# Patient Record
Sex: FEMALE | Race: WHITE | Employment: OTHER | URBAN - METROPOLITAN AREA
[De-identification: names, ages, dates, MRNs, and addresses within clinical notes are randomized per-mention and may not be internally consistent; named-entity substitution may affect disease eponyms.]

---

## 2017-07-11 PROBLEM — Z71.2 ENCOUNTER TO DISCUSS TEST RESULTS: Status: ACTIVE | Noted: 2017-07-11

## 2017-07-11 PROBLEM — N28.89 LEFT KIDNEY MASS: Status: ACTIVE | Noted: 2017-07-11

## 2018-05-01 PROBLEM — N17.9 AKI (ACUTE KIDNEY INJURY) (HCC): Status: ACTIVE | Noted: 2018-05-01

## 2018-05-01 PROBLEM — E86.0 DEHYDRATION: Status: ACTIVE | Noted: 2018-05-01

## 2018-05-31 PROBLEM — E86.0 DEHYDRATION: Status: RESOLVED | Noted: 2018-05-01 | Resolved: 2018-05-31

## 2018-06-05 ENCOUNTER — HOSPITAL ENCOUNTER (OUTPATIENT)
Dept: MRI IMAGING | Age: 78
Discharge: HOME | End: 2018-06-05
Attending: INTERNAL MEDICINE | Admitting: INTERNAL MEDICINE

## 2018-06-05 DIAGNOSIS — Z85.118 PERSONAL HISTORY OF OTHER MALIGNANT NEOPLASM OF BRONCHUS AND LUNG: ICD-10-CM

## 2018-06-05 DIAGNOSIS — Z85.118 PERSONAL HISTORY OF MALIGNANT NEOPLASM OF BRONCHUS AND LUNG: ICD-10-CM

## 2018-09-05 ENCOUNTER — HOSPITAL ENCOUNTER (OUTPATIENT)
Dept: CT IMAGING | Age: 78
Discharge: HOME | End: 2018-09-05
Payer: MEDICARE

## 2018-09-05 DIAGNOSIS — N28.89 RENAL MASS: ICD-10-CM

## 2018-09-05 DIAGNOSIS — C79.52 SECONDARY MALIGNANT NEOPLASM OF BONE AND BONE MARROW (HCC): ICD-10-CM

## 2018-09-05 DIAGNOSIS — Z85.118 PERSONAL HISTORY OF MALIGNANT NEOPLASM OF BRONCHUS AND LUNG: ICD-10-CM

## 2018-09-05 DIAGNOSIS — C79.51 SECONDARY MALIGNANT NEOPLASM OF BONE AND BONE MARROW (HCC): ICD-10-CM

## 2018-09-05 PROCEDURE — 71260 CT THORAX DX C+: CPT

## 2018-09-05 PROCEDURE — 70491 CT SOFT TISSUE NECK W/DYE: CPT

## 2018-09-05 PROCEDURE — 74177 CT ABD & PELVIS W/CONTRAST: CPT

## 2018-09-05 PROCEDURE — 6360000004 HC RX CONTRAST MEDICATION: Performed by: NURSE PRACTITIONER

## 2018-09-05 RX ADMIN — IOPAMIDOL 100 ML: 755 INJECTION, SOLUTION INTRAVENOUS at 10:16

## 2018-09-05 RX ADMIN — IOHEXOL 50 ML: 240 INJECTION, SOLUTION INTRATHECAL; INTRAVASCULAR; INTRAVENOUS; ORAL at 10:16

## 2018-09-26 PROBLEM — Z71.2 ENCOUNTER TO DISCUSS TEST RESULTS: Status: RESOLVED | Noted: 2017-07-11 | Resolved: 2018-09-26

## 2018-12-10 ENCOUNTER — HOSPITAL ENCOUNTER (OUTPATIENT)
Dept: CT IMAGING | Age: 78
Discharge: HOME | End: 2018-12-10
Payer: MEDICARE

## 2018-12-10 DIAGNOSIS — Z85.118 PERSONAL HISTORY OF MALIGNANT NEOPLASM OF BRONCHUS AND LUNG: ICD-10-CM

## 2018-12-10 DIAGNOSIS — N28.9 URETERAL SLUDGE: ICD-10-CM

## 2018-12-10 LAB
GFR AFRICAN AMERICAN: 31
GFR NON-AFRICAN AMERICAN: 26
PERFORMED ON: ABNORMAL
POC CREATININE: 1.9 MG/DL (ref 0.6–1.2)
POC SAMPLE TYPE: ABNORMAL

## 2018-12-10 PROCEDURE — 82565 ASSAY OF CREATININE: CPT

## 2018-12-10 PROCEDURE — 74176 CT ABD & PELVIS W/O CONTRAST: CPT

## 2018-12-10 PROCEDURE — 6360000004 HC RX CONTRAST MEDICATION: Performed by: INTERNAL MEDICINE

## 2018-12-10 PROCEDURE — 70490 CT SOFT TISSUE NECK W/O DYE: CPT

## 2018-12-10 PROCEDURE — 71250 CT THORAX DX C-: CPT

## 2018-12-10 RX ADMIN — IOHEXOL 50 ML: 240 INJECTION, SOLUTION INTRATHECAL; INTRAVASCULAR; INTRAVENOUS; ORAL at 11:27

## 2019-03-13 ENCOUNTER — HOSPITAL ENCOUNTER (OUTPATIENT)
Dept: CT IMAGING | Age: 79
Discharge: HOME | End: 2019-03-13
Payer: MEDICARE

## 2019-03-13 DIAGNOSIS — C34.90 MALIGNANT NEOPLASM OF BRONCHUS AND LUNG (HCC): ICD-10-CM

## 2019-03-13 PROCEDURE — 74176 CT ABD & PELVIS W/O CONTRAST: CPT

## 2019-03-13 PROCEDURE — 6360000004 HC RX CONTRAST MEDICATION: Performed by: INTERNAL MEDICINE

## 2019-03-13 RX ADMIN — IOHEXOL 50 ML: 240 INJECTION, SOLUTION INTRATHECAL; INTRAVASCULAR; INTRAVENOUS; ORAL at 10:44

## 2019-03-28 ENCOUNTER — HOSPITAL ENCOUNTER (OUTPATIENT)
Dept: NUCLEAR MEDICINE | Age: 79
Discharge: HOME | End: 2019-03-28
Payer: MEDICARE

## 2019-03-28 ENCOUNTER — HOSPITAL ENCOUNTER (OUTPATIENT)
Dept: MRI IMAGING | Age: 79
Discharge: HOME | End: 2019-03-28
Payer: MEDICARE

## 2019-03-28 DIAGNOSIS — C34.90 NONSQUAMOUS NONSMALL CELL NEOPLASM OF LUNG, UNSPECIFIED LATERALITY (HCC): ICD-10-CM

## 2019-03-28 DIAGNOSIS — C34.90 MALIGNANT NEOPLASM OF BRONCHUS AND LUNG (HCC): ICD-10-CM

## 2019-03-28 LAB
GFR AFRICAN AMERICAN: 44
GFR NON-AFRICAN AMERICAN: 37
PERFORMED ON: ABNORMAL
POC CREATININE: 1.4 MG/DL (ref 0.6–1.2)
POC SAMPLE TYPE: ABNORMAL

## 2019-03-28 PROCEDURE — A9579 GAD-BASE MR CONTRAST NOS,1ML: HCPCS | Performed by: INTERNAL MEDICINE

## 2019-03-28 PROCEDURE — 6360000004 HC RX CONTRAST MEDICATION: Performed by: INTERNAL MEDICINE

## 2019-03-28 PROCEDURE — 72157 MRI CHEST SPINE W/O & W/DYE: CPT

## 2019-03-28 PROCEDURE — A9503 TC99M MEDRONATE: HCPCS | Performed by: INTERNAL MEDICINE

## 2019-03-28 PROCEDURE — 2580000003 HC RX 258: Performed by: INTERNAL MEDICINE

## 2019-03-28 PROCEDURE — 82565 ASSAY OF CREATININE: CPT

## 2019-03-28 PROCEDURE — 78306 BONE IMAGING WHOLE BODY: CPT

## 2019-03-28 PROCEDURE — 3430000000 HC RX DIAGNOSTIC RADIOPHARMACEUTICAL: Performed by: INTERNAL MEDICINE

## 2019-03-28 RX ORDER — SODIUM CHLORIDE 0.9 % (FLUSH) 0.9 %
10 SYRINGE (ML) INJECTION 2 TIMES DAILY
Status: DISCONTINUED | OUTPATIENT
Start: 2019-03-28 | End: 2019-03-29 | Stop reason: HOSPADM

## 2019-03-28 RX ORDER — TC 99M MEDRONATE 20 MG/10ML
25 INJECTION, POWDER, LYOPHILIZED, FOR SOLUTION INTRAVENOUS
Status: COMPLETED | OUTPATIENT
Start: 2019-03-28 | End: 2019-03-28

## 2019-03-28 RX ADMIN — TC 99M MEDRONATE 25 MILLICURIE: 20 INJECTION, POWDER, LYOPHILIZED, FOR SOLUTION INTRAVENOUS at 10:50

## 2019-03-28 RX ADMIN — Medication 10 ML: at 10:50

## 2019-03-28 RX ADMIN — GADOTERIDOL 6 ML: 279.3 INJECTION, SOLUTION INTRAVENOUS at 12:08

## 2019-08-05 ENCOUNTER — HOSPITAL ENCOUNTER (OUTPATIENT)
Dept: CT IMAGING | Age: 79
Discharge: HOME | End: 2019-08-05
Payer: MEDICARE

## 2019-08-05 DIAGNOSIS — C34.90 MALIGNANT NEOPLASM OF BRONCHUS AND LUNG (HCC): ICD-10-CM

## 2019-08-05 DIAGNOSIS — C64.1 MALIGNANT NEOPLASM OF RIGHT KIDNEY, EXCEPT RENAL PELVIS (HCC): ICD-10-CM

## 2019-08-05 PROCEDURE — 74176 CT ABD & PELVIS W/O CONTRAST: CPT

## 2019-08-05 PROCEDURE — 6360000004 HC RX CONTRAST MEDICATION: Performed by: NURSE PRACTITIONER

## 2019-08-05 RX ADMIN — IOHEXOL 50 ML: 240 INJECTION, SOLUTION INTRATHECAL; INTRAVASCULAR; INTRAVENOUS; ORAL at 11:10

## 2019-10-18 ENCOUNTER — APPOINTMENT (OUTPATIENT)
Dept: CT IMAGING | Age: 79
DRG: 065 | End: 2019-10-18
Payer: MEDICARE

## 2019-10-18 ENCOUNTER — APPOINTMENT (OUTPATIENT)
Dept: GENERAL RADIOLOGY | Age: 79
DRG: 065 | End: 2019-10-18
Payer: MEDICARE

## 2019-10-18 ENCOUNTER — HOSPITAL ENCOUNTER (INPATIENT)
Age: 79
LOS: 3 days | Discharge: HOME | DRG: 065 | End: 2019-10-21
Attending: EMERGENCY MEDICINE | Admitting: INTERNAL MEDICINE
Payer: MEDICARE

## 2019-10-18 DIAGNOSIS — R29.898 LEFT ARM WEAKNESS: ICD-10-CM

## 2019-10-18 DIAGNOSIS — I63.9 CEREBROVASCULAR ACCIDENT (CVA), UNSPECIFIED MECHANISM (HCC): Primary | ICD-10-CM

## 2019-10-18 LAB
A/G RATIO: 1.2 (ref 1.1–2.2)
ALBUMIN SERPL-MCNC: 3.3 G/DL (ref 3.4–5)
ALP BLD-CCNC: 97 U/L (ref 40–129)
ALT SERPL-CCNC: 15 U/L (ref 10–40)
ANION GAP SERPL CALCULATED.3IONS-SCNC: 10 MMOL/L (ref 3–16)
ANION GAP SERPL CALCULATED.3IONS-SCNC: 15 MMOL/L (ref 3–16)
AST SERPL-CCNC: 17 U/L (ref 15–37)
BASOPHILS ABSOLUTE: 0.1 K/UL (ref 0–0.2)
BASOPHILS RELATIVE PERCENT: 0.7 %
BILIRUB SERPL-MCNC: <0.2 MG/DL (ref 0–1)
BILIRUBIN URINE: NEGATIVE
BLOOD, URINE: NEGATIVE
BUN BLDV-MCNC: 36 MG/DL (ref 7–20)
BUN BLDV-MCNC: 38 MG/DL (ref 7–20)
CALCIUM IONIZED: 1.22 MMOL/L (ref 1.12–1.32)
CALCIUM SERPL-MCNC: 8 MG/DL (ref 8.3–10.6)
CALCIUM SERPL-MCNC: 8.9 MG/DL (ref 8.3–10.6)
CHLORIDE BLD-SCNC: 102 MMOL/L (ref 99–110)
CHLORIDE BLD-SCNC: 106 MMOL/L (ref 99–110)
CLARITY: CLEAR
CO2: 17 MMOL/L (ref 21–32)
CO2: 20 MMOL/L (ref 21–32)
CO2: 20 MMOL/L (ref 21–32)
COLOR: YELLOW
CREAT SERPL-MCNC: 1.3 MG/DL (ref 0.6–1.2)
CREAT SERPL-MCNC: 1.4 MG/DL (ref 0.6–1.2)
EKG ATRIAL RATE: 79 BPM
EKG DIAGNOSIS: NORMAL
EKG P AXIS: 64 DEGREES
EKG P-R INTERVAL: 144 MS
EKG Q-T INTERVAL: 404 MS
EKG QRS DURATION: 72 MS
EKG QTC CALCULATION (BAZETT): 463 MS
EKG R AXIS: 22 DEGREES
EKG T AXIS: 54 DEGREES
EKG VENTRICULAR RATE: 79 BPM
EOSINOPHILS ABSOLUTE: 1 K/UL (ref 0–0.6)
EOSINOPHILS RELATIVE PERCENT: 14.3 %
GFR AFRICAN AMERICAN: 41
GFR AFRICAN AMERICAN: 44
GFR AFRICAN AMERICAN: 48
GFR NON-AFRICAN AMERICAN: 34
GFR NON-AFRICAN AMERICAN: 36
GFR NON-AFRICAN AMERICAN: 40
GLOBULIN: 2.8 G/DL
GLUCOSE BLD-MCNC: 133 MG/DL (ref 70–99)
GLUCOSE BLD-MCNC: 147 MG/DL (ref 70–99)
GLUCOSE BLD-MCNC: 81 MG/DL (ref 70–99)
GLUCOSE URINE: NEGATIVE MG/DL
HCT VFR BLD CALC: 30.5 % (ref 36–48)
HEMOGLOBIN: 10.2 G/DL (ref 12–16)
INR BLD: 1.03 (ref 0.86–1.14)
KETONES, URINE: NEGATIVE MG/DL
LEUKOCYTE ESTERASE, URINE: NEGATIVE
LYMPHOCYTES ABSOLUTE: 0.7 K/UL (ref 1–5.1)
LYMPHOCYTES RELATIVE PERCENT: 10.1 %
MCH RBC QN AUTO: 29.3 PG (ref 26–34)
MCHC RBC AUTO-ENTMCNC: 33.4 G/DL (ref 31–36)
MCV RBC AUTO: 87.8 FL (ref 80–100)
MICROSCOPIC EXAMINATION: NORMAL
MONOCYTES ABSOLUTE: 0.5 K/UL (ref 0–1.3)
MONOCYTES RELATIVE PERCENT: 6.8 %
NEUTROPHILS ABSOLUTE: 4.9 K/UL (ref 1.7–7.7)
NEUTROPHILS RELATIVE PERCENT: 68.1 %
NITRITE, URINE: NEGATIVE
PDW BLD-RTO: 14 % (ref 12.4–15.4)
PERFORMED ON: ABNORMAL
PH UA: 6 (ref 5–8)
PLATELET # BLD: 239 K/UL (ref 135–450)
PMV BLD AUTO: 7.3 FL (ref 5–10.5)
POC ANION GAP: 7 (ref 10–20)
POC BUN: 37 MG/DL (ref 7–18)
POC CHLORIDE: 112 MMOL/L (ref 99–110)
POC CREATININE: 1.5 MG/DL (ref 0.6–1.2)
POC POTASSIUM: 5 MMOL/L (ref 3.5–5.1)
POC SAMPLE TYPE: ABNORMAL
POC SODIUM: 139 MMOL/L (ref 136–145)
POTASSIUM REFLEX MAGNESIUM: 4.6 MMOL/L (ref 3.5–5.1)
POTASSIUM REFLEX MAGNESIUM: 5 MMOL/L (ref 3.5–5.1)
PROTEIN UA: NEGATIVE MG/DL
PROTHROMBIN TIME: 11.7 SEC (ref 9.8–13)
RBC # BLD: 3.47 M/UL (ref 4–5.2)
SODIUM BLD-SCNC: 134 MMOL/L (ref 136–145)
SODIUM BLD-SCNC: 136 MMOL/L (ref 136–145)
SPECIFIC GRAVITY UA: 1.02 (ref 1–1.03)
TOTAL PROTEIN: 6.1 G/DL (ref 6.4–8.2)
TROPONIN: <0.01 NG/ML
URINE REFLEX TO CULTURE: NORMAL
URINE TYPE: NORMAL
UROBILINOGEN, URINE: 0.2 E.U./DL
WBC # BLD: 7.1 K/UL (ref 4–11)

## 2019-10-18 PROCEDURE — 80047 BASIC METABLC PNL IONIZED CA: CPT

## 2019-10-18 PROCEDURE — 99291 CRITICAL CARE FIRST HOUR: CPT

## 2019-10-18 PROCEDURE — 70496 CT ANGIOGRAPHY HEAD: CPT

## 2019-10-18 PROCEDURE — 93005 ELECTROCARDIOGRAM TRACING: CPT | Performed by: EMERGENCY MEDICINE

## 2019-10-18 PROCEDURE — 70450 CT HEAD/BRAIN W/O DYE: CPT

## 2019-10-18 PROCEDURE — 80061 LIPID PANEL: CPT

## 2019-10-18 PROCEDURE — 6370000000 HC RX 637 (ALT 250 FOR IP): Performed by: STUDENT IN AN ORGANIZED HEALTH CARE EDUCATION/TRAINING PROGRAM

## 2019-10-18 PROCEDURE — 80053 COMPREHEN METABOLIC PANEL: CPT

## 2019-10-18 PROCEDURE — 83036 HEMOGLOBIN GLYCOSYLATED A1C: CPT

## 2019-10-18 PROCEDURE — 6360000002 HC RX W HCPCS: Performed by: STUDENT IN AN ORGANIZED HEALTH CARE EDUCATION/TRAINING PROGRAM

## 2019-10-18 PROCEDURE — 85610 PROTHROMBIN TIME: CPT

## 2019-10-18 PROCEDURE — 1200000000 HC SEMI PRIVATE

## 2019-10-18 PROCEDURE — 6360000004 HC RX CONTRAST MEDICATION: Performed by: EMERGENCY MEDICINE

## 2019-10-18 PROCEDURE — 36415 COLL VENOUS BLD VENIPUNCTURE: CPT

## 2019-10-18 PROCEDURE — 81003 URINALYSIS AUTO W/O SCOPE: CPT

## 2019-10-18 PROCEDURE — 51798 US URINE CAPACITY MEASURE: CPT

## 2019-10-18 PROCEDURE — 85025 COMPLETE CBC W/AUTO DIFF WBC: CPT

## 2019-10-18 PROCEDURE — 2580000003 HC RX 258: Performed by: STUDENT IN AN ORGANIZED HEALTH CARE EDUCATION/TRAINING PROGRAM

## 2019-10-18 PROCEDURE — 84484 ASSAY OF TROPONIN QUANT: CPT

## 2019-10-18 PROCEDURE — 71045 X-RAY EXAM CHEST 1 VIEW: CPT

## 2019-10-18 PROCEDURE — 70498 CT ANGIOGRAPHY NECK: CPT

## 2019-10-18 RX ORDER — LABETALOL 20 MG/4 ML (5 MG/ML) INTRAVENOUS SYRINGE
10 EVERY 6 HOURS PRN
Status: DISCONTINUED | OUTPATIENT
Start: 2019-10-18 | End: 2019-10-21 | Stop reason: HOSPADM

## 2019-10-18 RX ORDER — PROCHLORPERAZINE MALEATE 10 MG
10 TABLET ORAL EVERY 8 HOURS PRN
Status: ON HOLD | COMMUNITY
End: 2020-10-01 | Stop reason: HOSPADM

## 2019-10-18 RX ORDER — SODIUM CHLORIDE 0.9 % (FLUSH) 0.9 %
10 SYRINGE (ML) INJECTION PRN
Status: DISCONTINUED | OUTPATIENT
Start: 2019-10-18 | End: 2019-10-21 | Stop reason: HOSPADM

## 2019-10-18 RX ORDER — ACETAMINOPHEN 325 MG/1
650 TABLET ORAL EVERY 4 HOURS PRN
Status: DISCONTINUED | OUTPATIENT
Start: 2019-10-18 | End: 2019-10-21 | Stop reason: HOSPADM

## 2019-10-18 RX ORDER — SODIUM CHLORIDE 0.9 % (FLUSH) 0.9 %
10 SYRINGE (ML) INJECTION EVERY 12 HOURS SCHEDULED
Status: DISCONTINUED | OUTPATIENT
Start: 2019-10-18 | End: 2019-10-21 | Stop reason: HOSPADM

## 2019-10-18 RX ORDER — SODIUM CHLORIDE 9 MG/ML
INJECTION, SOLUTION INTRAVENOUS CONTINUOUS
Status: DISCONTINUED | OUTPATIENT
Start: 2019-10-18 | End: 2019-10-20

## 2019-10-18 RX ORDER — LORAZEPAM 0.5 MG/1
0.5 TABLET ORAL EVERY 8 HOURS PRN
Status: ON HOLD | COMMUNITY
End: 2020-10-16 | Stop reason: HOSPADM

## 2019-10-18 RX ORDER — OXYCODONE HYDROCHLORIDE AND ACETAMINOPHEN 5; 325 MG/1; MG/1
1 TABLET ORAL EVERY 8 HOURS PRN
Status: ON HOLD | COMMUNITY
End: 2020-10-16 | Stop reason: HOSPADM

## 2019-10-18 RX ORDER — HEPARIN SODIUM 5000 [USP'U]/ML
5000 INJECTION, SOLUTION INTRAVENOUS; SUBCUTANEOUS EVERY 8 HOURS SCHEDULED
Status: DISCONTINUED | OUTPATIENT
Start: 2019-10-18 | End: 2019-10-21 | Stop reason: HOSPADM

## 2019-10-18 RX ORDER — SIMVASTATIN 10 MG
10 TABLET ORAL NIGHTLY
Status: DISCONTINUED | OUTPATIENT
Start: 2019-10-18 | End: 2019-10-21 | Stop reason: HOSPADM

## 2019-10-18 RX ORDER — ONDANSETRON 2 MG/ML
4 INJECTION INTRAMUSCULAR; INTRAVENOUS EVERY 6 HOURS PRN
Status: DISCONTINUED | OUTPATIENT
Start: 2019-10-18 | End: 2019-10-21 | Stop reason: HOSPADM

## 2019-10-18 RX ORDER — ASPIRIN 325 MG
650 TABLET ORAL EVERY 12 HOURS PRN
Status: ON HOLD | COMMUNITY
End: 2019-10-21 | Stop reason: HOSPADM

## 2019-10-18 RX ORDER — CITALOPRAM 20 MG/1
40 TABLET ORAL NIGHTLY
Status: DISCONTINUED | OUTPATIENT
Start: 2019-10-18 | End: 2019-10-21 | Stop reason: HOSPADM

## 2019-10-18 RX ADMIN — IOPAMIDOL 80 ML: 755 INJECTION, SOLUTION INTRAVENOUS at 16:14

## 2019-10-18 RX ADMIN — ASPIRIN 325 MG: 325 TABLET, DELAYED RELEASE ORAL at 20:35

## 2019-10-18 RX ADMIN — CITALOPRAM HYDROBROMIDE 40 MG: 20 TABLET ORAL at 20:08

## 2019-10-18 RX ADMIN — SIMVASTATIN 10 MG: 10 TABLET, FILM COATED ORAL at 20:35

## 2019-10-18 RX ADMIN — HEPARIN SODIUM 5000 UNITS: 5000 INJECTION INTRAVENOUS; SUBCUTANEOUS at 20:08

## 2019-10-18 RX ADMIN — SODIUM CHLORIDE: 0.9 INJECTION, SOLUTION INTRAVENOUS at 20:34

## 2019-10-19 ENCOUNTER — APPOINTMENT (OUTPATIENT)
Dept: MRI IMAGING | Age: 79
DRG: 065 | End: 2019-10-19
Payer: MEDICARE

## 2019-10-19 LAB
ANION GAP SERPL CALCULATED.3IONS-SCNC: 9 MMOL/L (ref 3–16)
BASOPHILS ABSOLUTE: 0.1 K/UL (ref 0–0.2)
BASOPHILS RELATIVE PERCENT: 0.9 %
BUN BLDV-MCNC: 33 MG/DL (ref 7–20)
CALCIUM SERPL-MCNC: 8.9 MG/DL (ref 8.3–10.6)
CHLORIDE BLD-SCNC: 110 MMOL/L (ref 99–110)
CO2: 21 MMOL/L (ref 21–32)
CREAT SERPL-MCNC: 1.3 MG/DL (ref 0.6–1.2)
EOSINOPHILS ABSOLUTE: 1.2 K/UL (ref 0–0.6)
EOSINOPHILS RELATIVE PERCENT: 18.2 %
GFR AFRICAN AMERICAN: 48
GFR NON-AFRICAN AMERICAN: 40
GLUCOSE BLD-MCNC: 99 MG/DL (ref 70–99)
HCT VFR BLD CALC: 32.8 % (ref 36–48)
HEMOGLOBIN: 10.6 G/DL (ref 12–16)
LYMPHOCYTES ABSOLUTE: 0.8 K/UL (ref 1–5.1)
LYMPHOCYTES RELATIVE PERCENT: 12.9 %
MCH RBC QN AUTO: 29.2 PG (ref 26–34)
MCHC RBC AUTO-ENTMCNC: 32.2 G/DL (ref 31–36)
MCV RBC AUTO: 90.7 FL (ref 80–100)
MONOCYTES ABSOLUTE: 0.5 K/UL (ref 0–1.3)
MONOCYTES RELATIVE PERCENT: 8.1 %
NEUTROPHILS ABSOLUTE: 3.9 K/UL (ref 1.7–7.7)
NEUTROPHILS RELATIVE PERCENT: 59.9 %
PDW BLD-RTO: 14.1 % (ref 12.4–15.4)
PLATELET # BLD: 221 K/UL (ref 135–450)
PMV BLD AUTO: 7.2 FL (ref 5–10.5)
POTASSIUM REFLEX MAGNESIUM: 4.7 MMOL/L (ref 3.5–5.1)
RBC # BLD: 3.62 M/UL (ref 4–5.2)
SODIUM BLD-SCNC: 140 MMOL/L (ref 136–145)
WBC # BLD: 6.5 K/UL (ref 4–11)

## 2019-10-19 PROCEDURE — 36415 COLL VENOUS BLD VENIPUNCTURE: CPT

## 2019-10-19 PROCEDURE — 6370000000 HC RX 637 (ALT 250 FOR IP)

## 2019-10-19 PROCEDURE — 70551 MRI BRAIN STEM W/O DYE: CPT

## 2019-10-19 PROCEDURE — 2580000003 HC RX 258: Performed by: STUDENT IN AN ORGANIZED HEALTH CARE EDUCATION/TRAINING PROGRAM

## 2019-10-19 PROCEDURE — 1200000000 HC SEMI PRIVATE

## 2019-10-19 PROCEDURE — 85025 COMPLETE CBC W/AUTO DIFF WBC: CPT

## 2019-10-19 PROCEDURE — C8923 2D TTE W OR W/O FOL W/CON,CO: HCPCS

## 2019-10-19 PROCEDURE — 6370000000 HC RX 637 (ALT 250 FOR IP): Performed by: STUDENT IN AN ORGANIZED HEALTH CARE EDUCATION/TRAINING PROGRAM

## 2019-10-19 PROCEDURE — 80048 BASIC METABOLIC PNL TOTAL CA: CPT

## 2019-10-19 PROCEDURE — 6360000002 HC RX W HCPCS: Performed by: STUDENT IN AN ORGANIZED HEALTH CARE EDUCATION/TRAINING PROGRAM

## 2019-10-19 PROCEDURE — 6370000000 HC RX 637 (ALT 250 FOR IP): Performed by: INTERNAL MEDICINE

## 2019-10-19 RX ORDER — ACETAMINOPHEN, ASPIRIN AND CAFFEINE 250; 250; 65 MG/1; MG/1; MG/1
1 TABLET, FILM COATED ORAL ONCE
Status: DISCONTINUED | OUTPATIENT
Start: 2019-10-19 | End: 2019-10-21 | Stop reason: HOSPADM

## 2019-10-19 RX ORDER — FLUCONAZOLE 100 MG/1
150 TABLET ORAL ONCE
Status: COMPLETED | OUTPATIENT
Start: 2019-10-19 | End: 2019-10-19

## 2019-10-19 RX ORDER — TRAMADOL HYDROCHLORIDE 50 MG/1
25 TABLET ORAL EVERY 6 HOURS PRN
Status: DISCONTINUED | OUTPATIENT
Start: 2019-10-19 | End: 2019-10-21 | Stop reason: HOSPADM

## 2019-10-19 RX ORDER — NIFEDIPINE 30 MG/1
30 TABLET, FILM COATED, EXTENDED RELEASE ORAL DAILY
Status: DISCONTINUED | OUTPATIENT
Start: 2019-10-19 | End: 2019-10-21 | Stop reason: HOSPADM

## 2019-10-19 RX ADMIN — HEPARIN SODIUM 5000 UNITS: 5000 INJECTION INTRAVENOUS; SUBCUTANEOUS at 21:16

## 2019-10-19 RX ADMIN — ACETAMINOPHEN 650 MG: 325 TABLET ORAL at 05:35

## 2019-10-19 RX ADMIN — ASPIRIN 325 MG: 325 TABLET, DELAYED RELEASE ORAL at 11:58

## 2019-10-19 RX ADMIN — Medication 10 ML: at 14:10

## 2019-10-19 RX ADMIN — HEPARIN SODIUM 5000 UNITS: 5000 INJECTION INTRAVENOUS; SUBCUTANEOUS at 13:54

## 2019-10-19 RX ADMIN — NIFEDIPINE 30 MG: 30 TABLET, EXTENDED RELEASE ORAL at 12:12

## 2019-10-19 RX ADMIN — CITALOPRAM HYDROBROMIDE 40 MG: 20 TABLET ORAL at 20:29

## 2019-10-19 RX ADMIN — TRAMADOL HYDROCHLORIDE 25 MG: 50 TABLET, FILM COATED ORAL at 12:17

## 2019-10-19 RX ADMIN — HEPARIN SODIUM 5000 UNITS: 5000 INJECTION INTRAVENOUS; SUBCUTANEOUS at 05:35

## 2019-10-19 RX ADMIN — FLUCONAZOLE 150 MG: 100 TABLET ORAL at 13:55

## 2019-10-19 RX ADMIN — SIMVASTATIN 10 MG: 10 TABLET, FILM COATED ORAL at 20:29

## 2019-10-19 ASSESSMENT — ENCOUNTER SYMPTOMS
DIARRHEA: 0
CONSTIPATION: 0
STRIDOR: 0
NAUSEA: 0
SHORTNESS OF BREATH: 0
APNEA: 0
VOMITING: 0
COLOR CHANGE: 0
EYE REDNESS: 0
ABDOMINAL PAIN: 0
COUGH: 0
EYE PAIN: 0
CHEST TIGHTNESS: 0
TROUBLE SWALLOWING: 0
ABDOMINAL DISTENTION: 0
WHEEZING: 0
CHOKING: 0
SORE THROAT: 0

## 2019-10-19 ASSESSMENT — PAIN DESCRIPTION - PROGRESSION
CLINICAL_PROGRESSION: GRADUALLY IMPROVING
CLINICAL_PROGRESSION: NOT CHANGED

## 2019-10-19 ASSESSMENT — PAIN SCALES - GENERAL
PAINLEVEL_OUTOF10: 3
PAINLEVEL_OUTOF10: 4
PAINLEVEL_OUTOF10: 1
PAINLEVEL_OUTOF10: 3

## 2019-10-19 ASSESSMENT — PAIN DESCRIPTION - LOCATION
LOCATION: HEAD
LOCATION: HEAD

## 2019-10-19 ASSESSMENT — PAIN DESCRIPTION - PAIN TYPE
TYPE: ACUTE PAIN
TYPE: ACUTE PAIN

## 2019-10-19 ASSESSMENT — PAIN DESCRIPTION - ORIENTATION: ORIENTATION: LEFT;RIGHT

## 2019-10-19 ASSESSMENT — PAIN - FUNCTIONAL ASSESSMENT
PAIN_FUNCTIONAL_ASSESSMENT: ACTIVITIES ARE NOT PREVENTED
PAIN_FUNCTIONAL_ASSESSMENT: ACTIVITIES ARE NOT PREVENTED

## 2019-10-19 ASSESSMENT — PAIN DESCRIPTION - FREQUENCY
FREQUENCY: INTERMITTENT
FREQUENCY: INTERMITTENT

## 2019-10-19 ASSESSMENT — PAIN DESCRIPTION - DESCRIPTORS
DESCRIPTORS: HEADACHE
DESCRIPTORS: ACHING

## 2019-10-19 ASSESSMENT — PAIN DESCRIPTION - ONSET
ONSET: ON-GOING
ONSET: ON-GOING

## 2019-10-20 ENCOUNTER — APPOINTMENT (OUTPATIENT)
Dept: CT IMAGING | Age: 79
DRG: 065 | End: 2019-10-20
Payer: MEDICARE

## 2019-10-20 LAB
ANION GAP SERPL CALCULATED.3IONS-SCNC: 15 MMOL/L (ref 3–16)
BASOPHILS ABSOLUTE: 0 K/UL (ref 0–0.2)
BASOPHILS RELATIVE PERCENT: 0.8 %
BUN BLDV-MCNC: 22 MG/DL (ref 7–20)
CALCIUM SERPL-MCNC: 9.1 MG/DL (ref 8.3–10.6)
CHLORIDE BLD-SCNC: 106 MMOL/L (ref 99–110)
CHOLESTEROL, TOTAL: 129 MG/DL (ref 0–199)
CO2: 21 MMOL/L (ref 21–32)
CREAT SERPL-MCNC: 1.2 MG/DL (ref 0.6–1.2)
EOSINOPHILS ABSOLUTE: 1 K/UL (ref 0–0.6)
EOSINOPHILS RELATIVE PERCENT: 17.1 %
ESTIMATED AVERAGE GLUCOSE: 119.8 MG/DL
GFR AFRICAN AMERICAN: 53
GFR NON-AFRICAN AMERICAN: 44
GLUCOSE BLD-MCNC: 117 MG/DL (ref 70–99)
HBA1C MFR BLD: 5.8 %
HCT VFR BLD CALC: 33.4 % (ref 36–48)
HDLC SERPL-MCNC: 41 MG/DL (ref 40–60)
HEMOGLOBIN: 10.9 G/DL (ref 12–16)
LDL CHOLESTEROL CALCULATED: 66 MG/DL
LYMPHOCYTES ABSOLUTE: 0.8 K/UL (ref 1–5.1)
LYMPHOCYTES RELATIVE PERCENT: 14.6 %
MCH RBC QN AUTO: 28.7 PG (ref 26–34)
MCHC RBC AUTO-ENTMCNC: 32.7 G/DL (ref 31–36)
MCV RBC AUTO: 87.8 FL (ref 80–100)
MONOCYTES ABSOLUTE: 0.4 K/UL (ref 0–1.3)
MONOCYTES RELATIVE PERCENT: 7.9 %
NEUTROPHILS ABSOLUTE: 3.4 K/UL (ref 1.7–7.7)
NEUTROPHILS RELATIVE PERCENT: 59.6 %
PDW BLD-RTO: 14.2 % (ref 12.4–15.4)
PLATELET # BLD: 234 K/UL (ref 135–450)
PMV BLD AUTO: 6.9 FL (ref 5–10.5)
POTASSIUM REFLEX MAGNESIUM: 4.7 MMOL/L (ref 3.5–5.1)
RBC # BLD: 3.8 M/UL (ref 4–5.2)
SODIUM BLD-SCNC: 142 MMOL/L (ref 136–145)
TRIGL SERPL-MCNC: 112 MG/DL (ref 0–150)
VLDLC SERPL CALC-MCNC: 22 MG/DL
WBC # BLD: 5.7 K/UL (ref 4–11)

## 2019-10-20 PROCEDURE — 2580000003 HC RX 258: Performed by: STUDENT IN AN ORGANIZED HEALTH CARE EDUCATION/TRAINING PROGRAM

## 2019-10-20 PROCEDURE — 97162 PT EVAL MOD COMPLEX 30 MIN: CPT

## 2019-10-20 PROCEDURE — 97116 GAIT TRAINING THERAPY: CPT

## 2019-10-20 PROCEDURE — 6360000002 HC RX W HCPCS: Performed by: STUDENT IN AN ORGANIZED HEALTH CARE EDUCATION/TRAINING PROGRAM

## 2019-10-20 PROCEDURE — 97165 OT EVAL LOW COMPLEX 30 MIN: CPT

## 2019-10-20 PROCEDURE — 97530 THERAPEUTIC ACTIVITIES: CPT

## 2019-10-20 PROCEDURE — 36415 COLL VENOUS BLD VENIPUNCTURE: CPT

## 2019-10-20 PROCEDURE — 80048 BASIC METABOLIC PNL TOTAL CA: CPT

## 2019-10-20 PROCEDURE — 6370000000 HC RX 637 (ALT 250 FOR IP)

## 2019-10-20 PROCEDURE — 97535 SELF CARE MNGMENT TRAINING: CPT

## 2019-10-20 PROCEDURE — 70450 CT HEAD/BRAIN W/O DYE: CPT

## 2019-10-20 PROCEDURE — 6370000000 HC RX 637 (ALT 250 FOR IP): Performed by: STUDENT IN AN ORGANIZED HEALTH CARE EDUCATION/TRAINING PROGRAM

## 2019-10-20 PROCEDURE — 85025 COMPLETE CBC W/AUTO DIFF WBC: CPT

## 2019-10-20 PROCEDURE — 1200000000 HC SEMI PRIVATE

## 2019-10-20 PROCEDURE — 6370000000 HC RX 637 (ALT 250 FOR IP): Performed by: INTERNAL MEDICINE

## 2019-10-20 RX ORDER — UREA 10 %
6 LOTION (ML) TOPICAL NIGHTLY PRN
Status: DISCONTINUED | OUTPATIENT
Start: 2019-10-20 | End: 2019-10-21 | Stop reason: HOSPADM

## 2019-10-20 RX ADMIN — Medication 6 MG: at 22:15

## 2019-10-20 RX ADMIN — NIFEDIPINE 30 MG: 30 TABLET, EXTENDED RELEASE ORAL at 08:18

## 2019-10-20 RX ADMIN — Medication 10 ML: at 10:00

## 2019-10-20 RX ADMIN — HEPARIN SODIUM 5000 UNITS: 5000 INJECTION INTRAVENOUS; SUBCUTANEOUS at 14:04

## 2019-10-20 RX ADMIN — HEPARIN SODIUM 5000 UNITS: 5000 INJECTION INTRAVENOUS; SUBCUTANEOUS at 06:10

## 2019-10-20 RX ADMIN — HEPARIN SODIUM 5000 UNITS: 5000 INJECTION INTRAVENOUS; SUBCUTANEOUS at 22:15

## 2019-10-20 RX ADMIN — Medication 10 ML: at 08:20

## 2019-10-20 RX ADMIN — ASPIRIN 325 MG: 325 TABLET, DELAYED RELEASE ORAL at 08:18

## 2019-10-20 RX ADMIN — SIMVASTATIN 10 MG: 10 TABLET, FILM COATED ORAL at 20:30

## 2019-10-20 RX ADMIN — Medication 10 ML: at 21:29

## 2019-10-20 RX ADMIN — CITALOPRAM HYDROBROMIDE 40 MG: 20 TABLET ORAL at 20:30

## 2019-10-20 RX ADMIN — TRAMADOL HYDROCHLORIDE 25 MG: 50 TABLET, FILM COATED ORAL at 08:26

## 2019-10-20 ASSESSMENT — ENCOUNTER SYMPTOMS
CHEST TIGHTNESS: 0
TROUBLE SWALLOWING: 0
CONSTIPATION: 0
ABDOMINAL PAIN: 0
COUGH: 0
EYE REDNESS: 0
VOMITING: 0
CHOKING: 0
ABDOMINAL DISTENTION: 0
COLOR CHANGE: 0
EYE PAIN: 0
SORE THROAT: 0
STRIDOR: 0
DIARRHEA: 0
APNEA: 0
SHORTNESS OF BREATH: 0
NAUSEA: 0
WHEEZING: 0

## 2019-10-20 ASSESSMENT — PAIN SCALES - GENERAL
PAINLEVEL_OUTOF10: 4
PAINLEVEL_OUTOF10: 0

## 2019-10-20 ASSESSMENT — PAIN DESCRIPTION - PROGRESSION: CLINICAL_PROGRESSION: GRADUALLY IMPROVING

## 2019-10-20 ASSESSMENT — PAIN DESCRIPTION - ONSET: ONSET: ON-GOING

## 2019-10-20 ASSESSMENT — PAIN - FUNCTIONAL ASSESSMENT: PAIN_FUNCTIONAL_ASSESSMENT: ACTIVITIES ARE NOT PREVENTED

## 2019-10-20 ASSESSMENT — PAIN DESCRIPTION - PAIN TYPE: TYPE: ACUTE PAIN

## 2019-10-20 ASSESSMENT — PAIN DESCRIPTION - DESCRIPTORS: DESCRIPTORS: HEADACHE

## 2019-10-20 ASSESSMENT — PAIN DESCRIPTION - LOCATION: LOCATION: HEAD

## 2019-10-20 ASSESSMENT — PAIN DESCRIPTION - FREQUENCY: FREQUENCY: INTERMITTENT

## 2019-10-20 ASSESSMENT — PAIN DESCRIPTION - ORIENTATION: ORIENTATION: UPPER

## 2019-10-21 VITALS
DIASTOLIC BLOOD PRESSURE: 76 MMHG | TEMPERATURE: 98.3 F | OXYGEN SATURATION: 93 % | HEART RATE: 81 BPM | BODY MASS INDEX: 27.95 KG/M2 | WEIGHT: 151.9 LBS | HEIGHT: 62 IN | SYSTOLIC BLOOD PRESSURE: 106 MMHG | RESPIRATION RATE: 18 BRPM

## 2019-10-21 LAB
ANION GAP SERPL CALCULATED.3IONS-SCNC: 7 MMOL/L (ref 3–16)
BASOPHILS ABSOLUTE: 0 K/UL (ref 0–0.2)
BASOPHILS RELATIVE PERCENT: 0.8 %
BUN BLDV-MCNC: 21 MG/DL (ref 7–20)
CALCIUM SERPL-MCNC: 9.4 MG/DL (ref 8.3–10.6)
CHLORIDE BLD-SCNC: 102 MMOL/L (ref 99–110)
CO2: 25 MMOL/L (ref 21–32)
CREAT SERPL-MCNC: 1 MG/DL (ref 0.6–1.2)
EOSINOPHILS ABSOLUTE: 1.1 K/UL (ref 0–0.6)
EOSINOPHILS RELATIVE PERCENT: 18.4 %
GFR AFRICAN AMERICAN: >60
GFR NON-AFRICAN AMERICAN: 54
GLUCOSE BLD-MCNC: 102 MG/DL (ref 70–99)
HCT VFR BLD CALC: 32.5 % (ref 36–48)
HEMOGLOBIN: 10.6 G/DL (ref 12–16)
LYMPHOCYTES ABSOLUTE: 0.9 K/UL (ref 1–5.1)
LYMPHOCYTES RELATIVE PERCENT: 15.2 %
MCH RBC QN AUTO: 28.8 PG (ref 26–34)
MCHC RBC AUTO-ENTMCNC: 32.6 G/DL (ref 31–36)
MCV RBC AUTO: 88.3 FL (ref 80–100)
MONOCYTES ABSOLUTE: 0.5 K/UL (ref 0–1.3)
MONOCYTES RELATIVE PERCENT: 8.6 %
NEUTROPHILS ABSOLUTE: 3.3 K/UL (ref 1.7–7.7)
NEUTROPHILS RELATIVE PERCENT: 57 %
PDW BLD-RTO: 14.2 % (ref 12.4–15.4)
PLATELET # BLD: 272 K/UL (ref 135–450)
PMV BLD AUTO: 7.5 FL (ref 5–10.5)
POTASSIUM REFLEX MAGNESIUM: 4.2 MMOL/L (ref 3.5–5.1)
RBC # BLD: 3.68 M/UL (ref 4–5.2)
SODIUM BLD-SCNC: 134 MMOL/L (ref 136–145)
WBC # BLD: 5.9 K/UL (ref 4–11)

## 2019-10-21 PROCEDURE — 6370000000 HC RX 637 (ALT 250 FOR IP)

## 2019-10-21 PROCEDURE — 85025 COMPLETE CBC W/AUTO DIFF WBC: CPT

## 2019-10-21 PROCEDURE — 97116 GAIT TRAINING THERAPY: CPT

## 2019-10-21 PROCEDURE — 6360000002 HC RX W HCPCS: Performed by: STUDENT IN AN ORGANIZED HEALTH CARE EDUCATION/TRAINING PROGRAM

## 2019-10-21 PROCEDURE — 97530 THERAPEUTIC ACTIVITIES: CPT

## 2019-10-21 PROCEDURE — 2580000003 HC RX 258: Performed by: STUDENT IN AN ORGANIZED HEALTH CARE EDUCATION/TRAINING PROGRAM

## 2019-10-21 PROCEDURE — 6360000002 HC RX W HCPCS: Performed by: INTERNAL MEDICINE

## 2019-10-21 PROCEDURE — 80048 BASIC METABOLIC PNL TOTAL CA: CPT

## 2019-10-21 PROCEDURE — 6370000000 HC RX 637 (ALT 250 FOR IP): Performed by: STUDENT IN AN ORGANIZED HEALTH CARE EDUCATION/TRAINING PROGRAM

## 2019-10-21 RX ORDER — ATORVASTATIN CALCIUM 40 MG/1
40 TABLET, FILM COATED ORAL DAILY
Qty: 30 TABLET | Refills: 3 | Status: SHIPPED | OUTPATIENT
Start: 2019-10-21 | End: 2021-08-05 | Stop reason: CLARIF

## 2019-10-21 RX ORDER — HEPARIN SODIUM (PORCINE) LOCK FLUSH IV SOLN 100 UNIT/ML 100 UNIT/ML
500 SOLUTION INTRAVENOUS PRN
Status: DISCONTINUED | OUTPATIENT
Start: 2019-10-21 | End: 2019-10-21 | Stop reason: HOSPADM

## 2019-10-21 RX ORDER — ASPIRIN 325 MG
325 TABLET, DELAYED RELEASE (ENTERIC COATED) ORAL DAILY
Qty: 30 TABLET | Refills: 0 | Status: ON HOLD | OUTPATIENT
Start: 2019-10-21 | End: 2020-10-01

## 2019-10-21 RX ORDER — CLOPIDOGREL BISULFATE 75 MG/1
75 TABLET ORAL DAILY
Qty: 30 TABLET | Refills: 0 | Status: SHIPPED | OUTPATIENT
Start: 2019-10-21 | End: 2021-08-05 | Stop reason: CLARIF

## 2019-10-21 RX ORDER — NIFEDIPINE 30 MG/1
30 TABLET, FILM COATED, EXTENDED RELEASE ORAL DAILY
Qty: 30 TABLET | Refills: 0 | Status: ON HOLD | OUTPATIENT
Start: 2019-10-21 | End: 2020-09-27 | Stop reason: CLARIF

## 2019-10-21 RX ORDER — ASPIRIN 81 MG/1
81 TABLET ORAL DAILY
Qty: 30 TABLET | Refills: 0 | Status: SHIPPED | OUTPATIENT
Start: 2019-10-21 | End: 2019-10-21 | Stop reason: HOSPADM

## 2019-10-21 RX ADMIN — HEPARIN SODIUM (PORCINE) LOCK FLUSH IV SOLN 100 UNIT/ML 500 UNITS: 100 SOLUTION at 16:24

## 2019-10-21 RX ADMIN — HEPARIN SODIUM 5000 UNITS: 5000 INJECTION INTRAVENOUS; SUBCUTANEOUS at 06:02

## 2019-10-21 RX ADMIN — NIFEDIPINE 30 MG: 30 TABLET, EXTENDED RELEASE ORAL at 07:48

## 2019-10-21 RX ADMIN — Medication 10 ML: at 07:49

## 2019-10-21 RX ADMIN — ASPIRIN 325 MG: 325 TABLET, DELAYED RELEASE ORAL at 07:49

## 2019-10-21 RX ADMIN — HEPARIN SODIUM 5000 UNITS: 5000 INJECTION INTRAVENOUS; SUBCUTANEOUS at 14:52

## 2019-10-21 ASSESSMENT — ENCOUNTER SYMPTOMS
APNEA: 0
DIARRHEA: 0
EYE REDNESS: 0
TROUBLE SWALLOWING: 0
WHEEZING: 0
NAUSEA: 0
ABDOMINAL DISTENTION: 0
COLOR CHANGE: 0
CHEST TIGHTNESS: 0
SORE THROAT: 0
STRIDOR: 0
COUGH: 0
ABDOMINAL PAIN: 0
SHORTNESS OF BREATH: 0
VOMITING: 0
CHOKING: 0
CONSTIPATION: 0
EYE PAIN: 0

## 2019-10-21 ASSESSMENT — PAIN SCALES - GENERAL
PAINLEVEL_OUTOF10: 0
PAINLEVEL_OUTOF10: 0

## 2019-10-22 ENCOUNTER — APPOINTMENT (OUTPATIENT)
Dept: CT IMAGING | Age: 79
DRG: 065 | End: 2019-10-22
Payer: MEDICARE

## 2019-10-22 ENCOUNTER — HOSPITAL ENCOUNTER (INPATIENT)
Age: 79
LOS: 1 days | Discharge: HOME HEALTH | DRG: 065 | End: 2019-10-25
Attending: EMERGENCY MEDICINE | Admitting: INTERNAL MEDICINE
Payer: MEDICARE

## 2019-10-22 DIAGNOSIS — R29.898 WEAKNESS OF BOTH LOWER EXTREMITIES: Primary | ICD-10-CM

## 2019-10-22 DIAGNOSIS — N17.9 AKI (ACUTE KIDNEY INJURY) (HCC): ICD-10-CM

## 2019-10-22 LAB
ANION GAP SERPL CALCULATED.3IONS-SCNC: 13 MMOL/L (ref 3–16)
BASOPHILS ABSOLUTE: 0.1 K/UL (ref 0–0.2)
BASOPHILS RELATIVE PERCENT: 1.1 %
BUN BLDV-MCNC: 33 MG/DL (ref 7–20)
CALCIUM SERPL-MCNC: 9.4 MG/DL (ref 8.3–10.6)
CHLORIDE BLD-SCNC: 104 MMOL/L (ref 99–110)
CO2: 20 MMOL/L (ref 21–32)
CREAT SERPL-MCNC: 1.6 MG/DL (ref 0.6–1.2)
EOSINOPHILS ABSOLUTE: 1 K/UL (ref 0–0.6)
EOSINOPHILS RELATIVE PERCENT: 14.1 %
GFR AFRICAN AMERICAN: 38
GFR NON-AFRICAN AMERICAN: 31
GLUCOSE BLD-MCNC: 133 MG/DL (ref 70–99)
HCT VFR BLD CALC: 33.8 % (ref 36–48)
HEMOGLOBIN: 11 G/DL (ref 12–16)
LYMPHOCYTES ABSOLUTE: 1 K/UL (ref 1–5.1)
LYMPHOCYTES RELATIVE PERCENT: 14.5 %
MAGNESIUM: 1.5 MG/DL (ref 1.8–2.4)
MCH RBC QN AUTO: 29 PG (ref 26–34)
MCHC RBC AUTO-ENTMCNC: 32.7 G/DL (ref 31–36)
MCV RBC AUTO: 88.9 FL (ref 80–100)
MONOCYTES ABSOLUTE: 0.7 K/UL (ref 0–1.3)
MONOCYTES RELATIVE PERCENT: 9.1 %
NEUTROPHILS ABSOLUTE: 4.4 K/UL (ref 1.7–7.7)
NEUTROPHILS RELATIVE PERCENT: 61.2 %
PDW BLD-RTO: 14.5 % (ref 12.4–15.4)
PHOSPHORUS: 4.8 MG/DL (ref 2.5–4.9)
PLATELET # BLD: 275 K/UL (ref 135–450)
PMV BLD AUTO: 7.3 FL (ref 5–10.5)
POTASSIUM SERPL-SCNC: 5.3 MMOL/L (ref 3.5–5.1)
RBC # BLD: 3.8 M/UL (ref 4–5.2)
SODIUM BLD-SCNC: 137 MMOL/L (ref 136–145)
TROPONIN: 0.07 NG/ML
WBC # BLD: 7.2 K/UL (ref 4–11)

## 2019-10-22 PROCEDURE — 83735 ASSAY OF MAGNESIUM: CPT

## 2019-10-22 PROCEDURE — 70450 CT HEAD/BRAIN W/O DYE: CPT

## 2019-10-22 PROCEDURE — 84484 ASSAY OF TROPONIN QUANT: CPT

## 2019-10-22 PROCEDURE — 85025 COMPLETE CBC W/AUTO DIFF WBC: CPT

## 2019-10-22 PROCEDURE — 80048 BASIC METABOLIC PNL TOTAL CA: CPT

## 2019-10-22 PROCEDURE — 36415 COLL VENOUS BLD VENIPUNCTURE: CPT

## 2019-10-22 PROCEDURE — 99285 EMERGENCY DEPT VISIT HI MDM: CPT

## 2019-10-22 PROCEDURE — 93005 ELECTROCARDIOGRAM TRACING: CPT | Performed by: EMERGENCY MEDICINE

## 2019-10-22 PROCEDURE — 84100 ASSAY OF PHOSPHORUS: CPT

## 2019-10-22 ASSESSMENT — ENCOUNTER SYMPTOMS
RESPIRATORY NEGATIVE: 1
EYES NEGATIVE: 1
GASTROINTESTINAL NEGATIVE: 1

## 2019-10-23 ENCOUNTER — APPOINTMENT (OUTPATIENT)
Dept: MRI IMAGING | Age: 79
DRG: 065 | End: 2019-10-23
Payer: MEDICARE

## 2019-10-23 PROBLEM — R20.2 TINGLING OF LEFT UPPER EXTREMITY: Status: ACTIVE | Noted: 2019-10-23

## 2019-10-23 PROBLEM — N17.9 ACUTE RENAL FAILURE (ARF) (HCC): Status: ACTIVE | Noted: 2019-10-23

## 2019-10-23 PROBLEM — R29.90 STROKE-LIKE SYMPTOM: Status: ACTIVE | Noted: 2019-10-23

## 2019-10-23 PROBLEM — R29.898 WEAKNESS OF BOTH LOWER EXTREMITIES: Status: ACTIVE | Noted: 2019-10-23

## 2019-10-23 LAB
ANION GAP SERPL CALCULATED.3IONS-SCNC: 12 MMOL/L (ref 3–16)
BASOPHILS ABSOLUTE: 0.1 K/UL (ref 0–0.2)
BASOPHILS RELATIVE PERCENT: 0.9 %
BILIRUBIN URINE: NEGATIVE
BLOOD, URINE: NEGATIVE
BUN BLDV-MCNC: 31 MG/DL (ref 7–20)
CALCIUM SERPL-MCNC: 8.8 MG/DL (ref 8.3–10.6)
CHLORIDE BLD-SCNC: 106 MMOL/L (ref 99–110)
CLARITY: CLEAR
CO2: 19 MMOL/L (ref 21–32)
COLOR: YELLOW
CREAT SERPL-MCNC: 1.3 MG/DL (ref 0.6–1.2)
EKG ATRIAL RATE: 83 BPM
EKG DIAGNOSIS: NORMAL
EKG P AXIS: 51 DEGREES
EKG P-R INTERVAL: 144 MS
EKG Q-T INTERVAL: 388 MS
EKG QRS DURATION: 74 MS
EKG QTC CALCULATION (BAZETT): 455 MS
EKG R AXIS: 15 DEGREES
EKG T AXIS: 43 DEGREES
EKG VENTRICULAR RATE: 83 BPM
EOSINOPHILS ABSOLUTE: 1 K/UL (ref 0–0.6)
EOSINOPHILS RELATIVE PERCENT: 15.4 %
GFR AFRICAN AMERICAN: 48
GFR NON-AFRICAN AMERICAN: 40
GLUCOSE BLD-MCNC: 105 MG/DL (ref 70–99)
GLUCOSE URINE: NEGATIVE MG/DL
HCT VFR BLD CALC: 32.8 % (ref 36–48)
HEMOGLOBIN: 10.4 G/DL (ref 12–16)
KETONES, URINE: NEGATIVE MG/DL
LEUKOCYTE ESTERASE, URINE: NEGATIVE
LYMPHOCYTES ABSOLUTE: 0.9 K/UL (ref 1–5.1)
LYMPHOCYTES RELATIVE PERCENT: 13.6 %
MCH RBC QN AUTO: 29.3 PG (ref 26–34)
MCHC RBC AUTO-ENTMCNC: 31.8 G/DL (ref 31–36)
MCV RBC AUTO: 92 FL (ref 80–100)
MICROSCOPIC EXAMINATION: NORMAL
MONOCYTES ABSOLUTE: 0.6 K/UL (ref 0–1.3)
MONOCYTES RELATIVE PERCENT: 8.7 %
NEUTROPHILS ABSOLUTE: 4.1 K/UL (ref 1.7–7.7)
NEUTROPHILS RELATIVE PERCENT: 61.4 %
NITRITE, URINE: NEGATIVE
PDW BLD-RTO: 14.7 % (ref 12.4–15.4)
PH UA: 6 (ref 5–8)
PLATELET # BLD: 261 K/UL (ref 135–450)
PMV BLD AUTO: 7.9 FL (ref 5–10.5)
POTASSIUM REFLEX MAGNESIUM: 5.1 MMOL/L (ref 3.5–5.1)
PROTEIN UA: NEGATIVE MG/DL
RBC # BLD: 3.56 M/UL (ref 4–5.2)
SODIUM BLD-SCNC: 137 MMOL/L (ref 136–145)
SPECIFIC GRAVITY UA: 1.02 (ref 1–1.03)
URINE TYPE: NORMAL
UROBILINOGEN, URINE: 0.2 E.U./DL
WBC # BLD: 6.6 K/UL (ref 4–11)

## 2019-10-23 PROCEDURE — 36415 COLL VENOUS BLD VENIPUNCTURE: CPT

## 2019-10-23 PROCEDURE — 2580000003 HC RX 258: Performed by: INTERNAL MEDICINE

## 2019-10-23 PROCEDURE — 70551 MRI BRAIN STEM W/O DYE: CPT

## 2019-10-23 PROCEDURE — 6360000002 HC RX W HCPCS: Performed by: INTERNAL MEDICINE

## 2019-10-23 PROCEDURE — G0378 HOSPITAL OBSERVATION PER HR: HCPCS

## 2019-10-23 PROCEDURE — 81003 URINALYSIS AUTO W/O SCOPE: CPT

## 2019-10-23 PROCEDURE — 6360000004 HC RX CONTRAST MEDICATION: Performed by: PSYCHIATRY & NEUROLOGY

## 2019-10-23 PROCEDURE — 97116 GAIT TRAINING THERAPY: CPT

## 2019-10-23 PROCEDURE — 97162 PT EVAL MOD COMPLEX 30 MIN: CPT

## 2019-10-23 PROCEDURE — 96372 THER/PROPH/DIAG INJ SC/IM: CPT

## 2019-10-23 PROCEDURE — 80048 BASIC METABOLIC PNL TOTAL CA: CPT

## 2019-10-23 PROCEDURE — 85025 COMPLETE CBC W/AUTO DIFF WBC: CPT

## 2019-10-23 PROCEDURE — 6370000000 HC RX 637 (ALT 250 FOR IP): Performed by: INTERNAL MEDICINE

## 2019-10-23 PROCEDURE — 97110 THERAPEUTIC EXERCISES: CPT

## 2019-10-23 PROCEDURE — A9579 GAD-BASE MR CONTRAST NOS,1ML: HCPCS | Performed by: PSYCHIATRY & NEUROLOGY

## 2019-10-23 PROCEDURE — 72141 MRI NECK SPINE W/O DYE: CPT

## 2019-10-23 RX ORDER — NIFEDIPINE 30 MG/1
30 TABLET, FILM COATED, EXTENDED RELEASE ORAL DAILY
Status: DISCONTINUED | OUTPATIENT
Start: 2019-10-23 | End: 2019-10-25 | Stop reason: HOSPADM

## 2019-10-23 RX ORDER — ACETAMINOPHEN 325 MG/1
650 TABLET ORAL EVERY 4 HOURS PRN
Status: DISCONTINUED | OUTPATIENT
Start: 2019-10-23 | End: 2019-10-25 | Stop reason: HOSPADM

## 2019-10-23 RX ORDER — UREA 10 %
10 LOTION (ML) TOPICAL NIGHTLY PRN
Status: DISCONTINUED | OUTPATIENT
Start: 2019-10-23 | End: 2019-10-25 | Stop reason: HOSPADM

## 2019-10-23 RX ORDER — SODIUM CHLORIDE 9 MG/ML
INJECTION, SOLUTION INTRAVENOUS CONTINUOUS
Status: DISCONTINUED | OUTPATIENT
Start: 2019-10-23 | End: 2019-10-24

## 2019-10-23 RX ORDER — CLOPIDOGREL BISULFATE 75 MG/1
75 TABLET ORAL DAILY
Status: DISCONTINUED | OUTPATIENT
Start: 2019-10-23 | End: 2019-10-25 | Stop reason: HOSPADM

## 2019-10-23 RX ORDER — SODIUM CHLORIDE 9 MG/ML
10 INJECTION INTRAVENOUS PRN
Status: DISCONTINUED | OUTPATIENT
Start: 2019-10-23 | End: 2019-10-25 | Stop reason: HOSPADM

## 2019-10-23 RX ORDER — SODIUM CHLORIDE 9 MG/ML
10 INJECTION INTRAVENOUS EVERY 12 HOURS SCHEDULED
Status: DISCONTINUED | OUTPATIENT
Start: 2019-10-23 | End: 2019-10-25 | Stop reason: HOSPADM

## 2019-10-23 RX ORDER — ATORVASTATIN CALCIUM 20 MG/1
40 TABLET, FILM COATED ORAL DAILY
Status: DISCONTINUED | OUTPATIENT
Start: 2019-10-23 | End: 2019-10-25 | Stop reason: HOSPADM

## 2019-10-23 RX ORDER — CITALOPRAM 20 MG/1
40 TABLET ORAL NIGHTLY
Status: DISCONTINUED | OUTPATIENT
Start: 2019-10-23 | End: 2019-10-25 | Stop reason: HOSPADM

## 2019-10-23 RX ORDER — ONDANSETRON 2 MG/ML
4 INJECTION INTRAMUSCULAR; INTRAVENOUS EVERY 4 HOURS PRN
Status: DISCONTINUED | OUTPATIENT
Start: 2019-10-23 | End: 2019-10-23 | Stop reason: SDUPTHER

## 2019-10-23 RX ORDER — LORAZEPAM 0.5 MG/1
0.5 TABLET ORAL EVERY 8 HOURS PRN
Status: DISCONTINUED | OUTPATIENT
Start: 2019-10-23 | End: 2019-10-25 | Stop reason: HOSPADM

## 2019-10-23 RX ORDER — PROCHLORPERAZINE MALEATE 10 MG
10 TABLET ORAL EVERY 8 HOURS PRN
Status: DISCONTINUED | OUTPATIENT
Start: 2019-10-23 | End: 2019-10-25 | Stop reason: HOSPADM

## 2019-10-23 RX ORDER — 0.9 % SODIUM CHLORIDE 0.9 %
1000 INTRAVENOUS SOLUTION INTRAVENOUS ONCE
Status: COMPLETED | OUTPATIENT
Start: 2019-10-23 | End: 2019-10-23

## 2019-10-23 RX ORDER — HEPARIN SODIUM 5000 [USP'U]/ML
5000 INJECTION, SOLUTION INTRAVENOUS; SUBCUTANEOUS EVERY 8 HOURS
Status: DISCONTINUED | OUTPATIENT
Start: 2019-10-23 | End: 2019-10-25 | Stop reason: HOSPADM

## 2019-10-23 RX ORDER — PANTOPRAZOLE SODIUM 40 MG/1
40 TABLET, DELAYED RELEASE ORAL NIGHTLY
Status: DISCONTINUED | OUTPATIENT
Start: 2019-10-23 | End: 2019-10-25 | Stop reason: HOSPADM

## 2019-10-23 RX ORDER — ONDANSETRON 2 MG/ML
4 INJECTION INTRAMUSCULAR; INTRAVENOUS EVERY 6 HOURS PRN
Status: DISCONTINUED | OUTPATIENT
Start: 2019-10-23 | End: 2019-10-25 | Stop reason: HOSPADM

## 2019-10-23 RX ORDER — OXYCODONE HYDROCHLORIDE AND ACETAMINOPHEN 5; 325 MG/1; MG/1
1 TABLET ORAL EVERY 8 HOURS PRN
Status: DISCONTINUED | OUTPATIENT
Start: 2019-10-23 | End: 2019-10-25 | Stop reason: HOSPADM

## 2019-10-23 RX ADMIN — LORAZEPAM 0.5 MG: 0.5 TABLET ORAL at 04:23

## 2019-10-23 RX ADMIN — HEPARIN SODIUM 5000 UNITS: 5000 INJECTION INTRAVENOUS; SUBCUTANEOUS at 20:04

## 2019-10-23 RX ADMIN — Medication 10 ML: at 20:42

## 2019-10-23 RX ADMIN — PANTOPRAZOLE SODIUM 40 MG: 40 TABLET, DELAYED RELEASE ORAL at 20:05

## 2019-10-23 RX ADMIN — Medication 10 ML: at 09:40

## 2019-10-23 RX ADMIN — HEPARIN SODIUM 5000 UNITS: 5000 INJECTION INTRAVENOUS; SUBCUTANEOUS at 04:25

## 2019-10-23 RX ADMIN — CITALOPRAM HYDROBROMIDE 40 MG: 20 TABLET ORAL at 20:04

## 2019-10-23 RX ADMIN — SODIUM CHLORIDE 1000 ML: 9 INJECTION, SOLUTION INTRAVENOUS at 04:22

## 2019-10-23 RX ADMIN — SODIUM CHLORIDE 75 ML/HR: 9 INJECTION, SOLUTION INTRAVENOUS at 04:23

## 2019-10-23 RX ADMIN — ATORVASTATIN CALCIUM 40 MG: 20 TABLET, FILM COATED ORAL at 09:40

## 2019-10-23 RX ADMIN — Medication 10 MG: at 20:46

## 2019-10-23 RX ADMIN — CLOPIDOGREL BISULFATE 75 MG: 75 TABLET ORAL at 09:40

## 2019-10-23 RX ADMIN — NIFEDIPINE 30 MG: 30 TABLET, EXTENDED RELEASE ORAL at 09:40

## 2019-10-23 RX ADMIN — HEPARIN SODIUM 5000 UNITS: 5000 INJECTION INTRAVENOUS; SUBCUTANEOUS at 12:33

## 2019-10-23 RX ADMIN — ASPIRIN 325 MG: 325 TABLET, DELAYED RELEASE ORAL at 09:40

## 2019-10-23 ASSESSMENT — PAIN SCALES - GENERAL
PAINLEVEL_OUTOF10: 0

## 2019-10-24 LAB
ANION GAP SERPL CALCULATED.3IONS-SCNC: 12 MMOL/L (ref 3–16)
BASOPHILS ABSOLUTE: 0 K/UL (ref 0–0.2)
BASOPHILS RELATIVE PERCENT: 0.7 %
BUN BLDV-MCNC: 24 MG/DL (ref 7–20)
CALCIUM SERPL-MCNC: 8.5 MG/DL (ref 8.3–10.6)
CHLORIDE BLD-SCNC: 106 MMOL/L (ref 99–110)
CO2: 19 MMOL/L (ref 21–32)
CREAT SERPL-MCNC: 1.1 MG/DL (ref 0.6–1.2)
EOSINOPHILS ABSOLUTE: 0.9 K/UL (ref 0–0.6)
EOSINOPHILS RELATIVE PERCENT: 14.4 %
GFR AFRICAN AMERICAN: 58
GFR NON-AFRICAN AMERICAN: 48
GLUCOSE BLD-MCNC: 97 MG/DL (ref 70–99)
HCT VFR BLD CALC: 33.1 % (ref 36–48)
HEMOGLOBIN: 10.5 G/DL (ref 12–16)
LYMPHOCYTES ABSOLUTE: 0.9 K/UL (ref 1–5.1)
LYMPHOCYTES RELATIVE PERCENT: 14 %
MCH RBC QN AUTO: 28.6 PG (ref 26–34)
MCHC RBC AUTO-ENTMCNC: 31.9 G/DL (ref 31–36)
MCV RBC AUTO: 89.8 FL (ref 80–100)
MONOCYTES ABSOLUTE: 0.5 K/UL (ref 0–1.3)
MONOCYTES RELATIVE PERCENT: 8.3 %
NEUTROPHILS ABSOLUTE: 3.9 K/UL (ref 1.7–7.7)
NEUTROPHILS RELATIVE PERCENT: 62.6 %
PDW BLD-RTO: 14.9 % (ref 12.4–15.4)
PLATELET # BLD: 252 K/UL (ref 135–450)
PMV BLD AUTO: 7.2 FL (ref 5–10.5)
POTASSIUM REFLEX MAGNESIUM: 4.6 MMOL/L (ref 3.5–5.1)
RBC # BLD: 3.68 M/UL (ref 4–5.2)
SODIUM BLD-SCNC: 137 MMOL/L (ref 136–145)
WBC # BLD: 6.3 K/UL (ref 4–11)

## 2019-10-24 PROCEDURE — 85025 COMPLETE CBC W/AUTO DIFF WBC: CPT

## 2019-10-24 PROCEDURE — 97110 THERAPEUTIC EXERCISES: CPT

## 2019-10-24 PROCEDURE — 80048 BASIC METABOLIC PNL TOTAL CA: CPT

## 2019-10-24 PROCEDURE — 96372 THER/PROPH/DIAG INJ SC/IM: CPT

## 2019-10-24 PROCEDURE — 97535 SELF CARE MNGMENT TRAINING: CPT

## 2019-10-24 PROCEDURE — 97166 OT EVAL MOD COMPLEX 45 MIN: CPT

## 2019-10-24 PROCEDURE — 6360000002 HC RX W HCPCS: Performed by: INTERNAL MEDICINE

## 2019-10-24 PROCEDURE — 2580000003 HC RX 258: Performed by: INTERNAL MEDICINE

## 2019-10-24 PROCEDURE — 36415 COLL VENOUS BLD VENIPUNCTURE: CPT

## 2019-10-24 PROCEDURE — 6370000000 HC RX 637 (ALT 250 FOR IP): Performed by: INTERNAL MEDICINE

## 2019-10-24 PROCEDURE — 97116 GAIT TRAINING THERAPY: CPT

## 2019-10-24 PROCEDURE — 1200000000 HC SEMI PRIVATE

## 2019-10-24 RX ADMIN — ASPIRIN 325 MG: 325 TABLET, DELAYED RELEASE ORAL at 08:07

## 2019-10-24 RX ADMIN — ACETAMINOPHEN 650 MG: 325 TABLET ORAL at 20:59

## 2019-10-24 RX ADMIN — Medication 10 ML: at 09:29

## 2019-10-24 RX ADMIN — PANTOPRAZOLE SODIUM 40 MG: 40 TABLET, DELAYED RELEASE ORAL at 20:47

## 2019-10-24 RX ADMIN — SODIUM CHLORIDE: 9 INJECTION, SOLUTION INTRAVENOUS at 00:00

## 2019-10-24 RX ADMIN — CLOPIDOGREL BISULFATE 75 MG: 75 TABLET ORAL at 08:07

## 2019-10-24 RX ADMIN — HEPARIN SODIUM 5000 UNITS: 5000 INJECTION INTRAVENOUS; SUBCUTANEOUS at 20:47

## 2019-10-24 RX ADMIN — HEPARIN SODIUM 5000 UNITS: 5000 INJECTION INTRAVENOUS; SUBCUTANEOUS at 04:17

## 2019-10-24 RX ADMIN — NIFEDIPINE 30 MG: 30 TABLET, EXTENDED RELEASE ORAL at 08:07

## 2019-10-24 RX ADMIN — CITALOPRAM HYDROBROMIDE 40 MG: 20 TABLET ORAL at 20:47

## 2019-10-24 RX ADMIN — HEPARIN SODIUM 5000 UNITS: 5000 INJECTION INTRAVENOUS; SUBCUTANEOUS at 11:23

## 2019-10-24 RX ADMIN — Medication 10 MG: at 21:00

## 2019-10-24 RX ADMIN — ATORVASTATIN CALCIUM 40 MG: 20 TABLET, FILM COATED ORAL at 08:07

## 2019-10-24 ASSESSMENT — PAIN DESCRIPTION - DESCRIPTORS: DESCRIPTORS: HEADACHE

## 2019-10-24 ASSESSMENT — PAIN DESCRIPTION - PAIN TYPE: TYPE: ACUTE PAIN

## 2019-10-24 ASSESSMENT — PAIN DESCRIPTION - LOCATION: LOCATION: HEAD

## 2019-10-24 ASSESSMENT — PAIN SCALES - GENERAL
PAINLEVEL_OUTOF10: 3
PAINLEVEL_OUTOF10: 0

## 2019-10-24 ASSESSMENT — PAIN DESCRIPTION - ONSET: ONSET: GRADUAL

## 2019-10-24 ASSESSMENT — PAIN DESCRIPTION - PROGRESSION: CLINICAL_PROGRESSION: GRADUALLY IMPROVING

## 2019-10-24 ASSESSMENT — PAIN - FUNCTIONAL ASSESSMENT: PAIN_FUNCTIONAL_ASSESSMENT: ACTIVITIES ARE NOT PREVENTED

## 2019-10-25 ENCOUNTER — APPOINTMENT (OUTPATIENT)
Dept: NUCLEAR MEDICINE | Age: 79
DRG: 065 | End: 2019-10-25
Payer: MEDICARE

## 2019-10-25 ENCOUNTER — APPOINTMENT (OUTPATIENT)
Dept: CT IMAGING | Age: 79
DRG: 065 | End: 2019-10-25
Payer: MEDICARE

## 2019-10-25 VITALS
DIASTOLIC BLOOD PRESSURE: 70 MMHG | HEIGHT: 62 IN | OXYGEN SATURATION: 93 % | RESPIRATION RATE: 16 BRPM | TEMPERATURE: 98.5 F | WEIGHT: 138.23 LBS | HEART RATE: 84 BPM | BODY MASS INDEX: 25.44 KG/M2 | SYSTOLIC BLOOD PRESSURE: 133 MMHG

## 2019-10-25 LAB
ANION GAP SERPL CALCULATED.3IONS-SCNC: 11 MMOL/L (ref 3–16)
BASOPHILS ABSOLUTE: 0 K/UL (ref 0–0.2)
BASOPHILS RELATIVE PERCENT: 0.8 %
BUN BLDV-MCNC: 27 MG/DL (ref 7–20)
CALCIUM SERPL-MCNC: 8.8 MG/DL (ref 8.3–10.6)
CHLORIDE BLD-SCNC: 105 MMOL/L (ref 99–110)
CO2: 22 MMOL/L (ref 21–32)
CREAT SERPL-MCNC: 1.2 MG/DL (ref 0.6–1.2)
EOSINOPHILS ABSOLUTE: 0.8 K/UL (ref 0–0.6)
EOSINOPHILS RELATIVE PERCENT: 14.4 %
GFR AFRICAN AMERICAN: 53
GFR NON-AFRICAN AMERICAN: 44
GLUCOSE BLD-MCNC: 107 MG/DL (ref 70–99)
HCT VFR BLD CALC: 33.5 % (ref 36–48)
HEMOGLOBIN: 11 G/DL (ref 12–16)
LYMPHOCYTES ABSOLUTE: 0.7 K/UL (ref 1–5.1)
LYMPHOCYTES RELATIVE PERCENT: 12 %
MAGNESIUM: 1.6 MG/DL (ref 1.8–2.4)
MCH RBC QN AUTO: 28.9 PG (ref 26–34)
MCHC RBC AUTO-ENTMCNC: 32.8 G/DL (ref 31–36)
MCV RBC AUTO: 88.1 FL (ref 80–100)
MONOCYTES ABSOLUTE: 0.5 K/UL (ref 0–1.3)
MONOCYTES RELATIVE PERCENT: 8.1 %
NEUTROPHILS ABSOLUTE: 3.8 K/UL (ref 1.7–7.7)
NEUTROPHILS RELATIVE PERCENT: 64.7 %
PDW BLD-RTO: 15.1 % (ref 12.4–15.4)
PLATELET # BLD: 236 K/UL (ref 135–450)
PMV BLD AUTO: 7.1 FL (ref 5–10.5)
POTASSIUM REFLEX MAGNESIUM: 4 MMOL/L (ref 3.5–5.1)
RBC # BLD: 3.8 M/UL (ref 4–5.2)
SODIUM BLD-SCNC: 138 MMOL/L (ref 136–145)
WBC # BLD: 5.8 K/UL (ref 4–11)

## 2019-10-25 PROCEDURE — 96372 THER/PROPH/DIAG INJ SC/IM: CPT

## 2019-10-25 PROCEDURE — 36415 COLL VENOUS BLD VENIPUNCTURE: CPT

## 2019-10-25 PROCEDURE — 3430000000 HC RX DIAGNOSTIC RADIOPHARMACEUTICAL: Performed by: INTERNAL MEDICINE

## 2019-10-25 PROCEDURE — 83735 ASSAY OF MAGNESIUM: CPT

## 2019-10-25 PROCEDURE — 6360000002 HC RX W HCPCS: Performed by: INTERNAL MEDICINE

## 2019-10-25 PROCEDURE — 78306 BONE IMAGING WHOLE BODY: CPT

## 2019-10-25 PROCEDURE — 85025 COMPLETE CBC W/AUTO DIFF WBC: CPT

## 2019-10-25 PROCEDURE — 80048 BASIC METABOLIC PNL TOTAL CA: CPT

## 2019-10-25 PROCEDURE — A9503 TC99M MEDRONATE: HCPCS | Performed by: INTERNAL MEDICINE

## 2019-10-25 PROCEDURE — 71250 CT THORAX DX C-: CPT

## 2019-10-25 PROCEDURE — 2580000003 HC RX 258: Performed by: INTERNAL MEDICINE

## 2019-10-25 PROCEDURE — 6370000000 HC RX 637 (ALT 250 FOR IP): Performed by: INTERNAL MEDICINE

## 2019-10-25 RX ORDER — IBUPROFEN 200 MG
400 TABLET ORAL EVERY 6 HOURS PRN
Status: DISCONTINUED | OUTPATIENT
Start: 2019-10-25 | End: 2019-10-25 | Stop reason: HOSPADM

## 2019-10-25 RX ORDER — TC 99M MEDRONATE 20 MG/10ML
25 INJECTION, POWDER, LYOPHILIZED, FOR SOLUTION INTRAVENOUS
Status: COMPLETED | OUTPATIENT
Start: 2019-10-25 | End: 2019-10-25

## 2019-10-25 RX ADMIN — CLOPIDOGREL BISULFATE 75 MG: 75 TABLET ORAL at 09:15

## 2019-10-25 RX ADMIN — ACETAMINOPHEN 650 MG: 325 TABLET ORAL at 09:15

## 2019-10-25 RX ADMIN — ASPIRIN 325 MG: 325 TABLET, DELAYED RELEASE ORAL at 09:15

## 2019-10-25 RX ADMIN — TC 99M MEDRONATE 26.1 MILLICURIE: 20 INJECTION, POWDER, LYOPHILIZED, FOR SOLUTION INTRAVENOUS at 09:41

## 2019-10-25 RX ADMIN — NIFEDIPINE 30 MG: 30 TABLET, EXTENDED RELEASE ORAL at 09:15

## 2019-10-25 RX ADMIN — SODIUM CHLORIDE 10 ML: 9 INJECTION, SOLUTION INTRAMUSCULAR; INTRAVENOUS; SUBCUTANEOUS at 09:41

## 2019-10-25 RX ADMIN — HEPARIN SODIUM 5000 UNITS: 5000 INJECTION INTRAVENOUS; SUBCUTANEOUS at 14:09

## 2019-10-25 RX ADMIN — ATORVASTATIN CALCIUM 40 MG: 20 TABLET, FILM COATED ORAL at 09:15

## 2019-10-25 RX ADMIN — HEPARIN SODIUM 5000 UNITS: 5000 INJECTION INTRAVENOUS; SUBCUTANEOUS at 04:58

## 2019-10-25 ASSESSMENT — PAIN DESCRIPTION - LOCATION: LOCATION: HEAD

## 2019-10-25 ASSESSMENT — PAIN SCALES - GENERAL: PAINLEVEL_OUTOF10: 3

## 2019-10-25 ASSESSMENT — PAIN DESCRIPTION - FREQUENCY: FREQUENCY: INTERMITTENT

## 2019-10-25 ASSESSMENT — PAIN DESCRIPTION - DESCRIPTORS: DESCRIPTORS: HEADACHE

## 2019-10-25 ASSESSMENT — PAIN DESCRIPTION - PAIN TYPE: TYPE: ACUTE PAIN

## 2019-10-25 ASSESSMENT — PAIN DESCRIPTION - ONSET: ONSET: ON-GOING

## 2019-10-25 ASSESSMENT — PAIN DESCRIPTION - PROGRESSION: CLINICAL_PROGRESSION: GRADUALLY WORSENING

## 2019-10-31 ENCOUNTER — OFFICE VISIT (OUTPATIENT)
Dept: CARDIOLOGY CLINIC | Age: 79
End: 2019-10-31
Payer: MEDICARE

## 2019-10-31 VITALS
HEIGHT: 62 IN | WEIGHT: 145.2 LBS | BODY MASS INDEX: 26.72 KG/M2 | DIASTOLIC BLOOD PRESSURE: 68 MMHG | RESPIRATION RATE: 16 BRPM | SYSTOLIC BLOOD PRESSURE: 104 MMHG

## 2019-10-31 DIAGNOSIS — I63.329 CEREBROVASCULAR ACCIDENT (CVA) DUE TO THROMBOSIS OF ANTERIOR CEREBRAL ARTERY, UNSPECIFIED BLOOD VESSEL LATERALITY (HCC): Primary | ICD-10-CM

## 2019-10-31 PROCEDURE — 99214 OFFICE O/P EST MOD 30 MIN: CPT | Performed by: NURSE PRACTITIONER

## 2019-10-31 PROCEDURE — 0296T PR EXT ECG > 48HR TO 21 DAY RCRD W/CONECT INTL RCRD: CPT | Performed by: INTERNAL MEDICINE

## 2019-10-31 RX ORDER — MAGNESIUM OXIDE 400 MG/1
400 TABLET ORAL 2 TIMES DAILY
Qty: 30 TABLET | Refills: 1 | Status: SHIPPED | OUTPATIENT
Start: 2019-10-31 | End: 2021-08-05 | Stop reason: CLARIF

## 2019-11-14 ENCOUNTER — OFFICE VISIT (OUTPATIENT)
Dept: CARDIOLOGY CLINIC | Age: 79
End: 2019-11-14
Payer: MEDICARE

## 2019-11-14 VITALS
WEIGHT: 148.6 LBS | HEIGHT: 62 IN | BODY MASS INDEX: 27.34 KG/M2 | OXYGEN SATURATION: 94 % | HEART RATE: 92 BPM | DIASTOLIC BLOOD PRESSURE: 74 MMHG | SYSTOLIC BLOOD PRESSURE: 110 MMHG

## 2019-11-14 DIAGNOSIS — I63.00 CEREBROVASCULAR ACCIDENT (CVA) DUE TO THROMBOSIS OF PRECEREBRAL ARTERY (HCC): Primary | ICD-10-CM

## 2019-11-14 PROCEDURE — 99214 OFFICE O/P EST MOD 30 MIN: CPT | Performed by: NURSE PRACTITIONER

## 2019-11-26 PROCEDURE — 0298T PR EXT ECG > 48HR TO 21 DAY REVIEW AND INTERPRETATN: CPT | Performed by: INTERNAL MEDICINE

## 2019-12-05 DIAGNOSIS — R00.2 PALPITATION: ICD-10-CM

## 2020-03-25 PROBLEM — N17.9 AKI (ACUTE KIDNEY INJURY) (HCC): Status: RESOLVED | Noted: 2018-05-01 | Resolved: 2020-03-24

## 2020-04-08 ENCOUNTER — HOSPITAL ENCOUNTER (OUTPATIENT)
Dept: CT IMAGING | Age: 80
Discharge: HOME | End: 2020-04-08
Payer: MEDICARE

## 2020-04-08 PROCEDURE — 6360000004 HC RX CONTRAST MEDICATION: Performed by: NURSE PRACTITIONER

## 2020-04-08 PROCEDURE — 74150 CT ABDOMEN W/O CONTRAST: CPT

## 2020-04-08 PROCEDURE — 71250 CT THORAX DX C-: CPT

## 2020-04-08 RX ADMIN — IOHEXOL 50 ML: 240 INJECTION, SOLUTION INTRATHECAL; INTRAVASCULAR; INTRAVENOUS; ORAL at 10:42

## 2020-06-24 ENCOUNTER — HOSPITAL ENCOUNTER (OUTPATIENT)
Age: 80
Setting detail: OBSERVATION
Discharge: NURSING, REHAB OR LONG-TERM CARE | DRG: 948 | End: 2020-06-26
Attending: EMERGENCY MEDICINE | Admitting: INTERNAL MEDICINE
Payer: MEDICARE

## 2020-06-24 ENCOUNTER — APPOINTMENT (OUTPATIENT)
Dept: GENERAL RADIOLOGY | Age: 80
DRG: 948 | End: 2020-06-24
Payer: MEDICARE

## 2020-06-24 ENCOUNTER — APPOINTMENT (OUTPATIENT)
Dept: CT IMAGING | Age: 80
DRG: 948 | End: 2020-06-24
Payer: MEDICARE

## 2020-06-24 PROBLEM — R53.1 GENERALIZED WEAKNESS: Status: ACTIVE | Noted: 2020-06-24

## 2020-06-24 LAB
A/G RATIO: 1 (ref 1.1–2.2)
ALBUMIN SERPL-MCNC: 3.6 G/DL (ref 3.4–5)
ALP BLD-CCNC: 108 U/L (ref 40–129)
ALT SERPL-CCNC: 22 U/L (ref 10–40)
ANION GAP SERPL CALCULATED.3IONS-SCNC: 12 MMOL/L (ref 3–16)
AST SERPL-CCNC: 23 U/L (ref 15–37)
BASOPHILS ABSOLUTE: 0.1 K/UL (ref 0–0.2)
BASOPHILS RELATIVE PERCENT: 0.8 %
BILIRUB SERPL-MCNC: <0.2 MG/DL (ref 0–1)
BUN BLDV-MCNC: 29 MG/DL (ref 7–20)
CALCIUM SERPL-MCNC: 9.2 MG/DL (ref 8.3–10.6)
CHLORIDE BLD-SCNC: 107 MMOL/L (ref 99–110)
CO2: 19 MMOL/L (ref 21–32)
CREAT SERPL-MCNC: 1.2 MG/DL (ref 0.6–1.2)
EOSINOPHILS ABSOLUTE: 0.5 K/UL (ref 0–0.6)
EOSINOPHILS RELATIVE PERCENT: 8.1 %
GFR AFRICAN AMERICAN: 53
GFR NON-AFRICAN AMERICAN: 43
GLOBULIN: 3.5 G/DL
GLUCOSE BLD-MCNC: 116 MG/DL (ref 70–99)
HCT VFR BLD CALC: 35.2 % (ref 36–48)
HEMOGLOBIN: 10.9 G/DL (ref 12–16)
LIPASE: 41 U/L (ref 13–60)
LYMPHOCYTES ABSOLUTE: 0.7 K/UL (ref 1–5.1)
LYMPHOCYTES RELATIVE PERCENT: 10.9 %
MCH RBC QN AUTO: 26.5 PG (ref 26–34)
MCHC RBC AUTO-ENTMCNC: 31.1 G/DL (ref 31–36)
MCV RBC AUTO: 85 FL (ref 80–100)
MONOCYTES ABSOLUTE: 0.4 K/UL (ref 0–1.3)
MONOCYTES RELATIVE PERCENT: 5.7 %
NEUTROPHILS ABSOLUTE: 4.8 K/UL (ref 1.7–7.7)
NEUTROPHILS RELATIVE PERCENT: 74.5 %
PDW BLD-RTO: 16.5 % (ref 12.4–15.4)
PLATELET # BLD: 286 K/UL (ref 135–450)
PMV BLD AUTO: 7.6 FL (ref 5–10.5)
POTASSIUM REFLEX MAGNESIUM: 4.4 MMOL/L (ref 3.5–5.1)
PRO-BNP: 262 PG/ML (ref 0–449)
RBC # BLD: 4.14 M/UL (ref 4–5.2)
SODIUM BLD-SCNC: 138 MMOL/L (ref 136–145)
TOTAL PROTEIN: 7.1 G/DL (ref 6.4–8.2)
TROPONIN: <0.01 NG/ML
WBC # BLD: 6.4 K/UL (ref 4–11)

## 2020-06-24 PROCEDURE — 83880 ASSAY OF NATRIURETIC PEPTIDE: CPT

## 2020-06-24 PROCEDURE — 2580000003 HC RX 258: Performed by: INTERNAL MEDICINE

## 2020-06-24 PROCEDURE — 82607 VITAMIN B-12: CPT

## 2020-06-24 PROCEDURE — 83690 ASSAY OF LIPASE: CPT

## 2020-06-24 PROCEDURE — 72125 CT NECK SPINE W/O DYE: CPT

## 2020-06-24 PROCEDURE — 73600 X-RAY EXAM OF ANKLE: CPT

## 2020-06-24 PROCEDURE — 36415 COLL VENOUS BLD VENIPUNCTURE: CPT

## 2020-06-24 PROCEDURE — 6360000002 HC RX W HCPCS: Performed by: INTERNAL MEDICINE

## 2020-06-24 PROCEDURE — 73110 X-RAY EXAM OF WRIST: CPT

## 2020-06-24 PROCEDURE — 82306 VITAMIN D 25 HYDROXY: CPT

## 2020-06-24 PROCEDURE — 85025 COMPLETE CBC W/AUTO DIFF WBC: CPT

## 2020-06-24 PROCEDURE — 70450 CT HEAD/BRAIN W/O DYE: CPT

## 2020-06-24 PROCEDURE — G0378 HOSPITAL OBSERVATION PER HR: HCPCS

## 2020-06-24 PROCEDURE — 2580000003 HC RX 258: Performed by: PHYSICIAN ASSISTANT

## 2020-06-24 PROCEDURE — 71045 X-RAY EXAM CHEST 1 VIEW: CPT

## 2020-06-24 PROCEDURE — 93005 ELECTROCARDIOGRAM TRACING: CPT | Performed by: PHYSICIAN ASSISTANT

## 2020-06-24 PROCEDURE — 6370000000 HC RX 637 (ALT 250 FOR IP): Performed by: INTERNAL MEDICINE

## 2020-06-24 PROCEDURE — 99285 EMERGENCY DEPT VISIT HI MDM: CPT

## 2020-06-24 PROCEDURE — 84443 ASSAY THYROID STIM HORMONE: CPT

## 2020-06-24 PROCEDURE — 96372 THER/PROPH/DIAG INJ SC/IM: CPT

## 2020-06-24 PROCEDURE — 80053 COMPREHEN METABOLIC PANEL: CPT

## 2020-06-24 PROCEDURE — 84484 ASSAY OF TROPONIN QUANT: CPT

## 2020-06-24 PROCEDURE — 6370000000 HC RX 637 (ALT 250 FOR IP): Performed by: EMERGENCY MEDICINE

## 2020-06-24 RX ORDER — OXYCODONE HYDROCHLORIDE AND ACETAMINOPHEN 5; 325 MG/1; MG/1
1 TABLET ORAL EVERY 4 HOURS PRN
Status: DISCONTINUED | OUTPATIENT
Start: 2020-06-24 | End: 2020-06-26 | Stop reason: HOSPADM

## 2020-06-24 RX ORDER — NIFEDIPINE 30 MG/1
30 TABLET, FILM COATED, EXTENDED RELEASE ORAL DAILY
Status: DISCONTINUED | OUTPATIENT
Start: 2020-06-24 | End: 2020-06-26 | Stop reason: HOSPADM

## 2020-06-24 RX ORDER — SODIUM CHLORIDE 0.9 % (FLUSH) 0.9 %
10 SYRINGE (ML) INJECTION PRN
Status: DISCONTINUED | OUTPATIENT
Start: 2020-06-24 | End: 2020-06-26 | Stop reason: HOSPADM

## 2020-06-24 RX ORDER — HEPARIN SODIUM 5000 [USP'U]/ML
5000 INJECTION, SOLUTION INTRAVENOUS; SUBCUTANEOUS EVERY 8 HOURS SCHEDULED
Status: DISCONTINUED | OUTPATIENT
Start: 2020-06-24 | End: 2020-06-26 | Stop reason: HOSPADM

## 2020-06-24 RX ORDER — SODIUM CHLORIDE 0.9 % (FLUSH) 0.9 %
10 SYRINGE (ML) INJECTION EVERY 12 HOURS SCHEDULED
Status: DISCONTINUED | OUTPATIENT
Start: 2020-06-24 | End: 2020-06-26 | Stop reason: HOSPADM

## 2020-06-24 RX ORDER — CITALOPRAM 10 MG/1
40 TABLET ORAL NIGHTLY
Status: DISCONTINUED | OUTPATIENT
Start: 2020-06-24 | End: 2020-06-26 | Stop reason: HOSPADM

## 2020-06-24 RX ORDER — ACETAMINOPHEN 325 MG/1
650 TABLET ORAL EVERY 6 HOURS PRN
Status: DISCONTINUED | OUTPATIENT
Start: 2020-06-24 | End: 2020-06-26 | Stop reason: HOSPADM

## 2020-06-24 RX ORDER — ATORVASTATIN CALCIUM 40 MG/1
40 TABLET, FILM COATED ORAL DAILY
Status: DISCONTINUED | OUTPATIENT
Start: 2020-06-24 | End: 2020-06-26 | Stop reason: HOSPADM

## 2020-06-24 RX ORDER — ACETAMINOPHEN 325 MG/1
650 TABLET ORAL ONCE
Status: COMPLETED | OUTPATIENT
Start: 2020-06-24 | End: 2020-06-24

## 2020-06-24 RX ORDER — ACETAMINOPHEN 650 MG/1
650 SUPPOSITORY RECTAL EVERY 6 HOURS PRN
Status: DISCONTINUED | OUTPATIENT
Start: 2020-06-24 | End: 2020-06-26 | Stop reason: HOSPADM

## 2020-06-24 RX ORDER — SENNA AND DOCUSATE SODIUM 50; 8.6 MG/1; MG/1
1 TABLET, FILM COATED ORAL 2 TIMES DAILY
Status: DISCONTINUED | OUTPATIENT
Start: 2020-06-24 | End: 2020-06-26 | Stop reason: HOSPADM

## 2020-06-24 RX ORDER — ONDANSETRON 2 MG/ML
4 INJECTION INTRAMUSCULAR; INTRAVENOUS EVERY 6 HOURS PRN
Status: DISCONTINUED | OUTPATIENT
Start: 2020-06-24 | End: 2020-06-26 | Stop reason: HOSPADM

## 2020-06-24 RX ORDER — 0.9 % SODIUM CHLORIDE 0.9 %
500 INTRAVENOUS SOLUTION INTRAVENOUS ONCE
Status: COMPLETED | OUTPATIENT
Start: 2020-06-24 | End: 2020-06-24

## 2020-06-24 RX ORDER — PANTOPRAZOLE SODIUM 40 MG/1
40 TABLET, DELAYED RELEASE ORAL NIGHTLY
Status: DISCONTINUED | OUTPATIENT
Start: 2020-06-24 | End: 2020-06-26 | Stop reason: HOSPADM

## 2020-06-24 RX ORDER — PROMETHAZINE HYDROCHLORIDE 25 MG/1
12.5 TABLET ORAL EVERY 6 HOURS PRN
Status: DISCONTINUED | OUTPATIENT
Start: 2020-06-24 | End: 2020-06-26 | Stop reason: HOSPADM

## 2020-06-24 RX ORDER — ASPIRIN 81 MG/1
81 TABLET ORAL DAILY
Status: DISCONTINUED | OUTPATIENT
Start: 2020-06-24 | End: 2020-06-26 | Stop reason: HOSPADM

## 2020-06-24 RX ADMIN — DOCUSATE SODIUM 50 MG AND SENNOSIDES 8.6 MG 1 TABLET: 8.6; 5 TABLET, FILM COATED ORAL at 20:25

## 2020-06-24 RX ADMIN — CITALOPRAM HYDROBROMIDE 40 MG: 10 TABLET ORAL at 20:25

## 2020-06-24 RX ADMIN — Medication 10 ML: at 20:26

## 2020-06-24 RX ADMIN — PANTOPRAZOLE SODIUM 40 MG: 40 TABLET, DELAYED RELEASE ORAL at 20:25

## 2020-06-24 RX ADMIN — HEPARIN SODIUM 5000 UNITS: 5000 INJECTION INTRAVENOUS; SUBCUTANEOUS at 21:22

## 2020-06-24 RX ADMIN — ACETAMINOPHEN 650 MG: 325 TABLET ORAL at 17:30

## 2020-06-24 RX ADMIN — SODIUM CHLORIDE 500 ML: 9 INJECTION, SOLUTION INTRAVENOUS at 15:45

## 2020-06-24 RX ADMIN — NIFEDIPINE 30 MG: 30 TABLET, EXTENDED RELEASE ORAL at 20:25

## 2020-06-24 RX ADMIN — OXYCODONE HYDROCHLORIDE AND ACETAMINOPHEN 1 TABLET: 5; 325 TABLET ORAL at 20:25

## 2020-06-24 RX ADMIN — ATORVASTATIN CALCIUM 40 MG: 40 TABLET, FILM COATED ORAL at 20:25

## 2020-06-24 ASSESSMENT — ENCOUNTER SYMPTOMS
ABDOMINAL PAIN: 0
DIARRHEA: 0
WHEEZING: 0
NAUSEA: 0
COUGH: 0
SHORTNESS OF BREATH: 0
CHEST TIGHTNESS: 0
VOMITING: 0

## 2020-06-24 ASSESSMENT — PAIN SCALES - GENERAL
PAINLEVEL_OUTOF10: 5
PAINLEVEL_OUTOF10: 0
PAINLEVEL_OUTOF10: 0
PAINLEVEL_OUTOF10: 1
PAINLEVEL_OUTOF10: 5

## 2020-06-24 ASSESSMENT — PAIN DESCRIPTION - PAIN TYPE
TYPE: ACUTE PAIN
TYPE: ACUTE PAIN

## 2020-06-24 ASSESSMENT — PAIN DESCRIPTION - LOCATION
LOCATION: ANKLE;WRIST
LOCATION: ANKLE;WRIST

## 2020-06-24 ASSESSMENT — PAIN DESCRIPTION - ORIENTATION: ORIENTATION: RIGHT

## 2020-06-24 NOTE — ED NOTES
.Rounded on patient for pain, repositioning, and toileting needs. Call light and personal items within reach. Side rails up times 2.      Jose Chiang RN  06/24/20 1184

## 2020-06-24 NOTE — ED NOTES
PIV was setting off pump alarm. Port accessed and is flowing freely.      Sukumar Dukes RN  06/24/20 7738

## 2020-06-24 NOTE — ED PROVIDER NOTES
[address] ENCOUNTER          PHYSICIAN ASSISTANT NOTE       Date of evaluation: 6/24/2020    Chief Complaint     Fall; Ankle Pain; and Wrist Pain    History of Present Illness     Prince Mercer is a 70 y.o. female who presents after a fall. Patient reports that she was in her shower when she accidentally slipped and fell to the ground. Had immediate onset of right-sided ankle pain and right-sided wrist pain. Was unable to get up off the ground with assistance from her , prompting EMS to be called. Unsure if she struck her head on the way down, but does not believe she did. Did not lose consciousness, is on aspirin and Plavix. Denies any prodrome prior to the mechanical fall in the shower. Denies dizziness, vision changes, headache, chest pain, shortness of breath, or any other symptoms. Did have a recent stroke in October 2019 for which she has some residual right-sided weakness, no increase in that today    Review of Systems     Review of Systems   Constitutional: Negative for chills, diaphoresis, fatigue and fever. Respiratory: Negative for cough, chest tightness, shortness of breath and wheezing. Cardiovascular: Negative for chest pain and palpitations. Gastrointestinal: Negative for abdominal pain, diarrhea, nausea and vomiting. Genitourinary: Negative. Musculoskeletal:        Right Ankle Pain, Right Wrist Pain   Skin: Negative for rash. Neurological: Negative for dizziness, weakness, light-headedness and headaches. All other systems reviewed and are negative. Past Medical, Surgical, Family, and Social History     She has a past medical history of CHUYITA (acute kidney injury) ([address] Allergic rhinitis, Aneurysm ([address] Aortic aneurysm ([address] Cancer ([address] Cholelithiasis, Depression, Hyperlipidemia, Hypertension, Incontinence, MRSA (methicillin resistant staph aureus) culture positive, Ulcer, and Ulcerative colitis ([address]   She has a past surgical history that includes Abdomen surgery; Cholecystectomy, open (5/7/2012); Pressure ulcer debridement (2010); Colonoscopy; Upper gastrointestinal endoscopy; Stomach surgery; ERCP (3/13/12); bronchoscopy (12/11/2012); and ERCP (12-). Her family history includes Arthritis in her mother. She reports that she quit smoking about 11 years ago. She has a 75.00 pack-year smoking history. She has never used smokeless tobacco. She reports that she does not drink alcohol or use drugs. Medications     Previous Medications    ASPIRIN 325 MG EC TABLET    Take 1 tablet by mouth daily    ATORVASTATIN (LIPITOR) 40 MG TABLET    Take 1 tablet by mouth daily    CITALOPRAM (CELEXA) 40 MG TABLET    Take 40 mg by mouth nightly     CLOPIDOGREL (PLAVIX) 75 MG TABLET    Take 1 tablet by mouth daily    LORAZEPAM (ATIVAN) 0.5 MG TABLET    Take 0.5 mg by mouth every 8 hours as needed for Anxiety. MAGNESIUM OXIDE (MAG-OX) 400 MG TABLET    Take 1 tablet by mouth 2 times daily    NIFEDIPINE (ADALAT CC) 30 MG EXTENDED RELEASE TABLET    Take 1 tablet by mouth daily    OXYCODONE-ACETAMINOPHEN (PERCOCET) 5-325 MG PER TABLET    Take 1 tablet by mouth every 8 hours as needed for Pain. PANTOPRAZOLE (PROTONIX) 40 MG TABLET    Take 40 mg by mouth nightly     PROCHLORPERAZINE (COMPAZINE) 10 MG TABLET    Take 10 mg by mouth every 8 hours as needed (nausea)       Allergies     She has No Known Allergies. Physical Exam     INITIAL VITALS: BP: (!) 142/82, Temp: 98.1 °F (36.7 °C), Pulse: 78, Resp: 18, SpO2: 95 %  Physical Exam  Vitals signs and nursing note reviewed. Constitutional:       General: She is not in acute distress. Appearance: She is well-developed. She is not diaphoretic. HENT:      Head: Normocephalic and atraumatic. Eyes:      Conjunctiva/sclera: Conjunctivae normal.      Pupils: Pupils are equal, round, and reactive to light. Neck:      Musculoskeletal: Normal range of motion.    Cardiovascular:      Rate and Rhythm: Normal rate and regular rhythm. Heart sounds: Normal heart sounds. No murmur. No friction rub. Pulmonary:      Effort: Pulmonary effort is normal. No respiratory distress. Breath sounds: Normal breath sounds. No wheezing or rales. Abdominal:      General: There is no distension. Palpations: Abdomen is soft. Tenderness: There is no abdominal tenderness. There is no guarding. Musculoskeletal:      Comments: Mild swelling to the ulnar aspect of the right wrist, no significant tenderness to palpation. No snuffbox tenderness. Adequate range of motion to the wrist without difficulty. Radial pulse easily palpable. Median, radian, ulnar innervation intact. Mild tenderness to palpation throughout the medial malleoli of the right ankle, no swelling appreciated. Good range of motion to the foot. No cervical, thoracic, or lumbar spinous process tenderness. No step-offs noted. Negative logroll bilaterally. No obvious long bone deformities. Skin:     General: Skin is warm and dry. Neurological:      Mental Status: She is alert and oriented to person, place, and time. Psychiatric:         Behavior: Behavior normal.         Thought Content: Thought content normal.         Judgment: Judgment normal.       Diagnostic Results     EKG   Interpreted in conjunction with emergency department physician Pushpa Calderon MD  Rhythm: normal sinus   Rate: 78  Axis: normal  Ectopy: none  Conduction: normal  ST Segments: no acute change  T Waves: flattening in  III and aVl  Q Waves:nonspecific  Clinical Impression: no acute changes  Comparison:  10/22/19    RADIOLOGY:  XR CHEST PORTABLE   Final Result      No acute consolidation                  XR WRIST RIGHT (MIN 3 VIEWS)   Final Result      Marked deformity about the distal radius and ulna likely relates to old injury. Marked degenerative change radiocarpal joint and first carpal metacarpal joint.       Widening of the plans were discussed and agreed upon. Clinical Impression     1. Fall, initial encounter    2.  Right wrist pain        Disposition     PATIENT REFERRED TO:  Simona Myers MD  [facility] [address] [place] [address]  [phone]    Schedule an appointment as soon as possible for a visit         DISCHARGE MEDICATIONS:  New Prescriptions    No medications on file       DISPOSITION  06/24/2020 03:23:25 PM        Verito Nicholson [place]  06/24/20 1729

## 2020-06-24 NOTE — ED PROVIDER NOTES
450 K/uL    MPV 7.6 5.0 - 10.5 fL    Neutrophils % 74.5 %    Lymphocytes % 10.9 %    Monocytes % 5.7 %    Eosinophils % 8.1 %    Basophils % 0.8 %    Neutrophils Absolute 4.8 1.7 - 7.7 K/uL    Lymphocytes Absolute 0.7 (L) 1.0 - 5.1 K/uL    Monocytes Absolute 0.4 0.0 - 1.3 K/uL    Eosinophils Absolute 0.5 0.0 - 0.6 K/uL    Basophils Absolute 0.1 0.0 - 0.2 K/uL   Comprehensive Metabolic Panel w/ Reflex to MG   Result Value Ref Range    Sodium 138 136 - 145 mmol/L    Potassium reflex Magnesium 4.4 3.5 - 5.1 mmol/L    Chloride 107 99 - 110 mmol/L    CO2 19 (L) 21 - 32 mmol/L    Anion Gap 12 3 - 16    Glucose 116 (H) 70 - 99 mg/dL    BUN 29 (H) 7 - 20 mg/dL    CREATININE 1.2 0.6 - 1.2 mg/dL    GFR Non- 43 (A) >60    GFR  53 (A) >60    Calcium 9.2 8.3 - 10.6 mg/dL    Total Protein 7.1 6.4 - 8.2 g/dL    Alb 3.6 3.4 - 5.0 g/dL    Albumin/Globulin Ratio 1.0 (L) 1.1 - 2.2    Total Bilirubin <0.2 0.0 - 1.0 mg/dL    Alkaline Phosphatase 108 40 - 129 U/L    ALT 22 10 - 40 U/L    AST 23 15 - 37 U/L    Globulin 3.5 g/dL   Lipase   Result Value Ref Range    Lipase 41.0 13.0 - 60.0 U/L   Troponin   Result Value Ref Range    Troponin <0.01 <0.01 ng/mL   Brain Natriuretic Peptide   Result Value Ref Range    Pro- 0 - 449 pg/mL       RECENT VITALS:  BP: (!) 142/82, Temp: 98.1 °F (36.7 °C), Pulse: 78, Resp: 18, SpO2: 95 %     Procedures         ED Course     The patient was given the following medications:  Orders Placed This Encounter   Medications    0.9 % sodium chloride bolus    acetaminophen (TYLENOL) tablet 650 mg       CONSULTS:  None    MEDICAL DECISION MAKING / ASSESSMENT / Barbara Irvin is a 70 y.o. female who presents to the emergency department after a mechanical fall. The patient's CT of her head and imaging for injuries were negative. When attempting to get up for discharge patient felt that she was too weak to stand up and walk.   Labs were ordered subsequently and were pending at the time I took over care of this patient. At the time I took over care the following labs and diagnostics were pending: CBC, CMP, lipase, troponin, BNP, CXR      CBC, CMP, lipase, CXR, BNP are without significant abnormality. Troponin is negative. On reassessment the patient was still unable to stand and walk due to weakness and pain. She was admitted to medicine for further care. This patient was also evaluated by the attending physician. All care plans were discussed and agreed upon. Clinical Impression     1. Fall, initial encounter    2.  Right wrist pain        Disposition     PATIENT REFERRED TO:  Jenn Carver MD  [address] [place] [address]  [address] [place] [address]  [phone]    Schedule an appointment as soon as possible for a visit         DISCHARGE MEDICATIONS:  New Prescriptions    No medications on file       DISPOSITION  06/24/2020 03:23:25 PM          [address], [place]  06/24/20 1689

## 2020-06-24 NOTE — ED NOTES
Bed: A08-08  Expected date: 6/24/20  Expected time:   Means of arrival:   Comments:  [facility] [address]  06/24/20 1316

## 2020-06-24 NOTE — ED NOTES
Pt was unable to get up for an ambulation attempt. Pt denies pain, just overall weakness.      Thor Cristina RN  06/24/20 7836

## 2020-06-24 NOTE — PROGRESS NOTES
Patient arrived to room 6309 via cart from ER. nasireis any c/o pain or SOB. VSS. Patient oriented to room and plan of care. Call light placed in reach and bed alarm activated.

## 2020-06-24 NOTE — H&P
Hospital Medicine History & Physical      PCP: Carolyne Quezada DO    Date of Admission: 6/24/2020    Date of Service: 6/24/2020    Pt seen/examined on 6/24/2020  Placed in Observation. Chief Complaint:  fall    History Of Present Illness:      70 y.o. female who presented to [facility]. with mechanical fall getting into the shower this morning. She reports some neck pain as well as right ankle and right wrist pain. She denies LOC, there were no preceding sx, and there was no post ictal confusion. In the ED, she was unable to get up due to generalized weakness. Symptom pain   Location R wrist, R ankle, neck   Severity    Modifying factors    Associated si/sx    Duration Since this AM   Quality    Timing persistent   Context A/w mechanical fall       Past Medical History:          Diagnosis Date    CHUYITA (acute kidney injury) ([address] 5/1/2018    Allergic rhinitis     Aneurysm [facility]     aorta  watching now    Aortic aneurysm (HCC)     Currently has     Cancer ([address]     Lung    Cholelithiasis     Depression     Hyperlipidemia     Hypertension     Incontinence     bladder    MRSA (methicillin resistant staph aureus) culture positive 12/28/11    Leg    Ulcer     Ulcerative colitis ([address]        Past Surgical History:          Procedure Laterality Date    ABDOMEN SURGERY      gastric resection for ulcers    BRONCHOSCOPY  12/11/2012    CHOLECYSTECTOMY, OPEN  5/7/2012    COLONOSCOPY      ERCP  3/13/12    sphincterotomy; 5 [place] of [place] cm stent placed    ERCP  12-    pancreatic stent removal, pancreotogram    PRESSURE ULCER DEBRIDEMENT  2010    gastric    STOMACH SURGERY      for ulcer    UPPER GASTROINTESTINAL ENDOSCOPY         Medications Prior to Admission:      Prior to Admission medications    Medication Sig Start Date End Date Taking?  Authorizing Provider   magnesium oxide (MAG-OX) 400 MG tablet Take 1 tablet by mouth 2 times daily 10/31/19   CHULA Bess - CNP   NIFEdipine (ADALAT CC) 30 MG extended release tablet Take 1 tablet by mouth daily 10/21/19   Mary Curran MD   clopidogrel (PLAVIX) 75 MG tablet Take 1 tablet by mouth daily 10/21/19 11/20/19  Mary Curran MD   atorvastatin (LIPITOR) 40 MG tablet Take 1 tablet by mouth daily 10/21/19   Anabella Almonte MD   aspirin 325 MG EC tablet Take 1 tablet by mouth daily 10/21/19 11/20/19  Mary Curran MD   LORazepam (ATIVAN) 0.5 MG tablet Take 0.5 mg by mouth every 8 hours as needed for Anxiety. Historical Provider, MD   oxyCODONE-acetaminophen (PERCOCET) 5-325 MG per tablet Take 1 tablet by mouth every 8 hours as needed for Pain. Historical Provider, MD   prochlorperazine (COMPAZINE) 10 MG tablet Take 10 mg by mouth every 8 hours as needed (nausea)    Historical Provider, MD   pantoprazole (PROTONIX) 40 MG tablet Take 40 mg by mouth nightly     Historical Provider, MD   citalopram (CELEXA) 40 MG tablet Take 40 mg by mouth nightly     Historical Provider, MD       Allergies:  Patient has no known allergies. Social History:      TOBACCO:   reports that she quit smoking about 11 years ago. She has a 75.00 pack-year smoking history. She has never used smokeless tobacco.  ETOH:   reports no history of alcohol use. Family History:      Reviewed in detail and negative except as follows:        Problem Relation Age of Onset    Arthritis Mother        REVIEW OF SYSTEMS:   Pertinent positives and negatives as noted in the HPI. All other systems reviewed and negative.     PHYSICAL EXAM PERFORMED:    /76   Pulse 79   Temp 98.1 °F (36.7 °C) (Oral)   Resp 18   Ht 5' 7\" (1.702 m)   Wt 155 lb (70.3 kg)   SpO2 95%   BMI 24.28 kg/m²     General appearance:  No apparent distress, appears stated age  Eyes: Pupils equal, round, reactive to light, conjunctiva/corneas clear  Ears/Nose/Mouth/Throat: No external lesions or scars, hearing intact to voice  Neck: Trachea midline, no masses noted, no thyromegaly  Respiratory:  Normal respiratory effort. Clear to auscultation bilaterally  Cardiovascular: Regular rate and rhythm, nl S1/S2, w/o murmurs, rubs or gallops, no lower extremity edema  Abdomen: Soft, non-tender, non-distended, no hepatosplenomegaly  Musculoskeletal: No cyanosis, clubbing or petechiae, no lower extremity misalignment, asymmetry, or crepitation,  R wrist in splint, R upper and lower extremity both neurovascularly intact  Neuro: CN grossly intact, strength/sensation intact in all 3 extremities, no focal deficits noted  Skin: Normal skin color, texture, turgor. No rashes or lesions noted. Psychiatric: Alert and oriented x4, good insight and judgment    Labs:     Recent Labs     06/24/20  1608   WBC 6.4   HGB 10.9*   HCT 35.2*        Recent Labs     06/24/20  1608      K 4.4      CO2 19*   BUN 29*   CREATININE 1.2   CALCIUM 9.2     Recent Labs     06/24/20  1608   AST 23   ALT 22   BILITOT <0.2   ALKPHOS 108     No results for input(s): INR in the last 72 hours. Recent Labs     06/24/20  1608   TROPONINI <0.01       Urinalysis:      Lab Results   Component Value Date    NITRU Negative 10/23/2019    WBCUA 6-10 04/30/2018    BACTERIA 4+ 04/30/2018    RBCUA None seen 04/30/2018    BLOODU Negative 10/23/2019    SPECGRAV 1.025 10/23/2019    GLUCOSEU Negative 10/23/2019    GLUCOSEU Negative 05/08/2012       Radiology:     CXR: I have reviewed the CXR with the following interpretation:  -no acute process    Pertinent Labs Reviewed  -BMP, CBC, troponin, BNP, all unremarkable    XR CHEST PORTABLE   Final Result      No acute consolidation                  XR WRIST RIGHT (MIN 3 VIEWS)   Final Result      Marked deformity about the distal radius and ulna likely relates to old injury. Marked degenerative change radiocarpal joint and first carpal metacarpal joint. Widening of the scapholunate interval may relate to acute or chronic injury. If concern for acute injury splinting with follow-up recommended. XR ANKLE RIGHT (2 VIEWS)   Final Result      No acute bony abnormality right ankle. If concern for acute injury splinting with follow-up recommended. CT Cervical Spine WO Contrast   Final Result      No acute bony pathology. Stable sclerotic lesion at C2      CT Head WO Contrast   Final Result      No acute intracranial pathology. 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 lacunar infarcts in the basal ganglia        Acute sinusitis             ASSESSMENT:    Active Hospital Problems    Diagnosis Date Noted    Generalized weakness [R53.1] 06/24/2020       PLAN:    # Generalized weakness  -Unclear etiology, no focal neuro deficits  -Metabolic workup negative  -Chest XR negative  -UA pending  -Check b12, vitD, TSH  -PT/OT, SW for home needs vs placement    # Mechanical fall  -Negative imaging: CT head, CT C-spine, XR wrist, XR ankle  -Splint for wrist  -pain control as needed    # HTN  -Continue home aspirin, statin, nifedipine    # Depression  -Continue citalopram    DVT Prophylaxis: lovenox  Diet: No diet orders on file  Code Status: Prior    PT/OT Eval Status: ordered    Niki Au MD    Thank you Evy Castro DO for the opportunity to be involved in this patient's care. If you have any questions or concerns please feel free to contact me at [phone].

## 2020-06-25 LAB
ANION GAP SERPL CALCULATED.3IONS-SCNC: 9 MMOL/L (ref 3–16)
BUN BLDV-MCNC: 25 MG/DL (ref 7–20)
CALCIUM SERPL-MCNC: 8.6 MG/DL (ref 8.3–10.6)
CHLORIDE BLD-SCNC: 106 MMOL/L (ref 99–110)
CO2: 23 MMOL/L (ref 21–32)
CREAT SERPL-MCNC: 1 MG/DL (ref 0.6–1.2)
EKG ATRIAL RATE: 78 BPM
EKG DIAGNOSIS: NORMAL
EKG P AXIS: 52 DEGREES
EKG P-R INTERVAL: 150 MS
EKG Q-T INTERVAL: 404 MS
EKG QRS DURATION: 78 MS
EKG QTC CALCULATION (BAZETT): 460 MS
EKG R AXIS: 16 DEGREES
EKG T AXIS: 51 DEGREES
EKG VENTRICULAR RATE: 78 BPM
GFR AFRICAN AMERICAN: >60
GFR NON-AFRICAN AMERICAN: 54
GLUCOSE BLD-MCNC: 122 MG/DL (ref 70–99)
GLUCOSE BLD-MCNC: 166 MG/DL (ref 70–99)
HCT VFR BLD CALC: 32.7 % (ref 36–48)
HEMOGLOBIN: 10.5 G/DL (ref 12–16)
MCH RBC QN AUTO: 26.6 PG (ref 26–34)
MCHC RBC AUTO-ENTMCNC: 32 G/DL (ref 31–36)
MCV RBC AUTO: 83.1 FL (ref 80–100)
PDW BLD-RTO: 16.3 % (ref 12.4–15.4)
PERFORMED ON: ABNORMAL
PLATELET # BLD: 255 K/UL (ref 135–450)
PMV BLD AUTO: 7.7 FL (ref 5–10.5)
POTASSIUM REFLEX MAGNESIUM: 3.9 MMOL/L (ref 3.5–5.1)
RBC # BLD: 3.94 M/UL (ref 4–5.2)
SARS-COV-2, PCR: NOT DETECTED
SODIUM BLD-SCNC: 138 MMOL/L (ref 136–145)
TSH REFLEX: 1.67 UIU/ML (ref 0.27–4.2)
TSH REFLEX: 2.14 UIU/ML (ref 0.27–4.2)
TSH REFLEX: 2.34 UIU/ML (ref 0.27–4.2)
VITAMIN B-12: 330 PG/ML (ref 211–911)
VITAMIN B-12: 366 PG/ML (ref 211–911)
VITAMIN D 25-HYDROXY: 38.3 NG/ML
VITAMIN D 25-HYDROXY: 44.7 NG/ML
WBC # BLD: 8.4 K/UL (ref 4–11)

## 2020-06-25 PROCEDURE — 85027 COMPLETE CBC AUTOMATED: CPT

## 2020-06-25 PROCEDURE — U0003 INFECTIOUS AGENT DETECTION BY NUCLEIC ACID (DNA OR RNA); SEVERE ACUTE RESPIRATORY SYNDROME CORONAVIRUS 2 (SARS-COV-2) (CORONAVIRUS DISEASE [COVID-19]), AMPLIFIED PROBE TECHNIQUE, MAKING USE OF HIGH THROUGHPUT TECHNOLOGIES AS DESCRIBED BY CMS-2020-01-R: HCPCS

## 2020-06-25 PROCEDURE — 82607 VITAMIN B-12: CPT

## 2020-06-25 PROCEDURE — G0378 HOSPITAL OBSERVATION PER HR: HCPCS

## 2020-06-25 PROCEDURE — 82306 VITAMIN D 25 HYDROXY: CPT

## 2020-06-25 PROCEDURE — 1200000000 HC SEMI PRIVATE

## 2020-06-25 PROCEDURE — 97530 THERAPEUTIC ACTIVITIES: CPT

## 2020-06-25 PROCEDURE — 6360000002 HC RX W HCPCS: Performed by: INTERNAL MEDICINE

## 2020-06-25 PROCEDURE — 6370000000 HC RX 637 (ALT 250 FOR IP): Performed by: INTERNAL MEDICINE

## 2020-06-25 PROCEDURE — 96372 THER/PROPH/DIAG INJ SC/IM: CPT

## 2020-06-25 PROCEDURE — 80048 BASIC METABOLIC PNL TOTAL CA: CPT

## 2020-06-25 PROCEDURE — 2580000003 HC RX 258: Performed by: INTERNAL MEDICINE

## 2020-06-25 PROCEDURE — 84443 ASSAY THYROID STIM HORMONE: CPT

## 2020-06-25 PROCEDURE — 36415 COLL VENOUS BLD VENIPUNCTURE: CPT

## 2020-06-25 PROCEDURE — 97162 PT EVAL MOD COMPLEX 30 MIN: CPT

## 2020-06-25 PROCEDURE — 97167 OT EVAL HIGH COMPLEX 60 MIN: CPT

## 2020-06-25 PROCEDURE — 97535 SELF CARE MNGMENT TRAINING: CPT

## 2020-06-25 PROCEDURE — 97116 GAIT TRAINING THERAPY: CPT

## 2020-06-25 RX ADMIN — DOCUSATE SODIUM 50 MG AND SENNOSIDES 8.6 MG 1 TABLET: 8.6; 5 TABLET, FILM COATED ORAL at 09:25

## 2020-06-25 RX ADMIN — HEPARIN SODIUM 5000 UNITS: 5000 INJECTION INTRAVENOUS; SUBCUTANEOUS at 14:55

## 2020-06-25 RX ADMIN — NIFEDIPINE 30 MG: 30 TABLET, EXTENDED RELEASE ORAL at 09:25

## 2020-06-25 RX ADMIN — ASPIRIN 81 MG: 81 TABLET, COATED ORAL at 09:25

## 2020-06-25 RX ADMIN — PANTOPRAZOLE SODIUM 40 MG: 40 TABLET, DELAYED RELEASE ORAL at 20:58

## 2020-06-25 RX ADMIN — CITALOPRAM HYDROBROMIDE 40 MG: 10 TABLET ORAL at 20:58

## 2020-06-25 RX ADMIN — HEPARIN SODIUM 5000 UNITS: 5000 INJECTION INTRAVENOUS; SUBCUTANEOUS at 22:25

## 2020-06-25 RX ADMIN — Medication 10 ML: at 20:59

## 2020-06-25 RX ADMIN — Medication 10 ML: at 09:26

## 2020-06-25 RX ADMIN — HEPARIN SODIUM 5000 UNITS: 5000 INJECTION INTRAVENOUS; SUBCUTANEOUS at 06:00

## 2020-06-25 RX ADMIN — ATORVASTATIN CALCIUM 40 MG: 40 TABLET, FILM COATED ORAL at 09:25

## 2020-06-25 RX ADMIN — DOCUSATE SODIUM 50 MG AND SENNOSIDES 8.6 MG 1 TABLET: 8.6; 5 TABLET, FILM COATED ORAL at 20:59

## 2020-06-25 ASSESSMENT — PAIN SCALES - GENERAL
PAINLEVEL_OUTOF10: 0

## 2020-06-25 ASSESSMENT — PAIN - FUNCTIONAL ASSESSMENT: PAIN_FUNCTIONAL_ASSESSMENT: 0-10

## 2020-06-25 NOTE — DISCHARGE INSTR - COC
Continuity of Care Form    Patient Name: Mello Menjivar   :  1940  MRN:  [id number]    Admit date:  2020  Discharge date:  2020    Code Status Order: Full Code   Advance Directives:   [facility] Documentation     Date/Time Healthcare Directive Type of Healthcare Directive Copy in [facility] [address] Agent's Name Healthcare Agent's Phone Number    20  Yes, patient has an advance directive for healthcare treatment  --  --  --  --  --          Admitting Physician:  Katrine Sicard, MD  PCP: Ca Hanna DO    Discharging Nurse: [facility] Unit/Room#: 3540/4402-61  Discharging Unit Phone Number: ***    Emergency Contact:   Extended Emergency Contact Information  Primary Emergency Contact: Ozzie Verdin  Address: [address] [place] [address] Phone: [phone]  Work Phone: [phone]  Relation: Spouse   needed? No  Secondary Emergency Contact: Mariah Marcelino   [address] Phone: [phone]  Relation: Child   needed?  No    Past Surgical History:  Past Surgical History:   Procedure Laterality Date    ABDOMEN SURGERY      gastric resection for ulcers    BRONCHOSCOPY  2012    CHOLECYSTECTOMY, OPEN  2012    COLONOSCOPY      ERCP  3/13/12    sphincterotomy; 5 [place] cm stent placed    ERCP  2012    pancreatic stent removal, pancreotogram    PRESSURE ULCER DEBRIDEMENT      gastric    STOMACH SURGERY      for ulcer    UPPER GASTROINTESTINAL ENDOSCOPY         Immunization History:   Immunization History   Administered Date(s) Administered    Influenza Vaccine, unspecified formulation 10/06/2015, 2019    Influenza Virus Vaccine 10/12/2014    Influenza Whole 10/31/2012    Pneumococcal Conjugate 7-valent (Elige Arun) 10/01/2011    Zoster Live (Zostavax) 2012       Active Problems:  Patient Active Problem List   Diagnosis Code    Cellulitis and abscess L03.90, L02.91    UC (ulcerative colitis) ([facility] K51.90    Elevated blood pressure reading R03.0    Nausea & vomiting R11.2    Pancreatic mass K86.89    S/P cholecystectomy Z90.49    Cholelithiasis K80.20    Hypoxemia requiring supplemental oxygen R09.02, Z99.81    Incisional hernia K43.2    Port-A-Cath in place Z95.828    History of lung cancer Z85. 118    Left kidney mass N28.89    Wound infection after surgery, sequela T81. 4XXS    Left arm weakness R29.898    Stroke-like symptom R29.90    Weakness of both lower extremities R29.898    Tingling of left upper extremity R20.2    Acute renal failure (ARF) (HCC) N17.9    Generalized weakness R53.1       Isolation/Infection:   Isolation          No Isolation        Patient Infection Status     Infection Onset Added Last Indicated Last Indicated By Review Planned Expiration Resolved Resolved By    COVID-19 Rule Out 06/25/20 06/25/20 06/25/20 COVID-19 (Ordered)        Resolved    MRSA  12/31/11 12/31/11 Tanner Armenta, RN   05/01/18 Kunal Live, RN          Nurse Assessment:  Last Vital Signs: BP (!) 152/85   Pulse 90   Temp 98.3 °F (36.8 °C) (Oral)   Resp 18   Ht 5' 7\" (1.702 m)   Wt 155 lb 9.6 oz (70.6 kg)   SpO2 93%   BMI 24.37 kg/m²     Last documented pain score (0-10 scale): Pain Level: 0  Last Weight:   Wt Readings from Last 1 Encounters:   06/25/20 155 lb 9.6 oz (70.6 kg)     Mental Status:  oriented and alert    IV Access:  - None    Nursing Mobility/ADLs:  Walking   Dependent  Transfer  Assisted  Bathing  Assisted  Dressing  Assisted  Toileting  Dependent  Feeding  Independent  Med Admin  Assisted  Med Delivery   whole    Wound Care Documentation and Therapy:        Elimination:  Continence:   · Bowel:  Yes  · Bladder: Yes  Urinary Catheter: None   Colostomy/Ileostomy/Ileal Conduit: No       Date of Last BM: 6/26    Intake/Output Summary (Last 24 hours) at 6/25/2020 1054  Last data filed at 6/25/2020 0900  Gross per 24 hour   Intake 240 ml   Output --   Net 240 ml     No intake/output data recorded. Safety Concerns: At Risk for Falls    Impairments/Disabilities:      None    Nutrition Therapy:  Current Nutrition Therapy:   - Oral Diet:  General    Routes of Feeding: Oral  Liquids: Thin Liquids  Daily Fluid Restriction: no  Last Modified Barium Swallow with Video (Video Swallowing Test): not done    Treatments at the Time of Hospital Discharge:   Respiratory Treatments:   Oxygen Therapy:  is not on home oxygen therapy. Ventilator:    - No ventilator support    Rehab Therapies: Physical Therapy and Occupational Therapy  Weight Bearing Status/Restrictions: No weight bearing restirctions  Other Medical Equipment (for information only, NOT a DME order):  {EQUIPMENT:[id number]}  Other Treatments:     Patient's personal belongings (please select all that are sent with patient):      RN SIGNATURE:  Electronically signed by Ciera Bragg RN on 6/26/20 at 5:50 PM EDT    CASE MANAGEMENT/SOCIAL WORK SECTION    Inpatient Status Date: ***    Readmission Risk Assessment Score:  Readmission Risk              Risk of Unplanned Readmission:        0           Discharging to Facility/ Agency   · Name: [facility]  · Address:  · Phone: [phone]  · Fax: [phone]    Dialysis Facility (if applicable)   · Name:  · Address:  · Dialysis Schedule:  · Phone:  · Fax:    / signature: Electronically signed by KATHLEEN Haynes on 6/26/20 at 3:40 PM EDT    PHYSICIAN SECTION    Prognosis: Good    Condition at Discharge: Stable    Rehab Potential (if transferring to Rehab): Good    Recommended Labs or Other Treatments After Discharge:     Continue PT/OT-eval and treat.      Physician Certification: I certify the above information and transfer of Domi Randall  is necessary for the continuing treatment of the diagnosis listed and that she requires Bubba Marsh for

## 2020-06-25 NOTE — PLAN OF CARE
Problem: Falls - Risk of:  Goal: Will remain free from falls  Description: Will remain free from falls  Outcome: Ongoing   Patient will remain free of falls. Call light is in reach and encouraged to use.

## 2020-06-25 NOTE — DISCHARGE SUMMARY
Hospital Medicine Discharge Summary    Patient ID: Tone Munoz      Patient's PCP: Casey Ford DO    Admit Date: 6/24/2020     Discharge Date: 6/26/2020    Admitting Physician: Steffanie Vincent MD     Discharge Physician: Steffanie Vincent MD     Discharge Diagnoses: Active Hospital Problems    Diagnosis Date Noted    Generalized weakness [R53.1] 06/24/2020     See plan in progress note from this date for full hospital problem list.    The patient was seen and examined on day of discharge and this discharge summary is in conjunction with any daily progress note from day of discharge. Hospital Course:    Please see admission H&P as well as progress note from this date. Physical Exam Performed:     BP (!) 152/85   Pulse 90   Temp 98.3 °F (36.8 °C) (Oral)   Resp 18   Ht 5' 7\" (1.702 m)   Wt 155 lb 9.6 oz (70.6 kg)   SpO2 93%   BMI 24.37 kg/m²     Please see progress note from this date    Labs: For convenience and continuity at follow-up the following most recent labs are provided:      CBC:    Lab Results   Component Value Date    WBC 8.4 06/25/2020    HGB 10.5 06/25/2020    HCT 32.7 06/25/2020     06/25/2020       Renal:    Lab Results   Component Value Date     06/25/2020    K 3.9 06/25/2020     06/25/2020    CO2 23 06/25/2020    BUN 25 06/25/2020    CREATININE 1.0 06/25/2020    CALCIUM 8.6 06/25/2020    PHOS 4.8 10/22/2019         Significant Diagnostic Studies    Radiology:   XR CHEST PORTABLE   Final Result      No acute consolidation                  XR WRIST RIGHT (MIN 3 VIEWS)   Final Result      Marked deformity about the distal radius and ulna likely relates to old injury. Marked degenerative change radiocarpal joint and first carpal metacarpal joint. Widening of the scapholunate interval may relate to acute or chronic injury. If concern for acute injury splinting with follow-up recommended.       XR ANKLE RIGHT (2 VIEWS)   Final Result      No acute bony abnormality right ankle. If concern for acute injury splinting with follow-up recommended. CT Cervical Spine WO Contrast   Final Result      No acute bony pathology. Stable sclerotic lesion at C2      CT Head WO Contrast   Final Result      No acute intracranial pathology. 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 Continue PT/OT    Code Status:  Full Code     Activity: activity as tolerated    Diet: cardiac diet    Discharge Medications:     Current Discharge Medication List           Details   magnesium oxide (MAG-OX) 400 MG tablet Take 1 tablet by mouth 2 times daily  Qty: 30 tablet, Refills: 1      NIFEdipine (ADALAT CC) 30 MG extended release tablet Take 1 tablet by mouth daily  Qty: 30 tablet, Refills: 0      clopidogrel (PLAVIX) 75 MG tablet Take 1 tablet by mouth daily  Qty: 30 tablet, Refills: 0      atorvastatin (LIPITOR) 40 MG tablet Take 1 tablet by mouth daily  Qty: 30 tablet, Refills: 3      aspirin 325 MG EC tablet Take 1 tablet by mouth daily  Qty: 30 tablet, Refills: 0      LORazepam (ATIVAN) 0.5 MG tablet Take 0.5 mg by mouth every 8 hours as needed for Anxiety.       oxyCODONE-acetaminophen (PERCOCET) 5-325 MG per tablet Take 1 tablet by mouth every 8 hours as needed for Pain.      prochlorperazine (COMPAZINE) 10 MG tablet Take 10 mg by mouth every 8 hours as needed (nausea)      pantoprazole (PROTONIX) 40 MG tablet Take 40 mg by mouth nightly       citalopram (CELEXA) 40 MG tablet Take 40 mg by mouth nightly              Time Spent on discharge is 35 minutes in the examination, evaluation, counseling and review of medications and discharge plan. Signed:    Carla Mayo MD   6/25/2020      Thank you Soren Miner DO for the opportunity to be involved in this patient's care. If you have any questions or concerns please feel free to contact me at [phone].

## 2020-06-25 NOTE — PROGRESS NOTES
Occupational Therapy   Occupational Therapy Initial Assessment and Treatment    Date: 2020   Patient Name: Elzbieta Hanna  MRN: [id number]     : 1940    Date of Service: 2020    Discharge Recommendations:  Elzbieta Hanna scored a 16/24 on the AM-PAC ADL Inpatient form. Current research shows that an AM-PAC score of 17 or less is typically not associated with a discharge to the patient's home setting. Based on the patient's AM-PAC score and their current ADL deficits, it is recommended that the patient have 3-5 sessions per week of Occupational Therapy at d/c to increase the patient's independence. Please see assessment section for further patient specific details. If patient discharges prior to next session this note will serve as a discharge summary. Please see below for the latest assessment towards goals. OT Equipment Recommendations  Equipment Needed: No  Other: defer recommendations to discharge facility    Assessment   Performance deficits / Impairments: Decreased functional mobility ; Decreased ADL status; Decreased safe awareness;Decreased balance;Decreased endurance  Assessment: Pt admitted s/p fall in shower at home. Currently, pt is functioning well-below reported baseline. Pt demonstrating impaired sitting and standing balance w/ lean to R. During attempted ambulation to bathroom, pt's RLE dragging and balance becoming more and more impaired w/ activity - handoff provided to nurse. No c/o pain w/ activity. There are significant concerns for discharge home at this time as pt is needing assist of 2 for transfers - unsafe attempted ambulation. Pt will benefit from continued IP OT at discharge. Continue per POC and progress as tolerated.   Treatment Diagnosis: impaired ADLs/transfers, impaired sitting/standing balance  Decision Making: High Complexity  OT Education: OT Role;Plan of Care  Patient Education: ? full comprehension - continue to teach prn  REQUIRES OT FOLLOW UP: Yes  Activity Tolerance  Activity Tolerance: Patient limited by fatigue  Safety Devices  Safety Devices in place: Yes  Type of devices: Nurse notified; Left in bed;Call light within reach; Bed alarm in place(nurse and PCA at bedside and handoff provided; discussed recommendation for [employer] x 2 assist for toileting)           Patient Diagnosis(es): The primary encounter diagnosis was Fall, initial encounter. A diagnosis of Right wrist pain was also pertinent to this visit. has a past medical history of CHUYITA (acute kidney injury) ([facility] [address] Allergic rhinitis, Aneurysm ([facility] [address] Aortic aneurysm ([facility] [address] Cancer ([facility] [address] Cholelithiasis, Depression, Hyperlipidemia, Hypertension, Incontinence, MRSA (methicillin resistant staph aureus) culture positive, Ulcer, and Ulcerative colitis ([facility] [address] has a past surgical history that includes Abdomen surgery; Cholecystectomy, open (5/7/2012); Pressure ulcer debridement (2010); Colonoscopy; Upper gastrointestinal endoscopy; Stomach surgery; ERCP (3/13/12); bronchoscopy (12/11/2012); and ERCP (12-). Treatment Diagnosis: impaired ADLs/transfers, impaired sitting/standing balance      Restrictions  Position Activity Restriction  Other position/activity restrictions: up as lionel prn; elevate legs    Subjective   General  Chart Reviewed: Yes  Additional Pertinent Hx: 70 y.o. female who presents after a fall. Hospital Course: CT Head: neg; CT c-spine: neg; R Ankle: neg; R Wrist: neg acute, old injury. PMH: HTN, Hyperlipidemia, Ulcerative Colitis, MRSA, Incontinence, Depression, Aortic Aneurysm, CHUYITA, ERCP. Family / Caregiver Present: No  Referring Practitioner: Dr. Nicko Morillo  Diagnosis: Generalized Weakness    Subjective  Subjective: In bed on entry. Flat affect. \"I feel horrible. \" (vague) \"I just need to go home w/ my . \"    Patient Currently in Pain: (vague c/o arthritic pain - \"I just found out I had arthritis\")    Social/Functional History  Social/Functional History  Lives With: Spouse  Type of Home: House  Home Layout: One level  Home Access: Stairs to enter without rails  Entrance Stairs - Number of Steps: 2-3 steps to enter  Bathroom Toilet: Standard(sink next to toilet)  Home Equipment: Rolling walker  ADL Assistance: Independent(always makes sure spouse is home when she showers; fatigued after showering & effortful to get dressed)  Homemaking Assistance: Needs assistance(spouse does it all)  Ambulation Assistance: Independent  Transfer Assistance: Independent  Active : No(spouse takes her places)  Leisure & Hobbies: tv  Additional Comments: Had home cleaning services in past but not recently. Denies other falls. States sedentary at home & often stays in bed. Spouse has to A pt in/out bed & often helps with transfers. Pt questionable historian today:  per notes in Oct '19, pt living in mobile home with ramped entrance.        Objective   Vision: Within Functional Limits  Hearing: Within functional limits      Orientation  Overall Orientation Status: (oriented to self, location, situation, name of president; ? orientation to date - made reference to wall clock)        Balance  Sitting Balance: (initially Min A w/ lean to R > CGA; when seated in chair - lean to R (able to correct w/ cues))  Standing Balance: (lean to R (initially Min A and progressing to Mod A))    Functional Mobility  Functional - Mobility Device: Rolling Walker  Activity: Other(~4')  Functional Mobility Comments: initially requiring Mod A + Min A (ambulation stopped at this point 2* significant RLE drag); chair pulled up behind pt as ambulation was no longer safe    ADL  LE Dressing: Maximum assistance(assist to thread both feet, assist for pullup of depends; was able to pull depends down alternating R/L hand on Anders Cough)  Toileting: Maximum assistance(incontinent bladder; assist to doff/don clean depends - assist w/ pullup after toileting (able to wipe anterior, assist for posterior hygiene))     Tone RUE  RUE Tone: Normotonic  Tone LUE  LUE Tone: Normotonic  Quality of Movement Other  Comment: finger opposition intact; finger to nose intact bilaterally (w/ eyes closed)        Bed mobility  Supine to Sit: Moderate assistance(HOB elevated; reports spouse often A her out of bed)  Sit to Supine: Minimal assistance(for LEs only)     Transfers  Stand Step Transfers: Minimal assistance(bed to chair using wh walker (narrow ADRIA and near scissoring))  Sit to stand: (Min A (from bed to walker - post lean); CGA (from chair to [place] [employer]); Max A (from toilet to [place] [employer]))  Stand to sit: Minimal assistance  Transfer Comments: Note: pt w/ progressive R lean and R foot drag w/ activity; chair > toilet via [place] [employer] (one person guarding 2* impaired sitting balance; another person guiding [place] [employer])        Cognition  Overall Cognitive Status: Exceptions  Arousal/Alertness: Delayed responses to stimuli  Following Commands: Follows one step commands with repetition; Follows one step commands with increased time  Attention Span: Attends with cues to redirect  Memory: Decreased short term memory;Decreased recall of biographical Information(? historian )  Safety Judgement: Decreased awareness of need for assistance;Decreased awareness of need for safety  Problem Solving: Decreased awareness of errors  Insights: Decreased awareness of deficits  Initiation: Requires cues for some  Sequencing: Requires cues for some  Cognition Comment: flat affect           Sensation  Overall Sensation Status: WFL           LUE Strength  LUE Strength Comment: shoulder 4/5, elbow 4/5  RUE Strength  RUE Strength Comment: shoulder 4-/5, elbow 5/5        Hand Dominance  Hand Dominance: Right         Pt seen by OT for eval and treat.  Treatment included: bed mobility, unsupported sitting, functional transfer, ADL         Plan   Plan  Times per week: 2-5  Times per day: Daily  Current Treatment Recommendations: Strengthening, Balance Training, Functional

## 2020-06-25 NOTE — CARE COORDINATION
SW is aware that Pt will need SNF placement - therapy evals complete and AMPAC scores are 12/24 & 16/24. COVID test is ordered. Pt will need an [employer] Precert for SNF. SW met with Pt and son at bedside to discuss. Son stated his father is at an eye appointment now and will be at [facility] this afternoon to discuss SNF options. SW will meet with Pt, spouse, and son this afternoon to discuss SNF options and then make referrals. Saadia [place]  Case Management  [phone]-Addendum  Pt's spouse here to visit Pt and met with SW to discuss SNF placement. Pt and spouse states they are interested in [facility], [address], or [address]. SW explained need for [facility] S[facility]. SW called Admissions at [facility] but they are currently full and uncertain when a bed will be available. SW called Admissions at [address] ([phone]) and [address] but had to leave messages/referrals to both and faxed a facesheet via [employer]. CHRISTINE advised spouse of above. KATHLEEN Curtis  Case Management  [phone]-Addendum  Spouse asked for SW to also make referrals to [facility] and [facility]. SW called Admissions at both facilities-[facility] is out-of-network. [facility] does accept [employer] and can accept Pt and will start Precert in AM. CHRISTINE advised Pt and spoke and both are in agreement with plan.     KATHLEEN Posey  Case Management

## 2020-06-25 NOTE — PROGRESS NOTES
Hospitalist Progress Note      PCP: Emmett Ryan DO    Date of Admission: 6/24/2020    Chief Complaint: fall    Subjective:  Patient seen and examined at the bedside. No complaints at this time. Spoke with patient and son, Duc Warner, at the bedside regarding our recommendation for rehab prior to returning home. She will discuss with her  and let us know her decision. PFHS: reviewed as documented 6/24/2020, no changes      Medications:  Reviewed    Infusion Medications   Scheduled Medications    atorvastatin  40 mg Oral Daily    citalopram  40 mg Oral Nightly    NIFEdipine  30 mg Oral Daily    pantoprazole  40 mg Oral Nightly    aspirin  81 mg Oral Daily    sodium chloride flush  10 mL Intravenous 2 times per day    sennosides-docusate sodium  1 tablet Oral BID    heparin (porcine)  5,000 Units Subcutaneous 3 times per day     PRN Meds: sodium chloride flush, acetaminophen **OR** acetaminophen, magnesium hydroxide, promethazine **OR** ondansetron, oxyCODONE-acetaminophen      Intake/Output Summary (Last 24 hours) at 6/25/2020 1007  Last data filed at 6/25/2020 0900  Gross per 24 hour   Intake 240 ml   Output --   Net 240 ml         Physical Exam Performed:    BP (!) 152/85   Pulse 90   Temp 98.3 °F (36.8 °C) (Oral)   Resp 18   Ht 5' 7\" (1.702 m)   Wt 155 lb 9.6 oz (70.6 kg)   SpO2 93%   BMI 24.37 kg/m²     General appearance:  No apparent distress, appears stated age  Eyes: Pupils equal, round, reactive to light, conjunctiva/corneas clear  Ears/Nose/Mouth/Throat: No external lesions or scars, hearing intact to voice  Neck: Trachea midline, no masses noted  Respiratory:  Normal respiratory effort.  Clear to auscultation bilaterally  Cardiovascular: Regular rate and rhythm, nl S1/S2, w/o murmurs, rubs or gallops, no lower extremity edema  Abdomen: Soft, non-tender, non-distended, no hepatosplenomegaly  Musculoskeletal: No cyanosis, clubbing or petechiae, no lower extremity misalignment, asymmetry, or crepitation  Skin: Normal skin color, texture, turgor. No rashes or lesions noted. Psychiatric: Alert and oriented x4, good insight and judgment    Labs:   Recent Labs     06/24/20  1608 06/25/20  0430   WBC 6.4 8.4   HGB 10.9* 10.5*   HCT 35.2* 32.7*    255     Recent Labs     06/24/20  1608 06/25/20  0430    138   K 4.4 3.9    106   CO2 19* 23   BUN 29* 25*   CREATININE 1.2 1.0   CALCIUM 9.2 8.6     Recent Labs     06/24/20  1608   AST 23   ALT 22   BILITOT <0.2   ALKPHOS 108     No results for input(s): INR in the last 72 hours. Recent Labs     06/24/20  1608   TROPONINI <0.01       Urinalysis:      Lab Results   Component Value Date    NITRU Negative 10/23/2019    WBCUA 6-10 04/30/2018    BACTERIA 4+ 04/30/2018    RBCUA None seen 04/30/2018    BLOODU Negative 10/23/2019    SPECGRAV 1.025 10/23/2019    GLUCOSEU Negative 10/23/2019    GLUCOSEU Negative 05/08/2012       Radiology:  XR CHEST PORTABLE   Final Result      No acute consolidation                  XR WRIST RIGHT (MIN 3 VIEWS)   Final Result      Marked deformity about the distal radius and ulna likely relates to old injury. Marked degenerative change radiocarpal joint and first carpal metacarpal joint. Widening of the scapholunate interval may relate to acute or chronic injury. If concern for acute injury splinting with follow-up recommended. XR ANKLE RIGHT (2 VIEWS)   Final Result      No acute bony abnormality right ankle. If concern for acute injury splinting with follow-up recommended. CT Cervical Spine WO Contrast   Final Result      No acute bony pathology. Stable sclerotic lesion at C2      CT Head WO Contrast   Final Result      No acute intracranial pathology. 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 lacunar infarcts in the basal ganglia        Acute sinusitis Assessment/Plan:    Active Hospital Problems    Diagnosis Date Noted    Generalized weakness [R53.1] 06/24/2020       Plan:    # Generalized weakness  -Unclear etiology, no focal neuro deficits  -Metabolic workup negative  -Chest XR negative  -UA pending  -Check b12, vitD, TSH  -PT/OT, SW for home needs vs placement  -Has required IV fluids and overnight stay    # Mechanical fall  -Negative imaging: CT head, CT C-spine, XR wrist, XR ankle  -pain control as needed     # HTN  -Continue home aspirin, statin, nifedipine    # Depression  -Continue citalopram    DVT Prophylaxis: Lovenox  Diet: DIET CARDIAC;  Code Status: Full Code    PT/OT Eval Status: ordered    Dispo - inpt, Medically ready for discharge pending family decision, placement, precert    Berna Zambrano MD

## 2020-06-25 NOTE — PROGRESS NOTES
Physical Therapy    Facility/Department: 1 [facility], [address]  Initial Assessment/Treatment    NAME: Sameer Zuniga  : 1940  MRN: [id number]    Date of Service: 2020    Discharge Recommendations: Sameer Zuniga scored a  on the AM-PAC short mobility form. Current research shows that an AM-PAC score of 17 or less is typically not associated with a discharge to the patient's home setting. Based on the patient's AM-PAC score and their current functional mobility deficits, it is recommended that the patient have 3-5 sessions per week of Physical Therapy at d/c to increase the patient's independence. Please see assessment section for further patient specific details. If patient discharges prior to next session this note will serve as a discharge summary. Please see below for the latest assessment towards goals. PT Equipment Recommendations  Equipment Needed: (defer)    Assessment   Body structures, Functions, Activity limitations: Decreased functional mobility ; Decreased strength;Decreased balance;Decreased safe awareness  Assessment: 69 yo admitted s/p fall in shower at home with (-) xrays. Pt demo worsening balance during PT session this AM & significant lean to R. At times needed 2 person A due to impaired standing balance. RLE dragging behind pt with attemps to walk. Not at all safe to return home at this time due to inability to walk/transfer on her own. Pt also gave conflicting home information (? confusion). Recommend ongoing IP PT.  RN aware of worsening balance during session. Treatment Diagnosis: impaired balance/mobility  Prognosis: Good  Decision Making: Medium Complexity  PT Education: PT Role;Transfer Training;Goals; Functional Mobility Training;Gait Training;General Safety  Patient Education: verbalized understanding- needs reinforced  REQUIRES PT FOLLOW UP: Yes  Activity Tolerance  Activity Tolerance: Patient limited by endurance; Patient Tolerated treatment well       Patient Diagnosis(es): The primary encounter diagnosis was Fall, initial encounter. A diagnosis of Right wrist pain was also pertinent to this visit. has a past medical history of CHUYITA (acute kidney injury) ([facility] [address] Allergic rhinitis, Aneurysm ([facility] [address] Aortic aneurysm ([facility] [address] Cancer ([facility] [address] Cholelithiasis, Depression, Hyperlipidemia, Hypertension, Incontinence, MRSA (methicillin resistant staph aureus) culture positive, Ulcer, and Ulcerative colitis ([facility] [address] has a past surgical history that includes Abdomen surgery; Cholecystectomy, open (5/7/2012); Pressure ulcer debridement (2010); Colonoscopy; Upper gastrointestinal endoscopy; Stomach surgery; ERCP (3/13/12); bronchoscopy (12/11/2012); and ERCP (12-). Restrictions  Position Activity Restriction  Other position/activity restrictions: up as lionel prn; elevate legs        Subjective  General  Chart Reviewed: Yes  Additional Pertinent Hx: 69 yo admitted after slipped & fell in shower on 6/24. Recent CVA Oct '19 with residual R sided weakness. R wrist pain & R ankle pain with fall. Xrays (-) for acute fractures. Head CT (-) acute. 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 Family / Caregiver Present: No  Referring Practitioner: Katrine Sicard, MD  Diagnosis: generalized weakness  Follows Commands: Within Functional Limits  Subjective  Subjective: Found in bed, somewhat flat affect but cooperative. \"I don't do anything at home. \"  Reports stays in bed most of day but later stated she goes to kitchen for meals & takes showers on her own.    Pain Screening  Patient Currently in Pain: Denies          Orientation  Orientation  Overall Orientation Status: Impaired  Orientation Level: (somewhat slow to respond, flat affect; read off board for date)  Social/Functional History  Social/Functional History  Lives With: Spouse  Type of Home: House  Home Layout: One level  Home Access: Stairs to enter without rails  Entrance Stairs - Number of Steps: 2-3 steps to enter  Bathroom Toilet: Standard(sink next to toilet)  Home Equipment: Rolling walker  ADL Assistance: Independent(always makes sure spouse is home when she showers; fatigued after showering & effortful to get dressed)  Homemaking Assistance: Needs assistance(spouse does it all)  Ambulation Assistance: Independent  Transfer Assistance: Independent  Active : No(spouse takes her places)  Leisure & Hobbies: tv  Additional Comments: Had home cleaning services in past but not recently. Denies other falls. States sedentary at home & often stays in bed. Spouse has to A pt in/out bed & often helps with transfers. Pt questionable historian today:  per notes in Oct '19, pt living in mobile home with ramped entrance. Objective          AROM RLE (degrees)  RLE AROM: WFL  RLE General AROM: effortful to move RLE (reports arthritis everywhere)  AROM LLE (degrees)  LLE AROM : WFL  Strength RLE  Comment: quad WFL, hams 4/5  Strength LLE  Comment: quad & hams WFL     Sensation  Overall Sensation Status: WFL  Bed mobility  Supine to Sit: Moderate assistance(reports spouse often A her out of bed)  Sit to Supine: Minimal assistance(for LEs only)  Transfers  Sit to Stand: Contact guard assistance;Maximum Assistance(CGA from bed 1st trial, Mod A from chair to walker 2nd trial, CGA to odalis stedy from chair 3rd trial, max A off toilet to stedy 4th trial)  Stand to sit: Minimal Assistance; Moderate Assistance(cues for reaching back, leaning to R)  Ambulation  Ambulation?: Yes  More Ambulation?: Yes  Ambulation 1  Surface: level tile  Device: Rolling Walker  Assistance: Minimal assistance  Quality of Gait: narrow ADRIA, 1 incidence of scissoring gait  Distance: 4' bed> chair  Ambulation 2  Surface - 2: level tile  Device 2: [address] [facility] 2: 2 Person

## 2020-06-25 NOTE — PROGRESS NOTES
4 Eyes Admission Assessment     I agree as the admission nurse that 2 RN's have performed a thorough Head to Toe Skin Assessment on the patient. ALL assessment sites listed below have been assessed on admission. Areas assessed by both nurses: Khadijah [place]  [x]   Head, Face, and Ears   [x]   Shoulders, Back, and Chest  [x]   Arms, Elbows, and Hands   [x]   Coccyx, Sacrum, and Ischum  [x]   Legs, Feet, and Heels        Does the Patient have Skin Breakdown? No- redness noted on ellen area, coccyx, and heels.          Margarito Prevention initiated:  no  Wound Care Orders initiated:  no      C nurse consulted for Pressure Injury (Stage 3,4, Unstageable, DTI, NWPT, and Complex wounds):  no      Nurse 1 eSignature: Electronically signed by Fany Dove RN on 6/24/20 at 9:56 PM EDT    **SHARE this note so that the co-signing nurse is able to place an eSignature**    Nurse 2 eSignature: Electronically signed by Jie Juarez RN on 6/25/20 at 7:23 PM EDT

## 2020-06-26 VITALS
RESPIRATION RATE: 18 BRPM | OXYGEN SATURATION: 93 % | DIASTOLIC BLOOD PRESSURE: 76 MMHG | HEART RATE: 80 BPM | TEMPERATURE: 97.8 F | HEIGHT: 67 IN | SYSTOLIC BLOOD PRESSURE: 157 MMHG | WEIGHT: 154.19 LBS | BODY MASS INDEX: 24.2 KG/M2

## 2020-06-26 LAB
ANION GAP SERPL CALCULATED.3IONS-SCNC: 9 MMOL/L (ref 3–16)
BUN BLDV-MCNC: 20 MG/DL (ref 7–20)
CALCIUM SERPL-MCNC: 8.8 MG/DL (ref 8.3–10.6)
CHLORIDE BLD-SCNC: 106 MMOL/L (ref 99–110)
CO2: 25 MMOL/L (ref 21–32)
CREAT SERPL-MCNC: 0.9 MG/DL (ref 0.6–1.2)
GFR AFRICAN AMERICAN: >60
GFR NON-AFRICAN AMERICAN: >60
GLUCOSE BLD-MCNC: 108 MG/DL (ref 70–99)
HCT VFR BLD CALC: 32.1 % (ref 36–48)
HEMOGLOBIN: 10.3 G/DL (ref 12–16)
MCH RBC QN AUTO: 26.6 PG (ref 26–34)
MCHC RBC AUTO-ENTMCNC: 32 G/DL (ref 31–36)
MCV RBC AUTO: 82.9 FL (ref 80–100)
PDW BLD-RTO: 16.1 % (ref 12.4–15.4)
PLATELET # BLD: 229 K/UL (ref 135–450)
PMV BLD AUTO: 7.8 FL (ref 5–10.5)
POTASSIUM REFLEX MAGNESIUM: 4 MMOL/L (ref 3.5–5.1)
RBC # BLD: 3.87 M/UL (ref 4–5.2)
SODIUM BLD-SCNC: 140 MMOL/L (ref 136–145)
WBC # BLD: 4.6 K/UL (ref 4–11)

## 2020-06-26 PROCEDURE — 80048 BASIC METABOLIC PNL TOTAL CA: CPT

## 2020-06-26 PROCEDURE — 97535 SELF CARE MNGMENT TRAINING: CPT

## 2020-06-26 PROCEDURE — 6370000000 HC RX 637 (ALT 250 FOR IP): Performed by: INTERNAL MEDICINE

## 2020-06-26 PROCEDURE — G0378 HOSPITAL OBSERVATION PER HR: HCPCS

## 2020-06-26 PROCEDURE — 97530 THERAPEUTIC ACTIVITIES: CPT

## 2020-06-26 PROCEDURE — 85027 COMPLETE CBC AUTOMATED: CPT

## 2020-06-26 PROCEDURE — 2580000003 HC RX 258: Performed by: INTERNAL MEDICINE

## 2020-06-26 PROCEDURE — 6360000002 HC RX W HCPCS: Performed by: INTERNAL MEDICINE

## 2020-06-26 RX ADMIN — ATORVASTATIN CALCIUM 40 MG: 40 TABLET, FILM COATED ORAL at 08:09

## 2020-06-26 RX ADMIN — NIFEDIPINE 30 MG: 30 TABLET, EXTENDED RELEASE ORAL at 08:09

## 2020-06-26 RX ADMIN — DOCUSATE SODIUM 50 MG AND SENNOSIDES 8.6 MG 1 TABLET: 8.6; 5 TABLET, FILM COATED ORAL at 08:09

## 2020-06-26 RX ADMIN — HEPARIN SODIUM 5000 UNITS: 5000 INJECTION INTRAVENOUS; SUBCUTANEOUS at 05:58

## 2020-06-26 RX ADMIN — MAGNESIUM HYDROXIDE 30 ML: 400 SUSPENSION ORAL at 11:45

## 2020-06-26 RX ADMIN — ASPIRIN 81 MG: 81 TABLET, COATED ORAL at 08:09

## 2020-06-26 RX ADMIN — Medication 10 ML: at 08:09

## 2020-06-26 ASSESSMENT — PAIN SCALES - GENERAL
PAINLEVEL_OUTOF10: 0

## 2020-06-26 NOTE — CARE COORDINATION
Admissions at [facility] started Pt's [employer] Precert this AM and will call CHRISTINE when obtained. Pt has a COVID negative test on 6/25/20. CHRISTINE met with Pt's spouse this afternoon - he is aware of above. KATHLEEN Crawford  Case Management  [phone]  SW just received call from Admissions at [facility] that they have received Pt's [facility] Precert so can accept Pt this evening. CHRISTINE completed and submitted HENS # [id number]. CHRISTINE made a 6:30PM run with [facility]. Staff RN aware and to call report to [phone]. CHRISTINE faxed AMRITA to SN[facility]. CHRISTINE met with Pt and spouse at bedside and discussed above - both are in agreement with DC plan.      Ekta Acevedo [facility]  Case Management  [phone]

## 2020-06-26 NOTE — PROGRESS NOTES
Physical Therapy    Facility/Department: [address] [facility] [address]  Initial Assessment/Treatment    NAME: Avery Miguel  : 1940  MRN: [id number]    Date of Service: 2020    Discharge Recommendations: Avery Miguel scored a / on the AM-PAC short mobility form. Current research shows that an AM-PAC score of 17 or less is typically not associated with a discharge to the patient's home setting. Based on the patient's AM-PAC score and their current functional mobility deficits, it is recommended that the patient have 3-5 sessions per week of Physical Therapy at d/c to increase the patient's independence. Please see assessment section for further patient specific details. If patient discharges prior to next session this note will serve as a discharge summary. Please see below for the latest assessment towards goals. PT Equipment Recommendations  Equipment Needed: (defer)    Assessment   Body structures, Functions, Activity limitations: Decreased functional mobility ; Decreased strength;Decreased balance;Decreased safe awareness  Assessment: 71 yo admitted s/p fall in shower at home. Pt shows poor initiation of movement & poor sitting/standing balance. Pt almost seems unaware of leaning heavily to R & has a difficult time attempting to correct. Balance too poor today to attempt ambulation. Pt is well below her baseline & unsafe to return home at this time due to being at high risk for falls. Recommend ongoing PT. Treatment Diagnosis: impaired balance/mobility  Prognosis: Good  PT Education: PT Role;Transfer Training;Goals; Functional Mobility Training;Gait Training;General Safety  Patient Education: verbalized understanding- needs reinforced  REQUIRES PT FOLLOW UP: Yes  Activity Tolerance  Activity Tolerance: Patient limited by endurance; Patient limited by cognitive status       Patient Diagnosis(es): The primary encounter diagnosis was Fall, initial encounter.  A diagnosis of Right makes sure spouse is home when she showers; fatigued after showering & effortful to get dressed)  Homemaking Assistance: Needs assistance(spouse does it all)  Ambulation Assistance: Independent  Transfer Assistance: Independent  Active : No(spouse takes her places)  Leisure & Hobbies: tv  Additional Comments: Had home cleaning services in past but not recently. Denies other falls. States sedentary at home & often stays in bed. Spouse has to A pt in/out bed & often helps with transfers. Pt questionable historian today:  per notes in Oct '19, pt living in mobile home with ramped entrance. Objective                      Bed mobility  Supine to Sit: Moderate assistance(HOB elev; showing poor initiation of movement- lots of prompting needed, takes lengthy time to complete)  Transfers  Sit to Stand: Maximum Assistance;Contact guard assistance(CGA from bed pulling up on odalis stedy & CGA from elev stedy seat; Max A from low toilet to stedy)  Stand to sit: Moderate Assistance(A to reach back for chair; A to lower slowly)  Bed to Chair: Dependent/Total(via odalis stedy)  Comment: Pt requested using bathroom. Assisted to/from toilet via 5 CUPS and some sugaru 26. Balance  Sitting - Static: (brief Oh- Mod A majority of time due to heavy lean to R; pt shows no initiation of trying to correct balance on her own; repeatedly practiced weight shifting on side of bed, in chair, in odalis stedy)  Standing - Static: (brief CGA- Mod A majority of time due to heavy lean R & flexed posture. Needs verbal & tactile cues for significant weight shifting, shoulder retraction, trunk ext) Stood approx 30 sec x 2, 20 sec x 2 within odalis stedy.   Exercises  Hip Flexion: x 10 B with constant cues to participate  Knee Long Arc Quad: 5 reps x 2 B independent but states effortful & legs feel heavy     Plan   Plan  Times per week: 2-5  Current Treatment Recommendations: Strengthening, Transfer Training, Balance Training, Patient/Caregiver Education & Training, Safety Education & Training, Gait Training, Functional Mobility Training, Stair training  Safety Devices  Type of devices: Call light within reach, Chair alarm in place, Left in chair, Nurse notified                                                    AM-PAC Score  AM-PAC Inpatient Mobility Raw Score : 9 (06/26/20 1115)  AM-PAC Inpatient T-Scale Score : 30.55 (06/26/20 1115)  Mobility Inpatient CMS 0-100% Score: 81.38 (06/26/20 1115)  Mobility Inpatient CMS G-Code Modifier : CM (06/26/20 1115)          Goals- ongoing  Short term goals  Time Frame for Short term goals: dc  Short term goal 1: Supine<>sit CGA  Short term goal 2: Sit<>stand SBA consistently. Short term goal 3: Ambulate 36' with RW SBA. Short term goal 4: Independent sitting balance x 5 min on EOB. Short term goal 5: Static stand x 1 min with walker SBA. Short term goal 6: up/down 3 steps CGA  Patient Goals   Patient goals : \"I don't want to fall. \"       Therapy Time   Individual Concurrent Group Co-treatment   Time In 5971         Time Out 1100         Minutes 42           Timed Code Treatment Minutes:   42    Total Treatment Minutes:  [address] [facility], [address] [facility]

## 2020-06-26 NOTE — PROGRESS NOTES
Pt is awake in bed. Bed is locked and in lowest position with bed alarm on. Call light and bedside table are within reach. Pt is A&O. VSS. Assessment is complete and documented. Pt had no complaints at this time. Pt encouraged to notify RN with any needs. Pt verbalized understanding. Will continue to monitor.

## 2020-06-26 NOTE — PROGRESS NOTES
Dressing to left chest port changed using sterile technique. Pt brief changed and repositioned pt with pillow support. Fall precautions in place. Pt resting comfortably with eyes closed. Will continue to monitor.

## 2020-06-26 NOTE — PROGRESS NOTES
Hospitalist Progress Note      PCP: Soren Miner DO    Date of Admission: 6/24/2020    Chief Complaint: fall    Subjective:  Patient seen and examined at the bedside. No complaints at this time. Awaiting placement and precert    PFHS: reviewed as documented 6/24/2020, no changes      Medications:  Reviewed    Infusion Medications   Scheduled Medications    atorvastatin  40 mg Oral Daily    citalopram  40 mg Oral Nightly    NIFEdipine  30 mg Oral Daily    pantoprazole  40 mg Oral Nightly    aspirin  81 mg Oral Daily    sodium chloride flush  10 mL Intravenous 2 times per day    sennosides-docusate sodium  1 tablet Oral BID    heparin (porcine)  5,000 Units Subcutaneous 3 times per day     PRN Meds: sodium chloride flush, acetaminophen **OR** acetaminophen, magnesium hydroxide, promethazine **OR** ondansetron, oxyCODONE-acetaminophen    No intake or output data in the 24 hours ending 06/26/20 1002      Physical Exam Performed:    BP (!) 157/76   Pulse 80   Temp 97.8 °F (36.6 °C) (Axillary)   Resp 18   Ht 5' 7\" (1.702 m)   Wt 154 lb 3 oz (69.9 kg)   SpO2 93%   BMI 24.15 kg/m²     General appearance:  No apparent distress, appears stated age  Eyes: Pupils equal, round, reactive to light, conjunctiva/corneas clear  Ears/Nose/Mouth/Throat: No external lesions or scars, hearing intact to voice  Neck: Trachea midline, no masses noted  Respiratory:  Normal respiratory effort. Clear to auscultation bilaterally  Cardiovascular: Regular rate and rhythm, nl S1/S2, w/o murmurs, rubs or gallops, no lower extremity edema  Abdomen: Soft, non-tender, non-distended, no hepatosplenomegaly  Musculoskeletal: No cyanosis, clubbing or petechiae, no lower extremity misalignment, asymmetry, or crepitation  Skin: Normal skin color, texture, turgor. No rashes or lesions noted.   Psychiatric: Alert and oriented x4, good insight and judgment    Labs:   Recent Labs     06/24/20  1608 06/25/20  0430 06/26/20  0554   WBC 6.4 8.4 4.6   HGB 10.9* 10.5* 10.3*   HCT 35.2* 32.7* 32.1*    255 229     Recent Labs     06/24/20  1608 06/25/20  0430 06/26/20  0554    138 140   K 4.4 3.9 4.0    106 106   CO2 19* 23 25   BUN 29* 25* 20   CREATININE 1.2 1.0 0.9   CALCIUM 9.2 8.6 8.8     Recent Labs     06/24/20  1608   AST 23   ALT 22   BILITOT <0.2   ALKPHOS 108     No results for input(s): INR in the last 72 hours. Recent Labs     06/24/20  1608   TROPONINI <0.01       Urinalysis:      Lab Results   Component Value Date    NITRU Negative 10/23/2019    WBCUA 6-10 04/30/2018    BACTERIA 4+ 04/30/2018    RBCUA None seen 04/30/2018    BLOODU Negative 10/23/2019    SPECGRAV 1.025 10/23/2019    GLUCOSEU Negative 10/23/2019    GLUCOSEU Negative 05/08/2012       Radiology:  XR CHEST PORTABLE   Final Result      No acute consolidation                  XR WRIST RIGHT (MIN 3 VIEWS)   Final Result      Marked deformity about the distal radius and ulna likely relates to old injury. Marked degenerative change radiocarpal joint and first carpal metacarpal joint. Widening of the scapholunate interval may relate to acute or chronic injury. If concern for acute injury splinting with follow-up recommended. XR ANKLE RIGHT (2 VIEWS)   Final Result      No acute bony abnormality right ankle. If concern for acute injury splinting with follow-up recommended. CT Cervical Spine WO Contrast   Final Result      No acute bony pathology. Stable sclerotic lesion at C2      CT Head WO Contrast   Final Result      No acute intracranial pathology. 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

## 2020-06-26 NOTE — PROGRESS NOTES
Occupational Therapy  Facility/Department: [address]  Daily Treatment Note  NAME: Evans Shah  : 1940  MRN: [id number]    Date of Service: 2020    Discharge Recommendations:  Evans Shah scored a 14/24 on the AM-PAC ADL Inpatient form. Current research shows that an AM-PAC score of 17 or less is typically not associated with a discharge to the patient's home setting. Based on the patient's AM-PAC score and their current ADL deficits, it is recommended that the patient have 3-5 sessions per week of Occupational Therapy at d/c to increase the patient's independence. Please see assessment section for further patient specific details. If patient discharges prior to next session this note will serve as a discharge summary. Please see below for the latest assessment towards goals. OT Equipment Recommendations  Other: defer recommendations to discharge facility    Assessment   Performance deficits / Impairments: Decreased functional mobility ; Decreased ADL status; Decreased safe awareness;Decreased balance;Decreased endurance  Assessment: Continues to have impaired sitting and standing balance - lean to R. Needing Max A for LB ADLs. Pt has flat affect and is slow to respond/execute motor tasks. Not safe for discharge home at this time. Functioning well-below reported baseline of ambulatory w/o AD. Pt will benefit from continued IP OT at discharge. Continue per POC. Treatment Diagnosis: impaired ADLs/transfers, impaired sitting/standing balance  Prognosis: Fair  OT Education: OT Role;Plan of Care  Patient Education: ? full comprehension - continue to teach prn  REQUIRES OT FOLLOW UP: Yes  Activity Tolerance  Activity Tolerance: Patient limited by fatigue  Safety Devices  Safety Devices in place: Yes  Type of devices: Left in chair;Call light within reach; Chair alarm in place;Nurse notified         Patient Diagnosis(es): The primary encounter diagnosis was Fall, initial encounter. A diagnosis of Right wrist pain was also pertinent to this visit. has a past medical history of CHUYITA (acute kidney injury) ([facility] [address] Allergic rhinitis, Aneurysm ([facility] [address] Aortic aneurysm ([facility] [address] Cancer ([facility] [address] Cholelithiasis, Depression, Hyperlipidemia, Hypertension, Incontinence, MRSA (methicillin resistant staph aureus) culture positive, Ulcer, and Ulcerative colitis ([facility] [address] has a past surgical history that includes Abdomen surgery; Cholecystectomy, open (5/7/2012); Pressure ulcer debridement (2010); Colonoscopy; Upper gastrointestinal endoscopy; Stomach surgery; ERCP (3/13/12); bronchoscopy (12/11/2012); and ERCP (12-). Restrictions  Position Activity Restriction  Other position/activity restrictions: up as lionel prn; elevate legs     Subjective   General  Chart Reviewed: Yes  Additional Pertinent Hx: 70 y.o. female who presents after a fall. Hospital Course: CT Head: neg; CT c-spine: neg; R Ankle: neg; R Wrist: neg acute, old injury. PMH: HTN, Hyperlipidemia, Ulcerative Colitis, MRSA, Incontinence, Depression, Aortic Aneurysm, CHUYITA, ERCP. Family / Caregiver Present: No  Referring Practitioner: Dr. Yarely Dasilva  Diagnosis: Generalized Weakness    Subjective  Subjective: In bed on entry. Encouragement for OOB activity. \"I need to go to the bathroom. \" (when seated at EOB)  Pt makes conflicting statements - first stating she has no difficulty getting OOB at home, but then states \"this is the point I'd ask him to help me. \"        Orientation  Orientation  Overall Orientation Status: (not formally questioned)     Objective    ADL  Grooming: Minimal assistance; Increased time to complete(hand hygiene at sink level (supported standing on [address] seat); cues to locate items, cues for upright posture; increased time to process and execute task)  LE Dressing: Maximum assistance(assist to thread feet, assist for pullup/adjustment of clothes in the back)  Toileting: Maximum assistance(stating she need to use the toilet; depends were clean - sat on toilet for extended time and nothing happened (nurse informed))           Balance  Sitting Balance: Moderate assistance(lean to R at EOB - decreased righting reactions and decrease problem-solving; cues to extend R elbow for improved balance; intermittent Min A)  Standing Balance: (Min/Mod A - lean to R (cues to correct; momentarily corrects and then returns to R)    Functional Mobility  Functional Mobility Comments: to bathroom via [address] - Technique: (via [employer])  Equipment Used: Standard toilet  Toilet Transfer: Maximum assistance    Bed mobility  Supine to Sit: Moderate assistance(HOB elev; showing poor initiation of movement- lots of prompting needed)     Transfers  Sit to stand: (CGA (from bed; from seat of [employer]); Max A (from toilet - ? effort))  Stand to sit: Minimal assistance                              Cognition  Overall Cognitive Status: Exceptions  Arousal/Alertness: Delayed responses to stimuli  Following Commands: Follows one step commands with repetition; Follows one step commands with increased time  Attention Span: Attends with cues to redirect  Memory: Decreased short term memory;Decreased recall of biographical Information(? historian)  Safety Judgement: Decreased awareness of need for assistance;Decreased awareness of need for safety  Problem Solving: Decreased awareness of errors  Insights: Decreased awareness of deficits  Initiation: Requires cues for some  Sequencing: Requires cues for some  Cognition Comment: flat affect; slow to respond to questions                                            Plan   Plan  Times per week: 2-5  Times per day: Daily  Current Treatment Recommendations: Strengthening, Balance Training, Functional Mobility Training, Safety Education & Training, Self-Care / ADL, Neuromuscular Re-education                                                      AM-PAC Score        AM-PAC Inpatient Daily Activity Raw Score: 14 (06/26/20 1123)  AM-PAC Inpatient ADL T-Scale Score : 33.39 (06/26/20 1123)  ADL Inpatient CMS 0-100% Score: 59.67 (06/26/20 1123)  ADL Inpatient CMS G-Code Modifier : CK (06/26/20 1123)    Goals  Short term goals  Time Frame for Short term goals: discharge  Short term goal 1: stand step transfer bed to chair/BSC w/ Min A (not met)  Short term goal 2: unsupported sitting x 5 minutes in midline, SBA (not met)  Short term goal 3: LB dressing w/ Mod A (not met)  Patient Goals   Patient goals : to go home       Therapy Time   Individual Concurrent Group Co-treatment   Time In 1017         Time Out 1104         Minutes [address] [place] [address] OTR/L [address][id number]

## 2020-06-27 NOTE — FLOWSHEET NOTE
06/26/20 2012   Encounter Summary   Services provided to: Patient and family together   Referral/Consult From: [facility]   Support System Spouse   Continue Visiting   (6/26/2020, NAOMI. )   Complexity of Encounter Moderate   Length of Encounter 15 minutes   Routine   Type Initial   Assessment Approachable   Intervention Nurtured hope   Outcome Expressed gratitude

## 2020-09-27 ENCOUNTER — APPOINTMENT (OUTPATIENT)
Dept: CT IMAGING | Age: 80
DRG: 065 | End: 2020-09-27
Payer: MEDICARE

## 2020-09-27 ENCOUNTER — APPOINTMENT (OUTPATIENT)
Dept: MRI IMAGING | Age: 80
DRG: 065 | End: 2020-09-27
Payer: MEDICARE

## 2020-09-27 ENCOUNTER — HOSPITAL ENCOUNTER (INPATIENT)
Age: 80
LOS: 3 days | Discharge: HOME HEALTH | DRG: 065 | End: 2020-10-01
Attending: EMERGENCY MEDICINE | Admitting: INTERNAL MEDICINE
Payer: MEDICARE

## 2020-09-27 ENCOUNTER — APPOINTMENT (OUTPATIENT)
Dept: GENERAL RADIOLOGY | Age: 80
DRG: 065 | End: 2020-09-27
Payer: MEDICARE

## 2020-09-27 PROBLEM — R47.01 EXPRESSIVE APHASIA: Status: ACTIVE | Noted: 2020-09-27

## 2020-09-27 LAB
A/G RATIO: 1.4 (ref 1.1–2.2)
ALBUMIN SERPL-MCNC: 4.2 G/DL (ref 3.4–5)
ALP BLD-CCNC: 160 U/L (ref 40–129)
ALT SERPL-CCNC: 16 U/L (ref 10–40)
ANION GAP SERPL CALCULATED.3IONS-SCNC: 7 MMOL/L (ref 3–16)
AST SERPL-CCNC: 24 U/L (ref 15–37)
BACTERIA: ABNORMAL /HPF
BASOPHILS ABSOLUTE: 0.1 K/UL (ref 0–0.2)
BASOPHILS RELATIVE PERCENT: 1 %
BILIRUB SERPL-MCNC: <0.2 MG/DL (ref 0–1)
BILIRUBIN URINE: NEGATIVE
BLOOD, URINE: NEGATIVE
BUN BLDV-MCNC: 21 MG/DL (ref 7–20)
CALCIUM SERPL-MCNC: 8.9 MG/DL (ref 8.3–10.6)
CHLORIDE BLD-SCNC: 105 MMOL/L (ref 99–110)
CHOLESTEROL, TOTAL: 120 MG/DL (ref 0–199)
CLARITY: CLEAR
CO2: 26 MMOL/L (ref 21–32)
COLOR: YELLOW
CREAT SERPL-MCNC: 0.9 MG/DL (ref 0.6–1.2)
EOSINOPHILS ABSOLUTE: 0.9 K/UL (ref 0–0.6)
EOSINOPHILS RELATIVE PERCENT: 10.6 %
EPITHELIAL CELLS, UA: ABNORMAL /HPF (ref 0–5)
GFR AFRICAN AMERICAN: >60
GFR NON-AFRICAN AMERICAN: >60
GLOBULIN: 2.9 G/DL
GLUCOSE BLD-MCNC: 126 MG/DL (ref 70–99)
GLUCOSE BLD-MCNC: 132 MG/DL (ref 70–99)
GLUCOSE URINE: NEGATIVE MG/DL
HCT VFR BLD CALC: 33.9 % (ref 36–48)
HDLC SERPL-MCNC: 48 MG/DL (ref 40–60)
HEMOGLOBIN: 10.5 G/DL (ref 12–16)
INR BLD: 0.97 (ref 0.86–1.14)
KETONES, URINE: NEGATIVE MG/DL
LDL CHOLESTEROL CALCULATED: 41 MG/DL
LEUKOCYTE ESTERASE, URINE: NEGATIVE
LYMPHOCYTES ABSOLUTE: 0.8 K/UL (ref 1–5.1)
LYMPHOCYTES RELATIVE PERCENT: 9.8 %
MCH RBC QN AUTO: 25 PG (ref 26–34)
MCHC RBC AUTO-ENTMCNC: 31.1 G/DL (ref 31–36)
MCV RBC AUTO: 80.5 FL (ref 80–100)
MICROSCOPIC EXAMINATION: YES
MONOCYTES ABSOLUTE: 0.5 K/UL (ref 0–1.3)
MONOCYTES RELATIVE PERCENT: 6.8 %
NEUTROPHILS ABSOLUTE: 5.8 K/UL (ref 1.7–7.7)
NEUTROPHILS RELATIVE PERCENT: 71.8 %
NITRITE, URINE: POSITIVE
PDW BLD-RTO: 16.8 % (ref 12.4–15.4)
PERFORMED ON: ABNORMAL
PH UA: 6.5 (ref 5–8)
PLATELET # BLD: 270 K/UL (ref 135–450)
PMV BLD AUTO: 7.7 FL (ref 5–10.5)
POTASSIUM REFLEX MAGNESIUM: 3.8 MMOL/L (ref 3.5–5.1)
PROTEIN UA: NEGATIVE MG/DL
PROTHROMBIN TIME: 11.2 SEC (ref 10–13.2)
RBC # BLD: 4.2 M/UL (ref 4–5.2)
RBC UA: ABNORMAL /HPF (ref 0–4)
SODIUM BLD-SCNC: 138 MMOL/L (ref 136–145)
SPECIFIC GRAVITY UA: 1.01 (ref 1–1.03)
TOTAL PROTEIN: 7.1 G/DL (ref 6.4–8.2)
TRICHOMONAS: ABNORMAL /HPF
TRIGL SERPL-MCNC: 157 MG/DL (ref 0–150)
TROPONIN: <0.01 NG/ML
URINE REFLEX TO CULTURE: ABNORMAL
URINE TYPE: ABNORMAL
UROBILINOGEN, URINE: 0.2 E.U./DL
VLDLC SERPL CALC-MCNC: 31 MG/DL
WBC # BLD: 8 K/UL (ref 4–11)
WBC UA: ABNORMAL /HPF (ref 0–5)

## 2020-09-27 PROCEDURE — 83036 HEMOGLOBIN GLYCOSYLATED A1C: CPT

## 2020-09-27 PROCEDURE — 6360000004 HC RX CONTRAST MEDICATION: Performed by: EMERGENCY MEDICINE

## 2020-09-27 PROCEDURE — 71045 X-RAY EXAM CHEST 1 VIEW: CPT

## 2020-09-27 PROCEDURE — 96365 THER/PROPH/DIAG IV INF INIT: CPT

## 2020-09-27 PROCEDURE — 84484 ASSAY OF TROPONIN QUANT: CPT

## 2020-09-27 PROCEDURE — 2580000003 HC RX 258: Performed by: STUDENT IN AN ORGANIZED HEALTH CARE EDUCATION/TRAINING PROGRAM

## 2020-09-27 PROCEDURE — 70496 CT ANGIOGRAPHY HEAD: CPT

## 2020-09-27 PROCEDURE — 99285 EMERGENCY DEPT VISIT HI MDM: CPT

## 2020-09-27 PROCEDURE — 80061 LIPID PANEL: CPT

## 2020-09-27 PROCEDURE — 6360000002 HC RX W HCPCS: Performed by: INTERNAL MEDICINE

## 2020-09-27 PROCEDURE — G0378 HOSPITAL OBSERVATION PER HR: HCPCS

## 2020-09-27 PROCEDURE — 6370000000 HC RX 637 (ALT 250 FOR IP): Performed by: STUDENT IN AN ORGANIZED HEALTH CARE EDUCATION/TRAINING PROGRAM

## 2020-09-27 PROCEDURE — 96372 THER/PROPH/DIAG INJ SC/IM: CPT

## 2020-09-27 PROCEDURE — 70450 CT HEAD/BRAIN W/O DYE: CPT

## 2020-09-27 PROCEDURE — 85610 PROTHROMBIN TIME: CPT

## 2020-09-27 PROCEDURE — 93005 ELECTROCARDIOGRAM TRACING: CPT | Performed by: EMERGENCY MEDICINE

## 2020-09-27 PROCEDURE — 6360000002 HC RX W HCPCS: Performed by: STUDENT IN AN ORGANIZED HEALTH CARE EDUCATION/TRAINING PROGRAM

## 2020-09-27 PROCEDURE — 70498 CT ANGIOGRAPHY NECK: CPT

## 2020-09-27 PROCEDURE — 36415 COLL VENOUS BLD VENIPUNCTURE: CPT

## 2020-09-27 PROCEDURE — 70551 MRI BRAIN STEM W/O DYE: CPT

## 2020-09-27 PROCEDURE — 85025 COMPLETE CBC W/AUTO DIFF WBC: CPT

## 2020-09-27 PROCEDURE — 80053 COMPREHEN METABOLIC PANEL: CPT

## 2020-09-27 PROCEDURE — 81001 URINALYSIS AUTO W/SCOPE: CPT

## 2020-09-27 PROCEDURE — 2580000003 HC RX 258: Performed by: INTERNAL MEDICINE

## 2020-09-27 RX ORDER — ATORVASTATIN CALCIUM 40 MG/1
40 TABLET, FILM COATED ORAL DAILY
Status: CANCELLED | OUTPATIENT
Start: 2020-09-27

## 2020-09-27 RX ORDER — PANTOPRAZOLE SODIUM 40 MG/1
40 TABLET, DELAYED RELEASE ORAL NIGHTLY
Status: DISCONTINUED | OUTPATIENT
Start: 2020-09-27 | End: 2020-10-01 | Stop reason: HOSPADM

## 2020-09-27 RX ORDER — PROMETHAZINE HYDROCHLORIDE 12.5 MG/1
12.5 TABLET ORAL EVERY 6 HOURS PRN
Status: DISCONTINUED | OUTPATIENT
Start: 2020-09-27 | End: 2020-10-01 | Stop reason: HOSPADM

## 2020-09-27 RX ORDER — CITALOPRAM 10 MG/1
40 TABLET ORAL NIGHTLY
Status: CANCELLED | OUTPATIENT
Start: 2020-09-27

## 2020-09-27 RX ORDER — MAGNESIUM OXIDE 400 MG/1
400 TABLET ORAL 2 TIMES DAILY
Status: CANCELLED | OUTPATIENT
Start: 2020-09-27

## 2020-09-27 RX ORDER — SODIUM CHLORIDE 0.9 % (FLUSH) 0.9 %
10 SYRINGE (ML) INJECTION PRN
Status: DISCONTINUED | OUTPATIENT
Start: 2020-09-27 | End: 2020-10-01 | Stop reason: HOSPADM

## 2020-09-27 RX ORDER — CITALOPRAM 20 MG/1
40 TABLET ORAL NIGHTLY
Status: DISCONTINUED | OUTPATIENT
Start: 2020-09-27 | End: 2020-10-01 | Stop reason: HOSPADM

## 2020-09-27 RX ORDER — SODIUM CHLORIDE 0.9 % (FLUSH) 0.9 %
10 SYRINGE (ML) INJECTION EVERY 12 HOURS SCHEDULED
Status: DISCONTINUED | OUTPATIENT
Start: 2020-09-27 | End: 2020-10-01 | Stop reason: HOSPADM

## 2020-09-27 RX ORDER — LORAZEPAM 0.5 MG/1
0.5 TABLET ORAL EVERY 8 HOURS PRN
Status: DISCONTINUED | OUTPATIENT
Start: 2020-09-27 | End: 2020-10-01 | Stop reason: HOSPADM

## 2020-09-27 RX ORDER — PROCHLORPERAZINE MALEATE 10 MG
10 TABLET ORAL EVERY 8 HOURS PRN
Status: CANCELLED | OUTPATIENT
Start: 2020-09-27

## 2020-09-27 RX ORDER — ATORVASTATIN CALCIUM 80 MG/1
80 TABLET, FILM COATED ORAL DAILY
Status: DISCONTINUED | OUTPATIENT
Start: 2020-09-27 | End: 2020-10-01 | Stop reason: HOSPADM

## 2020-09-27 RX ORDER — ONDANSETRON 2 MG/ML
4 INJECTION INTRAMUSCULAR; INTRAVENOUS EVERY 6 HOURS PRN
Status: DISCONTINUED | OUTPATIENT
Start: 2020-09-27 | End: 2020-10-01 | Stop reason: HOSPADM

## 2020-09-27 RX ORDER — NIFEDIPINE 30 MG/1
90 TABLET, FILM COATED, EXTENDED RELEASE ORAL DAILY
Status: ON HOLD | COMMUNITY
End: 2020-10-30

## 2020-09-27 RX ORDER — NIFEDIPINE 30 MG/1
30 TABLET, FILM COATED, EXTENDED RELEASE ORAL DAILY
Status: CANCELLED | OUTPATIENT
Start: 2020-09-27

## 2020-09-27 RX ORDER — POLYETHYLENE GLYCOL 3350 17 G/17G
17 POWDER, FOR SOLUTION ORAL DAILY PRN
Status: DISCONTINUED | OUTPATIENT
Start: 2020-09-27 | End: 2020-10-01 | Stop reason: HOSPADM

## 2020-09-27 RX ORDER — LABETALOL HYDROCHLORIDE 5 MG/ML
10 INJECTION, SOLUTION INTRAVENOUS EVERY 10 MIN PRN
Status: DISCONTINUED | OUTPATIENT
Start: 2020-09-27 | End: 2020-10-01 | Stop reason: HOSPADM

## 2020-09-27 RX ORDER — LORAZEPAM 0.5 MG/1
0.5 TABLET ORAL EVERY 8 HOURS PRN
Status: CANCELLED | OUTPATIENT
Start: 2020-09-27

## 2020-09-27 RX ORDER — CLOPIDOGREL BISULFATE 75 MG/1
75 TABLET ORAL DAILY
Status: CANCELLED | OUTPATIENT
Start: 2020-09-27

## 2020-09-27 RX ORDER — BACITRACIN 500 [USP'U]/G
OINTMENT TOPICAL 4 TIMES DAILY PRN
Status: DISCONTINUED | OUTPATIENT
Start: 2020-09-27 | End: 2020-10-01 | Stop reason: HOSPADM

## 2020-09-27 RX ORDER — PANTOPRAZOLE SODIUM 40 MG/1
40 TABLET, DELAYED RELEASE ORAL NIGHTLY
Status: CANCELLED | OUTPATIENT
Start: 2020-09-27

## 2020-09-27 RX ORDER — OXYCODONE HYDROCHLORIDE AND ACETAMINOPHEN 5; 325 MG/1; MG/1
1 TABLET ORAL EVERY 8 HOURS PRN
Status: DISCONTINUED | OUTPATIENT
Start: 2020-09-27 | End: 2020-10-01 | Stop reason: HOSPADM

## 2020-09-27 RX ORDER — OXYCODONE HYDROCHLORIDE AND ACETAMINOPHEN 5; 325 MG/1; MG/1
1 TABLET ORAL EVERY 8 HOURS PRN
Status: CANCELLED | OUTPATIENT
Start: 2020-09-27

## 2020-09-27 RX ADMIN — PANTOPRAZOLE SODIUM 40 MG: 40 TABLET, DELAYED RELEASE ORAL at 20:28

## 2020-09-27 RX ADMIN — IOPAMIDOL 80 ML: 755 INJECTION, SOLUTION INTRAVENOUS at 12:39

## 2020-09-27 RX ADMIN — ENOXAPARIN SODIUM 40 MG: 40 INJECTION SUBCUTANEOUS at 18:37

## 2020-09-27 RX ADMIN — ASPIRIN 325 MG: 325 TABLET, COATED ORAL at 18:38

## 2020-09-27 RX ADMIN — ZINC OXIDE: 200 OINTMENT TOPICAL at 20:29

## 2020-09-27 RX ADMIN — CEFTRIAXONE SODIUM 2 G: 2 INJECTION, POWDER, FOR SOLUTION INTRAMUSCULAR; INTRAVENOUS at 16:04

## 2020-09-27 RX ADMIN — Medication 10 ML: at 20:31

## 2020-09-27 RX ADMIN — CARBIDOPA AND LEVODOPA 0.5 TABLET: 25; 100 TABLET ORAL at 20:28

## 2020-09-27 RX ADMIN — ATORVASTATIN CALCIUM 80 MG: 80 TABLET, FILM COATED ORAL at 18:38

## 2020-09-27 RX ADMIN — CITALOPRAM HYDROBROMIDE 40 MG: 20 TABLET ORAL at 20:28

## 2020-09-27 NOTE — PROGRESS NOTES
Diet changed to a general diet per MD orders. The pt did pass her bedside swallow screen. The pt was able to drink her water without any coughing or without clearing her throat. The pt also keeps on asking about food and how she is hungry and wants to eat. Per MD order diet was advanced to general since the pt passed her bedside swallow eval with the nurse. I will be in the room with the pt when her dinner arrives to make sure she doesn't have any issues with eating. I will continue to follow throughout the shift.   Britta Perkins

## 2020-09-27 NOTE — PROGRESS NOTES
Pt is eating her dinner without any swallowing issues, I will stay with the pt while she eats.   Ariel Andrea

## 2020-09-27 NOTE — ED NOTES
[phone] per Dr. Evaristo Mackay, patient \"outside window to receive TPA\".   Patient to be admitted     [facility], NAVEED  09/27/20 9290

## 2020-09-27 NOTE — PROGRESS NOTES
4 Eyes Admission Assessment     I agree as the admission nurse that 2 RN's have performed a thorough Head to Toe Skin Assessment on the patient. ALL assessment sites listed below have been assessed on admission. Areas assessed by both nurses:   [x]   Head, Face, and Ears   [x]   Shoulders, Back, and Chest  [x]   Arms, Elbows, and Hands   [x]   Coccyx, Sacrum, and Ischium  [x]   Legs, Feet, and Heels        Does the Patient have Skin Breakdown? Redness under left breast, excoriation on ellen area and inside the buttocks/ coccyx. Right leg has a blanchable red dollar sized nicholas.          Margarito Prevention initiated:  Yes   Wound Care Orders initiated:  No      WOC nurse consulted for Pressure Injury (Stage 3,4, Unstageable, DTI, NWPT, and Complex wounds) or Margarito score 18 or lower:  NA      Nurse 1 eSignature: Electronically signed by Rafael Maier RN on 9/27/20 at 3:57 PM EDT        Nurse 2 eSignature: Electronically signed by Burnice Holter, RN on 9/27/20 at 4:10 PM EDT

## 2020-09-27 NOTE — CONSULTS
Neurology Consult Note  Reason for Consult: stroke alert  Chief complaint: aphasia  Dr Cathy Steve MD asked me to see Gustavo Tapia in consultation for evaluation of stroke. Daughter at bedside provides history as patient is aphasic. History of Present Illness:  Gustavo Tapia is a 68 y.o. female with recent stroke (6-8 months ago, residual mild right hemiparesis), metastatic lung cancer, recent UTI, who presents with 6 hours of continuous expressive aphasia (and to some degree, receptive aphasia) which was first noted upon awakening this morning at 8 AM. Her daughter reports she is \"confused\" and this has happened to some extent with UTI but never this severe. She denies any abnormal behavior - just has speech difficulty. She had a stroke 6-8 months ago but daughter tells me she does not take asa, Plavix, or anticoagulation (though this is unclear). She has mild residual right hemiparesis but never had any aphasia with her prior stroke. She has a history of hypertension with home /70 mmHg though diastolic at times is as high as 100 mmHg before she takes her medication. She has a history of hyperlipidemia. She does not have any diabetes or atrial fibrillation to her daughter's knowledge. She just stopped antibiotics for UTI about two weeks ago. She is a remote smoker and quit in 2008. She has lung cancer with metastasis but her daughter isn't sure where - reports back pain.      Medical History:  Past Medical History:   Diagnosis Date    CHUYITA (acute kidney injury) ([facility] [address] 5/1/2018    Allergic rhinitis     Aneurysm ([facility] [address]     aorta  watching now    Aortic aneurysm [facility])     Currently has     Cancer [facility])     Lung    Cholelithiasis     Depression     Hyperlipidemia     Hypertension     Incontinence     bladder    MRSA (methicillin resistant staph aureus) culture positive 12/28/11    Leg    Ulcer     Ulcerative colitis ([facility] [address]      Past Surgical History:   Procedure Laterality Date    ABDOMEN SURGERY      gastric resection for ulcers    BRONCHOSCOPY  2012    CHOLECYSTECTOMY, OPEN  2012    COLONOSCOPY      ERCP  3/13/12    sphincterotomy; 5 Tajik cm stent placed    ERCP  2012    pancreatic stent removal, pancreotogram    PRESSURE ULCER DEBRIDEMENT      gastric    STOMACH SURGERY      for ulcer    UPPER GASTROINTESTINAL ENDOSCOPY       Not in a hospital admission. No Known Allergies  Family History   Problem Relation Age of Onset    Arthritis Mother      Social History     Tobacco Use   Smoking Status Former Smoker    Packs/day: 1.50    Years: 50.00    Pack years: 75.00    Last attempt to quit: 2008    Years since quittin.7   Smokeless Tobacco Never Used     Social History     Substance and Sexual Activity   Drug Use No     Social History     Substance and Sexual Activity   Alcohol Use No       ROS:  Constitutional- No weight loss or fevers  Eyes- No diplopia. No photophobia. Ears/nose/throat- No dysphagia. No Dysarthria  Cardiovascular- No palpitations. No chest pain  Respiratory- No dyspnea. No Cough  Gastrointestinal- No Abdominal pain. No Vomiting. Genitourinary- No incontinence. No urinary retention  Musculoskeletal- No myalgia. No arthralgia  Skin- No rash. No easy bruising. Psychiatric- No depression. No anxiety  Endocrine- No diabetes. No thyroid issues. Hematologic- No bleeding difficulty. No fatigue  Neurologic- No weakness. No Headache. Vitals:    20 1202 20 1245 20 1300   BP: (!) 151/86 (!) 141/77 (!) 147/70   Pulse:  88 90   Resp: 16 17 19   Temp: 98.3 °F (36.8 °C)     TempSrc: Oral     SpO2: 93% (!) 81% 96%   Weight:  152 lb 3.2 oz (69 kg)    Height:  5' 2\" (1.575 m)      Exam:  Constitutional    Vital signs: BP, HR, and RR reviewed   General Alert, no distress, well-nourished  Eyes: unable to visualize fundi   Cardiovascular: pulses symmetric in all 4 extremities. No peripheral edema.   Psychiatric: unable to assess given comatose state  Neurologic  Mental status: alert  orientation unable to assess given aphasia  General fund of knowledge unable to assess given aphasia   Memory unable to assess given aphasia  Attention intact as able to attend well to the exam   Language moderate aphasia. Frequent paraphasic errors and neologisms. Comprehension follows simple commands. When asked to touch right thumb to left ear, she touches her left ear to her cheek. CN2: Visual Fields: blinks to threat bilaterally   CN 3,4,6: pupils equal and reactive to light, extraocular muscles intact - she seemed to be having greater difficulty with leftward gaze but was able to overcome with verbal stimulus  CN5: facial sensation intact bilaterally but limited by aphasia  CN7: face symmetric without dysarthria  CN8: hearing unable to full assess but appears to hear my voice   CN9: unable to assess given aphasia  CN11: trap symmetric with shoulder shrug  CN12: tongue midline with protrusion   Strength: No pronator drift. RUE -5/5. Otherwise 5/5 throughout   Deep tendon reflexes: brisk and symmetric in all 4 extremities  Sensory: intact to light touch throughout though limited by aphasia  Cerebellar/coordination: no ataxia bilaterally with FNF  Tone: normal in all 4 extremities  Gait: deferred for safety given weakness    Labs  Creatinine 0.9 mg/dL  LDL 66 mg/dL (2019)  A1c 5.8% (2019)  B12 366 pg/mL    Studies  Head CT: no acute intracranial abnormality       CT-A head: no LVO. CT-A neck: no acute CTA findings. No hemodynamically significant stenosis or focal aneurysm    MRI C-spine 2019  1. Interval slight increase in size of C2 metastasis compared to 6/5/2018. 2. New hypointense lesion of right inferior endplate of C6. This could represent metastasis versus a new Schmorl's node. 3. Degenerative changes cervical spine as above. 4. Opacification most of the right mastoid air cells which could represent mastoiditis. Recommend correlation. MRI brain 10/23/19      Impression:  Percy Michaud is a 68 y.o. female with a history of stroke and metastatic lung cancer who presented with acute aphasia for the last six hours (last seen well last night) clinically consistent with ischemic stroke. Vessel imaging with no LVO. Outside the window for tPA. Recommendations:  - MRI brain and C-spine w/wo rosalva  (ordered)  - Routine EEG (ordered)   - Check lipids and A1c (ordered)  - Initiate asa 81 mg daily unless otherwise contrandicated; will leave to discretion of primary team given hx colitis  - Allow cerebral autoregulation to maximum 205/105 mmHg for first 24-48 hours (symptom onset was 6 hours ago); would begin to bring under better control tomorrow around 8 AM with goal less than 140/90 mmHg. - Echocardiogram (ordered). MIRANDA could be considered if 2d echo is unrevealing.  - Continue on telemetry for duration of hospital admission, in particular to monitor for paroxysmal atrial fibrillation or atrial flutter. Could consider long term cardiac monitoring upon discharge.   - Okay for subcutaneous heparin for DVT prophylaxis.   - Speech therapy for aphasia (ordered)  - Check UA & culture given recent UTI    A copy of this note was provided for MD Tejinder Dumont NP  [phone]  Evenings, weekends, and off weeks please discuss neurologist on-call

## 2020-09-27 NOTE — H&P
bathroom by herself. She normally walks with a walker.     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 She is status post radiation therapy. Past Medical History:          Diagnosis Date    CHUYITA (acute kidney injury) ([facility] [address] 5/1/2018    Allergic rhinitis     Aneurysm ([facility] [address]     aorta  watching now    Aortic aneurysm [facility])     Currently has     Cancer ([facility] [address]     Lung    Cholelithiasis     Depression     Hyperlipidemia     Hypertension     Incontinence     bladder    MRSA (methicillin resistant staph aureus) culture positive 12/28/11    Leg    Ulcer     Ulcerative colitis ([facility] [address]        Past Surgical History:          Procedure Laterality Date    ABDOMEN SURGERY      gastric resection for ulcers    BRONCHOSCOPY  12/11/2012    CHOLECYSTECTOMY, OPEN  5/7/2012    COLONOSCOPY      ERCP  3/13/12    sphincterotomy; 5 French cm stent placed    ERCP  12-    pancreatic stent removal, pancreotogram    PRESSURE ULCER DEBRIDEMENT  2010    gastric    STOMACH SURGERY      for ulcer    UPPER GASTROINTESTINAL ENDOSCOPY         Medications Prior to Admission:      Prior to Admission medications    Medication Sig Start Date End Date Taking? Authorizing Provider   magnesium oxide (MAG-OX) 400 MG tablet Take 1 tablet by mouth 2 times daily 10/31/19   Jennifer Figueredo APRN - CNP   NIFEdipine (ADALAT CC) 30 MG extended release tablet Take 1 tablet by mouth daily 10/21/19   Gabriela Guerra MD   clopidogrel (PLAVIX) 75 MG tablet Take 1 tablet by mouth daily 10/21/19 11/20/19  Gabriela Guerra MD   atorvastatin (LIPITOR) 40 MG tablet Take 1 tablet by mouth daily 10/21/19   Karlie Chowdhury MD   aspirin 325 MG EC tablet Take 1 tablet by mouth daily 10/21/19 11/20/19  Gabriela Guerra MD   LORazepam (ATIVAN) 0.5 MG tablet Take 0.5 mg by mouth every 8 hours as needed for Anxiety.     Historical Provider, MD   oxyCODONE-acetaminophen (PERCOCET) 5-325 MG per tablet Take 1 tablet by mouth every 8 hours Regular rate and rhythm with normal S1/S2 without murmurs, rubs or gallops. Abdomen: Soft, non-tender, non-distended with normal bowel sounds. Musculoskeletal:  No clubbing, cyanosis oredema bilaterally. Full range of motion without deformity. Skin: Skin color, texture, turgor normal.  Norashes or lesions. Neurologic:  Expressive aphasia, intermittently better. Neurovascularly intact without any focal sensory/motor deficits. Cranialnerves: II-XII intact, grossly non-focal.  Psychiatric:  Unable to assess due to expressive aphasia. Capillary Refill: Brisk,< 3 seconds   Peripheral Pulses: +2 palpable, equal bilaterally     NIHSS score 2 for inability to name month and expressive aphasia. Labs:     Recent Labs     09/27/20  1209   WBC 8.0   HGB 10.5*   HCT 33.9*        Recent Labs     09/27/20  1209      K 3.8      CO2 26   BUN 21*   CREATININE 0.9   CALCIUM 8.9     Recent Labs     09/27/20  1209   AST 24   ALT 16   BILITOT <0.2   ALKPHOS 160*     Recent Labs     09/27/20  1209   INR 0.97     Recent Labs     09/27/20  1209   TROPONINI <0.01       Urinalysis:      Lab Results   Component Value Date    NITRU POSITIVE 09/27/2020    WBCUA 6-10 04/30/2018    BACTERIA 4+ 04/30/2018    RBCUA None seen 04/30/2018    BLOODU Negative 09/27/2020    SPECGRAV 1.010 09/27/2020    GLUCOSEU Negative 09/27/2020    GLUCOSEU Negative 05/08/2012       Radiology:       CTA NECK W CONTRAST   Final Result      1. No findings for acute intracranial abnormality. 2.  Age related atrophy with moderate periventricular white matter changes bilaterally consistent with chronic ischemic leukoencephalopathy. 3.  Remote right frontal lobe infarct with multiple small bilateral basal ganglia lacunar infarcts, unchanged. 4.  Chronic right maxillary sinusitis.             PROCEDURE: CT ANGIOGRAPHY NECK WITH/WITHOUT CONTRAST      INDICATION: altered mental state      COMPARISON: none      TECHNIQUE: Limited noncontrast CT imaging performed for the purposes of IV contrast bolus tracking. Axial CT imaging obtained from skull base through the lung apices following administration of IV contrast. Axial images, multiplanar reformatted images,    and maximum intensity projection images were reviewed for CT angiographic technique. Up-to-date CT equipment and radiation dose reduction techniques were employed. IV contrast: 80 mL Isovue 370. FINDINGS:      AORTIC ARCH: Standard three-vessel aortic arch anatomy is present. INNOMINATE ARTERY: Normal.      RIGHT SUBCLAVIAN ARTERY: Normal.      RIGHT VERTEBRAL ARTERY: Normal.      RIGHT CAROTID ARTERY: There is contrast opacification of the right common carotid artery. Minimal epiglottic plaque at the carotid bifurcation. No flow significant stenosis seen. There is flow identified in the distal right internal carotid artery. LEFT SUBCLAVIAN ARTERY: Normal.      LEFT VERTEBRAL ARTERY: Normal.      LEFT CAROTID ARTERY: There is contrast opacification of the left common carotid artery. There is moderate chronic plaque at the carotid bifurcation. No flow significant stenosis or focal aneurysm seen. There is flow in the distal left internal carotid    artery. NECK SOFT TISSUES: No neck soft tissue mass or lymphadenopathy. VISUALIZED MEDIASTINUM: No mass or lymphadenopathy. VISUALIZED LUNG APICES: Mild centrilobular emphysematous changes bilaterally. BONES: No destructive osseous process. OTHER: None. IMPRESSION:      No acute CTA findings. No hemodynamically significant stenosis or focal aneurysm seen.                PROCEDURE: CT ANGIOGRAPHY HEAD WITH/WITHOUT CONTRAST      INDICATION: altered mental state      COMPARISON: none      TECHNIQUE: Axial CT imaging obtained through the head prior to and following administration of IV contrast. Axial images, multiplanar reformatted images, and maximum intensity projection images were reviewed for CT angiographic technique. IV contrast: 80 mL Isovue 370. FINDINGS:      ANTERIOR CIRCULATION: The intracranial internal carotid arteries, anterior cerebral arteries, and middle cerebral arteries demonstrate no occlusion or stenosis. No evidence for aneurysm or arteriovenous malformation. POSTERIOR CIRCULATION: The bilateral vertebral arteries, basilar artery and posterior cerebral arteries demonstrate no occlusion or significant stenosis. Small patent right posterior communicating artery. No evidence for aneurysm or arteriovenous    malformation. INTRACRANIAL VENOUS SYSTEM: No evidence for intracranial venous thrombosis. OTHER: None. IMPRESSION:      1. No findings for acute CTA findings in the head. No hemodynamically significant stenosis or focal aneurysm identified. CTA HEAD W CONTRAST   Final Result      1. No findings for acute intracranial abnormality. 2.  Age related atrophy with moderate periventricular white matter changes bilaterally consistent with chronic ischemic leukoencephalopathy. 3.  Remote right frontal lobe infarct with multiple small bilateral basal ganglia lacunar infarcts, unchanged. 4.  Chronic right maxillary sinusitis. PROCEDURE: CT ANGIOGRAPHY NECK WITH/WITHOUT CONTRAST      INDICATION: altered mental state      COMPARISON: none      TECHNIQUE: Limited noncontrast CT imaging performed for the purposes of IV contrast bolus tracking. Axial CT imaging obtained from skull base through the lung apices following administration of IV contrast. Axial images, multiplanar reformatted images,    and maximum intensity projection images were reviewed for CT angiographic technique. Up-to-date CT equipment and radiation dose reduction techniques were employed. IV contrast: 80 mL Isovue 370. FINDINGS:      AORTIC ARCH: Standard three-vessel aortic arch anatomy is present.       INNOMINATE ARTERY: Normal. RIGHT SUBCLAVIAN ARTERY: Normal.      RIGHT VERTEBRAL ARTERY: Normal.      RIGHT CAROTID ARTERY: There is contrast opacification of the right common carotid artery. Minimal epiglottic plaque at the carotid bifurcation. No flow significant stenosis seen. There is flow identified in the distal right internal carotid artery. LEFT SUBCLAVIAN ARTERY: Normal.      LEFT VERTEBRAL ARTERY: Normal.      LEFT CAROTID ARTERY: There is contrast opacification of the left common carotid artery. There is moderate chronic plaque at the carotid bifurcation. No flow significant stenosis or focal aneurysm seen. There is flow in the distal left internal carotid    artery. NECK SOFT TISSUES: No neck soft tissue mass or lymphadenopathy. VISUALIZED MEDIASTINUM: No mass or lymphadenopathy. VISUALIZED LUNG APICES: Mild centrilobular emphysematous changes bilaterally. BONES: No destructive osseous process. OTHER: None. IMPRESSION:      No acute CTA findings. No hemodynamically significant stenosis or focal aneurysm seen. PROCEDURE: CT ANGIOGRAPHY HEAD WITH/WITHOUT CONTRAST      INDICATION: altered mental state      COMPARISON: none      TECHNIQUE: Axial CT imaging obtained through the head prior to and following administration of IV contrast. Axial images, multiplanar reformatted images, and maximum intensity projection images were reviewed for CT angiographic technique. IV contrast: 80 mL Isovue 370. FINDINGS:      ANTERIOR CIRCULATION: The intracranial internal carotid arteries, anterior cerebral arteries, and middle cerebral arteries demonstrate no occlusion or stenosis. No evidence for aneurysm or arteriovenous malformation. POSTERIOR CIRCULATION: The bilateral vertebral arteries, basilar artery and posterior cerebral arteries demonstrate no occlusion or significant stenosis. Small patent right posterior communicating artery.   No evidence for aneurysm or arteriovenous malformation. INTRACRANIAL VENOUS SYSTEM: No evidence for intracranial venous thrombosis. OTHER: None. IMPRESSION:      1. No findings for acute CTA findings in the head. No hemodynamically significant stenosis or focal aneurysm identified. XR CHEST PORTABLE   Final Result   1. No localized consolidation or pleural effusion. .      CT Head WO Contrast   Final Result      1. No findings for acute intracranial abnormality. 2.  Age related atrophy with moderate periventricular white matter changes bilaterally consistent with chronic ischemic leukoencephalopathy. 3.  Remote right frontal lobe infarct with multiple small bilateral basal ganglia lacunar infarcts, unchanged. 4.  Chronic right maxillary sinusitis. PROCEDURE: CT ANGIOGRAPHY NECK WITH/WITHOUT CONTRAST      INDICATION: altered mental state      COMPARISON: none      TECHNIQUE: Limited noncontrast CT imaging performed for the purposes of IV contrast bolus tracking. Axial CT imaging obtained from skull base through the lung apices following administration of IV contrast. Axial images, multiplanar reformatted images,    and maximum intensity projection images were reviewed for CT angiographic technique. Up-to-date CT equipment and radiation dose reduction techniques were employed. IV contrast: 80 mL Isovue 370. FINDINGS:      AORTIC ARCH: Standard three-vessel aortic arch anatomy is present. INNOMINATE ARTERY: Normal.      RIGHT SUBCLAVIAN ARTERY: Normal.      RIGHT VERTEBRAL ARTERY: Normal.      RIGHT CAROTID ARTERY: There is contrast opacification of the right common carotid artery. Minimal epiglottic plaque at the carotid bifurcation. No flow significant stenosis seen. There is flow identified in the distal right internal carotid artery.       LEFT SUBCLAVIAN ARTERY: Normal.      LEFT VERTEBRAL ARTERY: Normal.      LEFT CAROTID ARTERY: There is contrast opacification of the left common carotid artery. There is moderate chronic plaque at the carotid bifurcation. No flow significant stenosis or focal aneurysm seen. There is flow in the distal left internal carotid    artery. NECK SOFT TISSUES: No neck soft tissue mass or lymphadenopathy. VISUALIZED MEDIASTINUM: No mass or lymphadenopathy. VISUALIZED LUNG APICES: Mild centrilobular emphysematous changes bilaterally. BONES: No destructive osseous process. OTHER: None. IMPRESSION:      No acute CTA findings. No hemodynamically significant stenosis or focal aneurysm seen. PROCEDURE: CT ANGIOGRAPHY HEAD WITH/WITHOUT CONTRAST      INDICATION: altered mental state      COMPARISON: none      TECHNIQUE: Axial CT imaging obtained through the head prior to and following administration of IV contrast. Axial images, multiplanar reformatted images, and maximum intensity projection images were reviewed for CT angiographic technique. IV contrast: 80 mL Isovue 370. FINDINGS:      ANTERIOR CIRCULATION: The intracranial internal carotid arteries, anterior cerebral arteries, and middle cerebral arteries demonstrate no occlusion or stenosis. No evidence for aneurysm or arteriovenous malformation. POSTERIOR CIRCULATION: The bilateral vertebral arteries, basilar artery and posterior cerebral arteries demonstrate no occlusion or significant stenosis. Small patent right posterior communicating artery. No evidence for aneurysm or arteriovenous    malformation. INTRACRANIAL VENOUS SYSTEM: No evidence for intracranial venous thrombosis. OTHER: None. IMPRESSION:      1. No findings for acute CTA findings in the head. No hemodynamically significant stenosis or focal aneurysm identified.                 ASSESSMENT & PLAN:  Gilmar Benavidez is a 68 y.o. female w/ a past medical history of aortic aneurysm, retention, hyperlipidemia, and stage IV non-small cell lung cancer who presented to [facility] with expressive aphasia. Active Hospital Problems    Diagnosis Date Noted    Expressive aphasia [R47.01] 09/27/2020       Expressive aphasia - pt has hx of stroke in Oct 2019 and has hx of smoking; expressive aphasia intermittently better  - etiologies include TIA v CVA v seizure focus  - neurology consultation  - NIHSS score 2  - permissive HTN systolic to 259 and diastolic to 940  - telemetry  - q4 neuro checks  - CT head without acute intracranial changes  - CTA neck and CTA head without acute changes  - CXR no consolidation  - f/u EEG, MRI brain and c-spine  - f/u ECHO    Suspected UTI  - UA positive for nitrites  - ceftriaxone      Chronic conditions:  NSCLC stage IV with bony metastases - treated with radiation therapy  Aortic aneurysm  Urinary retention  Hyperlipidemia      DVT Prophylaxis: Lovenox  Diet: General  Code Status: FULL  PT/OT Eval Status: pending  Dispo - Admit as observation      I will discuss the patient with the senior resident and MD Jamir Santos MD  Internal Medicine Resident, PGY-1    I have personally seen and evaluated this patient. I discussed findings and management with the resident, on 09/27/20  and agree as documented in this note   And changes made to the note.        [facility]  Attending Physician

## 2020-09-27 NOTE — ED TRIAGE NOTES
Patient arrived to ED via EMS due to altered mental status. Pt has hx. Of TIAs. Pt presents with expressive aphasia. Last known well was between [id number].

## 2020-09-27 NOTE — PROGRESS NOTES
Pt arrived to room 5510, once pt arrived both medical and neuro MD's saw the pt, I did fill out the pt's MRI questionnaire with her daughter and faxed it down to MRI at this time. I will work on pt's admission once MD's are done seeing the pt.    Lily Wilson

## 2020-09-27 NOTE — ED TRIAGE NOTES
Returned from CT report received from [facility]. Patient continues to have expressive aphasia. Unable to make needs known.

## 2020-09-27 NOTE — PROGRESS NOTES
Pt moved closer to the nurses station room 5527. Bedside report done with oncoming nurse Dayanna. Bed alarm on and call light in reach, pt appears comfortable, neuro unchanged.   Arie Edwards

## 2020-09-27 NOTE — CONSULTS
Clinical Pharmacy Consult Note    68 y.o. female admitted with stroke. Pharmacy has been asked by Dr. Boris Whaley to adjust all drips to normal saline as appropriate based on compatibility to avoid fluid shifts since D5 is osmotically active. The following intermittent IV drips/infusions have been adjusted to saline:  N/A    The following medications must remain in D5W due to incompatibility with normal saline:  Amphotericin  Mycophenolate  Nitroprusside  Penicillin G Potassium    Please be aware that patient has D5W ordered as part of hypoglycemia orderset. Total IV fluid delivered to patient over last 24h: Will assess tomorrow    [facility] will follow daily to ensure all new IVPBs + drips are in NS. Please call with questions:  [phone] ([email or web address])    [phone] ([address] [place] [address])    Thank you for the consult  [place]. WADE  9/27/2020    12:55 PM

## 2020-09-27 NOTE — PROGRESS NOTES
Internal Medicine PGY-1 Resident History & Physical      PCP: Joselyn Sinclair DO    Date of Admission: 9/27/2020    Date of Service: Pt seen/examined on 9/27 and Admitted to Inpatient with expected LOS greater than two midnights due to medical therapy. Chief Complaint:  Expressive aphasia      History Of Present Illness:    68 y.o. female with a past medical history of aortic aneurysm, retention, hyperlipidemia, and stage IV non-small cell lung cancer who presented to [facility] with expressive aphasia. History is obtained from the daughter James Mistry who is at pt's bedside, as pt is not able to give history due to the expressive aphasia. The pt lives with her . Per daughter the pt went to bed at about 7pm last night and this morning her  noticed that she was cought up in her blanket and was having trouble untangling it. At that time he noticed that she was having trouble expressing herself. Per daughter there has been no improvement in her ability to express herself since this morning and the words that are coming out are 'jumbled' and don't make sense. Pt is able to follow commands and her speech is nonslurred. However it does appear that she has some expressive aphasia, although it is intermittently better. She had a history of a cerebrovascular accident in October 2019 and has been having right sided weakness. Per medical records he had an MRI at that time, it was notable for 2 punctate infarcts in the posterior right frontal white matter. It was thought that it was secondary to embolic nature. She was seen by neurology at that time, recommended aspirin and Plavix as well as blood pressure control. Per daughter the pt has some memory problems at baseline involving mostly short term memory. She has also been having frequent falls since the past stroke and is being evaluated for Parkinson's disease.  At baseline pt cannot take shower by herself because of fear of falling, but she can go to the bathroom by herself. She normally walks with a walker.     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 She is status post radiation therapy. Past Medical History:          Diagnosis Date    CHUYITA (acute kidney injury) ([facility] [address] 5/1/2018    Allergic rhinitis     Aneurysm ([facility] [address]     aorta  watching now    Aortic aneurysm [facility])     Currently has     Cancer ([facility] [address]     Lung    Cholelithiasis     Depression     Hyperlipidemia     Hypertension     Incontinence     bladder    MRSA (methicillin resistant staph aureus) culture positive 12/28/11    Leg    Ulcer     Ulcerative colitis ([facility] [address]        Past Surgical History:          Procedure Laterality Date    ABDOMEN SURGERY      gastric resection for ulcers    BRONCHOSCOPY  12/11/2012    CHOLECYSTECTOMY, OPEN  5/7/2012    COLONOSCOPY      ERCP  3/13/12    sphincterotomy; 5 French cm stent placed    ERCP  12-    pancreatic stent removal, pancreotogram    PRESSURE ULCER DEBRIDEMENT  2010    gastric    STOMACH SURGERY      for ulcer    UPPER GASTROINTESTINAL ENDOSCOPY         Medications Prior to Admission:      Prior to Admission medications    Medication Sig Start Date End Date Taking? Authorizing Provider   magnesium oxide (MAG-OX) 400 MG tablet Take 1 tablet by mouth 2 times daily 10/31/19   CHULA Juárez - CNP   NIFEdipine (ADALAT CC) 30 MG extended release tablet Take 1 tablet by mouth daily 10/21/19   Nils Reed MD   clopidogrel (PLAVIX) 75 MG tablet Take 1 tablet by mouth daily 10/21/19 11/20/19  Nils Reed MD   atorvastatin (LIPITOR) 40 MG tablet Take 1 tablet by mouth daily 10/21/19   Herminio Caceres MD   aspirin 325 MG EC tablet Take 1 tablet by mouth daily 10/21/19 11/20/19  Nils Reed MD   LORazepam (ATIVAN) 0.5 MG tablet Take 0.5 mg by mouth every 8 hours as needed for Anxiety.     Historical Provider, MD   oxyCODONE-acetaminophen (PERCOCET) 5-325 MG per tablet Take 1 tablet by mouth every 8 hours as needed for Pain. Historical Provider, MD   prochlorperazine (COMPAZINE) 10 MG tablet Take 10 mg by mouth every 8 hours as needed (nausea)    Historical Provider, MD   pantoprazole (PROTONIX) 40 MG tablet Take 40 mg by mouth nightly     Historical Provider, MD   citalopram (CELEXA) 40 MG tablet Take 40 mg by mouth nightly     Historical Provider, MD       Allergies:  Patient has no known allergies. Social History:      The patient currently lives with her  Stella Stephenson ([phone]). TOBACCO:   reports that she quit smoking about 11 years ago. She has a 75.00 pack-year smoking history. She has never used smokeless tobacco.  ETOH:  reports no history of alcohol use. Family History:     Per daughter Madison Youssef a few of pt's brothers have hx of cancer. Problem Relation Age of Onset    Arthritis Mother        REVIEW OF SYSTEMS:   Hx obtained from daughter Madison Youssef as pt has expressive aphasia. Pertinent positives as noted in the HPI. All other systems reviewed and negative. Constitutional: Positive for expressive aphasia. Respiratory: Negative for cough and shortness of breath. Cardiovascular: Negative for chest pain. Gastrointestinal: Negative for abdominal pain, diarrhea, nausea and vomiting. Genitourinary: Negative for dysuria and hematuria. PHYSICAL EXAM PERFORMED:    BP (!) 145/79   Pulse 90   Temp 98.3 °F (36.8 °C) (Oral)   Resp 19   Ht 5' 2\" (1.575 m)   Wt 152 lb 3.2 oz (69 kg)   SpO2 96%   BMI 27.84 kg/m²     General appearance:  No apparent distress, appears stated age and cooperative. HEENT:  Normal cephalic,atraumatic without obvious deformity. Pupils equal, round, and reactive to light. Extra ocular muscles intact. Conjunctivae/corneas clear. Neck: Supple, with full range of motion. No jugular venous distention. Trachea midline. Respiratory:  Normal respiratory effort. Clear to auscultation, bilaterally without Rales/Wheezes/Rhonchi.   Cardiovascular: Regular rate and rhythm with normal S1/S2 without murmurs, rubs or gallops. Abdomen: Soft, non-tender, non-distended with normal bowel sounds. Musculoskeletal:  No clubbing, cyanosis oredema bilaterally. Full range of motion without deformity. Skin: Skin color, texture, turgor normal.  Norashes or lesions. Neurologic:  Expressive aphasia, intermittently better. Neurovascularly intact without any focal sensory/motor deficits. Cranialnerves: II-XII intact, grossly non-focal.  Psychiatric:  Unable to assess due to expressive aphasia. Capillary Refill: Brisk,< 3 seconds   Peripheral Pulses: +2 palpable, equal bilaterally     NIHSS score 2 for inability to name month and expressive aphasia. Labs:     Recent Labs     09/27/20  1209   WBC 8.0   HGB 10.5*   HCT 33.9*        Recent Labs     09/27/20  1209      K 3.8      CO2 26   BUN 21*   CREATININE 0.9   CALCIUM 8.9     Recent Labs     09/27/20  1209   AST 24   ALT 16   BILITOT <0.2   ALKPHOS 160*     Recent Labs     09/27/20  1209   INR 0.97     Recent Labs     09/27/20  1209   TROPONINI <0.01       Urinalysis:      Lab Results   Component Value Date    NITRU POSITIVE 09/27/2020    WBCUA 6-10 04/30/2018    BACTERIA 4+ 04/30/2018    RBCUA None seen 04/30/2018    BLOODU Negative 09/27/2020    SPECGRAV 1.010 09/27/2020    GLUCOSEU Negative 09/27/2020    GLUCOSEU Negative 05/08/2012       Radiology:       CTA NECK W CONTRAST   Final Result      1. No findings for acute intracranial abnormality. 2.  Age related atrophy with moderate periventricular white matter changes bilaterally consistent with chronic ischemic leukoencephalopathy. 3.  Remote right frontal lobe infarct with multiple small bilateral basal ganglia lacunar infarcts, unchanged. 4.  Chronic right maxillary sinusitis.             PROCEDURE: CT ANGIOGRAPHY NECK WITH/WITHOUT CONTRAST      INDICATION: altered mental state      COMPARISON: none      TECHNIQUE: Limited noncontrast CT imaging performed for the purposes of IV contrast bolus tracking. Axial CT imaging obtained from skull base through the lung apices following administration of IV contrast. Axial images, multiplanar reformatted images,    and maximum intensity projection images were reviewed for CT angiographic technique. Up-to-date CT equipment and radiation dose reduction techniques were employed. IV contrast: 80 mL Isovue 370. FINDINGS:      AORTIC ARCH: Standard three-vessel aortic arch anatomy is present. INNOMINATE ARTERY: Normal.      RIGHT SUBCLAVIAN ARTERY: Normal.      RIGHT VERTEBRAL ARTERY: Normal.      RIGHT CAROTID ARTERY: There is contrast opacification of the right common carotid artery. Minimal epiglottic plaque at the carotid bifurcation. No flow significant stenosis seen. There is flow identified in the distal right internal carotid artery. LEFT SUBCLAVIAN ARTERY: Normal.      LEFT VERTEBRAL ARTERY: Normal.      LEFT CAROTID ARTERY: There is contrast opacification of the left common carotid artery. There is moderate chronic plaque at the carotid bifurcation. No flow significant stenosis or focal aneurysm seen. There is flow in the distal left internal carotid    artery. NECK SOFT TISSUES: No neck soft tissue mass or lymphadenopathy. VISUALIZED MEDIASTINUM: No mass or lymphadenopathy. VISUALIZED LUNG APICES: Mild centrilobular emphysematous changes bilaterally. BONES: No destructive osseous process. OTHER: None. IMPRESSION:      No acute CTA findings. No hemodynamically significant stenosis or focal aneurysm seen.                PROCEDURE: CT ANGIOGRAPHY HEAD WITH/WITHOUT CONTRAST      INDICATION: altered mental state      COMPARISON: none      TECHNIQUE: Axial CT imaging obtained through the head prior to and following administration of IV contrast. Axial images, multiplanar reformatted images, and maximum intensity projection images were RIGHT SUBCLAVIAN ARTERY: Normal.      RIGHT VERTEBRAL ARTERY: Normal.      RIGHT CAROTID ARTERY: There is contrast opacification of the right common carotid artery. Minimal epiglottic plaque at the carotid bifurcation. No flow significant stenosis seen. There is flow identified in the distal right internal carotid artery. LEFT SUBCLAVIAN ARTERY: Normal.      LEFT VERTEBRAL ARTERY: Normal.      LEFT CAROTID ARTERY: There is contrast opacification of the left common carotid artery. There is moderate chronic plaque at the carotid bifurcation. No flow significant stenosis or focal aneurysm seen. There is flow in the distal left internal carotid    artery. NECK SOFT TISSUES: No neck soft tissue mass or lymphadenopathy. VISUALIZED MEDIASTINUM: No mass or lymphadenopathy. VISUALIZED LUNG APICES: Mild centrilobular emphysematous changes bilaterally. BONES: No destructive osseous process. OTHER: None. IMPRESSION:      No acute CTA findings. No hemodynamically significant stenosis or focal aneurysm seen. PROCEDURE: CT ANGIOGRAPHY HEAD WITH/WITHOUT CONTRAST      INDICATION: altered mental state      COMPARISON: none      TECHNIQUE: Axial CT imaging obtained through the head prior to and following administration of IV contrast. Axial images, multiplanar reformatted images, and maximum intensity projection images were reviewed for CT angiographic technique. IV contrast: 80 mL Isovue 370. FINDINGS:      ANTERIOR CIRCULATION: The intracranial internal carotid arteries, anterior cerebral arteries, and middle cerebral arteries demonstrate no occlusion or stenosis. No evidence for aneurysm or arteriovenous malformation. POSTERIOR CIRCULATION: The bilateral vertebral arteries, basilar artery and posterior cerebral arteries demonstrate no occlusion or significant stenosis. Small patent right posterior communicating artery.   No evidence for aneurysm or arteriovenous malformation. INTRACRANIAL VENOUS SYSTEM: No evidence for intracranial venous thrombosis. OTHER: None. IMPRESSION:      1. No findings for acute CTA findings in the head. No hemodynamically significant stenosis or focal aneurysm identified. XR CHEST PORTABLE   Final Result   1. No localized consolidation or pleural effusion. .      CT Head WO Contrast   Final Result      1. No findings for acute intracranial abnormality. 2.  Age related atrophy with moderate periventricular white matter changes bilaterally consistent with chronic ischemic leukoencephalopathy. 3.  Remote right frontal lobe infarct with multiple small bilateral basal ganglia lacunar infarcts, unchanged. 4.  Chronic right maxillary sinusitis. PROCEDURE: CT ANGIOGRAPHY NECK WITH/WITHOUT CONTRAST      INDICATION: altered mental state      COMPARISON: none      TECHNIQUE: Limited noncontrast CT imaging performed for the purposes of IV contrast bolus tracking. Axial CT imaging obtained from skull base through the lung apices following administration of IV contrast. Axial images, multiplanar reformatted images,    and maximum intensity projection images were reviewed for CT angiographic technique. Up-to-date CT equipment and radiation dose reduction techniques were employed. IV contrast: 80 mL Isovue 370. FINDINGS:      AORTIC ARCH: Standard three-vessel aortic arch anatomy is present. INNOMINATE ARTERY: Normal.      RIGHT SUBCLAVIAN ARTERY: Normal.      RIGHT VERTEBRAL ARTERY: Normal.      RIGHT CAROTID ARTERY: There is contrast opacification of the right common carotid artery. Minimal epiglottic plaque at the carotid bifurcation. No flow significant stenosis seen. There is flow identified in the distal right internal carotid artery.       LEFT SUBCLAVIAN ARTERY: Normal.      LEFT VERTEBRAL ARTERY: Normal.      LEFT CAROTID ARTERY: There is contrast opacification of the left common carotid artery. There is moderate chronic plaque at the carotid bifurcation. No flow significant stenosis or focal aneurysm seen. There is flow in the distal left internal carotid    artery. NECK SOFT TISSUES: No neck soft tissue mass or lymphadenopathy. VISUALIZED MEDIASTINUM: No mass or lymphadenopathy. VISUALIZED LUNG APICES: Mild centrilobular emphysematous changes bilaterally. BONES: No destructive osseous process. OTHER: None. IMPRESSION:      No acute CTA findings. No hemodynamically significant stenosis or focal aneurysm seen. PROCEDURE: CT ANGIOGRAPHY HEAD WITH/WITHOUT CONTRAST      INDICATION: altered mental state      COMPARISON: none      TECHNIQUE: Axial CT imaging obtained through the head prior to and following administration of IV contrast. Axial images, multiplanar reformatted images, and maximum intensity projection images were reviewed for CT angiographic technique. IV contrast: 80 mL Isovue 370. FINDINGS:      ANTERIOR CIRCULATION: The intracranial internal carotid arteries, anterior cerebral arteries, and middle cerebral arteries demonstrate no occlusion or stenosis. No evidence for aneurysm or arteriovenous malformation. POSTERIOR CIRCULATION: The bilateral vertebral arteries, basilar artery and posterior cerebral arteries demonstrate no occlusion or significant stenosis. Small patent right posterior communicating artery. No evidence for aneurysm or arteriovenous    malformation. INTRACRANIAL VENOUS SYSTEM: No evidence for intracranial venous thrombosis. OTHER: None. IMPRESSION:      1. No findings for acute CTA findings in the head. No hemodynamically significant stenosis or focal aneurysm identified.                 ASSESSMENT & PLAN:  Montserrat Patnio is a 68 y.o. female w/ a past medical history of aortic aneurysm, retention, hyperlipidemia, and stage IV non-small cell lung cancer who presented to [facility] with expressive aphasia.     Active Hospital Problems    Diagnosis Date Noted    Expressive aphasia [R47.01] 09/27/2020       Expressive aphasia - pt has hx of stroke in Oct 2019 and has hx of smoking; expressive aphasia intermittently better  - etiologies include TIA v CVA v seizure focus  - neurology consultation  - NIHSS score 2  - permissive HTN systolic to 572 and diastolic to 493  - telemetry  - q4 neuro checks  - CT head without acute intracranial changes  - CTA neck and CTA head without acute changes  - CXR no consolidation  - f/u EEG, MRI brain and c-spine  - f/u ECHO    Suspected UTI  - UA positive for nitrites  - ceftriaxone      Chronic conditions:  NSCLC stage IV with bony metastases - treated with radiation therapy  Aortic aneurysm  Urinary retention  Hyperlipidemia            DVT Prophylaxis: lovenox  Diet: General  Code Status: FULL  PT/OT Eval Status: pending  Dispo - IP      I will discuss the patient with the senior resident and MD Hussein Powers MD  Internal Medicine Resident, PGY-1

## 2020-09-27 NOTE — ED PROVIDER NOTES
[address] [facility]          ATTENDING PHYSICIAN NOTE       Date of evaluation: 9/27/2020    Chief Complaint     Altered Mental Status and Cerebrovascular Accident (expressive aphasia since 8:30-9a)      History of Present Illness     Macho Maciel is a 68 y.o. female with a past medical history of aortic aneurysm, retention, hyperlipidemia, and stage IV non-small cell lung cancer that presents today as a stroke alert. Patient last known normal was noted to be approximately 830 this morning. History is obtained from EMS as per , the patient had difficulty with speaking. She was not following commands. EMS relates that she had an episode where she appeared to return back to baseline. Accu-Chek prehospital was noted to be 180 mg/dL. Upon arrival, patient denies any chest pain. She is able to follow commands. Her speech is nonslurred, however it does appear that she has some expressive aphasia. 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 She is status post radiation therapy. She had a history of a cerebrovascular accident in October 2019. He had an MRI at that time, it was notable for 2 punctate infarcts in the posterior right frontal white matter. It was thought that it was secondary to embolic nature. She was seen by neurology at that time, recommended aspirin and Plavix as well as blood pressure control. Review of Systems     Review of Systems   Unable to perform ROS: Acuity of condition   Constitutional: Positive for activity change. Neurological: Positive for speech difficulty. Negative for seizures, facial asymmetry and weakness.        Past Medical, Surgical, Family, and Social History     She has a past medical history of CHUYITA (acute kidney injury) ([address] Allergic rhinitis, Aneurysm ([address] Aortic aneurysm ([address] Cancer ([facility] [address] Cholelithiasis, Depression, Hyperlipidemia, Hypertension, Incontinence, MRSA (methicillin resistant staph aureus) culture positive, Ulcer, and Ulcerative colitis ([address] She has a past surgical history that includes Abdomen surgery; Cholecystectomy, open (5/7/2012); Pressure ulcer debridement (2010); Colonoscopy; Upper gastrointestinal endoscopy; Stomach surgery; ERCP (3/13/12); bronchoscopy (12/11/2012); and ERCP (12-). Her family history includes Arthritis in her mother. She reports that she quit smoking about 11 years ago. She has a 75.00 pack-year smoking history. She has never used smokeless tobacco. She reports that she does not drink alcohol or use drugs. Medications     Previous Medications    ASPIRIN 325 MG EC TABLET    Take 1 tablet by mouth daily    ATORVASTATIN (LIPITOR) 40 MG TABLET    Take 1 tablet by mouth daily    CITALOPRAM (CELEXA) 40 MG TABLET    Take 40 mg by mouth nightly     CLOPIDOGREL (PLAVIX) 75 MG TABLET    Take 1 tablet by mouth daily    LORAZEPAM (ATIVAN) 0.5 MG TABLET    Take 0.5 mg by mouth every 8 hours as needed for Anxiety. MAGNESIUM OXIDE (MAG-OX) 400 MG TABLET    Take 1 tablet by mouth 2 times daily    NIFEDIPINE (ADALAT CC) 30 MG EXTENDED RELEASE TABLET    Take 1 tablet by mouth daily    OXYCODONE-ACETAMINOPHEN (PERCOCET) 5-325 MG PER TABLET    Take 1 tablet by mouth every 8 hours as needed for Pain. PANTOPRAZOLE (PROTONIX) 40 MG TABLET    Take 40 mg by mouth nightly     PROCHLORPERAZINE (COMPAZINE) 10 MG TABLET    Take 10 mg by mouth every 8 hours as needed (nausea)       Allergies     She has No Known Allergies. Physical Exam     INITIAL VITALS: BP: (!) 151/86, Temp: 98.3 °F (36.8 °C), Pulse: 88, Resp: 16, SpO2: 93 %   Physical Exam  Constitutional:       General: She is not in acute distress. Appearance: She is well-developed. She is not ill-appearing or toxic-appearing. HENT:      Head: Normocephalic and atraumatic. Eyes:      Extraocular Movements: Extraocular movements intact.       Pupils: Pupils are equal, round, and reactive to light. Neck:      Musculoskeletal: Normal range of motion and neck supple. Cardiovascular:      Rate and Rhythm: Normal rate and regular rhythm. Pulmonary:      Effort: Pulmonary effort is normal.      Breath sounds: Normal breath sounds. Abdominal:      General: Bowel sounds are normal.      Palpations: Abdomen is soft. Musculoskeletal: Normal range of motion. Skin:     General: Skin is warm. Neurological:      Mental Status: She is alert. She is disoriented. GCS: GCS eye subscore is 4. GCS verbal subscore is 4. GCS motor subscore is 6. Cranial Nerves: No cranial nerve deficit or facial asymmetry. Sensory: No sensory deficit. Motor: No weakness. Psychiatric:         Mood and Affect: Mood normal.       NIH Stroke Scale  NIH Stroke Scale Assessed: Yes  Interval: Baseline  Level of Consciousness (1a. ): Alert  LOC Questions (1b. ): Answers both correctly  LOC Commands (1c. ): Performs both tasks correctly  Best Gaze (2. ): Normal  Visual (3. ): No visual loss  Facial Palsy (4. ): Normal symmetrical movement  Motor Arm, Left (5a. ): No drift  Motor Arm, Right (5b. ): No drift  Motor Leg, Left (6a. ): No drift  Motor Leg, Right (6b. ): No drift  Limb Ataxia (7. ): Absent  Sensory (8. ): Normal  Best Language (9. ): Mild to moderate aphasia  Dysarthria (10. ): Normal  Extinction and Inattention (11): No abnormality  Total: 1  NIH Stroke Scale  NIH Stroke Scale Assessed: Yes  Interval: Baseline  Level of Consciousness (1a. ): Alert  LOC Questions (1b. ):  Answers both correctly  LOC Commands (1c. ): Performs both tasks correctly  Best Gaze (2. ): Normal  Visual (3. ): No visual loss  Facial Palsy (4. ): Normal symmetrical movement  Motor Arm, Left (5a. ): No drift  Motor Arm, Right (5b. ): No drift  Motor Leg, Left (6a. ): No drift  Motor Leg, Right (6b. ): No drift  Limb Ataxia (7. ): Absent  Sensory (8. ): Normal  Best Language (9. ): Mild to moderate aphasia  Dysarthria (10. ): Normal  Extinction and Inattention (11): No abnormality  Total: 1    Diagnostic Results     EKG   Performed at 1212, sinus rhythm at a rate of 93, normal axis, intervals are normal and are as follows: CT = 144, QRS = 72, QTC = 469. There is no ST segment elevations or depressions. Normal EKG. RADIOLOGY:  CTA NECK W CONTRAST   Final Result      1. No findings for acute intracranial abnormality. 2.  Age related atrophy with moderate periventricular white matter changes bilaterally consistent with chronic ischemic leukoencephalopathy. 3.  Remote right frontal lobe infarct with multiple small bilateral basal ganglia lacunar infarcts, unchanged. 4.  Chronic right maxillary sinusitis. PROCEDURE: CT ANGIOGRAPHY NECK WITH/WITHOUT CONTRAST      INDICATION: altered mental state      COMPARISON: none      TECHNIQUE: Limited noncontrast CT imaging performed for the purposes of IV contrast bolus tracking. Axial CT imaging obtained from skull base through the lung apices following administration of IV contrast. Axial images, multiplanar reformatted images,    and maximum intensity projection images were reviewed for CT angiographic technique. Up-to-date CT equipment and radiation dose reduction techniques were employed. IV contrast: 80 mL Isovue 370. FINDINGS:      AORTIC ARCH: Standard three-vessel aortic arch anatomy is present. INNOMINATE ARTERY: Normal.      RIGHT SUBCLAVIAN ARTERY: Normal.      RIGHT VERTEBRAL ARTERY: Normal.      RIGHT CAROTID ARTERY: There is contrast opacification of the right common carotid artery. Minimal epiglottic plaque at the carotid bifurcation. No flow significant stenosis seen. There is flow identified in the distal right internal carotid artery. LEFT SUBCLAVIAN ARTERY: Normal.      LEFT VERTEBRAL ARTERY: Normal.      LEFT CAROTID ARTERY: There is contrast opacification of the left common carotid artery.   There is moderate chronic plaque at the carotid bifurcation. No flow significant stenosis or focal aneurysm seen. There is flow in the distal left internal carotid    artery. NECK SOFT TISSUES: No neck soft tissue mass or lymphadenopathy. VISUALIZED MEDIASTINUM: No mass or lymphadenopathy. VISUALIZED LUNG APICES: Mild centrilobular emphysematous changes bilaterally. BONES: No destructive osseous process. OTHER: None. IMPRESSION:      No acute CTA findings. No hemodynamically significant stenosis or focal aneurysm seen. PROCEDURE: CT ANGIOGRAPHY HEAD WITH/WITHOUT CONTRAST      INDICATION: altered mental state      COMPARISON: none      TECHNIQUE: Axial CT imaging obtained through the head prior to and following administration of IV contrast. Axial images, multiplanar reformatted images, and maximum intensity projection images were reviewed for CT angiographic technique. IV contrast: 80 mL Isovue 370. FINDINGS:      ANTERIOR CIRCULATION: The intracranial internal carotid arteries, anterior cerebral arteries, and middle cerebral arteries demonstrate no occlusion or stenosis. No evidence for aneurysm or arteriovenous malformation. POSTERIOR CIRCULATION: The bilateral vertebral arteries, basilar artery and posterior cerebral arteries demonstrate no occlusion or significant stenosis. Small patent right posterior communicating artery. No evidence for aneurysm or arteriovenous    malformation. INTRACRANIAL VENOUS SYSTEM: No evidence for intracranial venous thrombosis. OTHER: None. IMPRESSION:      1. No findings for acute CTA findings in the head. No hemodynamically significant stenosis or focal aneurysm identified. CTA HEAD W CONTRAST   Final Result      1. No findings for acute intracranial abnormality. 2.  Age related atrophy with moderate periventricular white matter changes bilaterally consistent with chronic ischemic leukoencephalopathy.       3. Remote right frontal lobe infarct with multiple small bilateral basal ganglia lacunar infarcts, unchanged. 4.  Chronic right maxillary sinusitis. PROCEDURE: CT ANGIOGRAPHY NECK WITH/WITHOUT CONTRAST      INDICATION: altered mental state      COMPARISON: none      TECHNIQUE: Limited noncontrast CT imaging performed for the purposes of IV contrast bolus tracking. Axial CT imaging obtained from skull base through the lung apices following administration of IV contrast. Axial images, multiplanar reformatted images,    and maximum intensity projection images were reviewed for CT angiographic technique. Up-to-date CT equipment and radiation dose reduction techniques were employed. IV contrast: 80 mL Isovue 370. FINDINGS:      AORTIC ARCH: Standard three-vessel aortic arch anatomy is present. INNOMINATE ARTERY: Normal.      RIGHT SUBCLAVIAN ARTERY: Normal.      RIGHT VERTEBRAL ARTERY: Normal.      RIGHT CAROTID ARTERY: There is contrast opacification of the right common carotid artery. Minimal epiglottic plaque at the carotid bifurcation. No flow significant stenosis seen. There is flow identified in the distal right internal carotid artery. LEFT SUBCLAVIAN ARTERY: Normal.      LEFT VERTEBRAL ARTERY: Normal.      LEFT CAROTID ARTERY: There is contrast opacification of the left common carotid artery. There is moderate chronic plaque at the carotid bifurcation. No flow significant stenosis or focal aneurysm seen. There is flow in the distal left internal carotid    artery. NECK SOFT TISSUES: No neck soft tissue mass or lymphadenopathy. VISUALIZED MEDIASTINUM: No mass or lymphadenopathy. VISUALIZED LUNG APICES: Mild centrilobular emphysematous changes bilaterally. BONES: No destructive osseous process. OTHER: None. IMPRESSION:      No acute CTA findings. No hemodynamically significant stenosis or focal aneurysm seen.                PROCEDURE: CT ANGIOGRAPHY HEAD WITH/WITHOUT CONTRAST      INDICATION: altered mental state      COMPARISON: none      TECHNIQUE: Axial CT imaging obtained through the head prior to and following administration of IV contrast. Axial images, multiplanar reformatted images, and maximum intensity projection images were reviewed for CT angiographic technique. IV contrast: 80 mL Isovue 370. FINDINGS:      ANTERIOR CIRCULATION: The intracranial internal carotid arteries, anterior cerebral arteries, and middle cerebral arteries demonstrate no occlusion or stenosis. No evidence for aneurysm or arteriovenous malformation. POSTERIOR CIRCULATION: The bilateral vertebral arteries, basilar artery and posterior cerebral arteries demonstrate no occlusion or significant stenosis. Small patent right posterior communicating artery. No evidence for aneurysm or arteriovenous    malformation. INTRACRANIAL VENOUS SYSTEM: No evidence for intracranial venous thrombosis. OTHER: None. IMPRESSION:      1. No findings for acute CTA findings in the head. No hemodynamically significant stenosis or focal aneurysm identified. XR CHEST PORTABLE   Final Result   1. No localized consolidation or pleural effusion. .      CT Head WO Contrast   Final Result      1. No findings for acute intracranial abnormality. 2.  Age related atrophy with moderate periventricular white matter changes bilaterally consistent with chronic ischemic leukoencephalopathy. 3.  Remote right frontal lobe infarct with multiple small bilateral basal ganglia lacunar infarcts, unchanged. 4.  Chronic right maxillary sinusitis. PROCEDURE: CT ANGIOGRAPHY NECK WITH/WITHOUT CONTRAST      INDICATION: altered mental state      COMPARISON: none      TECHNIQUE: Limited noncontrast CT imaging performed for the purposes of IV contrast bolus tracking.  Axial CT imaging obtained from skull base through the lung apices following administration of IV contrast. Axial images, multiplanar reformatted images,    and maximum intensity projection images were reviewed for CT angiographic technique. Up-to-date CT equipment and radiation dose reduction techniques were employed. IV contrast: 80 mL Isovue 370. FINDINGS:      AORTIC ARCH: Standard three-vessel aortic arch anatomy is present. INNOMINATE ARTERY: Normal.      RIGHT SUBCLAVIAN ARTERY: Normal.      RIGHT VERTEBRAL ARTERY: Normal.      RIGHT CAROTID ARTERY: There is contrast opacification of the right common carotid artery. Minimal epiglottic plaque at the carotid bifurcation. No flow significant stenosis seen. There is flow identified in the distal right internal carotid artery. LEFT SUBCLAVIAN ARTERY: Normal.      LEFT VERTEBRAL ARTERY: Normal.      LEFT CAROTID ARTERY: There is contrast opacification of the left common carotid artery. There is moderate chronic plaque at the carotid bifurcation. No flow significant stenosis or focal aneurysm seen. There is flow in the distal left internal carotid    artery. NECK SOFT TISSUES: No neck soft tissue mass or lymphadenopathy. VISUALIZED MEDIASTINUM: No mass or lymphadenopathy. VISUALIZED LUNG APICES: Mild centrilobular emphysematous changes bilaterally. BONES: No destructive osseous process. OTHER: None. IMPRESSION:      No acute CTA findings. No hemodynamically significant stenosis or focal aneurysm seen. PROCEDURE: CT ANGIOGRAPHY HEAD WITH/WITHOUT CONTRAST      INDICATION: altered mental state      COMPARISON: none      TECHNIQUE: Axial CT imaging obtained through the head prior to and following administration of IV contrast. Axial images, multiplanar reformatted images, and maximum intensity projection images were reviewed for CT angiographic technique. IV contrast: 80 mL Isovue 370.       FINDINGS:      ANTERIOR CIRCULATION: The intracranial internal carotid arteries, anterior cerebral arteries, and middle cerebral arteries demonstrate no occlusion or stenosis. No evidence for aneurysm or arteriovenous malformation. POSTERIOR CIRCULATION: The bilateral vertebral arteries, basilar artery and posterior cerebral arteries demonstrate no occlusion or significant stenosis. Small patent right posterior communicating artery. No evidence for aneurysm or arteriovenous    malformation. INTRACRANIAL VENOUS SYSTEM: No evidence for intracranial venous thrombosis. OTHER: None. IMPRESSION:      1. No findings for acute CTA findings in the head. No hemodynamically significant stenosis or focal aneurysm identified.                 LABS:   Results for orders placed or performed during the hospital encounter of 09/27/20   CBC Auto Differential   Result Value Ref Range    WBC 8.0 4.0 - 11.0 K/uL    RBC 4.20 4.00 - 5.20 M/uL    Hemoglobin 10.5 (L) 12.0 - 16.0 g/dL    Hematocrit 33.9 (L) 36.0 - 48.0 %    MCV 80.5 80.0 - 100.0 fL    MCH 25.0 (L) 26.0 - 34.0 pg    MCHC 31.1 31.0 - 36.0 g/dL    RDW 16.8 (H) 12.4 - 15.4 %    Platelets 224 144 - 408 K/uL    MPV 7.7 5.0 - 10.5 fL    Neutrophils % 71.8 %    Lymphocytes % 9.8 %    Monocytes % 6.8 %    Eosinophils % 10.6 %    Basophils % 1.0 %    Neutrophils Absolute 5.8 1.7 - 7.7 K/uL    Lymphocytes Absolute 0.8 (L) 1.0 - 5.1 K/uL    Monocytes Absolute 0.5 0.0 - 1.3 K/uL    Eosinophils Absolute 0.9 (H) 0.0 - 0.6 K/uL    Basophils Absolute 0.1 0.0 - 0.2 K/uL   Comprehensive Metabolic Panel w/ Reflex to MG   Result Value Ref Range    Sodium 138 136 - 145 mmol/L    Potassium reflex Magnesium 3.8 3.5 - 5.1 mmol/L    Chloride 105 99 - 110 mmol/L    CO2 26 21 - 32 mmol/L    Anion Gap 7 3 - 16    Glucose 126 (H) 70 - 99 mg/dL    BUN 21 (H) 7 - 20 mg/dL    CREATININE 0.9 0.6 - 1.2 mg/dL    GFR Non-African American >60 >60    GFR African American >60 >60    Calcium 8.9 8.3 - 10.6 mg/dL    Total Protein 7.1 6.4 - 8.2 g/dL    Alb 4.2 3.4 - 5.0 g/dL Albumin/Globulin Ratio 1.4 1.1 - 2.2    Total Bilirubin <0.2 0.0 - 1.0 mg/dL    Alkaline Phosphatase 160 (H) 40 - 129 U/L    ALT 16 10 - 40 U/L    AST 24 15 - 37 U/L    Globulin 2.9 g/dL   Troponin   Result Value Ref Range    Troponin <0.01 <0.01 ng/mL   Protime-INR   Result Value Ref Range    Protime 11.2 10.0 - 13.2 sec    INR 0.97 0.86 - 1.14   POCT Glucose   Result Value Ref Range    POC Glucose 132 (H) 70 - 99 mg/dl    Performed on ACCU-CHEK        ED BEDSIDE ULTRASOUND:  None    RECENT VITALS:  BP: (!) 147/70,Temp: 98.3 °F (36.8 °C), Pulse: 90, Resp: 19, SpO2: 96 %     Procedures     None    ED Course     Nursing Notes, Past Medical Hx, Past Surgical Hx, Social Hx,Allergies, and Family Hx were reviewed. patient was given the following medications:  Orders Placed This Encounter   Medications    iopamidol (ISOVUE-370) 76 % injection 80 mL         CONSULTS:  IP CONSULT TO PHARMACY  PHARMACY TO CHANGE BASE FLUIDS  IP CONSULT TO NEUROLOGY  IP CONSULT TO STROKE TEAM  IP CONSULT TO HOSPITALIST    ED Course:  ED Course as of Sep 27 1349   Sun Sep 27, 2020   1258 Spoke to the Stroke Neurologist (Dr. Bhupinder Gonsalves) about the patient who reviewed CT images; he does not see any intracranial hemorrhage or large vessel occlusions. The patient has no definitive last known well, is hesitant to recommend TPA for this patient. At this point, will likely recommend admission for MRI.    [SM]   0535 Spoke to the patient's , Bard Prakash, via phone. They have been  for 59 years. He relates that the patient's last known well time was actually yesterday evening when she was watching the animal TV channel. He relates that they do not sleep in the same bed, and he went to check on her at 8:00 in the morning and noticed that she was prone and unable to get out of bed. He assisted her out of bed. He states at baseline she walks with help with ambulation via a walker.   He states that he attempted to feed her fried eggs and potatoes at approximately 10:30 in the morning, however she was unable to do so trying to use her fork as a knife. 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 At this point EMS was activated. From the sounds of it, it appears that the patient had a \"wake up\" stroke    [SM]   1493 Spoke to on call neurologist, agrees with medical admission. Patient not a tPA candidate.     [SM]   380 [facility] Patient's daughter, Lynnette Bamberger, now at the bedside. She was updated to results from studies and the need for hospitalization. She states that the patient is due to have an MRI of the brain. I told the patient's daughter that she will be getting an MRI while she is an inpatient.    [SM]   [phone] to the Hospitalist who will evaluate for admission.     [SM]      ED Course User Index  [SM] Td Silva MD       MEDICAL DECISIONMAKING / ASSESSMENT / Yobani Jolene is a 68 y.o. female with a past medical history of stage IV non-small cell carcinoma, hypertension, hyperlipidemia, and recent diagnosis of cerebrovascular accident on MRI presents today with altered mental state and expressive aphasia. Differential diagnosis to include: Ischemic stroke, hemorrhagic stroke, large vessel occlusion, electrolyte abnormality, diabetic emergency, subclinical seizures, urinary tract infection, renal failure, hepatic encephalopathy    Plan: Labs, urinalysis, urine tox screen, ethanol blood level, Accu-Chek, CT scan of the head, CTA of head and neck, chest x-ray. Anticipate the patient will need to be admitted. Medical Decision Making:  This is a 79-year-old female with a past medical history of non-small cell carcinoma with metastasis to the bone, hypertension, hyperlipidemia and history of stroke in 2019 that presents today by way of EMS for altered mental state. Patient was last known to be well yesterday evening prior to going to bed. History is obtained by her  and her daughter via phone and in person. The patient upon arrival had expressive aphasia but no focal weakness. Her NIH = 2. Work-up in the emergency department consisted of labs, EKG, chest x-ray, CT head, CTA of the head and neck. There was no acute findings on this imaging studies. Chest x-ray without signs of acute infection. Electrolytes were within normal limits her Accu-Chek was noted to be 126 mg/dL. EKG shows no signs of arrhythmia. Urinalysis is still pending. Did discuss the case with the on-call stroke neurologist as well as in-house neurologist.  Given that she had a wake-up stroke, she was not a candidate for TPA. We will perform a swallow evaluation, give her full dose aspirin. Monitor her blood pressure. She is afebrile, has no leukocytosis, and has a neck that is supple with no rashes; my suspicion for meningitis is low although if she does not improve clinically, would recommend a lumbar puncture as an inpatient to evaluate for encephalitis. There is also possibility that she could be having some subclinical seizures, however she is able to communicate so this is less likely. At this point, will be admitted to the medical service I have discussed the case with the hospitalist.  There will be a plan for an MRI of the brain as an inpatient. Critical Care:  Due to the immediate potential for life-threatening deterioration due to acute encephalopathy in the setting of prior history of cerebrovascular accident, I spent 32 minutes providing critical care. This time excludes time spent performing procedures but includes time spent on direct patient care, history retrieval, review of the chart, and discussions with patient, family, and consultant(s). Clinical Impression     1. Encephalopathy    2.  History of CVA (cerebrovascular accident) Disposition         DISPOSITION - admission           Anny Patel MD  09/27/20 5729

## 2020-09-27 NOTE — PROGRESS NOTES
Admission completed, spoke to MRI, they will be taking her down for her MRI in about one hour. Skin assessment done with another nurse, see \"4 eye assessment. \" pt was up to the bathroom with one assist and walker, pt voided urine but there was no hat in the toilet to collect, bed alarm on and call light in reach, pt told to call before getting up.   Atul Sheth

## 2020-09-28 ENCOUNTER — APPOINTMENT (OUTPATIENT)
Dept: MRI IMAGING | Age: 80
DRG: 065 | End: 2020-09-28
Payer: MEDICARE

## 2020-09-28 LAB
ANION GAP SERPL CALCULATED.3IONS-SCNC: 9 MMOL/L (ref 3–16)
BASOPHILS ABSOLUTE: 0.1 K/UL (ref 0–0.2)
BASOPHILS RELATIVE PERCENT: 0.9 %
BUN BLDV-MCNC: 17 MG/DL (ref 7–20)
CALCIUM SERPL-MCNC: 9.2 MG/DL (ref 8.3–10.6)
CHLORIDE BLD-SCNC: 101 MMOL/L (ref 99–110)
CO2: 25 MMOL/L (ref 21–32)
CREAT SERPL-MCNC: 0.8 MG/DL (ref 0.6–1.2)
EKG ATRIAL RATE: 93 BPM
EKG DIAGNOSIS: NORMAL
EKG P AXIS: 55 DEGREES
EKG P-R INTERVAL: 144 MS
EKG Q-T INTERVAL: 378 MS
EKG QRS DURATION: 72 MS
EKG QTC CALCULATION (BAZETT): 469 MS
EKG R AXIS: 12 DEGREES
EKG T AXIS: 49 DEGREES
EKG VENTRICULAR RATE: 93 BPM
EOSINOPHILS ABSOLUTE: 1 K/UL (ref 0–0.6)
EOSINOPHILS RELATIVE PERCENT: 13.5 %
ESTIMATED AVERAGE GLUCOSE: 125.5 MG/DL
GFR AFRICAN AMERICAN: >60
GFR NON-AFRICAN AMERICAN: >60
GLUCOSE BLD-MCNC: 110 MG/DL (ref 70–99)
HBA1C MFR BLD: 6 %
HCT VFR BLD CALC: 33.7 % (ref 36–48)
HEMOGLOBIN: 10.8 G/DL (ref 12–16)
LV EF: 58 %
LVEF MODALITY: NORMAL
LYMPHOCYTES ABSOLUTE: 1 K/UL (ref 1–5.1)
LYMPHOCYTES RELATIVE PERCENT: 14.1 %
MCH RBC QN AUTO: 25.1 PG (ref 26–34)
MCHC RBC AUTO-ENTMCNC: 32.2 G/DL (ref 31–36)
MCV RBC AUTO: 78 FL (ref 80–100)
MONOCYTES ABSOLUTE: 0.6 K/UL (ref 0–1.3)
MONOCYTES RELATIVE PERCENT: 8.3 %
NEUTROPHILS ABSOLUTE: 4.5 K/UL (ref 1.7–7.7)
NEUTROPHILS RELATIVE PERCENT: 63.2 %
PDW BLD-RTO: 17.2 % (ref 12.4–15.4)
PLATELET # BLD: 257 K/UL (ref 135–450)
PMV BLD AUTO: 7.5 FL (ref 5–10.5)
POTASSIUM REFLEX MAGNESIUM: 4 MMOL/L (ref 3.5–5.1)
RBC # BLD: 4.32 M/UL (ref 4–5.2)
SODIUM BLD-SCNC: 135 MMOL/L (ref 136–145)
WBC # BLD: 7.2 K/UL (ref 4–11)

## 2020-09-28 PROCEDURE — 6360000002 HC RX W HCPCS: Performed by: INTERNAL MEDICINE

## 2020-09-28 PROCEDURE — 6370000000 HC RX 637 (ALT 250 FOR IP): Performed by: STUDENT IN AN ORGANIZED HEALTH CARE EDUCATION/TRAINING PROGRAM

## 2020-09-28 PROCEDURE — 97116 GAIT TRAINING THERAPY: CPT

## 2020-09-28 PROCEDURE — C8929 TTE W OR WO FOL WCON,DOPPLER: HCPCS

## 2020-09-28 PROCEDURE — 2580000003 HC RX 258: Performed by: INTERNAL MEDICINE

## 2020-09-28 PROCEDURE — 97530 THERAPEUTIC ACTIVITIES: CPT

## 2020-09-28 PROCEDURE — 97535 SELF CARE MNGMENT TRAINING: CPT

## 2020-09-28 PROCEDURE — 96366 THER/PROPH/DIAG IV INF ADDON: CPT

## 2020-09-28 PROCEDURE — 97166 OT EVAL MOD COMPLEX 45 MIN: CPT

## 2020-09-28 PROCEDURE — 1200000000 HC SEMI PRIVATE

## 2020-09-28 PROCEDURE — 85025 COMPLETE CBC W/AUTO DIFF WBC: CPT

## 2020-09-28 PROCEDURE — 36415 COLL VENOUS BLD VENIPUNCTURE: CPT

## 2020-09-28 PROCEDURE — 95819 EEG AWAKE AND ASLEEP: CPT

## 2020-09-28 PROCEDURE — 92523 SPEECH SOUND LANG COMPREHEN: CPT

## 2020-09-28 PROCEDURE — 2580000003 HC RX 258: Performed by: STUDENT IN AN ORGANIZED HEALTH CARE EDUCATION/TRAINING PROGRAM

## 2020-09-28 PROCEDURE — 51798 US URINE CAPACITY MEASURE: CPT

## 2020-09-28 PROCEDURE — 80048 BASIC METABOLIC PNL TOTAL CA: CPT

## 2020-09-28 PROCEDURE — 96372 THER/PROPH/DIAG INJ SC/IM: CPT

## 2020-09-28 PROCEDURE — 92507 TX SP LANG VOICE COMM INDIV: CPT

## 2020-09-28 PROCEDURE — 6360000002 HC RX W HCPCS: Performed by: STUDENT IN AN ORGANIZED HEALTH CARE EDUCATION/TRAINING PROGRAM

## 2020-09-28 PROCEDURE — 97162 PT EVAL MOD COMPLEX 30 MIN: CPT

## 2020-09-28 RX ORDER — LORAZEPAM 2 MG/ML
0.5 INJECTION INTRAMUSCULAR ONCE
Status: DISCONTINUED | OUTPATIENT
Start: 2020-09-28 | End: 2020-10-01 | Stop reason: HOSPADM

## 2020-09-28 RX ADMIN — ENOXAPARIN SODIUM 40 MG: 40 INJECTION SUBCUTANEOUS at 08:59

## 2020-09-28 RX ADMIN — CARBIDOPA AND LEVODOPA 0.5 TABLET: 25; 100 TABLET ORAL at 21:34

## 2020-09-28 RX ADMIN — ATORVASTATIN CALCIUM 80 MG: 80 TABLET, FILM COATED ORAL at 08:59

## 2020-09-28 RX ADMIN — CITALOPRAM HYDROBROMIDE 40 MG: 20 TABLET ORAL at 21:34

## 2020-09-28 RX ADMIN — CARBIDOPA AND LEVODOPA 0.5 TABLET: 25; 100 TABLET ORAL at 08:59

## 2020-09-28 RX ADMIN — Medication 10 ML: at 09:00

## 2020-09-28 RX ADMIN — CARBIDOPA AND LEVODOPA 0.5 TABLET: 25; 100 TABLET ORAL at 14:39

## 2020-09-28 RX ADMIN — Medication 10 ML: at 21:34

## 2020-09-28 RX ADMIN — PANTOPRAZOLE SODIUM 40 MG: 40 TABLET, DELAYED RELEASE ORAL at 21:34

## 2020-09-28 RX ADMIN — ASPIRIN 325 MG: 325 TABLET, COATED ORAL at 08:59

## 2020-09-28 RX ADMIN — CEFTRIAXONE SODIUM 2 G: 2 INJECTION, POWDER, FOR SOLUTION INTRAMUSCULAR; INTRAVENOUS at 09:00

## 2020-09-28 ASSESSMENT — PAIN SCALES - PAIN ASSESSMENT IN ADVANCED DEMENTIA (PAINAD)
TOTALSCORE: 0
BREATHING: 0
BODYLANGUAGE: 0
CONSOLABILITY: 0
NEGVOCALIZATION: 0
FACIALEXPRESSION: 0

## 2020-09-28 NOTE — PLAN OF CARE
Patient high fall risk and is up with assist x1 with the walker and gait belt. Patient alert and oriented with intermittent confusion and inability to express answers d/t aphasia, fall precautions in place, bed in lowest position and locked, bed alarm on for safety, call light and belongings with in reach. Will continue to monitor. Problem: Falls - Risk of:  Goal: Will remain free from falls  Description: Will remain free from falls  9/28/2020 0802 by Kallie Black RN  Outcome: Ongoing     Patient skin will remain intact during shift. Q 2 hour repositioning and checking for incontinence. Will continue to monitor. Problem: Skin Integrity:  Goal: Absence of new skin breakdown  Description: Absence of new skin breakdown  Outcome: Ongoing   Patient has expressive aphasia. Will continue to monitor and assess to meet patient's needs.   Problem: COMMUNICATION IMPAIRMENT  Goal: Ability to express needs and understand communication  Outcome: Ongoing

## 2020-09-28 NOTE — PROGRESS NOTES
Clinical Pharmacy Progress Note    68 y.o. female admitted with expressive asphasia with concern for TIA vs stroke. Pharmacy has been asked by Dr. Valorie Lopez to adjust all drips to normal saline as appropriate based on compatibility to avoid fluid shifts since D5 is osmotically active. The following intermittent IV drips/infusions have been adjusted to saline:  Ceftriaxone    Total IV fluid delivered to patient over last 24h: 50 mL    RPh will follow daily to ensure all new IVPBs + drips are in NS.     Please call with questions:  Tiara WestD, BCPS  Wireless: [phone]  or [phone]  9/28/2020 7:37 AM

## 2020-09-28 NOTE — PLAN OF CARE
Problem: Falls - Risk of:  Goal: Will remain free from falls  Description: Will remain free from falls  9/28/2020 0501 by Hailey Guevara RN  Outcome: Met This Shift     Problem: HEMODYNAMIC STATUS  Goal: Patient has stable vital signs and fluid balance  Outcome: Met This Shift  Vitals:    09/28/20 0305   BP: 139/70   Pulse: 90   Resp: 16   Temp: 98.3 °F (36.8 °C)   SpO2: 91%

## 2020-09-28 NOTE — PROGRESS NOTES
Physical Therapy    Facility/Department: [facility] [address]  Initial Assessment/Treatment     NAME: Christine Galindo  : 1940  MRN: [id number]    Date of Service: 2020    Discharge Recommendations:  Christine Galindo scored a 17/24 on the AM-PAC short mobility form. Current research shows that an AM-PAC score of 17 or less is typically not associated with a discharge to the patient's home setting. Based on the patient's AM-PAC score and their current functional mobility deficits, it is recommended that the patient have 5-7 sessions per week of Physical Therapy at d/c to increase the patient's independence. At this time, this patient demonstrates the endurance, and/or tolerance for 3 hours of therapy each day, with a treatment frequency of 5-7x/wk. Please see assessment section for further patient specific details. If patient discharges prior to next session this note will serve as a discharge summary. Please see below for the latest assessment towards goals. Patient would benefit from continued therapy after discharge   PT Equipment Recommendations  Equipment Needed: No  Other: Pt owns rolling walker    Assessment   Body structures, Functions, Activity limitations: Decreased functional mobility ; Decreased strength;Decreased endurance;Decreased balance  Assessment: Pt presents with functional mobility below baseline level. Pt requires min assist for bed mobility and CGA for ambulation with RW and is unsafe to perform mobility independently at this time. Pt may be poor historian and required corrections from spouse throughout session when answering questions. Pt denies falls however was admitted 2020 s/p fall in shower. Pt is at a an increased risk for continued falls. Pt demonstrates expressive aphasia throughout session and requires increased time to make needs known. Pt would benefit from aggressive IP therapy following discharge and is capable of tolerating 3 hours of therapy a day. Will continue to follow. Treatment Diagnosis: decreased functional mobility  Prognosis: Good  Decision Making: Medium Complexity  PT Education: Goals;PT Role;Plan of Care;Gait Training;General Safety  Patient Education: Pt and spouse educated on rec for continued IP therapy following discharge. Pt and spouse verbalized understanding and denied further questions at this time. REQUIRES PT FOLLOW UP: Yes  Activity Tolerance  Activity Tolerance: Patient limited by fatigue  Activity Tolerance: Pt reports not feeling well during ambulation and requests to return to room. Pt presents with BP of 150/86 mmHg in sitting. RN aware. Patient Diagnosis(es): The primary encounter diagnosis was Encephalopathy. A diagnosis of History of CVA (cerebrovascular accident) was also pertinent to this visit. has a past medical history of CHUYITA (acute kidney injury) ([facility] [address] Allergic rhinitis, Aneurysm ([facility] Aortic aneurysm ([facility] Cancer ([facility] Cholelithiasis, Depression, Hyperlipidemia, Hypertension, Incontinence, MRSA (methicillin resistant staph aureus) culture positive, Ulcer, and [facility] has a past surgical history that includes Abdomen surgery; Cholecystectomy, open (5/7/2012); Pressure ulcer debridement (2010); Colonoscopy; Upper gastrointestinal endoscopy; Stomach surgery; ERCP (3/13/12); bronchoscopy (12/11/2012); and ERCP (12-).     Restrictions  Position Activity Restriction  Other position/activity restrictions: up at tolerated  Vision/Hearing  Vision: Impaired  Vision Exceptions: Wears glasses for reading  Hearing: Within functional limits     Subjective  General  Chart Reviewed: Yes  Patient assessed for rehabilitation services?: Yes  Additional Pertinent Hx: Admitted 527. 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 Head CT: No findings for acute intracranial abnormality, Chest XR: No localized consolidation or pleural effusion, CTA head: No findings for acute CTA findings in the head. No hemodynamically significant stenosis or focal aneurysm identified, Brain MRI: Evidence of cerebral atrophy and diffuse chronic small vessel white matter disease bilaterally  Family / Caregiver Present: Yes(spouse)  Referring Practitioner: Rosa Kohli MD  Diagnosis: expressive aphasia  Follows Commands: Within Functional Limits  Subjective  Subjective: Pt supine in bed upon arrival with spouse in room and agreeable to evaluation. Pt demonstrates expressive aphasia throughout session at times unable to communicate needs. Pt's spouse provides correction to answers about home set up as needed. Pain Screening  Patient Currently in Pain: Denies  Vital Signs  Patient Currently in Pain: Denies       Orientation  Orientation  Orientation Level: Oriented to person;Disoriented to place; Disoriented to situation(oriented to time with choices)  Social/Functional History  Social/Functional History  Lives With: Spouse  Type of Home: House(mobile home)  Home Layout: One level  Home Access: Ramped entrance  Bathroom Shower/Tub: Tub/Shower unit  Bathroom Toilet: Handicap height  Bathroom Equipment: Grab bars in shower, Shower chair, Hand-held shower, Grab bars around toilet  Bathroom Accessibility: [facility]: Rolling walker, Cane, [address] Help From: Family  ADL Assistance: Needs assistance (dtr assists with showers)  Homemaking Assistance: Needs assistance  Homemaking Responsibilities: No  Ambulation Assistance: Needs assistance(walker, sometimes has  help when R LE gives out)  Transfer Assistance: Independent  Active : No  Additional Comments: Pt denies falls. Pt was admitted in June of 2020 following fall in shower and was d/c to SNF.      Objective          AROM RLE (degrees)  RLE AROM: Evaluation, Education, & procurement, Transfer Training, Gait Training  Safety Devices  Type of devices: Call light within reach, Chair alarm in place, Gait belt, Nurse notified, Left in chair      AM-PAC Score  AM-PAC Inpatient Mobility Raw Score : 17 (09/28/20 1205)  AM-PAC Inpatient T-Scale Score : 42.13 (09/28/20 1205)  Mobility Inpatient CMS 0-100% Score: 50.57 (09/28/20 1205)  Mobility Inpatient CMS G-Code Modifier : CK (09/28/20 1205)          Goals  Short term goals  Time Frame for Short term goals: discharge  Short term goal 1: Pt to perform bed mobility with CGA in order to increase independence with functional mobility. Short term goal 2: Pt to perform sit-->stand to RW with supervision in order to prepare for ambulation. Short term goal 3: Pt to stand for 10 mins at RW with supervision in order to participate in ADLs. Short term goal 4: Pt to ambulate 100 ft with RW and supervision in order to increase mobility around the home. Patient Goals   Patient goals : not stated       Therapy Time   Individual Concurrent Group Co-treatment   Time In 1101         Time Out 1140         Minutes 39               Timed code treatment minutes: 24    Total treatment time: 39    If patient discharges prior to next session this note will serve as a discharge summary. Please see below for the latest assessment towards goals. Sierra Angel, Student PT   Addendum:  Therapist was present, directed the patient's care, made skilled judgement, and was responsible for assessment and treatment of the patient.   Teagan Luna PT [id number]

## 2020-09-28 NOTE — PROGRESS NOTES
therapy.      Subjective:  No acute events overnight. Pt still experiencing expressive aphasia, but is able to follow commands. Pt seems to realize that she is not able to find the correct words and that she is not saying the things she would like to say. Per  pt has had recent burning with urination. Denies nausea, vomiting, chest pain, shortness of breath. Medications:  Reviewed    Infusion Medications   Scheduled Medications    cefTRIAXone (ROCEPHIN) 2 g IVPB in NS 50ml minibag  2 g Intravenous Daily    citalopram  40 mg Oral Nightly    pantoprazole  40 mg Oral Nightly    aspirin  325 mg Oral Daily    atorvastatin  80 mg Oral Daily    sodium chloride flush  10 mL Intravenous 2 times per day    enoxaparin  40 mg Subcutaneous Daily    carbidopa-levodopa  0.5 tablet Oral TID     PRN Meds: LORazepam, oxyCODONE-acetaminophen, sodium chloride flush, polyethylene glycol, promethazine **OR** ondansetron, labetalol, zinc oxide      Intake/Output Summary (Last 24 hours) at 9/28/2020 7158  Last data filed at 9/28/2020 0534  Gross per 24 hour   Intake 340 ml   Output --   Net 340 ml       Physical Exam Performed:    BP (!) 141/82   Pulse 91   Temp 97.9 °F (36.6 °C) (Oral)   Resp 16   Ht 5' 2\" (1.575 m)   Wt 142 lb (64.4 kg)   SpO2 91%   BMI 25.97 kg/m²     General appearance: No apparent distress, appears stated age and cooperative. HEENT: Pupils equal, round, and reactive to light. Conjunctivae/corneas clear. Neck: Supple, with full range of motion. No jugular venous distention. Trachea midline. Respiratory:  Normal respiratory effort. Clear to auscultation, bilaterally without Rales/Wheezes/Rhonchi. Cardiovascular: Regular rate and rhythm with normal S1/S2 without murmurs, rubs or gallops. Abdomen: Soft, non-tender, non-distended with normal bowel sounds. Musculoskeletal: No clubbing, cyanosis or edema bilaterally. Full range of motion without deformity.   Skin: Skin color, texture, turgor normal.  No rashes or lesions. Neurologic:  Expressive aphasia. Able to follow commands. Neurovascularly intact without any focal sensory/motor deficits. Cranial nerves: II-XII intact, grossly non-focal.  Psychiatric: Difficult to assess orientation due to pt's expressive aphasia    Labs:   Recent Labs     09/27/20  1209 09/28/20  0506   WBC 8.0 7.2   HGB 10.5* 10.8*   HCT 33.9* 33.7*    257     Recent Labs     09/27/20  1209 09/28/20  0506    135*   K 3.8 4.0    101   CO2 26 25   BUN 21* 17   CREATININE 0.9 0.8   CALCIUM 8.9 9.2     Recent Labs     09/27/20  1209   AST 24   ALT 16   BILITOT <0.2   ALKPHOS 160*     Recent Labs     09/27/20  1209   INR 0.97     Recent Labs     09/27/20  1209   TROPONINI <0.01       Urinalysis:      Lab Results   Component Value Date    NITRU POSITIVE 09/27/2020    WBCUA 3-5 09/27/2020    BACTERIA 4+ 09/27/2020    RBCUA 0-2 09/27/2020    BLOODU Negative 09/27/2020    SPECGRAV 1.010 09/27/2020    GLUCOSEU Negative 09/27/2020    GLUCOSEU Negative 05/08/2012       Radiology:  MRI BRAIN WO CONTRAST   Final Result      Severely limited study. Evidence of cerebral atrophy and diffuse chronic small vessel white matter disease bilaterally. Remote right posterior frontal lobe cortical infarct. Small focus of diffusion restriction consistent with small vessel disease in the left periventricular white matter is not excluded. The study is limited significantly by motion. No other acute intracranial findings. Right maxillary chronic sinus disease. CTA NECK W CONTRAST   Final Result      1. No findings for acute intracranial abnormality. 2.  Age related atrophy with moderate periventricular white matter changes bilaterally consistent with chronic ischemic leukoencephalopathy. 3.  Remote right frontal lobe infarct with multiple small bilateral basal ganglia lacunar infarcts, unchanged. 4.  Chronic right maxillary sinusitis. PROCEDURE: CT ANGIOGRAPHY NECK WITH/WITHOUT CONTRAST      INDICATION: altered mental state      COMPARISON: none      TECHNIQUE: Limited noncontrast CT imaging performed for the purposes of IV contrast bolus tracking. Axial CT imaging obtained from skull base through the lung apices following administration of IV contrast. Axial images, multiplanar reformatted images,    and maximum intensity projection images were reviewed for CT angiographic technique. Up-to-date CT equipment and radiation dose reduction techniques were employed. IV contrast: 80 mL Isovue 370. FINDINGS:      AORTIC ARCH: Standard three-vessel aortic arch anatomy is present. INNOMINATE ARTERY: Normal.      RIGHT SUBCLAVIAN ARTERY: Normal.      RIGHT VERTEBRAL ARTERY: Normal.      RIGHT CAROTID ARTERY: There is contrast opacification of the right common carotid artery. Minimal epiglottic plaque at the carotid bifurcation. No flow significant stenosis seen. There is flow identified in the distal right internal carotid artery. LEFT SUBCLAVIAN ARTERY: Normal.      LEFT VERTEBRAL ARTERY: Normal.      LEFT CAROTID ARTERY: There is contrast opacification of the left common carotid artery. There is moderate chronic plaque at the carotid bifurcation. No flow significant stenosis or focal aneurysm seen. There is flow in the distal left internal carotid    artery. NECK SOFT TISSUES: No neck soft tissue mass or lymphadenopathy. VISUALIZED MEDIASTINUM: No mass or lymphadenopathy. VISUALIZED LUNG APICES: Mild centrilobular emphysematous changes bilaterally. BONES: No destructive osseous process. OTHER: None. IMPRESSION:      No acute CTA findings. No hemodynamically significant stenosis or focal aneurysm seen.                PROCEDURE: CT ANGIOGRAPHY HEAD WITH/WITHOUT CONTRAST      INDICATION: altered mental state      COMPARISON: none      TECHNIQUE: Axial CT imaging obtained through the head prior to and following administration of IV contrast. Axial images, multiplanar reformatted images, and maximum intensity projection images were reviewed for CT angiographic technique. IV contrast: 80 mL Isovue 370. FINDINGS:      ANTERIOR CIRCULATION: The intracranial internal carotid arteries, anterior cerebral arteries, and middle cerebral arteries demonstrate no occlusion or stenosis. No evidence for aneurysm or arteriovenous malformation. POSTERIOR CIRCULATION: The bilateral vertebral arteries, basilar artery and posterior cerebral arteries demonstrate no occlusion or significant stenosis. Small patent right posterior communicating artery. No evidence for aneurysm or arteriovenous    malformation. INTRACRANIAL VENOUS SYSTEM: No evidence for intracranial venous thrombosis. OTHER: None. IMPRESSION:      1. No findings for acute CTA findings in the head. No hemodynamically significant stenosis or focal aneurysm identified. CTA HEAD W CONTRAST   Final Result      1. No findings for acute intracranial abnormality. 2.  Age related atrophy with moderate periventricular white matter changes bilaterally consistent with chronic ischemic leukoencephalopathy. 3.  Remote right frontal lobe infarct with multiple small bilateral basal ganglia lacunar infarcts, unchanged. 4.  Chronic right maxillary sinusitis. PROCEDURE: CT ANGIOGRAPHY NECK WITH/WITHOUT CONTRAST      INDICATION: altered mental state      COMPARISON: none      TECHNIQUE: Limited noncontrast CT imaging performed for the purposes of IV contrast bolus tracking. Axial CT imaging obtained from skull base through the lung apices following administration of IV contrast. Axial images, multiplanar reformatted images,    and maximum intensity projection images were reviewed for CT angiographic technique. Up-to-date CT equipment and radiation dose reduction techniques were employed.       IV contrast: 80 mL no occlusion or significant stenosis. Small patent right posterior communicating artery. No evidence for aneurysm or arteriovenous    malformation. INTRACRANIAL VENOUS SYSTEM: No evidence for intracranial venous thrombosis. OTHER: None. IMPRESSION:      1. No findings for acute CTA findings in the head. No hemodynamically significant stenosis or focal aneurysm identified. XR CHEST PORTABLE   Final Result   1. No localized consolidation or pleural effusion. .      CT Head WO Contrast   Final Result      1. No findings for acute intracranial abnormality. 2.  Age related atrophy with moderate periventricular white matter changes bilaterally consistent with chronic ischemic leukoencephalopathy. 3.  Remote right frontal lobe infarct with multiple small bilateral basal ganglia lacunar infarcts, unchanged. 4.  Chronic right maxillary sinusitis. PROCEDURE: CT ANGIOGRAPHY NECK WITH/WITHOUT CONTRAST      INDICATION: altered mental state      COMPARISON: none      TECHNIQUE: Limited noncontrast CT imaging performed for the purposes of IV contrast bolus tracking. Axial CT imaging obtained from skull base through the lung apices following administration of IV contrast. Axial images, multiplanar reformatted images,    and maximum intensity projection images were reviewed for CT angiographic technique. Up-to-date CT equipment and radiation dose reduction techniques were employed. IV contrast: 80 mL Isovue 370. FINDINGS:      AORTIC ARCH: Standard three-vessel aortic arch anatomy is present. INNOMINATE ARTERY: Normal.      RIGHT SUBCLAVIAN ARTERY: Normal.      RIGHT VERTEBRAL ARTERY: Normal.      RIGHT CAROTID ARTERY: There is contrast opacification of the right common carotid artery. Minimal epiglottic plaque at the carotid bifurcation. No flow significant stenosis seen. There is flow identified in the distal right internal carotid artery.       LEFT SUBCLAVIAN ARTERY: Normal.      LEFT VERTEBRAL ARTERY: Normal.      LEFT CAROTID ARTERY: There is contrast opacification of the left common carotid artery. There is moderate chronic plaque at the carotid bifurcation. No flow significant stenosis or focal aneurysm seen. There is flow in the distal left internal carotid    artery. NECK SOFT TISSUES: No neck soft tissue mass or lymphadenopathy. VISUALIZED MEDIASTINUM: No mass or lymphadenopathy. VISUALIZED LUNG APICES: Mild centrilobular emphysematous changes bilaterally. BONES: No destructive osseous process. OTHER: None. IMPRESSION:      No acute CTA findings. No hemodynamically significant stenosis or focal aneurysm seen. PROCEDURE: CT ANGIOGRAPHY HEAD WITH/WITHOUT CONTRAST      INDICATION: altered mental state      COMPARISON: none      TECHNIQUE: Axial CT imaging obtained through the head prior to and following administration of IV contrast. Axial images, multiplanar reformatted images, and maximum intensity projection images were reviewed for CT angiographic technique. IV contrast: 80 mL Isovue 370. FINDINGS:      ANTERIOR CIRCULATION: The intracranial internal carotid arteries, anterior cerebral arteries, and middle cerebral arteries demonstrate no occlusion or stenosis. No evidence for aneurysm or arteriovenous malformation. POSTERIOR CIRCULATION: The bilateral vertebral arteries, basilar artery and posterior cerebral arteries demonstrate no occlusion or significant stenosis. Small patent right posterior communicating artery. No evidence for aneurysm or arteriovenous    malformation. INTRACRANIAL VENOUS SYSTEM: No evidence for intracranial venous thrombosis. OTHER: None. IMPRESSION:      1. No findings for acute CTA findings in the head. No hemodynamically significant stenosis or focal aneurysm identified.             MRI CERVICAL SPINE W WO CONTRAST    (Results Pending) Assessment/Plan:  Prerna Michael is a 68 y.o. female w/ a past medical history of aortic aneurysm, retention, hyperlipidemia, and stage IV non-small cell lung cancer [facility] with expressive aphasia.       Active Hospital Problems    Diagnosis Date Noted    Expressive aphasia [R47.01] 09/27/2020     Expressive aphasia - pt has hx of stroke in Oct 2019 and has hx of smoking; expressive aphasia intermittently better  - etiologies include TIA v CVA v seizure focus  - neurology consultation  - NIHSS score 2  - permissive HTN systolic to 796 and diastolic to 831  - telemetry  - q4 neuro checks  - CT head without acute intracranial changes  - CTA neck and CTA head without acute changes  - CXR no consolidation  - MRI brain: cerebral atrophy and diffuse chronic small vessel white matter disease bilaterally  - f/u EEG  - f/u ECHO     Suspected UTI - symptomatic - recent burning with urination per pt's   - UA positive for nitrites  - ceftriaxone (first dose 9/27)  - bladder scan        Chronic conditions:  NSCLC stage IV with bony metastases - treated with radiation therapy  Aortic aneurysm  Urinary retention  Hyperlipidemia    DVT Prophylaxis: Lovenox  Diet: DIET GENERAL;  Code Status: Full Code  PT/OT Eval Status: 17/24 PT  Dispo - pending EEG, ECHO, and Neurology team recs      I will discuss the patient with the senior resident and MD Mariana Olson MD   Internal Medicine Resident PGY-1

## 2020-09-28 NOTE — PROGRESS NOTES
NIH remains 3. Pt could not tell me the year or where she was but stated she couldn't understand why she wasn't able to tell me what she wanted to. Pt asks questions that do not make sense and are a word salad. VSS with elevated BP. Permissive HTN per orders. Pt voids WNL. Pt up x1 walker and GB. NSR on tele overnight. Dayna-sys on. Bed alarm set. Call light in reach. Will continue to monitor.

## 2020-09-28 NOTE — PROCEDURES
record was remarkable for the absence of epileptiform discharges. Photic stimulation was performed at various flash frequencies and without photoparoxysmal response. Hyperventilation was not performed due to medical condition. During the recording stage II sleep  was not seen. The EKG lead revealed no rhythm abnormalties. EEG Interpretation:   The EEG was abnormal due to the presence of: Generalized slowing    Generalized slowing is a non-specific finding consistent with a generalized disturbance of cerebral functioning including toxic, metabolic, or structural abnormalities that are multi-focal or diffuse. No focal, lateralizing, or epileptiform features were seen during the recording. Clinical correlation is recommended.   The absence of epileptiform discharges on a single EEG does not rule out a diagnosis of  epilepsy or rule out non-convulsive or complex partial status epilepticus as a cause of altered mental status    Electronically signed by Sue Nolen MD on 9/28/2020 at 5:56 PM

## 2020-09-28 NOTE — PLAN OF CARE
Completed speech evaluation. Please refer to EMR.     Julianna Chapman M.A., Sandra Solis   Speech-Language Pathologist

## 2020-09-28 NOTE — PROGRESS NOTES
Patient is alert but it is difficult to assess orientation as patient has word salad and responds with nonsensical terms. VSS. Patient has had no reports of pain. NIHSS remains a 3 for aphasia and inability to answer LOC questions. Patient is up with assist x1, walker and gait belt. Patient is incontinent and is being checked q 2 hours, repositioned and changed as needed. Patient on camera for safety reasons. Patient has had no reports of n/v and is tolerating her diet well.  is at bedside. All fall precautions in place, will continue to monitor.

## 2020-09-28 NOTE — PROGRESS NOTES
Goals:  Short-term Goals  Timeframe for Short-term Goals: 1-2 weeks or LOS  Goal 1: Patient will communicate basic wants/needs by any means. Goal 2: Patient will improve yes/no accuracy to 70%. Goal 3: Patient will complete naming tasks with 70% accuracy given mod cues. 9/28: pt required mod cues for 60% accuracy. Cont goals. Goal 4: Patient will follow single step directions with 70% accuracy given mod cues. 9/28: pt required mod cues for 60% accuracy with single step directions. Cont goals. Goal 5: Patient will complete ongoing cognitive-linguistic assessment. Patient/family involved in developing goals and treatment plan: yes, patient    Subjective:   Previous level of function and limitations: independent  General  Chart Reviewed: Yes  Patient assessed for rehabilitation services?: Yes  Family / Caregiver Present: Yes( present only at start of therapy session)  General Comment  Comments: Per admitting H&P (09/27/2020): '80 y.o. female with a past medical history of aortic aneurysm, retention, hyperlipidemia, and stage IV non-small cell lung cancer who presented to [facility], [facility]. with expressive aphasia. History is obtained from the daughter Abundio Dawkins who is at pt's bedside, as pt is not able to give history due to the expressive aphasia. The pt lives with her . Per daughter the pt went to bed at about 7pm last night and this morning her  noticed that she was cought up in her blanket and was having trouble untangling it. At that time he noticed that she was having trouble expressing herself. Per daughter there has been no improvement in her ability to express herself since this morning and the words that are coming out are 'jumbled' and don't make sense. Pt is able to follow commands and her speech is nonslurred. However it does appear that she has some expressive aphasia, although it is intermittently better.  She had a history of a cerebrovascular accident in October 2019 and has been having right sided weakness. Per medical records he had an MRI at that time, it was notable for 2 punctate infarcts in the posterior right frontal white matter. It was thought that it was secondary to embolic nature. She was seen by neurology at that time, recommended aspirin and Plavix as well as blood pressure control Per daughter the pt has some memory problems at baseline involving mostly short term memory. She has also been having frequent falls since the past stroke and is being evaluated for Parkinson's disease. At baseline pt cannot take shower by herself because of fear of falling, but she can go to the bathroom by herself. She normally walks with a walker. 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 She is status post radiation therapy.  '  Subjective  Subjective: Received pt alert and pleasant, seen seated up in bed, on room air. Social/Functional History  Lives With: Spouse  Homemaking Responsibilities: No  Active : No  Vision  Vision: Impaired  Vision Exceptions: Wears glasses for reading  Hearing  Hearing: Within functional limits        Objective: Auditory Comprehension  Comprehension: Exceptions  Yes/No Questions: Moderate  Basic Questions: Moderate  Complex Questions: Severe  One Step Basic Commands: Moderate  Two Step Basic Commands: Severe  Common Objects: Mild  Pictures: Moderate  Conversation: Severe    Reading Comprehension  Reading Status: Exceptions to [facility]  Sentence Impairment Severity: Severe    Expression  Primary Mode of Expression: Verbal    Verbal Expression  Verbal Expression: Exceptions to functional limits  Repetition: Moderate  Automatic Speech: Moderate  Confrontation: Moderate  Convergent: Moderate  Conversation: Moderate  Interfering Components: Paraphasia;Jargon    Written Expression  Dominant Hand: Right  Written Expression: Exceptions to [facility]  Legibility Impairment Severity: Moderate    Motor Speech  Motor Speech:  Within Functional Limits    Pragmatics/Social Functioning  Pragmatics: Within functional limits    Cognition:      Orientation  Overall Orientation Status: Impaired  Attention  Attention: Unable to assess  Memory  Memory: Unable to assess(deficits at baseline per chart review)  Problem Solving  Problem Solving: Unable to assess  Numeric Reasoning  Numeric Reasoning: Unable to assess  Abstract Reasoning  Abstract Reasoning: Unable to assess  Safety/Judgement  Safety/Judgement: Unable to assess    Prognosis:  Speech Therapy Prognosis  Prognosis: Fair  Prognosis Considerations: Age  Individuals consulted  Consulted and agree with results and recommendations: Patient    Education:  Patient Education: Educated pt re: role of SLP, purpose of visit, aphasia. Patient Education Response: Needs reinforcement  Safety Devices in place: Yes  Type of devices: Left in bed;Call light within reach; Bed alarm in place    Therapy Time:   Individual Concurrent Group Co-treatment   Time In 1427         Time Out 1502         Minutes 35            Timed Code Treatment Minutes: 0 Minutes  Total Treatment Time: [address], M.A., [address] [place] [address]  Speech-Language Pathologist    This document will serve as a discharge summary if pt discharges before next treatment.

## 2020-09-28 NOTE — PROGRESS NOTES
Occupational Therapy   Occupational Therapy Initial Assessment and Treatment  Date: 2020   Patient Name: Betsy Sparks  MRN: [id number]     : 1940    Date of Service: 2020    Discharge Recommendations:    Betsy Sparks scored a 15/24 on the AM-PAC ADL Inpatient form. Current research shows that an AM-PAC score of 17 or less is typically not associated with a discharge to the patient's home setting. Based on the patient's AM-PAC score and their current ADL deficits, it is recommended that the patient have 5-7 sessions per week of Occupational Therapy at d/c to increase the patient's independence. At this time, this patient demonstrates the endurance, and/or tolerance for 3 hours of therapy each day, with a treatment frequency of 5-7x/wk. Please see assessment section for further patient specific details. If patient discharges prior to next session this note will serve as a discharge summary. Please see below for the latest assessment towards goals. OT Equipment Recommendations  Equipment Needed: No  Other: defer    Assessment   Performance deficits / Impairments: Decreased functional mobility ; Decreased ADL status; Decreased strength;Decreased balance;Decreased posture;Decreased safe awareness;Decreased cognition;Decreased endurance;Decreased high-level IADLs;Decreased coordination;Decreased vision/visual deficit  After evaluation and treatment, pt found to be presenting with the above mentioned deficits. Pt would benefit from continued skilled occupational therapy to address these deficits, increasing safety and independence with ADL and functional mobility. Pt currently requires min A for standing level ADL and functional mobility with RW and max A for toileting. Pt noted to run into objects on R side and have extensive expressive language deficits. Will continue to assess for discharge needs.      REQUIRES OT FOLLOW UP: Yes  Activity Tolerance  Activity Tolerance: Patient Tolerated treatment well  Safety Devices  Safety Devices in place: Yes  Type of devices: Call light within reach; Chair alarm in place; Left in chair;Nurse notified;Gait belt(sitter present)         Patient Diagnosis(es): The primary encounter diagnosis was Encephalopathy. A diagnosis of History of CVA (cerebrovascular accident) was also pertinent to this visit. has a past medical history of CHUYITA (acute kidney injury) ([facility] [address] Allergic rhinitis, Aneurysm ([facility] [address] Aortic aneurysm ([facility] [address] Cancer ([facility] [address] Cholelithiasis, Depression, Hyperlipidemia, Hypertension, Incontinence, MRSA (methicillin resistant staph aureus) culture positive, Ulcer, and Ulcerative colitis ([facility] [address] has a past surgical history that includes Abdomen surgery; Cholecystectomy, open (5/7/2012); Pressure ulcer debridement (2010); Colonoscopy; Upper gastrointestinal endoscopy; Stomach surgery; ERCP (3/13/12); bronchoscopy (12/11/2012); and ERCP (12-). Restrictions  Position Activity Restriction  Other position/activity restrictions: up at tolerated    Subjective   General  Chart Reviewed: Yes  Patient assessed for rehabilitation services?: Yes  Family / Caregiver Present: No  Referring Practitioner: Darinel Addison MD  Diagnosis: Expressive aphasia. CT head and neck negative, and MRI of head (limited study) negative for acute change. Subjective  Subjective: RN cleared pt for tx. Pt supine at session start.      Patient Currently in Pain: Denies    Social/Functional History  Social/Functional History  Lives With: Spouse  Type of Home: House(mobile home)  Home Layout: One level  Home Access: Ramped entrance  Bathroom Shower/Tub: Tub/Shower unit  Bathroom Toilet: Handicap height  Bathroom Equipment: Grab bars in shower, Shower chair, Hand-held shower, Grab bars around toilet  Bathroom Accessibility: Jos Bowers: Ty Ding [address] Help From: Family  ADL Assistance: Needs assistance  Homemaking Assistance: Needs assistance  Homemaking Responsibilities: No  Ambulation Assistance: Needs assistance(walker, sometimes has  help when R LE gives out)  Transfer Assistance: Independent  Active : No  Additional Comments: Pt denies falls. Pt was admitted in June of 2020 following fall in shower and was d/c to SNF. **Information obtained from previous PT eval. No family present and pt is a questionable historian 2/2 expressive aphasia. Objective   Vision: Impaired  Vision Exceptions: Wears glasses for reading  Hearing: Within functional limits      Orientation  Overall Orientation Status: (Unable to formally assess 2/2 expressive aphasia.  Appears somewhat confused, responds to name.)        Balance  Sitting Balance: Minimal assistance(dynamic sitting balance)  Standing Balance: Minimal assistance(with RW)  Functional Mobility  Functional - Mobility Device: Rolling Walker  Activity: To/from bathroom(plus functional household distances)  Assist Level: Minimal assistance  Functional Mobility Comments: Running into objects on R side, needing increased time and cues to correct  Toilet Transfers  Toilet - Technique: Ambulating(with RW)  Equipment Used: Standard toilet(with R grab bar)  Toilet Transfer: Minimal assistance     ADL  Feeding: Supervision(to drink sitting)  Grooming: Contact guard assistance;Setup;Verbal cueing(to wash hands standing with RW, VCs for sequencing)  LE Dressing: Minimal assistance(to don B socks seated EOB, LOB needing assist to correct)  Toileting: Maximum assistance(hygiene and clothing management in standing with RW)     Tone RUE  RUE Tone: Normotonic  Tone LUE  LUE Tone: Normotonic  Coordination  Movements Are Fluid And Coordinated: Yes        Bed mobility  Supine to Sit: Supervision(HOB flat, use of bed rail)  Scooting: Contact guard assistance(to EOB)     Transfers  Sit to stand: Contact guard assistance(to SBA)  Stand to sit: Contact guard assistance(to SBA)  Transfer Comments: VCs for safe t/f technique and safe use of RW. Pt completed 10 consecutive sit to stands from recliner with CGA and RW, limited by fatigue, to build strength for functional t/fs. Vision - Basic Assessment  Vision Comments: Not formally assessed, but appears to have some R visual field neglect during functional tasks. Cognition  Overall Cognitive Status: Exceptions  Arousal/Alertness: Appropriate responses to stimuli  Following Commands: Follows one step commands with repetition; Follows one step commands with increased time  Attention Span: Difficulty dividing attention; Attends with cues to redirect; Difficulty attending to directions  Safety Judgement: Decreased awareness of need for assistance;Decreased awareness of need for safety  Problem Solving: Assistance required to generate solutions;Assistance required to implement solutions;Assistance required to identify errors made;Assistance required to correct errors made;Decreased awareness of errors  Insights: Decreased awareness of deficits  Initiation: Requires cues for some  Sequencing: Requires cues for some           Sensation  Overall Sensation Status: WFL(pt denies numbnes and tingling BLE)                       BUE strength and AROM are [facility] based on functional presentation.     Education: Role of OT, safe t/f training, safe use of DME, awareness of deficits, discharge planning, ADL as therapeutic exercise, importance of OOB, cognitive orientation        Plan   Plan  Times per week: 5-7    AM-PAC Score        AM-[facility] Inpatient Daily Activity Raw Score: 15 (09/28/20 1544)  AM-PAC Inpatient ADL T-Scale Score : 34.69 (09/28/20 1544)  ADL Inpatient CMS 0-100% Score: 56.46 (09/28/20 1544)  ADL Inpatient CMS G-Code Modifier : CK (09/28/20 1544)    Goals  Short term goals  Time Frame for Short term goals: 1 week  Short term goal 1: LB dressing with SBA  Short term goal 2: 3 grooming tasks standing with SBA  Short term goal 3: Toilet t/f from ambulatory level with SBA  Short term goal 4: Toileting with min A. Patient Goals   Patient goals : None stated by pt       Therapy Time   Individual Concurrent Group Co-treatment   Time In 9282         Time Out 1535         Minutes 27         Timed Code Treatment Minutes: 12 Minutes       If patient is discharged prior to next treatment session, this note will serve as the discharge summary.   Cedric Royal, OTR/L #[id number]

## 2020-09-28 NOTE — CARE COORDINATION
Case Management Assessment           Initial Evaluation                Date / Time of Evaluation: 9/28/2020 4:19 PM                 Assessment Completed by: Anjelica Cordero    Patient Name: Montserrat Patino     YOB: 1940  Diagnosis: Expressive aphasia [R47.01]  Expressive aphasia [R47.01]     Date / Time: 9/27/2020 12:02 PM    Patient Admission Status: Observation    If patient is discharged prior to next notation, then this note serves as note for discharge by case management. Current PCP: Francis Sewell,   Clinic Patient: No    Chart Reviewed: Yes  Patient/ Family Interviewed: Yes    Initial assessment completed at bedside with: pt and     Hospitalization in the last 30 days: No    Emergency Contacts:  Extended Emergency Contact Information  Primary Emergency Contact: Ozzie Verdin  Address: [address] [place] [address], [facility] [address] Phone: [phone]  Work Phone: [phone]  Relation: Spouse   needed? No  Secondary Emergency Contact: Mariah Marcelino   [address] Phone: [phone]  Relation: Child   needed? No    Advance Directives:   Code Status: Full Code    Healthcare Power of : No  Financial  Payor: Deisi Jurado / Plan: Abraham Howard PPO / Product Type: Medicare /     Pre-cert required for SNF: Yes    Pharmacy    [facility] [address] [facility], [address]  [address] [facility] [address]  Phone: [phone] Fax: [phone]      Potential assistance Purchasing Medications: Potential Assistance Purchasing Medications: No  Does Patient want to participate in local refill/ meds to beds program?: No    Meds To Beds General Rules:  1. Can ONLY be done Monday- Friday between 8:30am-5pm  2. Prescription(s) must be in pharmacy by 3pm to be filled same day  3. Copy of patient's insurance/ prescription drug card and patient face sheet must be sent along with the prescription(s)  4. Cost of Rx cannot be added to hospital bill. If financial assistance is needed, please contact unit  or ;  or  CANNOT provide pharmacy voucher for patients co-pays  5. Patients can then  the prescription on their way out of the hospital at discharge, or pharmacy can deliver to the bedside if staff is available. (payment due at time of pick-up or delivery - cash, check, or card accepted)     Able to afford home medications/ co-pay costs: Yes    ADLS  Support Systems: Therapist, Spouse/Significant Other, Children, Home Care Staff    PT AM-PAC: 17 /24  OT AM-PAC: 15 /24    [place]: home with   Steps: n/a    Plans to RETURN to current housing: Yes  Barriers to RETURNING to current housing: medical complications    Home Care Information  Currently ACTIVE with [address]: No          DISCHARGE PLAN:  Disposition: Other: TBD Phone: TBD  Fax: TBD    Transportation PLAN for discharge: ?? Factors facilitating achievement of predicted outcomes: Family support and Friend support    Barriers to discharge: Medical complications    Additional Case Management Notes: CM spoke with pt and  at bedside. They were unsure of what they wanted to go.  concered about visitation with SNF. THerapy recs suggest 5-7 days a week and ARU. The Plan for Transition of Care is related to the following treatment goals of Expressive aphasia [R47.01]  Expressive aphasia [R47.01]    The Patient and/or patient representative Lazaro Dillard and her family were provided with a choice of provider and agrees with the discharge plan Yes    Freedom of choice list was provided with basic dialogue that supports the patient's individualized plan of care/goals and shares the quality data associated with the providers.  Yes    Care Transition patient: No    Benson Yañez RN  [facility]  Case Management Department  Ph: [phone]   Fax: [phone]

## 2020-09-29 ENCOUNTER — APPOINTMENT (OUTPATIENT)
Dept: MRI IMAGING | Age: 80
DRG: 065 | End: 2020-09-29
Payer: MEDICARE

## 2020-09-29 LAB
ANION GAP SERPL CALCULATED.3IONS-SCNC: 9 MMOL/L (ref 3–16)
BASOPHILS ABSOLUTE: 0 K/UL (ref 0–0.2)
BASOPHILS RELATIVE PERCENT: 0.7 %
BUN BLDV-MCNC: 19 MG/DL (ref 7–20)
CALCIUM SERPL-MCNC: 8.8 MG/DL (ref 8.3–10.6)
CHLORIDE BLD-SCNC: 103 MMOL/L (ref 99–110)
CO2: 26 MMOL/L (ref 21–32)
CREAT SERPL-MCNC: 1.1 MG/DL (ref 0.6–1.2)
EOSINOPHILS ABSOLUTE: 0.9 K/UL (ref 0–0.6)
EOSINOPHILS RELATIVE PERCENT: 11.3 %
GFR AFRICAN AMERICAN: 58
GFR NON-AFRICAN AMERICAN: 48
GLUCOSE BLD-MCNC: 114 MG/DL (ref 70–99)
HCT VFR BLD CALC: 31.7 % (ref 36–48)
HEMOGLOBIN: 10.1 G/DL (ref 12–16)
LYMPHOCYTES ABSOLUTE: 0.9 K/UL (ref 1–5.1)
LYMPHOCYTES RELATIVE PERCENT: 11.8 %
MCH RBC QN AUTO: 25.2 PG (ref 26–34)
MCHC RBC AUTO-ENTMCNC: 31.8 G/DL (ref 31–36)
MCV RBC AUTO: 79.3 FL (ref 80–100)
MONOCYTES ABSOLUTE: 0.6 K/UL (ref 0–1.3)
MONOCYTES RELATIVE PERCENT: 8 %
NEUTROPHILS ABSOLUTE: 5.2 K/UL (ref 1.7–7.7)
NEUTROPHILS RELATIVE PERCENT: 68.2 %
PDW BLD-RTO: 17.3 % (ref 12.4–15.4)
PLATELET # BLD: 251 K/UL (ref 135–450)
PMV BLD AUTO: 7.7 FL (ref 5–10.5)
POTASSIUM REFLEX MAGNESIUM: 4 MMOL/L (ref 3.5–5.1)
RBC # BLD: 4 M/UL (ref 4–5.2)
SODIUM BLD-SCNC: 138 MMOL/L (ref 136–145)
WBC # BLD: 7.6 K/UL (ref 4–11)

## 2020-09-29 PROCEDURE — 2060000000 HC ICU INTERMEDIATE R&B

## 2020-09-29 PROCEDURE — 97110 THERAPEUTIC EXERCISES: CPT

## 2020-09-29 PROCEDURE — 80048 BASIC METABOLIC PNL TOTAL CA: CPT

## 2020-09-29 PROCEDURE — 6370000000 HC RX 637 (ALT 250 FOR IP): Performed by: STUDENT IN AN ORGANIZED HEALTH CARE EDUCATION/TRAINING PROGRAM

## 2020-09-29 PROCEDURE — 97530 THERAPEUTIC ACTIVITIES: CPT

## 2020-09-29 PROCEDURE — 97116 GAIT TRAINING THERAPY: CPT

## 2020-09-29 PROCEDURE — 6360000002 HC RX W HCPCS: Performed by: STUDENT IN AN ORGANIZED HEALTH CARE EDUCATION/TRAINING PROGRAM

## 2020-09-29 PROCEDURE — 97535 SELF CARE MNGMENT TRAINING: CPT

## 2020-09-29 PROCEDURE — 2580000003 HC RX 258

## 2020-09-29 PROCEDURE — 2580000003 HC RX 258: Performed by: STUDENT IN AN ORGANIZED HEALTH CARE EDUCATION/TRAINING PROGRAM

## 2020-09-29 PROCEDURE — 6360000002 HC RX W HCPCS: Performed by: INTERNAL MEDICINE

## 2020-09-29 PROCEDURE — 70551 MRI BRAIN STEM W/O DYE: CPT

## 2020-09-29 PROCEDURE — 85025 COMPLETE CBC W/AUTO DIFF WBC: CPT

## 2020-09-29 PROCEDURE — 92507 TX SP LANG VOICE COMM INDIV: CPT

## 2020-09-29 PROCEDURE — 2580000003 HC RX 258: Performed by: INTERNAL MEDICINE

## 2020-09-29 PROCEDURE — 36415 COLL VENOUS BLD VENIPUNCTURE: CPT

## 2020-09-29 RX ORDER — LORAZEPAM 2 MG/ML
2 INJECTION INTRAMUSCULAR ONCE
Status: COMPLETED | OUTPATIENT
Start: 2020-09-29 | End: 2020-09-29

## 2020-09-29 RX ORDER — SODIUM CHLORIDE 9 MG/ML
INJECTION, SOLUTION INTRAVENOUS
Status: COMPLETED
Start: 2020-09-29 | End: 2020-09-29

## 2020-09-29 RX ADMIN — SODIUM CHLORIDE 250 ML: 9 INJECTION, SOLUTION INTRAVENOUS at 08:41

## 2020-09-29 RX ADMIN — ATORVASTATIN CALCIUM 80 MG: 80 TABLET, FILM COATED ORAL at 08:40

## 2020-09-29 RX ADMIN — ASPIRIN 325 MG: 325 TABLET, COATED ORAL at 08:40

## 2020-09-29 RX ADMIN — CARBIDOPA AND LEVODOPA 0.5 TABLET: 25; 100 TABLET ORAL at 08:39

## 2020-09-29 RX ADMIN — CEFTRIAXONE SODIUM 2 G: 2 INJECTION, POWDER, FOR SOLUTION INTRAMUSCULAR; INTRAVENOUS at 08:40

## 2020-09-29 RX ADMIN — LORAZEPAM 2 MG: 2 INJECTION INTRAMUSCULAR; INTRAVENOUS at 17:17

## 2020-09-29 RX ADMIN — Medication 10 ML: at 21:00

## 2020-09-29 RX ADMIN — ENOXAPARIN SODIUM 40 MG: 40 INJECTION SUBCUTANEOUS at 08:40

## 2020-09-29 RX ADMIN — CARBIDOPA AND LEVODOPA 0.5 TABLET: 25; 100 TABLET ORAL at 14:01

## 2020-09-29 RX ADMIN — Medication 10 ML: at 08:40

## 2020-09-29 ASSESSMENT — PAIN SCALES - PAIN ASSESSMENT IN ADVANCED DEMENTIA (PAINAD)
TOTALSCORE: 0
CONSOLABILITY: 0
BODYLANGUAGE: 0
NEGVOCALIZATION: 0
FACIALEXPRESSION: 0
TOTALSCORE: 0
BREATHING: 0
BREATHING: 0
NEGVOCALIZATION: 0
CONSOLABILITY: 0
FACIALEXPRESSION: 0
BODYLANGUAGE: 0

## 2020-09-29 NOTE — PROGRESS NOTES
Occupational Therapy   Treatment  Date: 2020            Patient Name: Jose Miguel Vinson  MRN:   [id number]     : 1940     Date of Service: 2020     Discharge Recommendations:    Jose Miguel Vinson scored a 15/24 on the AM-PAC ADL Inpatient form. Current research shows that an AM-PAC score of 17 or less is typically not associated with a discharge to the patient's home setting. Based on the patient's AM-PAC score and their current ADL deficits, it is recommended that the patient have 5-7 sessions per week of Occupational Therapy at d/c to increase the patient's independence. At this time, this patient demonstrates the endurance, and/or tolerance for 3 hours of therapy each day, with a treatment frequency of 5-7x/wk. Please see assessment section for further patient specific details.     If patient discharges prior to next session this note will serve as a discharge summary. Please see below for the latest assessment towards goals.      OT Equipment Recommendations  Equipment Needed: No  Other: defer     Assessment   Performance deficits / Impairments: Decreased functional mobility ; Decreased ADL status; Decreased strength;Decreased balance;Decreased posture;Decreased safe awareness;Decreased cognition;Decreased endurance;Decreased high-level IADLs;Decreased coordination;Decreased vision/visual deficit  After treatment, pt found to be presenting with the above mentioned deficits. Pt would benefit from continued skilled occupational therapy to address these deficits, increasing safety and independence with ADL and functional mobility. Pt currently requires CGA for standing level ADL and functional mobility with RW. Improved expression today compared to evaluation yesterday.  She was able to tolerate ~18mins standing consecutively with RW while working on expression (answering open ended questions, multiple choice questions, describing environment, telling time, counting, naming colors and objects, etc.) Will continue to assess for discharge needs.      REQUIRES OT FOLLOW UP: Yes  Activity Tolerance  Activity Tolerance: Patient Tolerated treatment well  Safety Devices  Safety Devices in place: Yes  Type of devices: Call light within reach; Chair alarm in place; Left in chair;Nurse notified;Gait belt(sitter present)         Patient Diagnosis(es): The primary encounter diagnosis was Encephalopathy. A diagnosis of History of CVA (cerebrovascular accident) was also pertinent to this visit.      has a past medical history of CHUYITA (acute kidney injury) ([facility] [address] Allergic rhinitis, Aneurysm ([facility] [address] Aortic aneurysm ([facility] [address] Cancer ([facility] [address] Cholelithiasis, Depression, Hyperlipidemia, Hypertension, Incontinence, MRSA (methicillin resistant staph aureus) culture positive, Ulcer, and [facility] has a past surgical history that includes Abdomen surgery; Cholecystectomy, open (5/7/2012); Pressure ulcer debridement (2010); Colonoscopy; Upper gastrointestinal endoscopy; Stomach surgery; ERCP (3/13/12); bronchoscopy (12/11/2012); and ERCP (12-). Restrictions  Position Activity Restriction  Other position/activity restrictions: up at tolerated     Subjective   General  Chart Reviewed: Yes  Patient assessed for rehabilitation services?: Yes  Family / Caregiver Present: No -  left during session  Referring Practitioner: Michel Barrios MD  Diagnosis: Expressive aphasia. CT head and neck negative, and MRI of head (limited study) negative for acute change. Subjective  Subjective: RN cleared pt for tx. Pt supine at session start.      Patient Currently in Pain: Denies     Objective   Orientation  Overall Orientation Status: Expressed proper Orientation to person only.  Pt re-oriented     Balance  Sitting Balance: CGA (dynamic sitting balance)  Standing Balance: CGA to SBA (with RW)    Functional Mobility  Functional - Mobility Device: Rolling Walker  Activity: Functional household distances x 14 mins, slow pace, cues to navigate environmental barriers, completed while working on expressive language (answering open ended questions, multiple choice questions, describing environment, telling time, counting, naming colors and objects, etc.)    Assist Level: Minimal assistance  Functional Mobility Comments: Running into objects on R side, needing increased time and cues to correct    Toilet Transfers  Toilet - Technique: Ambulating(with RW)  Equipment Used: Standard toilet(with R grab bar)  Toilet Transfer: Minimal assistance      ADL  Feeding: Supervision(to drink sitting)  LE Dressing: CGA (to don B socks seated EOB)     Bed mobility  Supine to Sit: Supervision(HOB raised, use of bed rail, increased time)  Scooting: Contact guard assistance(to EOB)      Transfers  Sit to stand: Contact guard assistance(to SBA)  Stand to sit: Contact guard assistance(to SBA)  Transfer Comments: VCs for safe t/f technique and safe use of RW. Pt completed 10 consecutive knee raises on each leg and 10 calf raises with CGA and RW to build strength for functional t/fs and improve balance reactions.      Cognition  Overall Cognitive Status: Exceptions  Arousal/Alertness: Appropriate responses to stimuli  Following Commands: Follows one step commands with repetition; Follows one step commands with increased time  Attention Span: Difficulty dividing attention; Attends with cues to redirect; Difficulty attending to directions  Safety Judgement: Decreased awareness of need for assistance;Decreased awareness of need for safety  Problem Solving: Assistance required to generate solutions;Assistance required to implement solutions;Assistance required to identify errors made;Assistance required to correct errors made;Decreased awareness of errors  Insights: Decreased awareness of deficits  Initiation: Requires cues for some  Sequencing: Requires cues for some      Education: Role of OT, safe t/f training, safe use of DME, awareness of deficits, discharge planning, ADL as therapeutic exercise, importance of OOB, cognitive orientation     Plan   Times per week: 5-7     AM-PAC Score  AM-PAC Inpatient Daily Activity Raw Score: 15 (09/29/20 1521)  AM-PAC Inpatient ADL T-Scale Score : 34.69 (09/29/20 1521)  ADL Inpatient CMS 0-100% Score: 56.46 (09/29/20 1521)  ADL Inpatient CMS G-Code Modifier : CK (09/29/20 1521)     Goals  Short term goals  Time Frame for Short term goals: 1 week  Short term goal 1: LB dressing with SBA  Short term goal 2: 3 grooming tasks standing with SBA  Short term goal 3: Toilet t/f from ambulatory level with SBA  Short term goal 4: Toileting with min A. Patient Goals   Patient goals : None stated by pt     Therapy Time    Individual Concurrent Group Co-treatment   Time In 1448      Time Out 1512      Minutes 24      Timed Code Treatment Minutes: 24 Minutes     If patient is discharged prior to next treatment session, this note will serve as the discharge summary.   Criselda Cleary, OTR/L #[id number]

## 2020-09-29 NOTE — PROGRESS NOTES
Interval History:  Remains aphasic.     Current Medications:    Current Facility-Administered Medications:     LORazepam (ATIVAN) injection 0.5 mg, 0.5 mg, Intravenous, Once, Cherry Zavala MD    cefTRIAXone (ROCEPHIN) 2 g IVPB in NS 50ml minibag, 2 g, Intravenous, Daily, Clinton Alfredo MD, Last Rate: 100 mL/hr at 09/29/20 0840, 2 g at 09/29/20 0840    citalopram (CELEXA) tablet 40 mg, 40 mg, Oral, Nightly, Michael Reyes MD, 40 mg at 09/28/20 2134    pantoprazole (PROTONIX) tablet 40 mg, 40 mg, Oral, Nightly, Michael Reyes MD, 40 mg at 09/28/20 2134    LORazepam (ATIVAN) tablet 0.5 mg, 0.5 mg, Oral, Q8H PRN, Michael Reyes MD    oxyCODONE-acetaminophen (PERCOCET) 5-325 MG per tablet 1 tablet, 1 tablet, Oral, Q8H PRN, Michael Reyes MD    aspirin EC tablet 325 mg, 325 mg, Oral, Daily, Michael Reyes MD, 325 mg at 09/29/20 0840    atorvastatin (LIPITOR) tablet 80 mg, 80 mg, Oral, Daily, Michael Reyes MD, 80 mg at 09/29/20 0840    sodium chloride flush 0.9 % injection 10 mL, 10 mL, Intravenous, 2 times per day, Michael Reyes MD, 10 mL at 09/29/20 0840    sodium chloride flush 0.9 % injection 10 mL, 10 mL, Intravenous, PRN, Michael Reyes MD    polyethylene glycol (GLYCOLAX) packet 17 g, 17 g, Oral, Daily PRN, Michael Reyes MD    promethazine (PHENERGAN) tablet 12.5 mg, 12.5 mg, Oral, Q6H PRN **OR** ondansetron (ZOFRAN) injection 4 mg, 4 mg, Intravenous, Q6H PRN, Michael Reyes MD    enoxaparin (LOVENOX) injection 40 mg, 40 mg, Subcutaneous, Daily, Michael Reyes MD, 40 mg at 09/29/20 0840    labetalol (NORMODYNE;TRANDATE) injection 10 mg, 10 mg, Intravenous, Q10 Min PRN, Michael Reyes MD    carbidopa-levodopa (SINEMET)  MG per tablet 0.5 tablet, 0.5 tablet, Oral, TID, Gibson Pradhan MD, 0.5 tablet at 09/29/20 0839    zinc oxide 20 % ointment, , Topical, 4x Daily PRN, Gibson Pradhan MD      Physical Exam  Constitutional  BP (!) 158/90   Pulse 80   Temp 98.1 °F abnormal due to the presence of: Generalized slowing     Generalized slowing is a non-specific finding consistent with a generalized disturbance of cerebral functioning including toxic,   metabolic, or structural abnormalities that are multi-focal or diffuse. No focal, lateralizing, or epileptiform features were seen during the recording. Clinical correlation is recommended. The absence of epileptiform discharges on a single EEG does not rule out a diagnosis of  epilepsy or rule out non-convulsive or complex partial status   epilepticus as a cause of altered mental status       Pertinent Labs:     Hemoglobin A1C: 6.0  Lipid panel:      Ref. Range 9/27/2020 12:09   Troponin Latest Ref Range: <0.01 ng/mL <0.01   Cholesterol, Total Latest Ref Range: 0 - 199 mg/dL 120   HDL Cholesterol Latest Ref Range: 40 - 60 mg/dL 48   LDL Calculated Latest Ref Range: <100 mg/dL 41   Triglycerides Latest Ref Range: 0 - 150 mg/dL 157 (H)   VLDL Cholesterol Calculated Latest Ref Range: Not Established mg/dL 31       Katalina Francois is a 68 y.o. female with history of hypertension, hyperlipidemia, metastatic lung carcinoma and recent stroke 8 months ago with residual right-sided weakness was admitted with difficulty finding words and confusion. MRI negative for acute stroke but imaging not idea due to motion artifact. Still suspect stroke, repeat MRI with sedation. EEG unremarkable.     Plan:  -Repeat MRI brain.  -Continue ASA and Statin  -If MRI positive for stroke, will consider EP consult for loop placement   -Rehab evaluation in process; continue PT/OT/ST  -Neurocheck and vs q4h  -Continue home sinemet  - Avoid all other dopamine blocking agents (haloperidol, risperidone, phenergan, zyprexa metoclopramide, etc.)      CHULA Roman-CNP  [phone]

## 2020-09-29 NOTE — PROGRESS NOTES
Patient is alert and oriented only to self. She has expressive aphasia. NIHSS of 3. Strength is intact. Unable to assess sensation. Patient is incontinent of urine and feces at times, but will get up and go to the bathroom. She is tolerating Po fluids well. No signs of dysphasia / aspiration noted. No signs of nausea / vomiting. Advanced Dementia scale used for pain. Patient scores a 0 on all areas. She is calm and cooperative. Vital signs are stable. Will continue to assess and monitor.

## 2020-09-29 NOTE — CONSULTS
Dannielle Verdin  9/29/2020  [id number]    Rehab Brief Consult:    Patient is able to tolerate 3 hours of therapy 5 days per week and is medically appropriate for rehab at the Acute Rehabilitation Unit. Approved for admission with insurance precert and medical stability. Thank you for the consultation.     Maylin Trammell D.O. M.P.H  PM&R  9/29/2020  2:32 PM

## 2020-09-29 NOTE — PLAN OF CARE
Problem: Falls - Risk of:  Goal: Will remain free from falls  Description: Will remain free from falls  Outcome: Ongoing  Note: Fall precautions in place. Bed is in lowest position, wheels locked and alarm on. Non-skid socks on. Call light and bedside table within reach. Pt calls out appropriately. Pt is up x 1 assist with a walker and gait belt. Will continue to assess and monitor. Problem: COMMUNICATION IMPAIRMENT  Goal: Ability to express needs and understand communication  Outcome: Ongoing  Note: Patient has expressive aphasia. NIHSS of 3. Will continue to monitor and assess.

## 2020-09-29 NOTE — PROGRESS NOTES
Patient alert, oriented to self and place, neuro status unchanged with expressive aphasia, NIH remains 3. VSS on room air. Patient denies pain and N/V. Patient voiding adequately per BRP, with x1 bowel movement this morning. Patient tolerating ambulation well with x1 CG assist with rolling walker and GB. Patient has declined to get up to the chair so far this shift. All fall precautions in place. Will continue to monitor.

## 2020-09-29 NOTE — PROGRESS NOTES
Speech Language Pathology  Facility/Department: [facility] 5T ORTHO/NEURO  Daily Treatment Note - LATE ENTRY  NAME: Meryle Jo  : 1940  MRN: [id number]    Date of Eval: 2020  Evaluating Therapist: Cain Santillan    Patient Diagnosis(es): has Cellulitis and abscess; UC (ulcerative colitis) ([facility] [address] Elevated blood pressure reading; Nausea & vomiting; Pancreatic mass; S/P cholecystectomy; Cholelithiasis; Hypoxemia requiring supplemental oxygen; Incisional hernia; Port-A-Cath in place; History of lung cancer; Left kidney mass; Wound infection after surgery, sequela; Left arm weakness; Stroke-like symptom; Weakness of both lower extremities; Tingling of left upper extremity; Acute renal failure (ARF) ([facility] [address] Generalized weakness; and Expressive aphasia on their problem list.    Onset Date:  20    Primary Complaint: \"Oh that's crazy! Crazy! Crazy. \"    Pain:  Pain Assessment  Pain Assessment: Advanced Dementia  PAINAD (Pain Assessment in Advance Dementia)  Breathing: normal  Negative Vocalization: none  Facial Expression: smiling or inexpressive  Body Language: relaxed  Consolability: no need to console  PAINAD Score: 0    Chart reviewed. Pain:  denied  Medical diagnosis:  Expressive aphasia  Treatment diagnosis:  Aphasia (R47.01)    Treatment:  Pt seen to address the following goals:  Goal 1: Patient will communicate basic wants/needs by any means. 20:  Goal not directly targeted during this session. Continue goal.     Goal 2: Patient will improve yes/no accuracy to 70%. 20:  Pt responds to factual personal Y/N questions with 50% accuracy. Continue goal.     Goal 3: Patient will complete naming tasks with 70% accuracy given mod cues. : pt required mod cues for 60% accuracy. Cont goals. 20:  Pt demonstrates significant difficulty answering factual personal questions. Pt is able to complete automatic speech tasks with moderate assistance.   Pt is able to complete

## 2020-09-29 NOTE — PROGRESS NOTES
Internal Medicine PGY-1 Resident Progress Note        PCP: Dolores King DO    Date of Admission: 9/27/2020    Chief Complaint: Expressive aphasia    Hospital Course: 68 y.o. female with a past medical history of aortic aneurysm, retention, hyperlipidemia, and stage IV non-small cell lung cancer who [facility]. with expressive aphasia. History is obtained from the daughter Soila Porras who is at pt's bedside, as pt is not able to give history due to the expressive aphasia. The pt lives with her . Per daughter the pt went to bed at about 7pm last night and this morning her  noticed that she was cought up in her blanket and was having trouble untangling it. At that time he noticed that she was having trouble expressing herself. Per daughter there has been no improvement in her ability to express herself since this morning and the words that are coming out are 'jumbled' and don't make sense. Pt is able to follow commands and her speech is nonslurred. However it does appear that she has some expressive aphasia, although it is intermittently better. She had a history of a cerebrovascular accident in October 2019 and has been having right sided weakness. Per medical records he had an MRI at that time, it was notable for 2 punctate infarcts in the posterior right frontal white matter.  It was thought that it was secondary to embolic nature.  She was seen by neurology at that time, recommended aspirin and Plavix as well as blood pressure control.     Per daughter the pt has some memory problems at baseline involving mostly short term memory. She has also been having frequent falls since the past stroke and is being evaluated for Parkinson's disease. At baseline pt cannot take shower by herself because of fear of falling, but she can go to the bathroom by herself.  She normally walks with a walker.     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  She is status post radiation lacunar infarcts, unchanged. 4.  Chronic right maxillary sinusitis. PROCEDURE: CT ANGIOGRAPHY NECK WITH/WITHOUT CONTRAST      INDICATION: altered mental state      COMPARISON: none      TECHNIQUE: Limited noncontrast CT imaging performed for the purposes of IV contrast bolus tracking. Axial CT imaging obtained from skull base through the lung apices following administration of IV contrast. Axial images, multiplanar reformatted images,    and maximum intensity projection images were reviewed for CT angiographic technique. Up-to-date CT equipment and radiation dose reduction techniques were employed. IV contrast: 80 mL Isovue 370. FINDINGS:      AORTIC ARCH: Standard three-vessel aortic arch anatomy is present. INNOMINATE ARTERY: Normal.      RIGHT SUBCLAVIAN ARTERY: Normal.      RIGHT VERTEBRAL ARTERY: Normal.      RIGHT CAROTID ARTERY: There is contrast opacification of the right common carotid artery. Minimal epiglottic plaque at the carotid bifurcation. No flow significant stenosis seen. There is flow identified in the distal right internal carotid artery. LEFT SUBCLAVIAN ARTERY: Normal.      LEFT VERTEBRAL ARTERY: Normal.      LEFT CAROTID ARTERY: There is contrast opacification of the left common carotid artery. There is moderate chronic plaque at the carotid bifurcation. No flow significant stenosis or focal aneurysm seen. There is flow in the distal left internal carotid    artery. NECK SOFT TISSUES: No neck soft tissue mass or lymphadenopathy. VISUALIZED MEDIASTINUM: No mass or lymphadenopathy. VISUALIZED LUNG APICES: Mild centrilobular emphysematous changes bilaterally. BONES: No destructive osseous process. OTHER: None. IMPRESSION:      No acute CTA findings. No hemodynamically significant stenosis or focal aneurysm seen.                PROCEDURE: CT ANGIOGRAPHY HEAD WITH/WITHOUT CONTRAST      INDICATION: altered mental state COMPARISON: none      TECHNIQUE: Axial CT imaging obtained through the head prior to and following administration of IV contrast. Axial images, multiplanar reformatted images, and maximum intensity projection images were reviewed for CT angiographic technique. IV contrast: 80 mL Isovue 370. FINDINGS:      ANTERIOR CIRCULATION: The intracranial internal carotid arteries, anterior cerebral arteries, and middle cerebral arteries demonstrate no occlusion or stenosis. No evidence for aneurysm or arteriovenous malformation. POSTERIOR CIRCULATION: The bilateral vertebral arteries, basilar artery and posterior cerebral arteries demonstrate no occlusion or significant stenosis. Small patent right posterior communicating artery. No evidence for aneurysm or arteriovenous    malformation. INTRACRANIAL VENOUS SYSTEM: No evidence for intracranial venous thrombosis. OTHER: None. IMPRESSION:      1. No findings for acute CTA findings in the head. No hemodynamically significant stenosis or focal aneurysm identified. CTA HEAD W CONTRAST   Final Result      1. No findings for acute intracranial abnormality. 2.  Age related atrophy with moderate periventricular white matter changes bilaterally consistent with chronic ischemic leukoencephalopathy. 3.  Remote right frontal lobe infarct with multiple small bilateral basal ganglia lacunar infarcts, unchanged. 4.  Chronic right maxillary sinusitis. PROCEDURE: CT ANGIOGRAPHY NECK WITH/WITHOUT CONTRAST      INDICATION: altered mental state      COMPARISON: none      TECHNIQUE: Limited noncontrast CT imaging performed for the purposes of IV contrast bolus tracking. Axial CT imaging obtained from skull base through the lung apices following administration of IV contrast. Axial images, multiplanar reformatted images,    and maximum intensity projection images were reviewed for CT angiographic technique.    Up-to-date CT equipment and radiation dose reduction techniques were employed. IV contrast: 80 mL Isovue 370. FINDINGS:      AORTIC ARCH: Standard three-vessel aortic arch anatomy is present. INNOMINATE ARTERY: Normal.      RIGHT SUBCLAVIAN ARTERY: Normal.      RIGHT VERTEBRAL ARTERY: Normal.      RIGHT CAROTID ARTERY: There is contrast opacification of the right common carotid artery. Minimal epiglottic plaque at the carotid bifurcation. No flow significant stenosis seen. There is flow identified in the distal right internal carotid artery. LEFT SUBCLAVIAN ARTERY: Normal.      LEFT VERTEBRAL ARTERY: Normal.      LEFT CAROTID ARTERY: There is contrast opacification of the left common carotid artery. There is moderate chronic plaque at the carotid bifurcation. No flow significant stenosis or focal aneurysm seen. There is flow in the distal left internal carotid    artery. NECK SOFT TISSUES: No neck soft tissue mass or lymphadenopathy. VISUALIZED MEDIASTINUM: No mass or lymphadenopathy. VISUALIZED LUNG APICES: Mild centrilobular emphysematous changes bilaterally. BONES: No destructive osseous process. OTHER: None. IMPRESSION:      No acute CTA findings. No hemodynamically significant stenosis or focal aneurysm seen. PROCEDURE: CT ANGIOGRAPHY HEAD WITH/WITHOUT CONTRAST      INDICATION: altered mental state      COMPARISON: none      TECHNIQUE: Axial CT imaging obtained through the head prior to and following administration of IV contrast. Axial images, multiplanar reformatted images, and maximum intensity projection images were reviewed for CT angiographic technique. IV contrast: 80 mL Isovue 370. FINDINGS:      ANTERIOR CIRCULATION: The intracranial internal carotid arteries, anterior cerebral arteries, and middle cerebral arteries demonstrate no occlusion or stenosis. No evidence for aneurysm or arteriovenous malformation.       POSTERIOR CIRCULATION: The bilateral vertebral arteries, basilar artery and posterior cerebral arteries demonstrate no occlusion or significant stenosis. Small patent right posterior communicating artery. No evidence for aneurysm or arteriovenous    malformation. INTRACRANIAL VENOUS SYSTEM: No evidence for intracranial venous thrombosis. OTHER: None. IMPRESSION:      1. No findings for acute CTA findings in the head. No hemodynamically significant stenosis or focal aneurysm identified. XR CHEST PORTABLE   Final Result   1. No localized consolidation or pleural effusion. .      CT Head WO Contrast   Final Result      1. No findings for acute intracranial abnormality. 2.  Age related atrophy with moderate periventricular white matter changes bilaterally consistent with chronic ischemic leukoencephalopathy. 3.  Remote right frontal lobe infarct with multiple small bilateral basal ganglia lacunar infarcts, unchanged. 4.  Chronic right maxillary sinusitis. PROCEDURE: CT ANGIOGRAPHY NECK WITH/WITHOUT CONTRAST      INDICATION: altered mental state      COMPARISON: none      TECHNIQUE: Limited noncontrast CT imaging performed for the purposes of IV contrast bolus tracking. Axial CT imaging obtained from skull base through the lung apices following administration of IV contrast. Axial images, multiplanar reformatted images,    and maximum intensity projection images were reviewed for CT angiographic technique. Up-to-date CT equipment and radiation dose reduction techniques were employed. IV contrast: 80 mL Isovue 370. FINDINGS:      AORTIC ARCH: Standard three-vessel aortic arch anatomy is present. INNOMINATE ARTERY: Normal.      RIGHT SUBCLAVIAN ARTERY: Normal.      RIGHT VERTEBRAL ARTERY: Normal.      RIGHT CAROTID ARTERY: There is contrast opacification of the right common carotid artery. Minimal epiglottic plaque at the carotid bifurcation.   No flow significant stenosis Vasquez Shelley MD   Internal Medicine Resident PGY-1

## 2020-09-29 NOTE — PROGRESS NOTES
Physical Therapy  Facility/Department: [facility] 5T ORTHO/NEURO  Daily Treatment Note  NAME: Emi Vinson  : 1940  MRN: [id number]    Date of Service: 2020    Discharge Recommendations:  Emi Vinson scored a 17/24 on the AM-PAC short mobility form. Current research shows that an AM-PAC score of 17 or less is typically not associated with a discharge to the patient's home setting. Based on the patient's AM-PAC score and their current functional mobility deficits, it is recommended that the patient have 5-7 sessions per week of Physical Therapy at d/c to increase the patient's independence. At this time, this patient demonstrates the endurance, and/or tolerance for 3 hours of therapy each day, with a treatment frequency of 5-7x/wk. Please see assessment section for further patient specific details. If patient discharges prior to next session this note will serve as a discharge summary. Please see below for the latest assessment towards goals. Patient would benefit from continued therapy after discharge   PT Equipment Recommendations  Equipment Needed: No    Assessment   Body structures, Functions, Activity limitations: Decreased functional mobility   Assessment: Pt progressing with functional mobility & endurance. Pt could benefit from cont inpt PT upon D/C. Will cont PT while here per plan of care to maximize independence. Treatment Diagnosis: decreased functional mobility  Prognosis: Good  PT Education: Transfer Training;Gait Training  REQUIRES PT FOLLOW UP: Yes  Activity Tolerance  Activity Tolerance: Patient Tolerated treatment well     Patient Diagnosis(es): The primary encounter diagnosis was Encephalopathy. A diagnosis of History of CVA (cerebrovascular accident) was also pertinent to this visit.      has a past medical history of CHUYITA (acute kidney injury) ([facility] [address] Allergic rhinitis, Aneurysm ([facility] [address] Aortic aneurysm ([facility] [address] Cancer ([facility] [address] Cholelithiasis, Depression, Hyperlipidemia, Hypertension, Incontinence, MRSA (methicillin resistant staph aureus) culture positive, Ulcer, and Ulcerative colitis ([facility] [address] has a past surgical history that includes Abdomen surgery; Cholecystectomy, open (5/7/2012); Pressure ulcer debridement (2010); Colonoscopy; Upper gastrointestinal endoscopy; Stomach surgery; ERCP (3/13/12); bronchoscopy (12/11/2012); and ERCP (12-). Restrictions  Position Activity Restriction  Other position/activity restrictions: up at tolerated  Subjective   General  Chart Reviewed: Yes  Additional Pertinent Hx: Admitted 527. 42-year-old female with a past medical history of non-small cell carcinoma with metastasis to the bone, hypertension, hyperlipidemia and history of stroke in 2019 that presents today by way of EMS for altered mental state and expressive aphasia. Head CT: No findings for acute intracranial abnormality, Chest XR: No localized consolidation or pleural effusion, CTA head: No findings for acute CTA findings in the head. No hemodynamically significant stenosis or focal aneurysm identified, Brain MRI: Evidence of cerebral atrophy and diffuse chronic small vessel white matter disease bilaterally  Family / Caregiver Present: Yes()  Referring Practitioner: Debora Fountain MD  Subjective  Subjective: Pt supine in bed & agreeable to PT. Pt with expressive aphasia. Pain Screening  Patient Currently in Pain: Denies  Vital Signs  Patient Currently in Pain: Denies             Objective   Bed mobility  Supine to Sit: Supervision(HOB elevated)  Transfers  Sit to Stand: Contact guard assistance(from bed & chair. verbal cues required.)  Stand to sit: Contact guard assistance(verbal cues required)  Ambulation  Ambulation?: Yes  Ambulation 1  Surface: level tile  Device: Rolling Walker  Assistance: Contact guard assistance  Gait Deviations: Decreased step length;Decreased step height  Distance: 100 ft, 150 ft  Comments: no LOB.   Stairs/Curb  Stairs?: No Exercises  Straight Leg Raise: x 10 bilat  Heelslides: x 10 bilat  Hip Abduction: x 10 bilat  Knee Long Arc Quad: x 10 bilat  Ankle Pumps: x 20 bilat                                                                           AM-PAC Score  AM-PAC Inpatient Mobility Raw Score : 17 (09/29/20 1251)  AM-PAC Inpatient T-Scale Score : 42.13 (09/29/20 1251)  Mobility Inpatient CMS 0-100% Score: 50.57 (09/29/20 1251)  Mobility Inpatient CMS G-Code Modifier : CK (09/29/20 1251)          Goals  Short term goals  Time Frame for Short term goals: discharge  Short term goal 1: Pt to perform bed mobility with CGA in order to increase independence with functional mobility. Short term goal 2: Pt to perform sit-->stand to RW with supervision in order to prepare for ambulation. Short term goal 3: Pt to stand for 10 mins at RW with supervision in order to participate in ADLs. Short term goal 4: Pt to ambulate 100 ft with RW and supervision in order to increase mobility around the home.   Patient Goals   Patient goals : not stated    Plan    Plan  Times per week: 5-7  Times per day: Daily  Current Treatment Recommendations: Strengthening, Neuromuscular Re-education, Home Exercise Program, Cognitive/Perceptual Training, Safety Education & Training, Balance Training, Endurance Training, Patient/Caregiver Education & Training, Functional Mobility Training, Equipment Evaluation, Education, & procurement, Transfer Training, Gait Training  Safety Devices  Type of devices: Call light within reach, Chair alarm in place, Gait belt, Nurse notified, Left in chair     Therapy Time   Individual Concurrent Group Co-treatment   Time In 1223         Time Out 1246         Minutes 403  [facility], PT

## 2020-09-29 NOTE — PROGRESS NOTES
Clinical Pharmacy Progress Note    68 y.o. female admitted with expressive asphasia with concern for TIA vs stroke. Pharmacy has been asked by Dr. Sravani Chaudhary to adjust all drips to normal saline as appropriate based on compatibility to avoid fluid shifts since D5 is osmotically active. The following intermittent IV drips/infusions have been adjusted to saline:  Ceftriaxone    Total IV fluid delivered to patient over last 24h: 90 mL    As patient is not being treated for pituitary tumor resection or requiring hypertonic saline (defined as >0.9% NaCl), pharmacy will sign off of IV review consult, per protocol.       Please call with questions:  Michaelyn Opitz, PharmD  PGY1 Pharmacy Resident  9/29/2020 12:04 PM  [id number]

## 2020-09-29 NOTE — CARE COORDINATION
PMR consult placed this morning. Awaiting for notes and shashi f/u with . Electronically signed by Michele Doe RN on 9/29/2020 at 12:49 PM       Pt is able to go home with home care. CM left a message with . Will continue to f/u. Pt is active with [facility]. They can continue care.  Electronically signed by Michele Doe RN on 9/29/2020 at 1:35 PM   [facility]

## 2020-09-30 ENCOUNTER — APPOINTMENT (OUTPATIENT)
Dept: CT IMAGING | Age: 80
DRG: 065 | End: 2020-09-30
Payer: MEDICARE

## 2020-09-30 PROBLEM — Z86.73 HISTORY OF ISCHEMIC LEFT MCA STROKE: Status: ACTIVE | Noted: 2020-09-30

## 2020-09-30 LAB
ANION GAP SERPL CALCULATED.3IONS-SCNC: 8 MMOL/L (ref 3–16)
BASOPHILS ABSOLUTE: 0.1 K/UL (ref 0–0.2)
BASOPHILS RELATIVE PERCENT: 1 %
BUN BLDV-MCNC: 19 MG/DL (ref 7–20)
CALCIUM SERPL-MCNC: 8.9 MG/DL (ref 8.3–10.6)
CHLORIDE BLD-SCNC: 103 MMOL/L (ref 99–110)
CO2: 26 MMOL/L (ref 21–32)
CREAT SERPL-MCNC: 1 MG/DL (ref 0.6–1.2)
EOSINOPHILS ABSOLUTE: 0.9 K/UL (ref 0–0.6)
EOSINOPHILS RELATIVE PERCENT: 13.6 %
GFR AFRICAN AMERICAN: >60
GFR NON-AFRICAN AMERICAN: 54
GLUCOSE BLD-MCNC: 108 MG/DL (ref 70–99)
HCT VFR BLD CALC: 33.1 % (ref 36–48)
HEMOGLOBIN: 10.9 G/DL (ref 12–16)
LYMPHOCYTES ABSOLUTE: 0.8 K/UL (ref 1–5.1)
LYMPHOCYTES RELATIVE PERCENT: 13 %
MCH RBC QN AUTO: 25.9 PG (ref 26–34)
MCHC RBC AUTO-ENTMCNC: 33 G/DL (ref 31–36)
MCV RBC AUTO: 78.3 FL (ref 80–100)
MONOCYTES ABSOLUTE: 0.6 K/UL (ref 0–1.3)
MONOCYTES RELATIVE PERCENT: 8.7 %
NEUTROPHILS ABSOLUTE: 4 K/UL (ref 1.7–7.7)
NEUTROPHILS RELATIVE PERCENT: 63.7 %
PDW BLD-RTO: 17.1 % (ref 12.4–15.4)
PLATELET # BLD: 253 K/UL (ref 135–450)
PMV BLD AUTO: 7.9 FL (ref 5–10.5)
POTASSIUM REFLEX MAGNESIUM: 4.1 MMOL/L (ref 3.5–5.1)
RBC # BLD: 4.22 M/UL (ref 4–5.2)
SODIUM BLD-SCNC: 137 MMOL/L (ref 136–145)
WBC # BLD: 6.3 K/UL (ref 4–11)

## 2020-09-30 PROCEDURE — 80048 BASIC METABOLIC PNL TOTAL CA: CPT

## 2020-09-30 PROCEDURE — 85025 COMPLETE CBC W/AUTO DIFF WBC: CPT

## 2020-09-30 PROCEDURE — 6370000000 HC RX 637 (ALT 250 FOR IP): Performed by: STUDENT IN AN ORGANIZED HEALTH CARE EDUCATION/TRAINING PROGRAM

## 2020-09-30 PROCEDURE — 97116 GAIT TRAINING THERAPY: CPT

## 2020-09-30 PROCEDURE — 6360000002 HC RX W HCPCS: Performed by: STUDENT IN AN ORGANIZED HEALTH CARE EDUCATION/TRAINING PROGRAM

## 2020-09-30 PROCEDURE — 71260 CT THORAX DX C+: CPT

## 2020-09-30 PROCEDURE — 2580000003 HC RX 258: Performed by: STUDENT IN AN ORGANIZED HEALTH CARE EDUCATION/TRAINING PROGRAM

## 2020-09-30 PROCEDURE — 36415 COLL VENOUS BLD VENIPUNCTURE: CPT

## 2020-09-30 PROCEDURE — 2060000000 HC ICU INTERMEDIATE R&B

## 2020-09-30 PROCEDURE — 6360000002 HC RX W HCPCS: Performed by: INTERNAL MEDICINE

## 2020-09-30 PROCEDURE — 92507 TX SP LANG VOICE COMM INDIV: CPT

## 2020-09-30 PROCEDURE — 6360000004 HC RX CONTRAST MEDICATION: Performed by: PHYSICAL THERAPIST

## 2020-09-30 PROCEDURE — 2580000003 HC RX 258: Performed by: INTERNAL MEDICINE

## 2020-09-30 RX ORDER — NIFEDIPINE 30 MG/1
30 TABLET, FILM COATED, EXTENDED RELEASE ORAL DAILY
Status: DISCONTINUED | OUTPATIENT
Start: 2020-09-30 | End: 2020-10-01 | Stop reason: HOSPADM

## 2020-09-30 RX ORDER — LISINOPRIL 10 MG/1
10 TABLET ORAL DAILY
Status: DISCONTINUED | OUTPATIENT
Start: 2020-09-30 | End: 2020-09-30

## 2020-09-30 RX ORDER — CLOPIDOGREL BISULFATE 75 MG/1
75 TABLET ORAL DAILY
Status: DISCONTINUED | OUTPATIENT
Start: 2020-09-30 | End: 2020-10-01 | Stop reason: HOSPADM

## 2020-09-30 RX ADMIN — CITALOPRAM HYDROBROMIDE 40 MG: 20 TABLET ORAL at 21:19

## 2020-09-30 RX ADMIN — CARBIDOPA AND LEVODOPA 0.5 TABLET: 25; 100 TABLET ORAL at 14:18

## 2020-09-30 RX ADMIN — ASPIRIN 325 MG: 325 TABLET, COATED ORAL at 09:35

## 2020-09-30 RX ADMIN — CLOPIDOGREL BISULFATE 75 MG: 75 TABLET ORAL at 14:18

## 2020-09-30 RX ADMIN — ATORVASTATIN CALCIUM 80 MG: 80 TABLET, FILM COATED ORAL at 09:36

## 2020-09-30 RX ADMIN — IOHEXOL 50 ML: 240 INJECTION, SOLUTION INTRATHECAL; INTRAVASCULAR; INTRAVENOUS; ORAL at 19:45

## 2020-09-30 RX ADMIN — ENOXAPARIN SODIUM 40 MG: 40 INJECTION SUBCUTANEOUS at 09:35

## 2020-09-30 RX ADMIN — ZINC OXIDE: 200 OINTMENT TOPICAL at 21:20

## 2020-09-30 RX ADMIN — PANTOPRAZOLE SODIUM 40 MG: 40 TABLET, DELAYED RELEASE ORAL at 21:19

## 2020-09-30 RX ADMIN — CEFTRIAXONE SODIUM 2 G: 2 INJECTION, POWDER, FOR SOLUTION INTRAMUSCULAR; INTRAVENOUS at 09:35

## 2020-09-30 RX ADMIN — Medication 10 ML: at 21:19

## 2020-09-30 RX ADMIN — NIFEDIPINE 30 MG: 30 TABLET, EXTENDED RELEASE ORAL at 14:18

## 2020-09-30 RX ADMIN — CARBIDOPA AND LEVODOPA 0.5 TABLET: 25; 100 TABLET ORAL at 21:19

## 2020-09-30 RX ADMIN — IOPAMIDOL 80 ML: 755 INJECTION, SOLUTION INTRAVENOUS at 19:47

## 2020-09-30 RX ADMIN — CARBIDOPA AND LEVODOPA 0.5 TABLET: 25; 100 TABLET ORAL at 09:36

## 2020-09-30 ASSESSMENT — PAIN SCALES - PAIN ASSESSMENT IN ADVANCED DEMENTIA (PAINAD)
BODYLANGUAGE: 0
CONSOLABILITY: 0
BODYLANGUAGE: 0
TOTALSCORE: 0
TOTALSCORE: 0
BREATHING: 0
BREATHING: 0
NEGVOCALIZATION: 0
NEGVOCALIZATION: 0
BREATHING: 0
BODYLANGUAGE: 0
FACIALEXPRESSION: 0
CONSOLABILITY: 0
NEGVOCALIZATION: 0
CONSOLABILITY: 0
FACIALEXPRESSION: 0
FACIALEXPRESSION: 0
TOTALSCORE: 0

## 2020-09-30 ASSESSMENT — ENCOUNTER SYMPTOMS
BACK PAIN: 1
EYES NEGATIVE: 1
GASTROINTESTINAL NEGATIVE: 1
RESPIRATORY NEGATIVE: 1

## 2020-09-30 NOTE — PROGRESS NOTES
Speech Language Pathology  Facility/Department: [facility] 5T ORTHO/NEURO  Daily Treatment Note  NAME: Corina Truong  : 1940  MRN: [id number]    Date of Treatment: 2020  Treating Therapist: Kevyn Lantigua    Patient Diagnosis(es): has Cellulitis and abscess; UC (ulcerative colitis) ([facility] [address] Elevated blood pressure reading; Nausea & vomiting; Pancreatic mass; S/P cholecystectomy; Cholelithiasis; Hypoxemia requiring supplemental oxygen; Incisional hernia; Port-A-Cath in place; History of lung cancer; Left kidney mass; Wound infection after surgery, sequela; Left arm weakness; Stroke-like symptom; Weakness of both lower extremities; Tingling of left upper extremity; Acute renal failure (ARF) ([facility] [address] Generalized weakness; and Expressive aphasia on their problem list.    Onset Date:  20    Primary Complaint:  Pt indicates frustration with her paraphasias but she generally has a good sense of humor about her errors    Pain:  Pain Assessment  Pain Assessment: Advanced Dementia  PAINAD (Pain Assessment in Advance Dementia)  Breathing: normal  Negative Vocalization: none  Facial Expression: smiling or inexpressive  Body Language: relaxed  Consolability: no need to console  PAINAD Score: 0    Chart reviewed. Pain:  denied  Medical diagnosis:  Expressive aphasia  Treatment diagnosis:  Aphasia (R47.01)    Treatment:  Pt seen to address the following goals:  Goal 1: Patient will communicate basic wants/needs by any means. 20:  Goal not directly targeted during this session. Continue goal.  20:  Per RN, pt is generally able to express her wants/needs with assistance and patience. Continue goal.     Goal 2: Patient will improve yes/no accuracy to 70%. 20:  Pt responds to factual personal Y/N questions with 50% accuracy. Continue goal.  20:  Pt is able to respond to factual personal Y/N questions with 70% accuracy.   Continue goal.     Goal 3: Patient will complete naming tasks with 70% accuracy given mod cues. 9/28: pt required mod cues for 60% accuracy. Cont goals. 9/29/20:  Pt demonstrates significant difficulty answering factual personal questions. Pt is able to complete automatic speech tasks with moderate assistance. Pt is able to complete predictable phrase completion tasks with increased time and minimal assistance. Continue goal.  9/30/20:  Pt is able to name objects in the room with moderate assistance. Pt is able to complete automatic speech tasks (numbers and months) with minimal assistance. Continue goal.     Goal 4: Patient will follow single step directions with 70% accuracy given mod cues. 9/28: pt required mod cues for 60% accuracy with single step directions. Cont goals. 9/29/20:  Pt is able to follow simple 1-step directions with 75% accuracy with moderate assistance. RN confirms difficulty with following functional instructions; SLP educated RN re: incorporating visual cues with verbalization. Continue goal.  9/30/20:  Pt is able to follow 1-step directions with 75% accuracy. Pt is able to follow simple 2-step directions with 50% accuracy. Continue goal.     Goal 5: Patient will complete ongoing cognitive-linguistic assessment. 9/29/20:  Goal not directly targeted during this session. Continue goal.  9/30/20:   Goal not directly targeted during this session. Continue goal.    Education:  SLP educated pt and spouse Jackelyn Pereira) re: role of SLP, orientation strategies, and POC. Spouse indicated comprehension. Pt needs reinforcement (due to receptive language deficits). Plan:  Continue speech/language therapy to address above goals, 3-5 x/week x LOS  DC recommendations:  TBD  D/W nursing  Needs met prior to leaving room, call button in reach.   Treatment time: 25 minutes    Electronically Signed by:  Jene Simmonds., [address] [facility] [address] [id number]  Pager #[phone]  If patient is discharged prior to next treatment, this note will serve

## 2020-09-30 NOTE — PROGRESS NOTES
Interval History:  Remains aphasic.  Repeat MRI shows left temporal lobe stroke    Current Medications:    Current Facility-Administered Medications:     NIFEdipine (ADALAT CC) extended release tablet 30 mg, 30 mg, Oral, Daily, Patti Domínguez MD, 30 mg at 09/30/20 1418    clopidogrel (PLAVIX) tablet 75 mg, 75 mg, Oral, Daily, Patti Domínguez MD, 75 mg at 09/30/20 1418    LORazepam (ATIVAN) injection 0.5 mg, 0.5 mg, Intravenous, Once, Diony Mayfield MD, Stopped at 09/29/20 0800    citalopram (CELEXA) tablet 40 mg, 40 mg, Oral, Nightly, Patti Domínguez MD, 40 mg at 09/28/20 2134    pantoprazole (PROTONIX) tablet 40 mg, 40 mg, Oral, Nightly, Patti Domínguez MD, 40 mg at 09/28/20 2134    LORazepam (ATIVAN) tablet 0.5 mg, 0.5 mg, Oral, Q8H PRN, Patti Domínguez MD    oxyCODONE-acetaminophen (PERCOCET) 5-325 MG per tablet 1 tablet, 1 tablet, Oral, Q8H PRN, Patti Domínguez MD    aspirin EC tablet 325 mg, 325 mg, Oral, Daily, Patti Domínguez MD, 325 mg at 09/30/20 0935    atorvastatin (LIPITOR) tablet 80 mg, 80 mg, Oral, Daily, Patti Domínguez MD, 80 mg at 09/30/20 0936    sodium chloride flush 0.9 % injection 10 mL, 10 mL, Intravenous, 2 times per day, Patti Domínguez MD, 10 mL at 09/29/20 2100    sodium chloride flush 0.9 % injection 10 mL, 10 mL, Intravenous, PRN, Patti Domínguez MD    polyethylene glycol (GLYCOLAX) packet 17 g, 17 g, Oral, Daily PRN, Patti Domínguez MD    promethazine (PHENERGAN) tablet 12.5 mg, 12.5 mg, Oral, Q6H PRN **OR** ondansetron (ZOFRAN) injection 4 mg, 4 mg, Intravenous, Q6H PRN, Patti Domínguez MD    enoxaparin (LOVENOX) injection 40 mg, 40 mg, Subcutaneous, Daily, Patti Domínguez MD, 40 mg at 09/30/20 0935    labetalol (NORMODYNE;TRANDATE) injection 10 mg, 10 mg, Intravenous, Q10 Min PRN, Patti Domínguez MD    carbidopa-levodopa (SINEMET)  MG per tablet 0.5 tablet, 0.5 tablet, Oral, TID, Satya Dale MD, 0.5 tablet at 09/30/20 1418    zinc oxide 20 % ointment, , Topical, 4x Daily PRN, Sarahi Jerez MD      Physical Exam  Constitutional  BP (!) 143/73   Pulse 91   Temp 96.9 °F (36.1 °C) (Axillary)   Resp 16   Ht 5' 2\" (1.575 m)   Wt 142 lb (64.4 kg)   SpO2 91%   BMI 25.97 kg/m²     General Alert, no distress, well-nourished      Neurologic  Mental status: expressive aphasia. Oriented to self, place, month, and year with options. Paraphasic errors. Cranial nerves:   CN2: Visual Fields full w/o extinction on confrontational testing,   CN 3,4,6: pupils equal and reactive to light, extraocular muscles intact,  CN5: facial sensation symmetric   CN7:face symmetric without dysarthria  CN8: hearing symmetric to finger rub   CN9: palate elevated symmetrically  CN11: trap full strength on shoulder shrug  CN12: tongue midline with protrusion  Motor Exam:   5/5 throughout with exception of hip flexor in RUE      Sensory: light touch intact and symmetric in all 4 extremities. Images:     MRI brain wo contrast:  1. Moderate-sized area of infarction in the posterior left MCA distribution involving the posterior left temporal lobe and inferior left parietal white matter. Diffusion restriction compatible with infarcts within the last 7-10 days. 2. Extensive white matter signal abnormality compatible with chronic small vessel ischemic disease. Areas of previous infarct within cephalization the frontal lobes. 3. No evidence of acute intracranial hemorrhage. 4. Abnormal marrow signal in the posterior dens compatible with metastatic disease. CTA head and neck w contrast:  No findings for acute CTA findings in the head. No hemodynamically significant stenosis or focal aneurysm identified. Echocardiogram with bubble:   Technically difficult study. Left ventricular cavity size is normal. Thereis asymmetric hypertrophy of the basal septum. Overall left ventricular systolic function appears normal with an estimated ejection fraction of 55-60%. Diastolic filling parameters suggests normal diastolic function. No evidence of significant valvular stenosis or regurgitation present. A bubble study was performed and fails to show evidence of shunting. Routine EEG:  The EEG was abnormal due to the presence of: Generalized slowing     Generalized slowing is a non-specific finding consistent with a generalized disturbance of cerebral functioning including toxic,   metabolic, or structural abnormalities that are multi-focal or diffuse. No focal, lateralizing, or epileptiform features were seen during the recording. Clinical correlation is recommended. The absence of epileptiform discharges on a single EEG does not rule out a diagnosis of  epilepsy or rule out non-convulsive or complex partial status   epilepticus as a cause of altered mental status       Pertinent Labs:     Hemoglobin A1C: 6.0  Lipid panel:      Ref. Range 9/27/2020 12:09   Troponin Latest Ref Range: <0.01 ng/mL <0.01   Cholesterol, Total Latest Ref Range: 0 - 199 mg/dL 120   HDL Cholesterol Latest Ref Range: 40 - 60 mg/dL 48   LDL Calculated Latest Ref Range: <100 mg/dL 41   Triglycerides Latest Ref Range: 0 - 150 mg/dL 157 (H)   VLDL Cholesterol Calculated Latest Ref Range: Not Established mg/dL 31       Natalia Donaldson is a 68 y.o. female with history of hypertension, hyperlipidemia, metastatic lung carcinoma and recent stroke 8 months ago with residual right-sided weakness was admitted with difficulty finding words and confusion. MRI positive for acute ischemic stroke in the left temporal and parietal lobes.     Plan:    -Continue ASA and Statin, ok to restart home plavix  -EP consult for loop placement   -Rehab evaluation in process; continue PT/OT/ST  -Neurocheck and vs q4h  -Continue home sinemet  - Avoid all other dopamine blocking agents (haloperidol, risperidone, phenergan, zyprexa metoclopramide, etc.)  -Oncology to weigh in if they feel she is hypercoagulable Hao Denney, APRN-CNP  [phone]

## 2020-09-30 NOTE — PROGRESS NOTES
Internal Medicine PGY-1 Resident Progress Note        PCP: Trace Bello DO    Date of Admission: 9/27/2020    Chief Complaint: Expressive aphasia    Hospital Course: 68 y.o. female with a past medical history of aortic aneurysm, retention, hyperlipidemia, and stage IV non-small cell lung cancer [facility] with expressive aphasia. History is obtained from the daughter Enrique Thacker who is at pt's bedside, as pt is not able to give history due to the expressive aphasia. The pt lives with her . Per daughter the pt went to bed at about 7pm last night and this morning her  noticed that she was cought up in her blanket and was having trouble untangling it. At that time he noticed that she was having trouble expressing herself. Per daughter there has been no improvement in her ability to express herself since this morning and the words that are coming out are 'jumbled' and don't make sense. Pt is able to follow commands and her speech is nonslurred. However it does appear that she has some expressive aphasia, although it is intermittently better. She had a history of a cerebrovascular accident in October 2019 and has been having right sided weakness. Per medical records he had an MRI at that time, it was notable for 2 punctate infarcts in the posterior right frontal white matter.  It was thought that it was secondary to embolic nature.  She was seen by neurology at that time, recommended aspirin and Plavix as well as blood pressure control.     Per daughter the pt has some memory problems at baseline involving mostly short term memory. She has also been having frequent falls since the past stroke and is being evaluated for Parkinson's disease. At baseline pt cannot take shower by herself because of fear of falling, but she can go to the bathroom by herself.  She normally walks with a walker.     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  She is status post radiation therapy.      Subjective:    No acute events overnight. Still experiencing expressive aphasia and inability to repeat sentences, but following commands. Pt made aware of the MRI findings of a left MCA stroke and possible metastatic disease. Pt is agreeable to admission to the ARU. States she has not felt burning with urination for a few days and denies any today. Denies nausea, vomiting, chest pain, shortness of breath. Medications:  Reviewed    Infusion Medications   Scheduled Medications    LORazepam  0.5 mg Intravenous Once    cefTRIAXone (ROCEPHIN) 2 g IVPB in NS 50ml minibag  2 g Intravenous Daily    citalopram  40 mg Oral Nightly    pantoprazole  40 mg Oral Nightly    aspirin  325 mg Oral Daily    atorvastatin  80 mg Oral Daily    sodium chloride flush  10 mL Intravenous 2 times per day    enoxaparin  40 mg Subcutaneous Daily    carbidopa-levodopa  0.5 tablet Oral TID     PRN Meds: LORazepam, oxyCODONE-acetaminophen, sodium chloride flush, polyethylene glycol, promethazine **OR** ondansetron, labetalol, zinc oxide      Intake/Output Summary (Last 24 hours) at 9/30/2020 0852  Last data filed at 9/30/2020 0843  Gross per 24 hour   Intake 480 ml   Output 1 ml   Net 479 ml       Physical Exam Performed:    BP (!) 154/84   Pulse 91   Temp 97.6 °F (36.4 °C) (Oral)   Resp 18   Ht 5' 2\" (1.575 m)   Wt 142 lb (64.4 kg)   SpO2 91%   BMI 25.97 kg/m²     General appearance: No apparent distress, appears stated age and cooperative. HEENT: Pupils equal, round, and reactive to light. Conjunctivae/corneas clear. Neck: Supple, with full range of motion. No jugular venous distention. Trachea midline. Respiratory:  Normal respiratory effort. Clear to auscultation, bilaterally without Rales/Wheezes/Rhonchi. Cardiovascular: Regular rate and rhythm with normal S1/S2 without murmurs, rubs or gallops. Abdomen: Soft, non-tender, non-distended with normal bowel sounds.   Musculoskeletal: No clubbing, cyanosis or edema bilaterally. Full range of motion without deformity. Skin: Skin color, texture, turgor normal.  No rashes or lesions. Neurologic:  Expressive aphasia and impaired repetition. Able to follow commands. Neurovascularly intact without any focal sensory/motor deficits. Cranial nerves: II-XII intact, grossly non-focal.  Psychiatric: Difficult to assess orientation due to pt's expressive aphasia    Labs:   Recent Labs     09/28/20  0506 09/29/20  0600 09/30/20  0534   WBC 7.2 7.6 6.3   HGB 10.8* 10.1* 10.9*   HCT 33.7* 31.7* 33.1*    251 253     Recent Labs     09/28/20  0506 09/29/20  0600 09/30/20  0534   * 138 137   K 4.0 4.0 4.1    103 103   CO2 25 26 26   BUN 17 19 19   CREATININE 0.8 1.1 1.0   CALCIUM 9.2 8.8 8.9     Recent Labs     09/27/20  1209   AST 24   ALT 16   BILITOT <0.2   ALKPHOS 160*     Recent Labs     09/27/20  1209   INR 0.97     Recent Labs     09/27/20  1209   TROPONINI <0.01       Urinalysis:      Lab Results   Component Value Date    NITRU POSITIVE 09/27/2020    WBCUA 3-5 09/27/2020    BACTERIA 4+ 09/27/2020    RBCUA 0-2 09/27/2020    BLOODU Negative 09/27/2020    SPECGRAV 1.010 09/27/2020    GLUCOSEU Negative 09/27/2020    GLUCOSEU Negative 05/08/2012       Radiology:  MRI BRAIN WO CONTRAST   Final Result   1. Moderate-sized area of infarction in the posterior left MCA distribution involving the posterior left temporal lobe and inferior left parietal white matter. Diffusion restriction compatible with infarcts within the last 7-10 days. 2. Extensive white matter signal abnormality compatible with chronic small vessel ischemic disease. Areas of previous infarct within cephalization the frontal lobes. 3. No evidence of acute intracranial hemorrhage. 4. Abnormal marrow signal in the posterior dens compatible with metastatic disease. MRI BRAIN WO CONTRAST   Final Result      Severely limited study.       Evidence of cerebral atrophy and diffuse chronic small vessel white matter disease bilaterally. Remote right posterior frontal lobe cortical infarct. Small focus of diffusion restriction consistent with small vessel disease in the left periventricular white matter is not excluded. The study is limited significantly by motion. No other acute intracranial findings. Right maxillary chronic sinus disease. CTA NECK W CONTRAST   Final Result      1. No findings for acute intracranial abnormality. 2.  Age related atrophy with moderate periventricular white matter changes bilaterally consistent with chronic ischemic leukoencephalopathy. 3.  Remote right frontal lobe infarct with multiple small bilateral basal ganglia lacunar infarcts, unchanged. 4.  Chronic right maxillary sinusitis. PROCEDURE: CT ANGIOGRAPHY NECK WITH/WITHOUT CONTRAST      INDICATION: altered mental state      COMPARISON: none      TECHNIQUE: Limited noncontrast CT imaging performed for the purposes of IV contrast bolus tracking. Axial CT imaging obtained from skull base through the lung apices following administration of IV contrast. Axial images, multiplanar reformatted images,    and maximum intensity projection images were reviewed for CT angiographic technique. Up-to-date CT equipment and radiation dose reduction techniques were employed. IV contrast: 80 mL Isovue 370. FINDINGS:      AORTIC ARCH: Standard three-vessel aortic arch anatomy is present. INNOMINATE ARTERY: Normal.      RIGHT SUBCLAVIAN ARTERY: Normal.      RIGHT VERTEBRAL ARTERY: Normal.      RIGHT CAROTID ARTERY: There is contrast opacification of the right common carotid artery. Minimal epiglottic plaque at the carotid bifurcation. No flow significant stenosis seen. There is flow identified in the distal right internal carotid artery.       LEFT SUBCLAVIAN ARTERY: Normal.      LEFT VERTEBRAL ARTERY: Normal.      LEFT CAROTID ARTERY: There is contrast opacification of the left common carotid artery. There is moderate chronic plaque at the carotid bifurcation. No flow significant stenosis or focal aneurysm seen. There is flow in the distal left internal carotid    artery. NECK SOFT TISSUES: No neck soft tissue mass or lymphadenopathy. VISUALIZED MEDIASTINUM: No mass or lymphadenopathy. VISUALIZED LUNG APICES: Mild centrilobular emphysematous changes bilaterally. BONES: No destructive osseous process. OTHER: None. IMPRESSION:      No acute CTA findings. No hemodynamically significant stenosis or focal aneurysm seen. PROCEDURE: CT ANGIOGRAPHY HEAD WITH/WITHOUT CONTRAST      INDICATION: altered mental state      COMPARISON: none      TECHNIQUE: Axial CT imaging obtained through the head prior to and following administration of IV contrast. Axial images, multiplanar reformatted images, and maximum intensity projection images were reviewed for CT angiographic technique. IV contrast: 80 mL Isovue 370. FINDINGS:      ANTERIOR CIRCULATION: The intracranial internal carotid arteries, anterior cerebral arteries, and middle cerebral arteries demonstrate no occlusion or stenosis. No evidence for aneurysm or arteriovenous malformation. POSTERIOR CIRCULATION: The bilateral vertebral arteries, basilar artery and posterior cerebral arteries demonstrate no occlusion or significant stenosis. Small patent right posterior communicating artery. No evidence for aneurysm or arteriovenous    malformation. INTRACRANIAL VENOUS SYSTEM: No evidence for intracranial venous thrombosis. OTHER: None. IMPRESSION:      1. No findings for acute CTA findings in the head. No hemodynamically significant stenosis or focal aneurysm identified. CTA HEAD W CONTRAST   Final Result      1. No findings for acute intracranial abnormality.       2.  Age related atrophy with moderate periventricular white matter changes bilaterally consistent with chronic ischemic leukoencephalopathy. 3.  Remote right frontal lobe infarct with multiple small bilateral basal ganglia lacunar infarcts, unchanged. 4.  Chronic right maxillary sinusitis. PROCEDURE: CT ANGIOGRAPHY NECK WITH/WITHOUT CONTRAST      INDICATION: altered mental state      COMPARISON: none      TECHNIQUE: Limited noncontrast CT imaging performed for the purposes of IV contrast bolus tracking. Axial CT imaging obtained from skull base through the lung apices following administration of IV contrast. Axial images, multiplanar reformatted images,    and maximum intensity projection images were reviewed for CT angiographic technique. Up-to-date CT equipment and radiation dose reduction techniques were employed. IV contrast: 80 mL Isovue 370. FINDINGS:      AORTIC ARCH: Standard three-vessel aortic arch anatomy is present. INNOMINATE ARTERY: Normal.      RIGHT SUBCLAVIAN ARTERY: Normal.      RIGHT VERTEBRAL ARTERY: Normal.      RIGHT CAROTID ARTERY: There is contrast opacification of the right common carotid artery. Minimal epiglottic plaque at the carotid bifurcation. No flow significant stenosis seen. There is flow identified in the distal right internal carotid artery. LEFT SUBCLAVIAN ARTERY: Normal.      LEFT VERTEBRAL ARTERY: Normal.      LEFT CAROTID ARTERY: There is contrast opacification of the left common carotid artery. There is moderate chronic plaque at the carotid bifurcation. No flow significant stenosis or focal aneurysm seen. There is flow in the distal left internal carotid    artery. NECK SOFT TISSUES: No neck soft tissue mass or lymphadenopathy. VISUALIZED MEDIASTINUM: No mass or lymphadenopathy. VISUALIZED LUNG APICES: Mild centrilobular emphysematous changes bilaterally. BONES: No destructive osseous process. OTHER: None. IMPRESSION:      No acute CTA findings.   No hemodynamically significant stenosis or focal aneurysm seen. PROCEDURE: CT ANGIOGRAPHY HEAD WITH/WITHOUT CONTRAST      INDICATION: altered mental state      COMPARISON: none      TECHNIQUE: Axial CT imaging obtained through the head prior to and following administration of IV contrast. Axial images, multiplanar reformatted images, and maximum intensity projection images were reviewed for CT angiographic technique. IV contrast: 80 mL Isovue 370. FINDINGS:      ANTERIOR CIRCULATION: The intracranial internal carotid arteries, anterior cerebral arteries, and middle cerebral arteries demonstrate no occlusion or stenosis. No evidence for aneurysm or arteriovenous malformation. POSTERIOR CIRCULATION: The bilateral vertebral arteries, basilar artery and posterior cerebral arteries demonstrate no occlusion or significant stenosis. Small patent right posterior communicating artery. No evidence for aneurysm or arteriovenous    malformation. INTRACRANIAL VENOUS SYSTEM: No evidence for intracranial venous thrombosis. OTHER: None. IMPRESSION:      1. No findings for acute CTA findings in the head. No hemodynamically significant stenosis or focal aneurysm identified. XR CHEST PORTABLE   Final Result   1. No localized consolidation or pleural effusion. .      CT Head WO Contrast   Final Result      1. No findings for acute intracranial abnormality. 2.  Age related atrophy with moderate periventricular white matter changes bilaterally consistent with chronic ischemic leukoencephalopathy. 3.  Remote right frontal lobe infarct with multiple small bilateral basal ganglia lacunar infarcts, unchanged. 4.  Chronic right maxillary sinusitis. PROCEDURE: CT ANGIOGRAPHY NECK WITH/WITHOUT CONTRAST      INDICATION: altered mental state      COMPARISON: none      TECHNIQUE: Limited noncontrast CT imaging performed for the purposes of IV contrast bolus tracking.  Axial CT imaging obtained from skull base through the lung apices following administration of IV contrast. Axial images, multiplanar reformatted images,    and maximum intensity projection images were reviewed for CT angiographic technique. Up-to-date CT equipment and radiation dose reduction techniques were employed. IV contrast: 80 mL Isovue 370. FINDINGS:      AORTIC ARCH: Standard three-vessel aortic arch anatomy is present. INNOMINATE ARTERY: Normal.      RIGHT SUBCLAVIAN ARTERY: Normal.      RIGHT VERTEBRAL ARTERY: Normal.      RIGHT CAROTID ARTERY: There is contrast opacification of the right common carotid artery. Minimal epiglottic plaque at the carotid bifurcation. No flow significant stenosis seen. There is flow identified in the distal right internal carotid artery. LEFT SUBCLAVIAN ARTERY: Normal.      LEFT VERTEBRAL ARTERY: Normal.      LEFT CAROTID ARTERY: There is contrast opacification of the left common carotid artery. There is moderate chronic plaque at the carotid bifurcation. No flow significant stenosis or focal aneurysm seen. There is flow in the distal left internal carotid    artery. NECK SOFT TISSUES: No neck soft tissue mass or lymphadenopathy. VISUALIZED MEDIASTINUM: No mass or lymphadenopathy. VISUALIZED LUNG APICES: Mild centrilobular emphysematous changes bilaterally. BONES: No destructive osseous process. OTHER: None. IMPRESSION:      No acute CTA findings. No hemodynamically significant stenosis or focal aneurysm seen. PROCEDURE: CT ANGIOGRAPHY HEAD WITH/WITHOUT CONTRAST      INDICATION: altered mental state      COMPARISON: none      TECHNIQUE: Axial CT imaging obtained through the head prior to and following administration of IV contrast. Axial images, multiplanar reformatted images, and maximum intensity projection images were reviewed for CT angiographic technique. IV contrast: 80 mL Isovue 370.       FINDINGS:      ANTERIOR CIRCULATION: The intracranial internal carotid arteries, anterior cerebral arteries, and middle cerebral arteries demonstrate no occlusion or stenosis. No evidence for aneurysm or arteriovenous malformation. POSTERIOR CIRCULATION: The bilateral vertebral arteries, basilar artery and posterior cerebral arteries demonstrate no occlusion or significant stenosis. Small patent right posterior communicating artery. No evidence for aneurysm or arteriovenous    malformation. INTRACRANIAL VENOUS SYSTEM: No evidence for intracranial venous thrombosis. OTHER: None. IMPRESSION:      1. No findings for acute CTA findings in the head. No hemodynamically significant stenosis or focal aneurysm identified. Assessment/Plan:  Raine Lewis is a 68 y.o. female w/ a past medical history of aortic aneurysm, retention, hyperlipidemia, and stage IV non-small cell lung cancer who presented to [facility]. with expressive aphasia. Active Hospital Problems    Diagnosis Date Noted    Expressive aphasia [R47.01] 09/27/2020     Expressive aphasia - pt has hx of stroke in Oct 2019 and has hx of smoking; expressive aphasia intermittently better  - etiologies include TIA v CVA v seizure focus  - neurology consultation  - NIHSS score 2  - permissive HTN systolic to 062 and diastolic to 003  - telemetry  - q4 neuro checks  - CT head without acute intracranial changes  - CTA neck and CTA head without acute changes  - CXR no consolidation  - MRI brain (9/27): cerebral atrophy and diffuse chronic small vessel white matter disease bilaterally  - EEG with generalized slowing but no epileptiform discharges  - ECHO findings: asymmetric hypertrophy of the basal septum. Overall left ventricular systolic function appears normal with an estimated ejection fraction of 55-60%. Diastolic filling parameters suggests normal diastolic function.  No evidence of significant valvular stenosis or regurgitation present  - repeat MRI

## 2020-09-30 NOTE — CONSULTS
[facility]  Consult Note      Requesting Physician: No att. providers found    CHIEF COMPLAINT:  Expressive aphasia    HISTORY OF PRESENT ILLNESS:  Ms. Vikas De Jesus  is a 68 y.o. female we are seeing in consultation for \"hx of lung cancer, MRI showed mets to brain \". 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 She was admitted to [place] [address] for CVA workup after presenting with difficulty finding words and confusion which started in the morning of her presentation. Patient had CT head which didn't show any evidence of hemorrhage. No tPA was given as onset of sxs thought to be outside window. Neurology was consulted. Patient continued to receive her home ASA, statin. She had MRI on admission but study was limited and wasn't impressive. She had a repeat MRI brain 9/29 which revealed infarction in the posterior L-MCA distribution compatible with infarcts within the last 7-10 days and abnormal marrow signal in the posterior dens compatible with metastatic disease. Oncology was consulted for management. 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 She follows with oncologist Dr. Des Key. She was treated with 4 cycles of carbo and Alimta 9/2013 followed by maintenance Alimta 10/2013 which she didn't tolerate and was discontinued prematurely. She had CT abd/pelvis 1/2018 which revealed lesion on left kidney that was enlarged. She underwent da Hank left partial nephrectomy and pathology showed metastatic pulmonary adenocarcinoma. Following surgery, patient had SBO again and she underwent exploratory laparotomy with small bowel resection but path was negative for malignancy. Patient was started on Nivolumab in 5/2018.  She had CT abd/pelvis 3/2019 which revealed sclerotic lesion on T11. She started radiation therapy in 5/2019 to a total dose of 2000 cGY. She was started on Denosumab afterward. Patient seen and examined at bedside. Her  was present in the room and provided more to the history given her speech condition. Pt missed the past few [facility] appointments for therapy as she was feeling weak. She still has some upper back pain but better compared to when it started. She denies any pain in the neck or occipital area. She has occasional nasuea but reports one episode of emesis every few months. Pt still has has difficulty finding words. She denies any other neurological deficiency. Past Medical History:        Diagnosis Date    CHUYITA (acute kidney injury) ([facility] [address] 5/1/2018    Allergic rhinitis     Aneurysm ([facility] [address]     aorta  watching now    Aortic aneurysm [facility])     Currently has     Cancer [facility])     Lung    Cholelithiasis     Depression     History of ischemic left MCA stroke 9/30/2020    With residual expressive aphasia. Noted to be subacute ~ 8days old on MRI 9/29/2020.  History of ischemic left MCA stroke 9/30/2020    Hyperlipidemia     Hypertension     Incontinence     bladder    MRSA (methicillin resistant staph aureus) culture positive 12/28/11    Leg    Ulcer     Ulcerative colitis ([facility] [address]        Past Surgical History:        Procedure Laterality Date    ABDOMEN SURGERY      gastric resection for ulcers    BRONCHOSCOPY  12/11/2012    CHOLECYSTECTOMY, OPEN  5/7/2012    COLONOSCOPY      ERCP  3/13/12    sphincterotomy; 5 Mongolian cm stent placed    ERCP  12-    pancreatic stent removal, pancreotogram    PRESSURE ULCER DEBRIDEMENT  2010    gastric    STOMACH SURGERY      for ulcer    UPPER GASTROINTESTINAL ENDOSCOPY         Current Medications:  No current facility-administered medications for this encounter. Allergies:  Patient has no known allergies.     Social History:      Social History     Socioeconomic History    Marital status:      Spouse name: Not on file    Number of children: Not on file    Years of education: Not on file    Highest education level: Not on file   Occupational History    Not on file   Social Needs    Financial resource strain: Not on file    Food insecurity     Worry: Not on file     Inability: Not on file    Transportation needs     Medical: Not on file     Non-medical: Not on file   Tobacco Use    Smoking status: Former Smoker     Packs/day: 1.50     Years: 50.00     Pack years: 75.00     Last attempt to quit: 2008     Years since quittin.7    Smokeless tobacco: Never Used   Substance and Sexual Activity    Alcohol use: No    Drug use: No    Sexual activity: Not on file   Lifestyle    Physical activity     Days per week: Not on file     Minutes per session: Not on file    Stress: Not on file   Relationships    Social connections     Talks on phone: Not on file     Gets together: Not on file     Attends Zoroastrianism service: Not on file     Active member of club or organization: Not on file     Attends meetings of clubs or organizations: Not on file     Relationship status: Not on file    Intimate partner violence     Fear of current or ex partner: Not on file     Emotionally abused: Not on file     Physically abused: Not on file     Forced sexual activity: Not on file   Other Topics Concern    Not on file   Social History Narrative    ** Merged History Encounter **            Family History:         Problem Relation Age of Onset    Arthritis Mother        REVIEW OF SYSTEMS:    Review of Systems   Constitutional: Positive for fatigue. HENT: Negative. Eyes: Negative. Respiratory: Negative. Cardiovascular: Negative. Gastrointestinal: Negative. Genitourinary: Negative. Musculoskeletal: Positive for back pain. Negative for neck pain and neck stiffness. Neurological: Positive for speech difficulty. Negative for weakness, light-headedness and headaches. altered mental state COMPARISON: none TECHNIQUE: Axial CT imaging obtained through the head prior to and following administration of IV contrast. Axial images, multiplanar reformatted images, and maximum intensity projection images were reviewed for CT angiographic technique. IV contrast: 80 mL Isovue 370. FINDINGS: ANTERIOR CIRCULATION: The intracranial internal carotid arteries, anterior cerebral arteries, and middle cerebral arteries demonstrate no occlusion or stenosis. No evidence for aneurysm or arteriovenous malformation. POSTERIOR CIRCULATION: The bilateral vertebral arteries, basilar artery and posterior cerebral arteries demonstrate no occlusion or significant stenosis. Small patent right posterior communicating artery. No evidence for aneurysm or arteriovenous malformation. INTRACRANIAL VENOUS SYSTEM: No evidence for intracranial venous thrombosis. OTHER: None. IMPRESSION: 1. No findings for acute CTA findings in the head. No hemodynamically significant stenosis or focal aneurysm identified. Xr Chest Portable    Result Date: 9/27/2020  PORTABLE CHEST. HISTORY: AMS, expressive aphasia COMPARISON STUDY: 6/24/2020 FINDINGS: Heart size and mediastinal structures appear within normal limits. Mild prominence of lung markings in the lung bases. No change in left sided Port-A-Cath. No localized consolidation or pleural effusion identified. Bony structures are intact. 1. No localized consolidation or pleural effusion. Ezella Calk Neck W Contrast    Result Date: 9/27/2020  CT HEAD WITHOUT CONTRAST Comparison exam 6/24/2020 HISTORY: Altered mental status FINDINGS: Thin section axial images obtained through the head from skull base to vertex. Multiplanar reconstruction images received for interpretation. Low radiation dose CT technique utilized. Diffuse cerebral atrophy with prominence of the sulci and ventricles.   Patchy periventricular deep white matter low attenuation bilaterally consistent with changes of chronic small vessel ischemia. Encephalomalacia in the right frontal lobe unchanged. Small inferior infarct in the bilateral basal ganglia. There are no masses or midline shift. No abnormal extra-axial fluid collection seen. Gray-white differentiation is maintained. Brainstem and posterior fossa appear within normal limits. Visualized orbits are intact. There is diffuse mucosal thickening calcification in the right maxillary sinus, similar prior exam.  Remaining paranasal sinuses otherwise unremarkable. Mastoid air cells well pneumatized. No skull abnormality seen. 1.  No findings for acute intracranial abnormality. 2.  Age related atrophy with moderate periventricular white matter changes bilaterally consistent with chronic ischemic leukoencephalopathy. 3.  Remote right frontal lobe infarct with multiple small bilateral basal ganglia lacunar infarcts, unchanged. 4.  Chronic right maxillary sinusitis. PROCEDURE: CT ANGIOGRAPHY NECK WITH/WITHOUT CONTRAST INDICATION: altered mental state COMPARISON: none TECHNIQUE: Limited noncontrast CT imaging performed for the purposes of IV contrast bolus tracking. Axial CT imaging obtained from skull base through the lung apices following administration of IV contrast. Axial images, multiplanar reformatted images, and maximum intensity projection images were reviewed for CT angiographic technique. Up-to-date CT equipment and radiation dose reduction techniques were employed. IV contrast: 80 mL Isovue 370. FINDINGS: AORTIC ARCH: Standard three-vessel aortic arch anatomy is present. INNOMINATE ARTERY: Normal. RIGHT SUBCLAVIAN ARTERY: Normal. RIGHT VERTEBRAL ARTERY: Normal. RIGHT CAROTID ARTERY: There is contrast opacification of the right common carotid artery. Minimal epiglottic plaque at the carotid bifurcation. No flow significant stenosis seen. There is flow identified in the distal right internal carotid artery.  LEFT SUBCLAVIAN ARTERY: Normal. LEFT VERTEBRAL ARTERY: Normal. LEFT CAROTID ARTERY: There is contrast opacification of the left common carotid artery. There is moderate chronic plaque at the carotid bifurcation. No flow significant stenosis or focal aneurysm seen. There is flow in the distal left internal carotid  artery. NECK SOFT TISSUES: No neck soft tissue mass or lymphadenopathy. VISUALIZED MEDIASTINUM: No mass or lymphadenopathy. VISUALIZED LUNG APICES: Mild centrilobular emphysematous changes bilaterally. BONES: No destructive osseous process. OTHER: None. IMPRESSION: No acute CTA findings. No hemodynamically significant stenosis or focal aneurysm seen. PROCEDURE: CT ANGIOGRAPHY HEAD WITH/WITHOUT CONTRAST INDICATION: altered mental state COMPARISON: none TECHNIQUE: Axial CT imaging obtained through the head prior to and following administration of IV contrast. Axial images, multiplanar reformatted images, and maximum intensity projection images were reviewed for CT angiographic technique. IV contrast: 80 mL Isovue 370. FINDINGS: ANTERIOR CIRCULATION: The intracranial internal carotid arteries, anterior cerebral arteries, and middle cerebral arteries demonstrate no occlusion or stenosis. No evidence for aneurysm or arteriovenous malformation. POSTERIOR CIRCULATION: The bilateral vertebral arteries, basilar artery and posterior cerebral arteries demonstrate no occlusion or significant stenosis. Small patent right posterior communicating artery. No evidence for aneurysm or arteriovenous malformation. INTRACRANIAL VENOUS SYSTEM: No evidence for intracranial venous thrombosis. OTHER: None. IMPRESSION: 1. No findings for acute CTA findings in the head. No hemodynamically significant stenosis or focal aneurysm identified. Cta Head W Contrast    Result Date: 9/27/2020  CT HEAD WITHOUT CONTRAST Comparison exam 6/24/2020 HISTORY: Altered mental status FINDINGS: Thin section axial images obtained through the head from skull base to vertex. Multiplanar reconstruction images received for interpretation. Low radiation dose CT technique utilized. Diffuse cerebral atrophy with prominence of the sulci and ventricles. Patchy periventricular deep white matter low attenuation bilaterally consistent with changes of chronic small vessel ischemia. Encephalomalacia in the right frontal lobe unchanged. Small inferior infarct in the bilateral basal ganglia. There are no masses or midline shift. No abnormal extra-axial fluid collection seen. Gray-white differentiation is maintained. Brainstem and posterior fossa appear within normal limits. Visualized orbits are intact. There is diffuse mucosal thickening calcification in the right maxillary sinus, similar prior exam.  Remaining paranasal sinuses otherwise unremarkable. Mastoid air cells well pneumatized. No skull abnormality seen. 1.  No findings for acute intracranial abnormality. 2.  Age related atrophy with moderate periventricular white matter changes bilaterally consistent with chronic ischemic leukoencephalopathy. 3.  Remote right frontal lobe infarct with multiple small bilateral basal ganglia lacunar infarcts, unchanged. 4.  Chronic right maxillary sinusitis. PROCEDURE: CT ANGIOGRAPHY NECK WITH/WITHOUT CONTRAST INDICATION: altered mental state COMPARISON: none TECHNIQUE: Limited noncontrast CT imaging performed for the purposes of IV contrast bolus tracking. Axial CT imaging obtained from skull base through the lung apices following administration of IV contrast. Axial images, multiplanar reformatted images, and maximum intensity projection images were reviewed for CT angiographic technique. Up-to-date CT equipment and radiation dose reduction techniques were employed. IV contrast: 80 mL Isovue 370. FINDINGS: AORTIC ARCH: Standard three-vessel aortic arch anatomy is present.  INNOMINATE ARTERY: Normal. RIGHT SUBCLAVIAN ARTERY: Normal. RIGHT VERTEBRAL ARTERY: Normal. RIGHT CAROTID ARTERY: There is contrast opacification of the right common carotid artery. Minimal epiglottic plaque at the carotid bifurcation. No flow significant stenosis seen. There is flow identified in the distal right internal carotid artery. LEFT SUBCLAVIAN ARTERY: Normal. LEFT VERTEBRAL ARTERY: Normal. LEFT CAROTID ARTERY: There is contrast opacification of the left common carotid artery. There is moderate chronic plaque at the carotid bifurcation. No flow significant stenosis or focal aneurysm seen. There is flow in the distal left internal carotid  artery. NECK SOFT TISSUES: No neck soft tissue mass or lymphadenopathy. VISUALIZED MEDIASTINUM: No mass or lymphadenopathy. VISUALIZED LUNG APICES: Mild centrilobular emphysematous changes bilaterally. BONES: No destructive osseous process. OTHER: None. IMPRESSION: No acute CTA findings. No hemodynamically significant stenosis or focal aneurysm seen. PROCEDURE: CT ANGIOGRAPHY HEAD WITH/WITHOUT CONTRAST INDICATION: altered mental state COMPARISON: none TECHNIQUE: Axial CT imaging obtained through the head prior to and following administration of IV contrast. Axial images, multiplanar reformatted images, and maximum intensity projection images were reviewed for CT angiographic technique. IV contrast: 80 mL Isovue 370. FINDINGS: ANTERIOR CIRCULATION: The intracranial internal carotid arteries, anterior cerebral arteries, and middle cerebral arteries demonstrate no occlusion or stenosis. No evidence for aneurysm or arteriovenous malformation. POSTERIOR CIRCULATION: The bilateral vertebral arteries, basilar artery and posterior cerebral arteries demonstrate no occlusion or significant stenosis. Small patent right posterior communicating artery. No evidence for aneurysm or arteriovenous malformation. INTRACRANIAL VENOUS SYSTEM: No evidence for intracranial venous thrombosis. OTHER: None. IMPRESSION: 1. No findings for acute CTA findings in the head.   No hemodynamically significant stenosis or focal aneurysm identified. Mri Brain Wo Contrast    Result Date: 9/29/2020  EXAM: MRI BRAIN WO CONTRAST INDICATION:stroke 8 aphasia. History of lung cancer. COMPARISON: 9/27/2020. TECHNIQUE: Multiplanar, multisequence MR imaging of the head obtained with and without IV contrast. IV contrast: None. FINDINGS: SINUSES AND OSSEOUS STRUCTURES: Extensive maxillary sinus disease on the right. On ethmoid sinus disease. Mastoid air cells are clear. Abnormal signal in the dens and in the body of C2 compatible with osseous metastasis. ORBITS: Prior cataract surgery. No orbital abnormality otherwise. Limited overall exam due to motion. MIDLINE STRUCTURES: The sella turcica and pituitary gland are normal. Craniovertebral alignment and cerebellar tonsils are normal. VENTRICLES: Normal size and configuration for patient age. Cisternal spaces are intact. INTRACRANIAL HEMORRHAGE: None. BRAIN PARENCHYMA: Multiple areas of abnormal diffusion signal in the posterior left temporal lobe and inferior left parietal lobe compatible with posterior left MCA distribution infarct. Minimal involvement of the posterior left insula. Areas of prior infarct with encephalomalacia in the right frontal and left frontal lobe. Extensive nonspecific periventricular and subcortical white matter signal and bodies compatible with extensive chronic small vessel ischemic disease. Diffuse sulcal prominence compatible with atrophy. 1. Moderate-sized area of infarction in the posterior left MCA distribution involving the posterior left temporal lobe and inferior left parietal white matter. Diffusion restriction compatible with infarcts within the last 7-10 days. 2. Extensive white matter signal abnormality compatible with chronic small vessel ischemic disease. Areas of previous infarct within cephalization the frontal lobes. 3. No evidence of acute intracranial hemorrhage.  4. Abnormal marrow signal in the posterior dens compatible with metastatic disease. Mri Brain Wo Contrast    Result Date: 9/27/2020  MRI OF THE HEAD WITHOUT CONTRAST: HISTORY: Aphasia, history of stroke and lung cancer. . TECHNIQUE: Routine multiplanar, multisequence imaging protocol was performed. COMPARISON: 10/23/2019. Dc Jimenez FINDINGS: This study is severely limited secondary to significant motion artifact on all imaging sequences obtained. The ventricular system is normal and midline in position. No extra-axial collections, mass effect or midline shift is seen. Diffuse patchy increased T2-weighted/FLAIR signal seen throughout the periventricular and subcortical white matter bilaterally. Focal area of encephalomalacia is seen in the right posterior frontal lobe, consistent with remote infarct. There is no evidence of intracranial hemorrhage. Diffusion imaging is severely limited. Question small focus of diffusion restriction signal abnormality in the left periventricular white matter adjacent to the atria of the lateral ventricle. No other diffusion restriction signal abnormalities are suggested. Major intracranial vessels including vertebral, basilar and bilateral internal carotid arteries demonstrate normal flow signal. There is diffuse mucosal thickening and near complete opacification of the right maxillary sinus. Severely limited study. Evidence of cerebral atrophy and diffuse chronic small vessel white matter disease bilaterally. Remote right posterior frontal lobe cortical infarct. Small focus of diffusion restriction consistent with small vessel disease in the left periventricular white matter is not excluded. The study is limited significantly by motion. No other acute intracranial findings. Right maxillary chronic sinus disease.         Problem List  Patient Active Problem List   Diagnosis    Cellulitis and abscess    UC (ulcerative colitis) ([facility] [address]    Elevated blood pressure reading    Nausea & vomiting    Pancreatic mass    S/P cholecystectomy   

## 2020-09-30 NOTE — PROGRESS NOTES
diagnosis was Encephalopathy. A diagnosis of History of CVA (cerebrovascular accident) was also pertinent to this visit. has a past medical history of CHUYITA (acute kidney injury) ([facility] [address] Allergic rhinitis, Aneurysm ([facility] [address] Aortic aneurysm ([facility] [address] Cancer ([facility] [address] Cholelithiasis, Depression, Hyperlipidemia, Hypertension, Incontinence, MRSA (methicillin resistant staph aureus) culture positive, Ulcer, and Ulcerative colitis ([facility] [address] has a past surgical history that includes Abdomen surgery; Cholecystectomy, open (5/7/2012); Pressure ulcer debridement (2010); Colonoscopy; Upper gastrointestinal endoscopy; Stomach surgery; ERCP (3/13/12); bronchoscopy (12/11/2012); and ERCP (12-). Restrictions  Position Activity Restriction  Other position/activity restrictions: up at tolerated  Subjective   General  Chart Reviewed: Yes  Additional Pertinent Hx: Admitted 527. 53-year-old female with a past medical history of non-small cell carcinoma with metastasis to the bone, hypertension, hyperlipidemia and history of stroke in 2019 that presents today by way of EMS for altered mental state and expressive aphasia. Head CT: No findings for acute intracranial abnormality, Chest XR: No localized consolidation or pleural effusion, CTA head: No findings for acute CTA findings in the head. No hemodynamically significant stenosis or focal aneurysm identified, Brain MRI: Evidence of cerebral atrophy and diffuse chronic small vessel white matter disease bilaterally  Family / Caregiver Present: Yes()  Referring Practitioner: Amanda Abrams MD  Subjective  Subjective: Pt denies pain. \"I am feeling better. \" Agreeable to therapy with min encouragement as pt worried about missing lunch.   General Comment  Comments: Pt supine in bed upon arrival.  Pain Screening  Patient Currently in Pain: Denies  Vital Signs  Patient Currently in Pain: Denies       Orientation  Orientation  Overall Orientation Status: Impaired(pt with difficulty word finding d/t aphasia.)  Cognition      Objective   Bed mobility  Supine to Sit: Minimal assistance(Pt reaching for therapist hands, stating she can't use the bedrail. Rick at trunk for support.)  Sit to Supine: Stand by assistance  Transfers  Sit to Stand: Contact guard assistance(From EOB)  Stand to sit: Stand by assistance  Ambulation  Ambulation?: Yes  Ambulation 1  Surface: level tile  Device: Rolling Walker  Assistance: Contact guard assistance  Quality of Gait: slow, steady jose, narrow ADRIA, decreased step length/height B, shuffling at times  Distance: 150'  Stairs/Curb  Stairs?: No     Balance  Posture: Fair  Sitting - Static: Good  Sitting - Dynamic: Good  Standing - Static: Fair;+(with RW)  Standing - Dynamic: Fair(with RW)  Comments: Pt sat EOB to don socks SBA. No LOB. G-Code     OutComes Score                                                     AM-PAC Score  AM-PAC Inpatient Mobility Raw Score : 17 (09/30/20 1215)  AM-PAC Inpatient T-Scale Score : 42.13 (09/30/20 1215)  Mobility Inpatient CMS 0-100% Score: 50.57 (09/30/20 1215)  Mobility Inpatient CMS G-Code Modifier : CK (09/30/20 1215)          Goals  Short term goals  Time Frame for Short term goals: discharge  Short term goal 1: Pt to perform bed mobility with CGA in order to increase independence with functional mobility. Short term goal 2: Pt to perform sit-->stand to RW with supervision in order to prepare for ambulation. Short term goal 3: Pt to stand for 10 mins at RW with supervision in order to participate in ADLs. Short term goal 4: Pt to ambulate 100 ft with RW and supervision in order to increase mobility around the home.   Patient Goals   Patient goals : not stated    Plan    Plan  Times per week: 5-7  Times per day: Daily  Current Treatment Recommendations: Strengthening, Neuromuscular Re-education, Home Exercise Program, Cognitive/Perceptual Training, Safety Education & Training, Balance Training, Endurance Training, Patient/Caregiver Education & Training, Functional Mobility Training, Equipment Evaluation, Education, & procurement, Transfer Training, Gait Training  Safety Devices  Type of devices: Bed alarm in place, Call light within reach, Nurse notified, Left in bed     Therapy Time   Individual Concurrent Group Co-treatment   Time In 1141         Time Out 1158         Minutes [address]

## 2020-09-30 NOTE — CONSULTS
Consult Note  Physical Medicine and Rehabilitation    Patient: Prerna Michael  [id number]  Date: 9/30/2020      Chief Complaint: Expressive aphasia     History of Present Illness/Hospital Course: Prerna Michael is a 68year old female with PMHx of HTN, HLD, metastatic lung carcinoma and recent stroke 8 months ago with residual R-sided weakness. Patient was admitted with difficulty finding words and worsening confusion. CT head and CTAs did not show any acute findings. MRI showed cerebral atrophy and diffuse chronic small vessel white matter disease bilaterally. Neurology was consulted for acute stroke who recommended repeat MRI of brain due to strong suspicion for stroke. She was also found to have a UTI and is receiving her last dose of antibiotics (rocephin) today.        Prior Level of Function:  Independent for mobility, ADLs, and IADLs    Current Level of Function:  Mod assist    Pertinent Social History:  Support: lives with family     Past Medical History:   Diagnosis Date    CHUYITA (acute kidney injury) ([address] 5/1/2018    Allergic rhinitis     Aneurysm ([address]     aorta  watching now    Aortic aneurysm ([address]     Currently has     Cancer ([address]     Lung    Cholelithiasis     Depression     Hyperlipidemia     Hypertension     Incontinence     bladder    MRSA (methicillin resistant staph aureus) culture positive 12/28/11    Leg    Ulcer     Ulcerative colitis ([address]        Past Surgical History:   Procedure Laterality Date    ABDOMEN SURGERY      gastric resection for ulcers    BRONCHOSCOPY  12/11/2012    CHOLECYSTECTOMY, OPEN  5/7/2012    COLONOSCOPY      ERCP  3/13/12    sphincterotomy; 5 Syriac cm stent placed    ERCP  12-    pancreatic stent removal, pancreotogram    PRESSURE ULCER DEBRIDEMENT  2010    gastric    STOMACH SURGERY      for ulcer    UPPER GASTROINTESTINAL ENDOSCOPY         Family History   Problem Relation Age of Onset    Arthritis Mother        Social History sensory/motor deficits. Cranial nerves: II-XII intact, grossly non-focal.  Psych: Stable mood, normal judgement, normal affect     Lab Results   Component Value Date    WBC 6.3 09/30/2020    HGB 10.9 (L) 09/30/2020    HCT 33.1 (L) 09/30/2020    MCV 78.3 (L) 09/30/2020     09/30/2020     Lab Results   Component Value Date    INR 0.97 09/27/2020    INR 1.03 10/18/2019    INR 0.96 08/19/2017    PROTIME 11.2 09/27/2020    PROTIME 11.7 10/18/2019    PROTIME 10.8 08/19/2017     Lab Results   Component Value Date    CREATININE 1.0 09/30/2020    BUN 19 09/30/2020     09/30/2020    K 4.1 09/30/2020     09/30/2020    CO2 26 09/30/2020     Lab Results   Component Value Date    ALT 16 09/27/2020    AST 24 09/27/2020     (H) 12/12/2012    ALKPHOS 160 (H) 09/27/2020    BILITOT <0.2 09/27/2020       Most recent echocardiogram revealed:  ECHO findings: asymmetric hypertrophy of the basal septum. Overall left ventricular systolic function appears normal with an estimated ejection fraction of 55-60%. Diastolic filling parameters suggests normal diastolic function. No evidence of significant valvular stenosis or regurgitation present    MRI BRAIN WO CONTRAST   Final Result   1. Moderate-sized area of infarction in the posterior left MCA distribution involving the posterior left temporal lobe and inferior left parietal white matter. Diffusion restriction compatible with infarcts within the last 7-10 days. 2. Extensive white matter signal abnormality compatible with chronic small vessel ischemic disease. Areas of previous infarct within cephalization the frontal lobes. 3. No evidence of acute intracranial hemorrhage. 4. Abnormal marrow signal in the posterior dens compatible with metastatic disease. MRI BRAIN WO CONTRAST   Final Result      Severely limited study. Evidence of cerebral atrophy and diffuse chronic small vessel white matter disease bilaterally.       Remote right posterior frontal lobe cortical infarct. Small focus of diffusion restriction consistent with small vessel disease in the left periventricular white matter is not excluded. The study is limited significantly by motion. No other acute intracranial findings. Right maxillary chronic sinus disease. CTA NECK W CONTRAST   Final Result      1. No findings for acute intracranial abnormality. 2.  Age related atrophy with moderate periventricular white matter changes bilaterally consistent with chronic ischemic leukoencephalopathy. 3.  Remote right frontal lobe infarct with multiple small bilateral basal ganglia lacunar infarcts, unchanged. 4.  Chronic right maxillary sinusitis. PROCEDURE: CT ANGIOGRAPHY NECK WITH/WITHOUT CONTRAST      INDICATION: altered mental state      COMPARISON: none      TECHNIQUE: Limited noncontrast CT imaging performed for the purposes of IV contrast bolus tracking. Axial CT imaging obtained from skull base through the lung apices following administration of IV contrast. Axial images, multiplanar reformatted images,    and maximum intensity projection images were reviewed for CT angiographic technique. Up-to-date CT equipment and radiation dose reduction techniques were employed. IV contrast: 80 mL Isovue 370. FINDINGS:      AORTIC ARCH: Standard three-vessel aortic arch anatomy is present. INNOMINATE ARTERY: Normal.      RIGHT SUBCLAVIAN ARTERY: Normal.      RIGHT VERTEBRAL ARTERY: Normal.      RIGHT CAROTID ARTERY: There is contrast opacification of the right common carotid artery. Minimal epiglottic plaque at the carotid bifurcation. No flow significant stenosis seen. There is flow identified in the distal right internal carotid artery. LEFT SUBCLAVIAN ARTERY: Normal.      LEFT VERTEBRAL ARTERY: Normal.      LEFT CAROTID ARTERY: There is contrast opacification of the left common carotid artery.   There is moderate chronic plaque at the carotid infarct with multiple small bilateral basal ganglia lacunar infarcts, unchanged. 4.  Chronic right maxillary sinusitis. PROCEDURE: CT ANGIOGRAPHY NECK WITH/WITHOUT CONTRAST      INDICATION: altered mental state      COMPARISON: none      TECHNIQUE: Limited noncontrast CT imaging performed for the purposes of IV contrast bolus tracking. Axial CT imaging obtained from skull base through the lung apices following administration of IV contrast. Axial images, multiplanar reformatted images,    and maximum intensity projection images were reviewed for CT angiographic technique. Up-to-date CT equipment and radiation dose reduction techniques were employed. IV contrast: 80 mL Isovue 370. FINDINGS:      AORTIC ARCH: Standard three-vessel aortic arch anatomy is present. INNOMINATE ARTERY: Normal.      RIGHT SUBCLAVIAN ARTERY: Normal.      RIGHT VERTEBRAL ARTERY: Normal.      RIGHT CAROTID ARTERY: There is contrast opacification of the right common carotid artery. Minimal epiglottic plaque at the carotid bifurcation. No flow significant stenosis seen. There is flow identified in the distal right internal carotid artery. LEFT SUBCLAVIAN ARTERY: Normal.      LEFT VERTEBRAL ARTERY: Normal.      LEFT CAROTID ARTERY: There is contrast opacification of the left common carotid artery. There is moderate chronic plaque at the carotid bifurcation. No flow significant stenosis or focal aneurysm seen. There is flow in the distal left internal carotid    artery. NECK SOFT TISSUES: No neck soft tissue mass or lymphadenopathy. VISUALIZED MEDIASTINUM: No mass or lymphadenopathy. VISUALIZED LUNG APICES: Mild centrilobular emphysematous changes bilaterally. BONES: No destructive osseous process. OTHER: None. IMPRESSION:      No acute CTA findings. No hemodynamically significant stenosis or focal aneurysm seen.                PROCEDURE: CT ANGIOGRAPHY HEAD WITH/WITHOUT CONTRAST      INDICATION: altered mental state      COMPARISON: none      TECHNIQUE: Axial CT imaging obtained through the head prior to and following administration of IV contrast. Axial images, multiplanar reformatted images, and maximum intensity projection images were reviewed for CT angiographic technique. IV contrast: 80 mL Isovue 370. FINDINGS:      ANTERIOR CIRCULATION: The intracranial internal carotid arteries, anterior cerebral arteries, and middle cerebral arteries demonstrate no occlusion or stenosis. No evidence for aneurysm or arteriovenous malformation. POSTERIOR CIRCULATION: The bilateral vertebral arteries, basilar artery and posterior cerebral arteries demonstrate no occlusion or significant stenosis. Small patent right posterior communicating artery. No evidence for aneurysm or arteriovenous    malformation. INTRACRANIAL VENOUS SYSTEM: No evidence for intracranial venous thrombosis. OTHER: None. IMPRESSION:      1. No findings for acute CTA findings in the head. No hemodynamically significant stenosis or focal aneurysm identified. XR CHEST PORTABLE   Final Result   1. No localized consolidation or pleural effusion. .      CT Head WO Contrast   Final Result      1. No findings for acute intracranial abnormality. 2.  Age related atrophy with moderate periventricular white matter changes bilaterally consistent with chronic ischemic leukoencephalopathy. 3.  Remote right frontal lobe infarct with multiple small bilateral basal ganglia lacunar infarcts, unchanged. 4.  Chronic right maxillary sinusitis. PROCEDURE: CT ANGIOGRAPHY NECK WITH/WITHOUT CONTRAST      INDICATION: altered mental state      COMPARISON: none      TECHNIQUE: Limited noncontrast CT imaging performed for the purposes of IV contrast bolus tracking.  Axial CT imaging obtained from skull base through the lung apices following administration of IV contrast. Axial images, multiplanar reformatted images,    and maximum intensity projection images were reviewed for CT angiographic technique. Up-to-date CT equipment and radiation dose reduction techniques were employed. IV contrast: 80 mL Isovue 370. FINDINGS:      AORTIC ARCH: Standard three-vessel aortic arch anatomy is present. INNOMINATE ARTERY: Normal.      RIGHT SUBCLAVIAN ARTERY: Normal.      RIGHT VERTEBRAL ARTERY: Normal.      RIGHT CAROTID ARTERY: There is contrast opacification of the right common carotid artery. Minimal epiglottic plaque at the carotid bifurcation. No flow significant stenosis seen. There is flow identified in the distal right internal carotid artery. LEFT SUBCLAVIAN ARTERY: Normal.      LEFT VERTEBRAL ARTERY: Normal.      LEFT CAROTID ARTERY: There is contrast opacification of the left common carotid artery. There is moderate chronic plaque at the carotid bifurcation. No flow significant stenosis or focal aneurysm seen. There is flow in the distal left internal carotid    artery. NECK SOFT TISSUES: No neck soft tissue mass or lymphadenopathy. VISUALIZED MEDIASTINUM: No mass or lymphadenopathy. VISUALIZED LUNG APICES: Mild centrilobular emphysematous changes bilaterally. BONES: No destructive osseous process. OTHER: None. IMPRESSION:      No acute CTA findings. No hemodynamically significant stenosis or focal aneurysm seen. PROCEDURE: CT ANGIOGRAPHY HEAD WITH/WITHOUT CONTRAST      INDICATION: altered mental state      COMPARISON: none      TECHNIQUE: Axial CT imaging obtained through the head prior to and following administration of IV contrast. Axial images, multiplanar reformatted images, and maximum intensity projection images were reviewed for CT angiographic technique. IV contrast: 80 mL Isovue 370.       FINDINGS:      ANTERIOR CIRCULATION: The intracranial internal carotid arteries, anterior cerebral arteries, and middle cerebral arteries demonstrate no occlusion or stenosis. No evidence for aneurysm or arteriovenous malformation. POSTERIOR CIRCULATION: The bilateral vertebral arteries, basilar artery and posterior cerebral arteries demonstrate no occlusion or significant stenosis. Small patent right posterior communicating artery. No evidence for aneurysm or arteriovenous    malformation. INTRACRANIAL VENOUS SYSTEM: No evidence for intracranial venous thrombosis. OTHER: None. IMPRESSION:      1. No findings for acute CTA findings in the head. No hemodynamically significant stenosis or focal aneurysm identified. Assessment:  Thomas Cuevas a 68 y. o. female with history of hypertension, hyperlipidemia, metastatic lung carcinoma and recent stroke 8 months ago with residual right-sided weakness who was admitted with difficulty finding words and confusion. MRI negative for acute stroke however Neurology recomendding repeat MRI for strong suspicion of stroke. EEG unremarkable. 1. Expressive aphasia  2. UTI  3. NSCLC stage IV    Impairments- Decreased functional mobility, Decreased ADLs    Recommendations:  ARU Admit     Patient with new functional deficits and ongoing medical complexity. Demonstrates ability to tolerate 3 hours therapy/day. She is a good candidate for acute inpatient rehab when medically appropriate. Tony Brothers MD PGY3  Discussed with BRENDA DiazPMilH  PM&R  9/30/2020  11:16 AM     This patient has been seen, examined, and discussed with the resident. This note has been altered to reflect my own examination findings, impression, and recommendations.      BRENDA DiazPMilH  PM&R  9/30/2020  12:35 PM

## 2020-09-30 NOTE — CARE COORDINATION
CM went in to see pt, pt can ambulate 100ft  . [facility]. ARU started precert the day before yesterday and they have not received precert as of earlier today. Pt wants to return home. Will continue to f/u.  Electronically signed by Haily García RN on 9/30/2020 at 12:01 PM

## 2020-09-30 NOTE — PROGRESS NOTES
Occupational Therapy  No Treatment  Chart Reviewed. Consulted RN. Attempted to see pt this date for therapy. Pt declining therapy stating \"I cant get up right now, I just ate\"  Pt educated on importance of out of bed activity. Pt continued to decline. Continue OT per POC.      [address], [address] [place]/L

## 2020-09-30 NOTE — PLAN OF CARE
Problem: Falls - Risk of:  Goal: Will remain free from falls  Description: Will remain free from falls  Outcome: Ongoing  Note: Fall precautions in place. Bed is in lowest position, wheels locked and alarm on. Non-skid socks on. Call light and bedside table within reach. Pt calls out appropriately. Pt is up x 1 assist with a walker and gait belt. Will continue to assess and monitor. Problem: COMMUNICATION IMPAIRMENT  Goal: Ability to express needs and understand communication  Outcome: Ongoing  Note: Patient has expressive asphasia, NIHSS of 3 due to best language and not understanding questions about place, time and situation. Will continue to assess and monitor.

## 2020-09-30 NOTE — PLAN OF CARE
Problem: Falls - Risk of:  Goal: Will remain free from falls  Description: Will remain free from falls  9/30/2020 1150 by Clark Gaytan RN  Outcome: Ongoing  9/30/2020 0331 by Karlie Shepherd RN  Outcome: Ongoing  Note: Fall precautions in place. Bed is in lowest position, wheels locked and alarm on. Non-skid socks on. Call light and bedside table within reach. Pt calls out appropriately. Pt is up x 1 assist with a walker and gait belt. Will continue to assess and monitor. Problem: COMMUNICATION IMPAIRMENT  Goal: Ability to express needs and understand communication  9/30/2020 1150 by Clark Gaytan RN  Outcome: Ongoing  9/30/2020 0331 by Karlie Shepherd RN  Outcome: Ongoing  Note: Patient has expressive asphasia, NIHSS of 3 due to best language and not understanding questions about place, time and situation. Will continue to assess and monitor.

## 2020-10-01 ENCOUNTER — NURSE ONLY (OUTPATIENT)
Dept: CARDIOLOGY CLINIC | Age: 80
End: 2020-10-01

## 2020-10-01 VITALS
RESPIRATION RATE: 18 BRPM | TEMPERATURE: 98.1 F | HEART RATE: 89 BPM | HEIGHT: 62 IN | OXYGEN SATURATION: 91 % | WEIGHT: 142 LBS | BODY MASS INDEX: 26.13 KG/M2 | DIASTOLIC BLOOD PRESSURE: 74 MMHG | SYSTOLIC BLOOD PRESSURE: 124 MMHG

## 2020-10-01 LAB
AMMONIA: 24 UMOL/L (ref 11–51)
ANION GAP SERPL CALCULATED.3IONS-SCNC: 11 MMOL/L (ref 3–16)
BASOPHILS ABSOLUTE: 0 K/UL (ref 0–0.2)
BASOPHILS RELATIVE PERCENT: 0.6 %
BUN BLDV-MCNC: 26 MG/DL (ref 7–20)
CALCIUM SERPL-MCNC: 9 MG/DL (ref 8.3–10.6)
CHLORIDE BLD-SCNC: 100 MMOL/L (ref 99–110)
CO2: 24 MMOL/L (ref 21–32)
CREAT SERPL-MCNC: 1.1 MG/DL (ref 0.6–1.2)
EOSINOPHILS ABSOLUTE: 0.9 K/UL (ref 0–0.6)
EOSINOPHILS RELATIVE PERCENT: 12.6 %
GFR AFRICAN AMERICAN: 58
GFR NON-AFRICAN AMERICAN: 48
GLUCOSE BLD-MCNC: 120 MG/DL (ref 70–99)
HCT VFR BLD CALC: 31.2 % (ref 36–48)
HEMOGLOBIN: 9.9 G/DL (ref 12–16)
LYMPHOCYTES ABSOLUTE: 0.9 K/UL (ref 1–5.1)
LYMPHOCYTES RELATIVE PERCENT: 12.5 %
MCH RBC QN AUTO: 25.6 PG (ref 26–34)
MCHC RBC AUTO-ENTMCNC: 31.8 G/DL (ref 31–36)
MCV RBC AUTO: 80.4 FL (ref 80–100)
MONOCYTES ABSOLUTE: 0.6 K/UL (ref 0–1.3)
MONOCYTES RELATIVE PERCENT: 7.7 %
NEUTROPHILS ABSOLUTE: 4.8 K/UL (ref 1.7–7.7)
NEUTROPHILS RELATIVE PERCENT: 66.6 %
PDW BLD-RTO: 17.1 % (ref 12.4–15.4)
PLATELET # BLD: 271 K/UL (ref 135–450)
PMV BLD AUTO: 7.9 FL (ref 5–10.5)
POTASSIUM REFLEX MAGNESIUM: 4.4 MMOL/L (ref 3.5–5.1)
RBC # BLD: 3.88 M/UL (ref 4–5.2)
SODIUM BLD-SCNC: 135 MMOL/L (ref 136–145)
WBC # BLD: 7.2 K/UL (ref 4–11)

## 2020-10-01 PROCEDURE — 97110 THERAPEUTIC EXERCISES: CPT

## 2020-10-01 PROCEDURE — 97535 SELF CARE MNGMENT TRAINING: CPT

## 2020-10-01 PROCEDURE — 6370000000 HC RX 637 (ALT 250 FOR IP): Performed by: STUDENT IN AN ORGANIZED HEALTH CARE EDUCATION/TRAINING PROGRAM

## 2020-10-01 PROCEDURE — 85025 COMPLETE CBC W/AUTO DIFF WBC: CPT

## 2020-10-01 PROCEDURE — 97116 GAIT TRAINING THERAPY: CPT

## 2020-10-01 PROCEDURE — 80048 BASIC METABOLIC PNL TOTAL CA: CPT

## 2020-10-01 PROCEDURE — 36415 COLL VENOUS BLD VENIPUNCTURE: CPT

## 2020-10-01 PROCEDURE — 97530 THERAPEUTIC ACTIVITIES: CPT

## 2020-10-01 PROCEDURE — 82140 ASSAY OF AMMONIA: CPT

## 2020-10-01 PROCEDURE — 2580000003 HC RX 258: Performed by: STUDENT IN AN ORGANIZED HEALTH CARE EDUCATION/TRAINING PROGRAM

## 2020-10-01 PROCEDURE — 99223 1ST HOSP IP/OBS HIGH 75: CPT | Performed by: NURSE PRACTITIONER

## 2020-10-01 PROCEDURE — 6360000002 HC RX W HCPCS: Performed by: STUDENT IN AN ORGANIZED HEALTH CARE EDUCATION/TRAINING PROGRAM

## 2020-10-01 RX ORDER — ASPIRIN 81 MG/1
81 TABLET ORAL DAILY
Qty: 90 TABLET | Refills: 1 | Status: SHIPPED | OUTPATIENT
Start: 2020-10-01 | End: 2021-08-05 | Stop reason: CLARIF

## 2020-10-01 RX ADMIN — CLOPIDOGREL BISULFATE 75 MG: 75 TABLET ORAL at 10:04

## 2020-10-01 RX ADMIN — CARBIDOPA AND LEVODOPA 0.5 TABLET: 25; 100 TABLET ORAL at 10:05

## 2020-10-01 RX ADMIN — ASPIRIN 325 MG: 325 TABLET, COATED ORAL at 10:04

## 2020-10-01 RX ADMIN — ENOXAPARIN SODIUM 40 MG: 40 INJECTION SUBCUTANEOUS at 10:05

## 2020-10-01 RX ADMIN — NIFEDIPINE 30 MG: 30 TABLET, EXTENDED RELEASE ORAL at 10:04

## 2020-10-01 RX ADMIN — CARBIDOPA AND LEVODOPA 0.5 TABLET: 25; 100 TABLET ORAL at 14:08

## 2020-10-01 RX ADMIN — Medication 10 ML: at 10:05

## 2020-10-01 RX ADMIN — ATORVASTATIN CALCIUM 80 MG: 80 TABLET, FILM COATED ORAL at 12:08

## 2020-10-01 NOTE — PROGRESS NOTES
Patient alert, PEDRO orientation accurately as patient has difficulty verbalizing. Patient kept repeating numbers when asked for her name, appears to understand that she is in a hospital. VSS. Neuro checks with normal limits with exception to speech. Patient denies nausea or pain, tolerating diet well. Patient ambulating with standby assist and gait belt, tolerating well. Patient anticipating discharge to ARU once precert obtained. Will continue to monitor.

## 2020-10-01 NOTE — CONSULTS
[facility]   Electrophysiology   H&P/Consult    Date: 10/1/2020    History Obtained From: Patient and medical record. Chief Complaint:CVA    Cardiac Hx: Prerna Michael is a 68 y.o. woman with a h/o HTM, HLD, aortic aneurysm, metastatic lung CA, MRSA, UC, CVA (8 mths ago) who p/w confusion and difficulty finding her words, MRI showed a mod L MCA stroke involving the L temporal lobe and inf L parietal white matter. Today: EP asked to see for outpatient monitoring to assess for AF as a cause of recent/previous stroke. Patient with no heart racing, palpitations, irregular heartbeats or flip-flops prior to admission. She denies any chest pain, shortness of breath, PND orthopnea. She has not had any syncopal events outpatient. Home medications:   No current facility-administered medications on file prior to encounter. Current Outpatient Medications on File Prior to Encounter   Medication Sig Dispense Refill    carbidopa-levodopa (SINEMET)  MG per tablet Take 0.5 tablets by mouth 3 times daily 9/27/20- On day 13 of 21-days of 0.5 mg TID. Starting Starting Starting 10/5/20 will increase to 1 tablet TID      NIFEdipine (ADALAT CC) 30 MG extended release tablet Take 90 mg by mouth daily      magnesium oxide (MAG-OX) 400 MG tablet Take 1 tablet by mouth 2 times daily 30 tablet 1    clopidogrel (PLAVIX) 75 MG tablet Take 1 tablet by mouth daily 30 tablet 0    atorvastatin (LIPITOR) 40 MG tablet Take 1 tablet by mouth daily 30 tablet 3    LORazepam (ATIVAN) 0.5 MG tablet Take 0.5 mg by mouth every 8 hours as needed for Anxiety.  oxyCODONE-acetaminophen (PERCOCET) 5-325 MG per tablet Take 1 tablet by mouth every 8 hours as needed for Pain.       prochlorperazine (COMPAZINE) 10 MG tablet Take 10 mg by mouth every 8 hours as needed (nausea)      pantoprazole (PROTONIX) 40 MG tablet Take 40 mg by mouth nightly       citalopram (CELEXA) 40 MG tablet Take 40 mg by mouth nightly  aspirin 325 MG EC tablet Take 1 tablet by mouth daily 30 tablet 0       Scheduled Meds:   NIFEdipine  30 mg Oral Daily    clopidogrel  75 mg Oral Daily    LORazepam  0.5 mg Intravenous Once    citalopram  40 mg Oral Nightly    pantoprazole  40 mg Oral Nightly    aspirin  325 mg Oral Daily    atorvastatin  80 mg Oral Daily    sodium chloride flush  10 mL Intravenous 2 times per day    enoxaparin  40 mg Subcutaneous Daily    carbidopa-levodopa  0.5 tablet Oral TID     Continuous Infusions:  PRN Meds:LORazepam, oxyCODONE-acetaminophen, sodium chloride flush, polyethylene glycol, promethazine **OR** ondansetron, labetalol, zinc oxide       Past Medical History:   Diagnosis Date    CHUYITA (acute kidney injury) ([facility] 5/1/2018    Allergic rhinitis     Aneurysm ([facility]     aorta  watching now    Aortic aneurysm [facility])     Currently has     Cancer ([facility]     Lung    Cholelithiasis     Depression     History of ischemic left MCA stroke 9/30/2020    With residual expressive aphasia. Noted to be subacute ~ 8days old on MRI 9/29/2020.  History of ischemic left MCA stroke 9/30/2020    Hyperlipidemia     Hypertension     Incontinence     bladder    MRSA (methicillin resistant staph aureus) culture positive 12/28/11    Leg    Ulcer     Ulcerative colitis ([facility]         Past Surgical History:   Procedure Laterality Date    ABDOMEN SURGERY      gastric resection for ulcers    BRONCHOSCOPY  12/11/2012    CHOLECYSTECTOMY, OPEN  5/7/2012    COLONOSCOPY      ERCP  3/13/12    sphincterotomy; 5 Indonesian cm stent placed    ERCP  12-    pancreatic stent removal, pancreotogram    PRESSURE ULCER DEBRIDEMENT  2010    gastric    STOMACH SURGERY      for ulcer    UPPER GASTROINTESTINAL ENDOSCOPY         No Known Allergies    Social History:  Reviewed. reports that she quit smoking about 11 years ago. She has a 75.00 pack-year smoking history.  She has never used smokeless tobacco. She reports that she does not drink alcohol or use drugs. Family History:  Reviewed. family history includes Arthritis in her mother. Review of System:    · Constitutional: No fevers, chills. · Eyes: No visual changes or diplopia. No scleral icterus. · ENT: No Headaches. No mouth sores or sore throat. · Cardiovascular: No for chest pain, No for dyspnea on exertion, No for palpitations or No for loss of consciousness. No cough, hemoptysis, No for pleuritic pain, or phlebitis. · Respiratory: No for cough or wheezing. No hematemesis. · Gastrointestinal: No abdominal pain, blood in stools. · Genitourinary: No dysuria, or hematuria. · Musculoskeletal: No gait disturbance,    · Integumentary: No rash or pruritis. · Neurological: No headache, change in muscle strength, numbness or tingling. · Psychiatric: No anxiety, or depression. · Endocrine: No temperature intolerance. No excessive thirst, fluid intake, or urination. · Hem/Lymph: No abnormal bruising or bleeding, blood clots or swollen lymph nodes. · Allergic/Immunologic: No nasal congestion or hives. Physical Examination:  Vitals:    10/01/20 0706   BP: 138/74   Pulse: 86   Resp: 18   Temp: 97.9 °F (36.6 °C)   SpO2: 91%      In: 300 [P.O.:300]  Out: -    Wt Readings from Last 3 Encounters:   20 142 lb (64.4 kg)   20 154 lb 3 oz (69.9 kg)   19 148 lb 9.6 oz (67.4 kg)     Temp  Av.7 °F (36.5 °C)  Min: 96.9 °F (36.1 °C)  Max: 98.5 °F (36.9 °C)  Pulse  Av.1  Min: 81  Max: 98  BP  Min: 137/76  Max: 148/76  SpO2  Av.3 %  Min: 90 %  Max: 98 %    Intake/Output Summary (Last 24 hours) at 10/1/2020 0816  Last data filed at 10/1/2020 0135  Gross per 24 hour   Intake 720 ml   Output 1 ml   Net 719 ml       · Constitutional: Oriented. No distress. · Head: Normocephalic and atraumatic. · Mouth/Throat: Oropharynx is clear and moist.   · Eyes: Conjunctivae clear without jaunduice. PERRL. · Neck: Neck supple. No rigidity. No JVD present. · Cardiovascular: Normal rate, regular rhythm, S1&S2. · Pulmonary/Chest: Bilateral respiratory sounds. No wheezes, No rhonchi. · Abdominal: Soft. Bowel sounds present. No distension, No tenderness. · Musculoskeletal: No tenderness. No edema    · Lymphadenopathy: Has no cervical adenopathy. · Neurological: Alert and oriented. Cranial nerve appears intact, No Gross deficit   · Skin: Skin is warm and dry. No rash noted. · Psychiatric: Has a normal mood, affect and behavior     Labs:  Reviewed. Recent Labs     09/29/20  0600 09/30/20  0534 10/01/20  0500    137 135*   K 4.0 4.1 4.4    103 100   CO2 26 26 24   BUN 19 19 26*   CREATININE 1.1 1.0 1.1     Recent Labs     09/29/20  0600 09/30/20  0534 10/01/20  0500   WBC 7.6 6.3 7.2   HGB 10.1* 10.9* 9.9*   HCT 31.7* 33.1* 31.2*   MCV 79.3* 78.3* 80.4    253 271     Lab Results   Component Value Date    TROPONINI <0.01 09/27/2020     No results found for: BNP  Lab Results   Component Value Date    PROTIME 11.2 09/27/2020    PROTIME 11.7 10/18/2019    PROTIME 10.8 08/19/2017    INR 0.97 09/27/2020    INR 1.03 10/18/2019    INR 0.96 08/19/2017     Lab Results   Component Value Date    CHOL 120 09/27/2020    HDL 48 09/27/2020    TRIG 157 09/27/2020       Telemetry: Personally reviewed: NSR,     Radiography: Personally reviewed: CXR - no acute process    ECG: Personally reviewed: NSR, HR 78. , QRS 78, QTc 460     ECHO:  9/28/20   Summary   Technically difficult study. Left ventricular cavity size is normal. There   is asymmetric hypertrophy of the basal septum. Overall left ventricular   systolic function appears normal with an estimated ejection fraction of   55-60%. Diastolic filling parameters suggests normal diastolic function. No   evidence of significant valvular stenosis or regurgitation present. A bubble   study was performed and fails to show evidence of shunting.      Stress Test: n/a    Cardiac Angiography: n/a    Problem List: Patient Active Problem List    Diagnosis Date Noted    History of lung cancer 10/13/2014     Priority: High    History of ischemic left MCA stroke 09/30/2020    Expressive aphasia 09/27/2020    Generalized weakness 06/24/2020    Stroke-like symptom 10/23/2019    Weakness of both lower extremities 10/23/2019    Tingling of left upper extremity 10/23/2019    Acute renal failure (ARF) ([facility] 10/23/2019    Left arm weakness 10/18/2019    Wound infection after surgery, sequela 05/01/2018    Left kidney mass 07/11/2017    Port-A-Cath in place 10/13/2014    Incisional hernia 01/22/2014    Hypoxemia requiring supplemental oxygen 12/12/2012    Cholelithiasis 05/10/2012    S/P cholecystectomy 05/07/2012    Nausea & vomiting 04/08/2012    Pancreatic mass 04/08/2012    Elevated blood pressure reading 12/30/2011    Cellulitis and abscess 12/28/2011    UC (ulcerative colitis) ([facility] 12/28/2011        Assessment:   1. Encephalopathy    2. History of CVA (cerebrovascular accident)      Cardiac Hx: Samuel Darling is a 68 y.o. woman with a h/o HTM, HLD, aortic aneurysm, metastatic lung CA, MRSA, UC, CVA (8 mths ago) who p/w confusion and difficulty finding her words, MRI showed a mod L MCA stroke involving the L temporal lobe and inf L parietal white matter. CVA  - EP asked to see to r/o AF as a cause of MCA stroke  - Will do 30 day monitor  - If no yield on 30 day - will discuss ILR for further monitoring  - ECG ordered and results personally reviewed     EF of 97-18%  No systolic HF  No known CAD  Anticoagulation for AF and heart failure  No tobacco use. All questions and concerns were addressed to the patient/family. Alternatives to my treatment were discussed. The note was completed using EMR. Every effort was made to ensure accuracy; however, inadvertent computerized transcription errors may be present.        Natalia Camilo CNP  [address]    Addendum:   Reviewed with neuro NP - would prefer ILR - will arrange to be done outpatient next week as patient will be d/c'd to home.

## 2020-10-01 NOTE — CARE COORDINATION
Case Management Assessment            Discharge Note                    Date / Time of Note: 10/1/2020 2:02 PM                  Discharge Note Completed by: Sivan Aldrich    Patient Name: Jennifer Grossman   YOB: 1940  Diagnosis: Expressive aphasia [R47.01]  Expressive aphasia [R47.01]  Expressive aphasia [R47.01]   Date / Time: 9/27/2020 12:02 PM    Current PCP: Jojo Kimball DO  Clinic patient: No    Hospitalization in the last 30 days: No    Advance Directives:  Code Status: Full Code  [place] DNR form completed and on chart: No    Financial:  Payor: Staci Harris / Plan: Terry Hinton PPO / Product Type: Medicare /      Pharmacy:    [address], [address]  [address]  [address] [facility] [address]  Phone: [phone] Fax: [phone]      Assistance purchasing medications?: Potential Assistance Purchasing Medications: No  Assistance provided by Case Management: None at this time    Does patient want to participate in local refill/ meds to beds program?: No    Meds To Beds General Rules:  1. Can ONLY be done Monday- Friday between 8:30am-5pm  2. Prescription(s) must be in pharmacy by 3pm to be filled same day  3. Copy of patient's insurance/ prescription drug card and patient face sheet must be sent along with the prescription(s)  4. Cost of Rx cannot be added to hospital bill. If financial assistance is needed, please contact unit  or ;  or  CANNOT provide pharmacy voucher for patients co-pays  5.  Patients can then  the prescription on their way out of the hospital at discharge, or pharmacy can deliver to the bedside if staff is available. (payment due at time of pick-up or delivery - cash, check, or card accepted)     Able to afford home medications/ co-pay costs: Yes    ADLS:  Current PT AM-PAC Score: 17 /24  Current OT AM-PAC Score: 16 /24      DISCHARGE Disposition: Home with [facility] [address]: care connections     LOC at discharge: Not Applicable  AMRITA Completed: Yes    Notification completed in HENS/PAS?:  Not Applicable    IMM Completed:   Not Indicated    Transportation:  Transportation PLAN for discharge: family   Mode of Transport: Private Car  Reason for medical transport: Not Applicable  Name of [address] [place] [address],[address]: Not Applicable      Home Care:  [address] ordered at discharge: Yes  [address]: [facility]   Phone: [phone]  Fax: [phone]  Orders faxed: Yes      Additional CM Notes: Pts daughter Omar Miranda will take pt home. Pt will have [facility] at [place]. The Plan for Transition of Care is related to the following treatment goals of Expressive aphasia [R47.01]  Expressive aphasia [R47.01]  Expressive aphasia [R47.01]    The Patient and/or patient representative Isabel Cool and her family were provided with a choice of provider and agrees with the discharge plan Yes    Freedom of choice list was provided with basic dialogue that supports the patient's individualized plan of care/goals and shares the quality data associated with the providers.  Yes    Care Transitions patient: No    Anjelica Cordero RN  [facility] [employer]  Case Management Department  IP:[phone]   Fax: [phone]

## 2020-10-01 NOTE — PROGRESS NOTES
[facility]  Progress Note    Subjective:     No acute events overnight. Had CT chest/abd/pelvis which showed stable disease. Patient seen and examined at bedside. She has no new acute problems. Her speech seems to be better than yesterday. She reports she sometimes gets neck pain. Denies fever, night sweats, chills, chest pain, cough, dyspnea, palpitations, nausea, vomiting, diarrhea, dysuria. Review of Systems:     Review of Systems  Constitutional: Positive for fatigue. HENT: Negative. Eyes: Negative. Respiratory: Negative. Cardiovascular: Negative. Gastrointestinal: Negative. Genitourinary: Negative. Musculoskeletal: Positive for back pain. Negative for neck pain and stiffness. Neurological: Positive for speech difficulty. Negative for weakness, light-headedness and headaches. Hematological: Negative.          Objective:     Medications    Current Facility-Administered Medications: NIFEdipine (ADALAT CC) extended release tablet 30 mg, 30 mg, Oral, Daily  clopidogrel (PLAVIX) tablet 75 mg, 75 mg, Oral, Daily  LORazepam (ATIVAN) injection 0.5 mg, 0.5 mg, Intravenous, Once  citalopram (CELEXA) tablet 40 mg, 40 mg, Oral, Nightly  pantoprazole (PROTONIX) tablet 40 mg, 40 mg, Oral, Nightly  LORazepam (ATIVAN) tablet 0.5 mg, 0.5 mg, Oral, Q8H PRN  oxyCODONE-acetaminophen (PERCOCET) 5-325 MG per tablet 1 tablet, 1 tablet, Oral, Q8H PRN  aspirin EC tablet 325 mg, 325 mg, Oral, Daily  atorvastatin (LIPITOR) tablet 80 mg, 80 mg, Oral, Daily  sodium chloride flush 0.9 % injection 10 mL, 10 mL, Intravenous, 2 times per day  sodium chloride flush 0.9 % injection 10 mL, 10 mL, Intravenous, PRN  polyethylene glycol (GLYCOLAX) packet 17 g, 17 g, Oral, Daily PRN  promethazine (PHENERGAN) tablet 12.5 mg, 12.5 mg, Oral, Q6H PRN **OR** ondansetron (ZOFRAN) injection 4 mg, 4 mg, Intravenous, Q6H PRN  enoxaparin (LOVENOX) injection 40 mg, 40 mg, Subcutaneous, Daily  labetalol 5/5, RLE 5/5, LLE 5/5) present.         Labs  Recent Labs     09/29/20  0600 09/30/20  0534 10/01/20  0500   WBC 7.6 6.3 7.2   HGB 10.1* 10.9* 9.9*   HCT 31.7* 33.1* 31.2*    253 271   MCV 79.3* 78.3* 80.4       Recent Labs     09/29/20  0600 09/30/20  0534 10/01/20  0500    137 135*   K 4.0 4.1 4.4    103 100   CO2 26 26 24   BUN 19 19 26*   CREATININE 1.1 1.0 1.1       No results for input(s): AST, ALT, ALB, BILIDIR, BILITOT, ALKPHOS in the last 72 hours. No results for input(s): MG in the last 72 hours. Radiology Review:  Ct Head Wo Contrast    Result Date: 9/27/2020  CT HEAD WITHOUT CONTRAST Comparison exam 6/24/2020 HISTORY: Altered mental status FINDINGS: Thin section axial images obtained through the head from skull base to vertex. Multiplanar reconstruction images received for interpretation. Low radiation dose CT technique utilized. Diffuse cerebral atrophy with prominence of the sulci and ventricles. Patchy periventricular deep white matter low attenuation bilaterally consistent with changes of chronic small vessel ischemia. Encephalomalacia in the right frontal lobe unchanged. Small inferior infarct in the bilateral basal ganglia. There are no masses or midline shift. No abnormal extra-axial fluid collection seen. Gray-white differentiation is maintained. Brainstem and posterior fossa appear within normal limits. Visualized orbits are intact. There is diffuse mucosal thickening calcification in the right maxillary sinus, similar prior exam.  Remaining paranasal sinuses otherwise unremarkable. Mastoid air cells well pneumatized. No skull abnormality seen. 1.  No findings for acute intracranial abnormality. 2.  Age related atrophy with moderate periventricular white matter changes bilaterally consistent with chronic ischemic leukoencephalopathy. 3.  Remote right frontal lobe infarct with multiple small bilateral basal ganglia lacunar infarcts, unchanged.  4.  Chronic intensity projection images were reviewed for CT angiographic technique. IV contrast: 80 mL Isovue 370. FINDINGS: ANTERIOR CIRCULATION: The intracranial internal carotid arteries, anterior cerebral arteries, and middle cerebral arteries demonstrate no occlusion or stenosis. No evidence for aneurysm or arteriovenous malformation. POSTERIOR CIRCULATION: The bilateral vertebral arteries, basilar artery and posterior cerebral arteries demonstrate no occlusion or significant stenosis. Small patent right posterior communicating artery. No evidence for aneurysm or arteriovenous malformation. INTRACRANIAL VENOUS SYSTEM: No evidence for intracranial venous thrombosis. OTHER: None. IMPRESSION: 1. No findings for acute CTA findings in the head. No hemodynamically significant stenosis or focal aneurysm identified. Xr Chest Portable    Result Date: 9/27/2020  PORTABLE CHEST. HISTORY: AMS, expressive aphasia COMPARISON STUDY: 6/24/2020 FINDINGS: Heart size and mediastinal structures appear within normal limits. Mild prominence of lung markings in the lung bases. No change in left sided Port-A-Cath. No localized consolidation or pleural effusion identified. Bony structures are intact. 1. No localized consolidation or pleural effusion. Onetha Barer Neck W Contrast    Result Date: 9/27/2020  CT HEAD WITHOUT CONTRAST Comparison exam 6/24/2020 HISTORY: Altered mental status FINDINGS: Thin section axial images obtained through the head from skull base to vertex. Multiplanar reconstruction images received for interpretation. Low radiation dose CT technique utilized. Diffuse cerebral atrophy with prominence of the sulci and ventricles. Patchy periventricular deep white matter low attenuation bilaterally consistent with changes of chronic small vessel ischemia. Encephalomalacia in the right frontal lobe unchanged. Small inferior infarct in the bilateral basal ganglia. There are no masses or midline shift.   No abnormal extra-axial fluid collection seen. Gray-white differentiation is maintained. Brainstem and posterior fossa appear within normal limits. Visualized orbits are intact. There is diffuse mucosal thickening calcification in the right maxillary sinus, similar prior exam.  Remaining paranasal sinuses otherwise unremarkable. Mastoid air cells well pneumatized. No skull abnormality seen. 1.  No findings for acute intracranial abnormality. 2.  Age related atrophy with moderate periventricular white matter changes bilaterally consistent with chronic ischemic leukoencephalopathy. 3.  Remote right frontal lobe infarct with multiple small bilateral basal ganglia lacunar infarcts, unchanged. 4.  Chronic right maxillary sinusitis. PROCEDURE: CT ANGIOGRAPHY NECK WITH/WITHOUT CONTRAST INDICATION: altered mental state COMPARISON: none TECHNIQUE: Limited noncontrast CT imaging performed for the purposes of IV contrast bolus tracking. Axial CT imaging obtained from skull base through the lung apices following administration of IV contrast. Axial images, multiplanar reformatted images, and maximum intensity projection images were reviewed for CT angiographic technique. Up-to-date CT equipment and radiation dose reduction techniques were employed. IV contrast: 80 mL Isovue 370. FINDINGS: AORTIC ARCH: Standard three-vessel aortic arch anatomy is present. INNOMINATE ARTERY: Normal. RIGHT SUBCLAVIAN ARTERY: Normal. RIGHT VERTEBRAL ARTERY: Normal. RIGHT CAROTID ARTERY: There is contrast opacification of the right common carotid artery. Minimal epiglottic plaque at the carotid bifurcation. No flow significant stenosis seen. There is flow identified in the distal right internal carotid artery. LEFT SUBCLAVIAN ARTERY: Normal. LEFT VERTEBRAL ARTERY: Normal. LEFT CAROTID ARTERY: There is contrast opacification of the left common carotid artery. There is moderate chronic plaque at the carotid bifurcation.   No flow significant reviewed. Up-to-date CT equipment and radiation dose reduction techniques were employed. IV Contrast: 100 mL Isovue-370 Oral Contrast: Yes. CT CHEST: Evaluation is degraded by motion artifact. LUNGS AND AIRWAYS: Airways are patent. There is a background of emphysema. Linear scarring/fibrosis at the base of the right middle lobe, image 76 of series 601, is similar to the prior study. Scarring anteriorly within the lingula, image 75 of series 601 is also similar in appearance. There is no new or enlarging pulmonary nodularity. PLEURA: No pleural effusions or significant pleural thickening. HEART AND GREAT VESSELS: The heart is stable in size. Atherosclerotic calcification of the thoracic aorta, aortic valve and coronary arteries is unchanged. ADENOPATHY: There is no new or progressive lymphadenopathy of the chest. CHEST WALL / LOWER NECK: No significant abnormality. BONES: Sclerotic lesion of the spinous process and posterior lamina at T11 again noted. This is similar in appearance to the prior study. . There is no new destructive osseous process. CT ABDOMEN AND PELVIS: Evaluation is degraded by motion artifact. LIVER: Normal. GALLBLADDER AND BILIARY DUCTS: The gallbladder is absent. No intra- or extrahepatic biliary dilatation. PANCREAS: Normal. SPLEEN: Normal. ADRENAL GLANDS: Normal. KIDNEYS AND URETERS: Status post left nephrectomy. 3 mm calcification of the mid pole right kidney is favored to be renal vascular in nature. This is unchanged. There is no hydronephrosis. URINARY BLADDER: Normal. REPRODUCTIVE ORGANS: No associated masses. BOWEL: Surgical changes of the bowel including gastric bypass noted. The bowel itself is nondilated. LYMPH NODES: No new or progressive lymphadenopathy of the abdomen or pelvis. PERITONEUM / RETROPERITONEUM: No ascites or free air. VESSELS: Aortobiiliac stent graft is noted. The excluded aneurysmal sac of the abdominal aorta measures up to 3.7 x 3.2 cm.  Splenic vein, SMV, PV and hepatic veins demonstrate enhancement. ABDOMINAL WALL: Broad mild anterior abdominal and pelvic wall hernia noted containing nondilated loops of both large and small bowel. BONES: No acute fracture or destructive osseous process. CHEST: 1. No acute intra-thoracic abnormality. 2. No evidence for new or progressive disease of the chest. 3. Emphysema. 4. Stable sclerotic lesion of the posterior elements at T11. 5. Atherosclerotic disease. ABDOMEN/PELVIS: 1. No acute intra-abdominopelvic abnormality. 2. No evidence for new or progressive disease of the abdomen or pelvis. 3. Anterior abdominal and pelvic wall hernia containing nonobstructed loops of large and small bowel, which is not significantly changed. Cta Head W Contrast    Result Date: 9/27/2020  CT HEAD WITHOUT CONTRAST Comparison exam 6/24/2020 HISTORY: Altered mental status FINDINGS: Thin section axial images obtained through the head from skull base to vertex. Multiplanar reconstruction images received for interpretation. Low radiation dose CT technique utilized. Diffuse cerebral atrophy with prominence of the sulci and ventricles. Patchy periventricular deep white matter low attenuation bilaterally consistent with changes of chronic small vessel ischemia. Encephalomalacia in the right frontal lobe unchanged. Small inferior infarct in the bilateral basal ganglia. There are no masses or midline shift. No abnormal extra-axial fluid collection seen. Gray-white differentiation is maintained. Brainstem and posterior fossa appear within normal limits. Visualized orbits are intact. There is diffuse mucosal thickening calcification in the right maxillary sinus, similar prior exam.  Remaining paranasal sinuses otherwise unremarkable. Mastoid air cells well pneumatized. No skull abnormality seen. 1.  No findings for acute intracranial abnormality.  2.  Age related atrophy with moderate periventricular white matter changes bilaterally consistent with chronic ischemic leukoencephalopathy. 3.  Remote right frontal lobe infarct with multiple small bilateral basal ganglia lacunar infarcts, unchanged. 4.  Chronic right maxillary sinusitis. PROCEDURE: CT ANGIOGRAPHY NECK WITH/WITHOUT CONTRAST INDICATION: altered mental state COMPARISON: none TECHNIQUE: Limited noncontrast CT imaging performed for the purposes of IV contrast bolus tracking. Axial CT imaging obtained from skull base through the lung apices following administration of IV contrast. Axial images, multiplanar reformatted images, and maximum intensity projection images were reviewed for CT angiographic technique. Up-to-date CT equipment and radiation dose reduction techniques were employed. IV contrast: 80 mL Isovue 370. FINDINGS: AORTIC ARCH: Standard three-vessel aortic arch anatomy is present. INNOMINATE ARTERY: Normal. RIGHT SUBCLAVIAN ARTERY: Normal. RIGHT VERTEBRAL ARTERY: Normal. RIGHT CAROTID ARTERY: There is contrast opacification of the right common carotid artery. Minimal epiglottic plaque at the carotid bifurcation. No flow significant stenosis seen. There is flow identified in the distal right internal carotid artery. LEFT SUBCLAVIAN ARTERY: Normal. LEFT VERTEBRAL ARTERY: Normal. LEFT CAROTID ARTERY: There is contrast opacification of the left common carotid artery. There is moderate chronic plaque at the carotid bifurcation. No flow significant stenosis or focal aneurysm seen. There is flow in the distal left internal carotid  artery. NECK SOFT TISSUES: No neck soft tissue mass or lymphadenopathy. VISUALIZED MEDIASTINUM: No mass or lymphadenopathy. VISUALIZED LUNG APICES: Mild centrilobular emphysematous changes bilaterally. BONES: No destructive osseous process. OTHER: None. IMPRESSION: No acute CTA findings. No hemodynamically significant stenosis or focal aneurysm seen.  PROCEDURE: CT ANGIOGRAPHY HEAD WITH/WITHOUT CONTRAST INDICATION: altered mental state COMPARISON: none TECHNIQUE: Axial CT imaging obtained through the head prior to and following administration of IV contrast. Axial images, multiplanar reformatted images, and maximum intensity projection images were reviewed for CT angiographic technique. IV contrast: 80 mL Isovue 370. FINDINGS: ANTERIOR CIRCULATION: The intracranial internal carotid arteries, anterior cerebral arteries, and middle cerebral arteries demonstrate no occlusion or stenosis. No evidence for aneurysm or arteriovenous malformation. POSTERIOR CIRCULATION: The bilateral vertebral arteries, basilar artery and posterior cerebral arteries demonstrate no occlusion or significant stenosis. Small patent right posterior communicating artery. No evidence for aneurysm or arteriovenous malformation. INTRACRANIAL VENOUS SYSTEM: No evidence for intracranial venous thrombosis. OTHER: None. IMPRESSION: 1. No findings for acute CTA findings in the head. No hemodynamically significant stenosis or focal aneurysm identified. Mri Brain Wo Contrast    Result Date: 9/29/2020  EXAM: MRI BRAIN WO CONTRAST INDICATION:stroke 8 aphasia. History of lung cancer. COMPARISON: 9/27/2020. TECHNIQUE: Multiplanar, multisequence MR imaging of the head obtained with and without IV contrast. IV contrast: None. FINDINGS: SINUSES AND OSSEOUS STRUCTURES: Extensive maxillary sinus disease on the right. On ethmoid sinus disease. Mastoid air cells are clear. Abnormal signal in the dens and in the body of C2 compatible with osseous metastasis. ORBITS: Prior cataract surgery. No orbital abnormality otherwise. Limited overall exam due to motion. MIDLINE STRUCTURES: The sella turcica and pituitary gland are normal. Craniovertebral alignment and cerebellar tonsils are normal. VENTRICLES: Normal size and configuration for patient age. Cisternal spaces are intact. INTRACRANIAL HEMORRHAGE: None.  BRAIN PARENCHYMA: Multiple areas of abnormal diffusion signal in the posterior left temporal lobe and inferior left parietal lobe compatible with posterior left MCA distribution infarct. Minimal involvement of the posterior left insula. Areas of prior infarct with encephalomalacia in the right frontal and left frontal lobe. Extensive nonspecific periventricular and subcortical white matter signal and bodies compatible with extensive chronic small vessel ischemic disease. Diffuse sulcal prominence compatible with atrophy. 1. Moderate-sized area of infarction in the posterior left MCA distribution involving the posterior left temporal lobe and inferior left parietal white matter. Diffusion restriction compatible with infarcts within the last 7-10 days. 2. Extensive white matter signal abnormality compatible with chronic small vessel ischemic disease. Areas of previous infarct within cephalization the frontal lobes. 3. No evidence of acute intracranial hemorrhage. 4. Abnormal marrow signal in the posterior dens compatible with metastatic disease. Mri Brain Wo Contrast    Result Date: 9/27/2020  MRI OF THE HEAD WITHOUT CONTRAST: HISTORY: Aphasia, history of stroke and lung cancer. . TECHNIQUE: Routine multiplanar, multisequence imaging protocol was performed. COMPARISON: 10/23/2019. Jama Hilt FINDINGS: This study is severely limited secondary to significant motion artifact on all imaging sequences obtained. The ventricular system is normal and midline in position. No extra-axial collections, mass effect or midline shift is seen. Diffuse patchy increased T2-weighted/FLAIR signal seen throughout the periventricular and subcortical white matter bilaterally. Focal area of encephalomalacia is seen in the right posterior frontal lobe, consistent with remote infarct. There is no evidence of intracranial hemorrhage. Diffusion imaging is severely limited. Question small focus of diffusion restriction signal abnormality in the left periventricular white matter adjacent to the atria of the lateral ventricle.  No other diffusion lesion   - CT cervical 6/24/2020 Stable sclerotic lesion at C2; similar finding on MRI 9/30  - pt missed few sessions of treatment appointments after 6/2020  -- discussed with patient findings  -- Reviewed films with Dr. Kimberli Iyv of radiation oncology. No evidence of new or progressive disease in the cervical spine.     CVA with L. MCA infarct  Hx of 2 strokes in the past last in 10/2019. Expressive aphasia (intermittent), right side weakness (improved). Given malignancy, patient has higher risk of strokes. Neurology following. Pt to be discharged on loop recorder to rule out arrhythmia as a risk factor for CVA. - on ASA, plavix and statin  - No further studies recommended to investigate pt's hypercoagulability status 2/2 malignancy. Typically this would be venous thromboembolic phenomena, not arterial  - follow up neurology and primary recs  - plan for pt to be discharged to ARU       Discussed and staffed with Dr. Leigha Lovell    Thank you for consultation. Danilo Larsen MD  [facility]  10/1/2020  8:58 AM    Attending:    Patient seen and examined. Data reviewed. Case discussed with Dr. Iris Thornton on rounds and agree with his progress note with my modification. Films reviewed with and case discussed with Dr. Kimberli Ivy of radiation oncology.     Giovanny Lund MD

## 2020-10-01 NOTE — PLAN OF CARE
Problem: Falls - Risk of:  Goal: Will remain free from falls  Description: Will remain free from falls  Outcome: Met This Shift   Pt free from injury this shift and free of falls. 2/4 rails up on bed and bed is in the lowest position. Wheels locked and bed alarm set. Socks on pt and ID bands on pt. Call light in reach of pt and pt educated to call out to get up x1 walker and gb. Will continue to monitor for safety. Problem: HEMODYNAMIC STATUS  Goal: Patient has stable vital signs and fluid balance  Outcome: Met This Shift     Vitals:    09/30/20 2303   BP: (!) 144/78   Pulse: 87   Resp: 18   Temp: 98.5 °F (36.9 °C)   SpO2: 92%     Pt Voiding wnl.

## 2020-10-01 NOTE — PROGRESS NOTES
way of EMS for altered mental state and expressive aphasia. Head CT: No findings for acute intracranial abnormality, Chest XR: No localized consolidation or pleural effusion, CTA head: No findings for acute CTA findings in the head. No hemodynamically significant stenosis or focal aneurysm identified, Brain MRI: Evidence of cerebral atrophy and diffuse chronic small vessel white matter disease bilaterally  Referring Practitioner: Moe Salas MD  Subjective  Subjective: Pt was in bed willing to participate  Pain Screening  Patient Currently in Pain: Denies  Vital Signs  Patient Currently in Pain: Denies  Orientation  Orientation  Overall Orientation Status: Impaired  Objective   Bed mobility  Supine to Sit: Min assist   Scooting: Contact guard assistance  Transfers  Sit to Stand: Contact guard assistance  Stand to sit: Stand by assistance  Ambulation  Ambulation?: Yes  Ambulation 1  Device: Rolling Walker  Assistance: Contact guard assistance  Quality of Gait: slow, steady jose, narrow ADRIA, decreased step length/height B, shuffling at times  Gait Deviations: Decreased step length;Decreased step height  Distance: 250'     Balance  Sitting - Static: Good  Sitting - Dynamic: Good  Standing - Static: Fair;+  Standing - Dynamic: Fair  Exercises  Straight Leg Raise: x 10 bilat  Heelslides: x15 B  Hip Abduction: x15 B  Ankle Pumps: x20 B   Anterior weight shifts while sitting to doff gown  Static standing with dynamic reaching below waistline to jamal and doff briefs: 2x30\"   AM-PAC Score  AM-PAC Inpatient Mobility Raw Score : 17 (10/01/20 1234)  AM-PAC Inpatient T-Scale Score : 42.13 (10/01/20 1234)  Mobility Inpatient CMS 0-100% Score: 50.57 (10/01/20 1234)  Mobility Inpatient CMS G-Code Modifier : CK (10/01/20 1234)    Goals  Short term goals  Time Frame for Short term goals: discharge  Short term goal 1: Pt to perform bed mobility with CGA in order to increase independence with functional mobility.   Short term goal 2: Pt to perform sit-->stand to RW with supervision in order to prepare for ambulation. Short term goal 3: Pt to stand for 10 mins at RW with supervision in order to participate in ADLs. Short term goal 4: Pt to ambulate 100 ft with RW and supervision in order to increase mobility around the home.   Patient Goals   Patient goals : not stated    Plan    Plan  Times per week: 5-7  Times per day: Daily  Current Treatment Recommendations: Strengthening, Neuromuscular Re-education, Home Exercise Program, Cognitive/Perceptual Training, Safety Education & Training, Balance Training, Endurance Training, Patient/Caregiver Education & Training, Functional Mobility Training, Equipment Evaluation, Education, & procurement, Transfer Training, Gait Training  Safety Devices  Type of devices: Call light within reach, Nurse notified, Left in chair, Chair alarm in place     Therapy Time   Individual Concurrent Group Co-treatment   Time In 0915         Time Out 0955         Minutes 40         Timed Code Treatment Minutes: 20 [facility]

## 2020-10-01 NOTE — PROGRESS NOTES
Internal Medicine PGY-1 Resident Progress Note        PCP: Brianna Gillette DO    Date of Admission: 9/27/2020    Chief Complaint: Expressive aphasia    Hospital Course: 68 y.o. female with a past medical history of aortic aneurysm, retention, hyperlipidemia, and stage IV non-small cell lung cancer who [facility]. with expressive aphasia. History is obtained from the daughter Esther Miller who is at pt's bedside, as pt is not able to give history due to the expressive aphasia. The pt lives with her . Per daughter the pt went to bed at about 7pm last night and this morning her  noticed that she was cought up in her blanket and was having trouble untangling it. At that time he noticed that she was having trouble expressing herself. Per daughter there has been no improvement in her ability to express herself since this morning and the words that are coming out are 'jumbled' and don't make sense. Pt is able to follow commands and her speech is nonslurred. However it does appear that she has some expressive aphasia, although it is intermittently better. She had a history of a cerebrovascular accident in October 2019 and has been having right sided weakness. Per medical records he had an MRI at that time, it was notable for 2 punctate infarcts in the posterior right frontal white matter.  It was thought that it was secondary to embolic nature.  She was seen by neurology at that time, recommended aspirin and Plavix as well as blood pressure control.     Per daughter the pt has some memory problems at baseline involving mostly short term memory. She has also been having frequent falls since the past stroke and is being evaluated for Parkinson's disease. At baseline pt cannot take shower by herself because of fear of falling, but she can go to the bathroom by herself.  She normally walks with a walker.     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  She is status post radiation therapy.      Subjective:    Pt resting comfortably in bed. Still experiencing expressive aphasia. Slight worsening in ability to follow commands this AM. Was having trouble with performing finger to nose (would touch my finger but not her nose). Denies nausea, vomiting, chest pain, shortness of breath, fevers, chills, burning with urination. Medications:  Reviewed    Infusion Medications   Scheduled Medications    NIFEdipine  30 mg Oral Daily    clopidogrel  75 mg Oral Daily    LORazepam  0.5 mg Intravenous Once    citalopram  40 mg Oral Nightly    pantoprazole  40 mg Oral Nightly    aspirin  325 mg Oral Daily    atorvastatin  80 mg Oral Daily    sodium chloride flush  10 mL Intravenous 2 times per day    enoxaparin  40 mg Subcutaneous Daily    carbidopa-levodopa  0.5 tablet Oral TID     PRN Meds: LORazepam, oxyCODONE-acetaminophen, sodium chloride flush, polyethylene glycol, promethazine **OR** ondansetron, labetalol, zinc oxide      Intake/Output Summary (Last 24 hours) at 10/1/2020 1004  Last data filed at 10/1/2020 0135  Gross per 24 hour   Intake 300 ml   Output --   Net 300 ml       Physical Exam Performed:    /74   Pulse 86   Temp 97.9 °F (36.6 °C) (Oral)   Resp 18   Ht 5' 2\" (1.575 m)   Wt 142 lb (64.4 kg)   SpO2 91%   BMI 25.97 kg/m²     General appearance: No apparent distress, appears stated age and cooperative. HEENT: Pupils equal, round, and reactive to light. Conjunctivae/corneas clear. Neck: Supple, with full range of motion. No jugular venous distention. Trachea midline. Respiratory:  Normal respiratory effort. Clear to auscultation, bilaterally without Rales/Wheezes/Rhonchi. Cardiovascular: Regular rate and rhythm with normal S1/S2 without murmurs, rubs or gallops. Abdomen: Soft, non-tender, non-distended with normal bowel sounds. Musculoskeletal: No clubbing, cyanosis or edema bilaterally. Full range of motion without deformity.   Skin: Skin color, texture, turgor normal.  No rashes or lesions. Neurologic:  Expressive aphasia and impaired repetition. Slight worsening in ability to follow commands from previous but cooperative. Neurovascularly intact without any focal sensory/motor deficits. Cranial nerves: II-XII intact, grossly non-focal.  Psychiatric: Difficult to assess orientation due to pt's expressive aphasia    Labs:   Recent Labs     09/29/20  0600 09/30/20  0534 10/01/20  0500   WBC 7.6 6.3 7.2   HGB 10.1* 10.9* 9.9*   HCT 31.7* 33.1* 31.2*    253 271     Recent Labs     09/29/20  0600 09/30/20  0534 10/01/20  0500    137 135*   K 4.0 4.1 4.4    103 100   CO2 26 26 24   BUN 19 19 26*   CREATININE 1.1 1.0 1.1   CALCIUM 8.8 8.9 9.0     No results for input(s): AST, ALT, BILIDIR, BILITOT, ALKPHOS in the last 72 hours. No results for input(s): INR in the last 72 hours. No results for input(s): Jennifer Rojelio in the last 72 hours. Urinalysis:      Lab Results   Component Value Date    NITRU POSITIVE 09/27/2020    WBCUA 3-5 09/27/2020    BACTERIA 4+ 09/27/2020    RBCUA 0-2 09/27/2020    BLOODU Negative 09/27/2020    SPECGRAV 1.010 09/27/2020    GLUCOSEU Negative 09/27/2020    GLUCOSEU Negative 05/08/2012       Radiology:  CT CHEST ABDOMEN PELVIS W CONTRAST   Final Result      CHEST:      1. No acute intra-thoracic abnormality. 2. No evidence for new or progressive disease of the chest.   3. Emphysema. 4. Stable sclerotic lesion of the posterior elements at T11.   5. Atherosclerotic disease. ABDOMEN/PELVIS:      1. No acute intra-abdominopelvic abnormality. 2. No evidence for new or progressive disease of the abdomen or pelvis. 3. Anterior abdominal and pelvic wall hernia containing nonobstructed loops of large and small bowel, which is not significantly changed. MRI BRAIN WO CONTRAST   Final Result   1.  Moderate-sized area of infarction in the posterior left MCA distribution involving the posterior left temporal lobe and inferior left parietal white matter. Diffusion restriction compatible with infarcts within the last 7-10 days. 2. Extensive white matter signal abnormality compatible with chronic small vessel ischemic disease. Areas of previous infarct within cephalization the frontal lobes. 3. No evidence of acute intracranial hemorrhage. 4. Abnormal marrow signal in the posterior dens compatible with metastatic disease. MRI BRAIN WO CONTRAST   Final Result      Severely limited study. Evidence of cerebral atrophy and diffuse chronic small vessel white matter disease bilaterally. Remote right posterior frontal lobe cortical infarct. Small focus of diffusion restriction consistent with small vessel disease in the left periventricular white matter is not excluded. The study is limited significantly by motion. No other acute intracranial findings. Right maxillary chronic sinus disease. CTA NECK W CONTRAST   Final Result      1. No findings for acute intracranial abnormality. 2.  Age related atrophy with moderate periventricular white matter changes bilaterally consistent with chronic ischemic leukoencephalopathy. 3.  Remote right frontal lobe infarct with multiple small bilateral basal ganglia lacunar infarcts, unchanged. 4.  Chronic right maxillary sinusitis. PROCEDURE: CT ANGIOGRAPHY NECK WITH/WITHOUT CONTRAST      INDICATION: altered mental state      COMPARISON: none      TECHNIQUE: Limited noncontrast CT imaging performed for the purposes of IV contrast bolus tracking. Axial CT imaging obtained from skull base through the lung apices following administration of IV contrast. Axial images, multiplanar reformatted images,    and maximum intensity projection images were reviewed for CT angiographic technique. Up-to-date CT equipment and radiation dose reduction techniques were employed. IV contrast: 80 mL Isovue 370.       FINDINGS:      AORTIC ARCH: Standard three-vessel aortic arch anatomy is present. INNOMINATE ARTERY: Normal.      RIGHT SUBCLAVIAN ARTERY: Normal.      RIGHT VERTEBRAL ARTERY: Normal.      RIGHT CAROTID ARTERY: There is contrast opacification of the right common carotid artery. Minimal epiglottic plaque at the carotid bifurcation. No flow significant stenosis seen. There is flow identified in the distal right internal carotid artery. LEFT SUBCLAVIAN ARTERY: Normal.      LEFT VERTEBRAL ARTERY: Normal.      LEFT CAROTID ARTERY: There is contrast opacification of the left common carotid artery. There is moderate chronic plaque at the carotid bifurcation. No flow significant stenosis or focal aneurysm seen. There is flow in the distal left internal carotid    artery. NECK SOFT TISSUES: No neck soft tissue mass or lymphadenopathy. VISUALIZED MEDIASTINUM: No mass or lymphadenopathy. VISUALIZED LUNG APICES: Mild centrilobular emphysematous changes bilaterally. BONES: No destructive osseous process. OTHER: None. IMPRESSION:      No acute CTA findings. No hemodynamically significant stenosis or focal aneurysm seen. PROCEDURE: CT ANGIOGRAPHY HEAD WITH/WITHOUT CONTRAST      INDICATION: altered mental state      COMPARISON: none      TECHNIQUE: Axial CT imaging obtained through the head prior to and following administration of IV contrast. Axial images, multiplanar reformatted images, and maximum intensity projection images were reviewed for CT angiographic technique. IV contrast: 80 mL Isovue 370. FINDINGS:      ANTERIOR CIRCULATION: The intracranial internal carotid arteries, anterior cerebral arteries, and middle cerebral arteries demonstrate no occlusion or stenosis. No evidence for aneurysm or arteriovenous malformation. POSTERIOR CIRCULATION: The bilateral vertebral arteries, basilar artery and posterior cerebral arteries demonstrate no occlusion or significant stenosis.   Small patent right posterior communicating artery. No evidence for aneurysm or arteriovenous    malformation. INTRACRANIAL VENOUS SYSTEM: No evidence for intracranial venous thrombosis. OTHER: None. IMPRESSION:      1. No findings for acute CTA findings in the head. No hemodynamically significant stenosis or focal aneurysm identified. CTA HEAD W CONTRAST   Final Result      1. No findings for acute intracranial abnormality. 2.  Age related atrophy with moderate periventricular white matter changes bilaterally consistent with chronic ischemic leukoencephalopathy. 3.  Remote right frontal lobe infarct with multiple small bilateral basal ganglia lacunar infarcts, unchanged. 4.  Chronic right maxillary sinusitis. PROCEDURE: CT ANGIOGRAPHY NECK WITH/WITHOUT CONTRAST      INDICATION: altered mental state      COMPARISON: none      TECHNIQUE: Limited noncontrast CT imaging performed for the purposes of IV contrast bolus tracking. Axial CT imaging obtained from skull base through the lung apices following administration of IV contrast. Axial images, multiplanar reformatted images,    and maximum intensity projection images were reviewed for CT angiographic technique. Up-to-date CT equipment and radiation dose reduction techniques were employed. IV contrast: 80 mL Isovue 370. FINDINGS:      AORTIC ARCH: Standard three-vessel aortic arch anatomy is present. INNOMINATE ARTERY: Normal.      RIGHT SUBCLAVIAN ARTERY: Normal.      RIGHT VERTEBRAL ARTERY: Normal.      RIGHT CAROTID ARTERY: There is contrast opacification of the right common carotid artery. Minimal epiglottic plaque at the carotid bifurcation. No flow significant stenosis seen. There is flow identified in the distal right internal carotid artery.       LEFT SUBCLAVIAN ARTERY: Normal.      LEFT VERTEBRAL ARTERY: Normal.      LEFT CAROTID ARTERY: There is contrast opacification of the left common carotid artery. There is moderate chronic plaque at the carotid bifurcation. No flow significant stenosis or focal aneurysm seen. There is flow in the distal left internal carotid    artery. NECK SOFT TISSUES: No neck soft tissue mass or lymphadenopathy. VISUALIZED MEDIASTINUM: No mass or lymphadenopathy. VISUALIZED LUNG APICES: Mild centrilobular emphysematous changes bilaterally. BONES: No destructive osseous process. OTHER: None. IMPRESSION:      No acute CTA findings. No hemodynamically significant stenosis or focal aneurysm seen. PROCEDURE: CT ANGIOGRAPHY HEAD WITH/WITHOUT CONTRAST      INDICATION: altered mental state      COMPARISON: none      TECHNIQUE: Axial CT imaging obtained through the head prior to and following administration of IV contrast. Axial images, multiplanar reformatted images, and maximum intensity projection images were reviewed for CT angiographic technique. IV contrast: 80 mL Isovue 370. FINDINGS:      ANTERIOR CIRCULATION: The intracranial internal carotid arteries, anterior cerebral arteries, and middle cerebral arteries demonstrate no occlusion or stenosis. No evidence for aneurysm or arteriovenous malformation. POSTERIOR CIRCULATION: The bilateral vertebral arteries, basilar artery and posterior cerebral arteries demonstrate no occlusion or significant stenosis. Small patent right posterior communicating artery. No evidence for aneurysm or arteriovenous    malformation. INTRACRANIAL VENOUS SYSTEM: No evidence for intracranial venous thrombosis. OTHER: None. IMPRESSION:      1. No findings for acute CTA findings in the head. No hemodynamically significant stenosis or focal aneurysm identified. XR CHEST PORTABLE   Final Result   1. No localized consolidation or pleural effusion. .      CT Head WO Contrast   Final Result      1. No findings for acute intracranial abnormality.       2.  Age related atrophy with moderate periventricular white matter changes bilaterally consistent with chronic ischemic leukoencephalopathy. 3.  Remote right frontal lobe infarct with multiple small bilateral basal ganglia lacunar infarcts, unchanged. 4.  Chronic right maxillary sinusitis. PROCEDURE: CT ANGIOGRAPHY NECK WITH/WITHOUT CONTRAST      INDICATION: altered mental state      COMPARISON: none      TECHNIQUE: Limited noncontrast CT imaging performed for the purposes of IV contrast bolus tracking. Axial CT imaging obtained from skull base through the lung apices following administration of IV contrast. Axial images, multiplanar reformatted images,    and maximum intensity projection images were reviewed for CT angiographic technique. Up-to-date CT equipment and radiation dose reduction techniques were employed. IV contrast: 80 mL Isovue 370. FINDINGS:      AORTIC ARCH: Standard three-vessel aortic arch anatomy is present. INNOMINATE ARTERY: Normal.      RIGHT SUBCLAVIAN ARTERY: Normal.      RIGHT VERTEBRAL ARTERY: Normal.      RIGHT CAROTID ARTERY: There is contrast opacification of the right common carotid artery. Minimal epiglottic plaque at the carotid bifurcation. No flow significant stenosis seen. There is flow identified in the distal right internal carotid artery. LEFT SUBCLAVIAN ARTERY: Normal.      LEFT VERTEBRAL ARTERY: Normal.      LEFT CAROTID ARTERY: There is contrast opacification of the left common carotid artery. There is moderate chronic plaque at the carotid bifurcation. No flow significant stenosis or focal aneurysm seen. There is flow in the distal left internal carotid    artery. NECK SOFT TISSUES: No neck soft tissue mass or lymphadenopathy. VISUALIZED MEDIASTINUM: No mass or lymphadenopathy. VISUALIZED LUNG APICES: Mild centrilobular emphysematous changes bilaterally. BONES: No destructive osseous process. OTHER: None. IMPRESSION:      No acute CTA findings. No hemodynamically significant stenosis or focal aneurysm seen. PROCEDURE: CT ANGIOGRAPHY HEAD WITH/WITHOUT CONTRAST      INDICATION: altered mental state      COMPARISON: none      TECHNIQUE: Axial CT imaging obtained through the head prior to and following administration of IV contrast. Axial images, multiplanar reformatted images, and maximum intensity projection images were reviewed for CT angiographic technique. IV contrast: 80 mL Isovue 370. FINDINGS:      ANTERIOR CIRCULATION: The intracranial internal carotid arteries, anterior cerebral arteries, and middle cerebral arteries demonstrate no occlusion or stenosis. No evidence for aneurysm or arteriovenous malformation. POSTERIOR CIRCULATION: The bilateral vertebral arteries, basilar artery and posterior cerebral arteries demonstrate no occlusion or significant stenosis. Small patent right posterior communicating artery. No evidence for aneurysm or arteriovenous    malformation. INTRACRANIAL VENOUS SYSTEM: No evidence for intracranial venous thrombosis. OTHER: None. IMPRESSION:      1. No findings for acute CTA findings in the head. No hemodynamically significant stenosis or focal aneurysm identified. Assessment/Plan:  Corina Truong is a 68 y.o. female w/ a past medical history of aortic aneurysm, retention, hyperlipidemia, and stage IV non-small cell lung cancer who presented to [facility]. with expressive aphasia.       Active Hospital Problems    Diagnosis Date Noted    History of ischemic left MCA stroke [Z86.73] 09/30/2020    Expressive aphasia [R47.01] 09/27/2020     Expressive aphasia - pt has hx of stroke in Oct 2019 and has hx of smoking; expressive aphasia intermittently better  - etiologies include TIA v CVA v seizure focus  - neurology consultation  - NIHSS score 2  - permissive HTN systolic to 932 and diastolic to 758  - telemetry  - q4 neuro checks  - CT head without acute intracranial changes  - CTA neck and CTA head without acute changes  - CXR no consolidation  - MRI brain (9/27): cerebral atrophy and diffuse chronic small vessel white matter disease bilaterally  - EEG with generalized slowing but no epileptiform discharges  - ECHO findings: asymmetric hypertrophy of the basal septum. Overall left ventricular systolic function appears normal with an estimated ejection fraction of 55-60%. Diastolic filling parameters suggests normal diastolic function. No evidence of significant valvular stenosis or regurgitation present  - repeat MRI (9/29): Moderate-sized area of infarction in the posterior left MCA distribution involving the posterior left temporal lobe and inferior left parietal white matter. Diffusion restriction compatible with infarcts within the last 7-10 days. Extensive white matter signal abnormality compatible with chronic small vessel ischemic disease. Areas of previous infarct within cephalization the frontal lobes. No evidence of acute intracranial hemorrhage.  Abnormal marrow signal in the posterior dens compatible with metastatic disease  - restart plavix - per Neurology team recommendations  - per Heme/Onc team no further studies recommended to investigate pt's hypercoagulability status 2/2 malignancy  - per Neurology team recs f/u EP consult for loop placement, continue home sinemet, avoid all other dopamine blocking agents, f/u with neurology  - heart monitor at home  - awaiting ARU precert      Possible delirium  - slight worsening in ability to follow commands today   - delirium probably from hospitalization superimposed on hx of dementia  - f/u ammonia  - delirium precautions, frequent reorientation        UTI - symptomatic - recent burning with urination per pt's   - UA positive for nitrites  - dc'd ceftriaxone    HTN  - BP better controlled   - on home nifedipine        Chronic conditions:  NSCLC stage IV with bony metastases - treated with radiation therapy  Aortic aneurysm  Urinary retention  Hyperlipidemia    DVT Prophylaxis: Lovenox  Diet: DIET GENERAL;  Code Status: Full Code  PT/OT Eval Status: 17/24 PT  Dispo - pending ammonia test result and ARU precert      I will discuss the patient with the senior resident and Irene Runner, MD Dariel Alderman, MD   Internal Medicine Resident PGY-1

## 2020-10-01 NOTE — PROGRESS NOTES
VSS. NIH 3 d/t aphasia and being unable to state the year or where she is. Sometimes pt will answer questions correctly and other times she has word salad. She is alert to self  as far as nursing can assess. Pt is up x1 with walker and GB. Pt can be incontinent at times and other times she gets up to toilet BRP. PIV SL. Pt tolerates diet w/o nausea. Lungs clear. Pt awaits potential ARU. CT abd and pelvis completed overnight. Bed alarm set. Call light in reach. Will continue to monitor.

## 2020-10-01 NOTE — PROGRESS NOTES
Interval History:  Ok to go to inpatient rehab from neuro perspective.      Current Medications:    Current Facility-Administered Medications:     NIFEdipine (ADALAT CC) extended release tablet 30 mg, 30 mg, Oral, Daily, Lois Pedersen MD, 30 mg at 09/30/20 1418    clopidogrel (PLAVIX) tablet 75 mg, 75 mg, Oral, Daily, Lois Pedersen MD, 75 mg at 09/30/20 1418    LORazepam (ATIVAN) injection 0.5 mg, 0.5 mg, Intravenous, Once, Donna Gonzalez MD, Stopped at 09/29/20 0800    citalopram (CELEXA) tablet 40 mg, 40 mg, Oral, Nightly, Lois Pedersen MD, 40 mg at 09/30/20 2119    pantoprazole (PROTONIX) tablet 40 mg, 40 mg, Oral, Nightly, Lois Pedersen MD, 40 mg at 09/30/20 2119    LORazepam (ATIVAN) tablet 0.5 mg, 0.5 mg, Oral, Q8H PRN, Lois Pedersen MD    oxyCODONE-acetaminophen (PERCOCET) 5-325 MG per tablet 1 tablet, 1 tablet, Oral, Q8H PRN, Lois Pedersen MD    aspirin EC tablet 325 mg, 325 mg, Oral, Daily, Lois Pedersen MD, 325 mg at 09/30/20 0935    atorvastatin (LIPITOR) tablet 80 mg, 80 mg, Oral, Daily, Lois Pedersen MD, 80 mg at 09/30/20 0936    sodium chloride flush 0.9 % injection 10 mL, 10 mL, Intravenous, 2 times per day, Lois Pedersen MD, 10 mL at 09/30/20 2119    sodium chloride flush 0.9 % injection 10 mL, 10 mL, Intravenous, PRN, Lois Pedersen MD    polyethylene glycol (GLYCOLAX) packet 17 g, 17 g, Oral, Daily PRN, Lois Pedersen MD    promethazine (PHENERGAN) tablet 12.5 mg, 12.5 mg, Oral, Q6H PRN **OR** ondansetron (ZOFRAN) injection 4 mg, 4 mg, Intravenous, Q6H PRN, Lois Pedersen MD    enoxaparin (LOVENOX) injection 40 mg, 40 mg, Subcutaneous, Daily, Lois Pedersen MD, 40 mg at 09/30/20 0983    labetalol (NORMODYNE;TRANDATE) injection 10 mg, 10 mg, Intravenous, Q10 Min PRN, Lois Pedersen MD    carbidopa-levodopa (SINEMET)  MG per tablet 0.5 tablet, 0.5 tablet, Oral, TID, Llia Fox MD, 0.5 tablet at 09/30/20 0880    zinc oxide 20 % ointment, , Topical, 4x Daily PRN, Yogesh Ambriz MD        Images:     MRI brain wo contrast:  1. Moderate-sized area of infarction in the posterior left MCA distribution involving the posterior left temporal lobe and inferior left parietal white matter. Diffusion restriction compatible with infarcts within the last 7-10 days. 2. Extensive white matter signal abnormality compatible with chronic small vessel ischemic disease. Areas of previous infarct within cephalization the frontal lobes. 3. No evidence of acute intracranial hemorrhage. 4. Abnormal marrow signal in the posterior dens compatible with metastatic disease. CTA head and neck w contrast:  No findings for acute CTA findings in the head. No hemodynamically significant stenosis or focal aneurysm identified. Echocardiogram with bubble:   Technically difficult study. Left ventricular cavity size is normal. Thereis asymmetric hypertrophy of the basal septum. Overall left ventricular systolic function appears normal with an estimated ejection fraction of 55-60%. Diastolic filling parameters suggests normal diastolic function. No evidence of significant valvular stenosis or regurgitation present. A bubble study was performed and fails to show evidence of shunting. Routine EEG:  The EEG was abnormal due to the presence of: Generalized slowing     Generalized slowing is a non-specific finding consistent with a generalized disturbance of cerebral functioning including toxic,   metabolic, or structural abnormalities that are multi-focal or diffuse. No focal, lateralizing, or epileptiform features were seen during the recording. Clinical correlation is recommended. The absence of epileptiform discharges on a single EEG does not rule out a diagnosis of  epilepsy or rule out non-convulsive or complex partial status   epilepticus as a cause of altered mental status       Pertinent Labs:     Hemoglobin A1C: 6.0  Lipid panel:      Ref.  Range 9/27/2020

## 2020-10-01 NOTE — PROGRESS NOTES
Depression, History of ischemic left MCA stroke, History of ischemic left MCA stroke, Hyperlipidemia, Hypertension, Incontinence, MRSA (methicillin resistant staph aureus) culture positive, Ulcer, and Ulcerative colitis ([facility] [address] has a past surgical history that includes Abdomen surgery; Cholecystectomy, open (5/7/2012); Pressure ulcer debridement (2010); Colonoscopy; Upper gastrointestinal endoscopy; Stomach surgery; ERCP (3/13/12); bronchoscopy (12/11/2012); and ERCP (12-). Restrictions  Position Activity Restriction  Other position/activity restrictions: up at tolerated       Additional Pertinent Hx: Admitted 527. 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 Head CT: No findings for acute intracranial abnormality, Chest XR: No localized consolidation or pleural effusion, CTA head: No findings for acute CTA findings in the head. No hemodynamically significant stenosis or focal aneurysm identified, Brain MRI: Evidence of cerebral atrophy and diffuse chronic small vessel white matter disease bilaterally      Diagnosis: Expressive aphasia. CT head and neck negative, and MRI of head (limited study) negative for acute change. Subjective:   Pt met supine in bed in company of daughter. Pt requiring encouragement for participation. Daughter encouraging pt for participation. Pain:   Denies    Objective:    Cognition/Orientation:  Expressive aphasia, decreased awareness of deficits    Bed mobility   Supine to sit: SBA   Scooting: SBA    Functional Mobility   Sit to Stand: CGA for stance from EOB  Stand to Sit: CGA  Bed to Chair Transfer: CGA with heavy VCs for tech. Pt pushing RW aside and leaning/reaching toward chair while turning  Commode Transfer: declined  Other:  Functional mobility around room x2 with RW and CGA. Pt encouraged to walk in hallway however pt declining. ADLs   LB dressing: Mni A for donning socks seated on EOB. Pt pulling knee up on bed for donning socks requiring increased time and VCs for tech  Toileting: declined      Activity Tolerance:  Pt requiring encouragement for participation and VCs for understanding    Patient Education:   Safe transfers. Pt and daughter education on discharge/ARU/precert process. - Daughter verb understanding - pt will need reinforcement      Safety Devices in Place:  Pt left in chair with alarm on and call light in reach.                   Plan  If pt discharges prior to next treatment, this note will serve as discharge summary  Plan  Times per week: 5-7                        AM-PAC Score        AM-PAC Inpatient Daily Activity Raw Score: 16 (10/01/20 1218)  AM-PAC Inpatient ADL T-Scale Score : 35.96 (10/01/20 1218)  ADL Inpatient CMS 0-100% Score: 53.32 (10/01/20 1218)  ADL Inpatient CMS G-Code Modifier : CK (10/01/20 1218)    Goals (as determined and assessed by primary OT)  Short term goals  Time Frame for Short term goals: 1 week  Short term goal 1: LB dressing with SBA - ongoing  Short term goal 2: 3 grooming tasks standing with SBA - ongoing  Short term goal 3: Toilet t/f from ambulatory level with SBA - ongoing  Short term goal 4: Toileting with min A. - ongoing  Patient Goals   Patient goals : None stated by pt       Therapy Time   Individual Concurrent Group Co-treatment   Time In 1155         Time Out 1218         Minutes 23         Timed Code Treatment Minutes: 23 Minutes   Total Treatment minutes: 23 min     [address] [place] [address], Ne, ESTELLE

## 2020-10-01 NOTE — DISCHARGE INSTR - COC
Continuity of Care Form    Patient Name: Natan Gutierrez   :  1940  MRN:  [id number]    Admit date:  2020  Discharge date:  ***    Code Status Order: Full Code   Advance Directives:   Advance Care Flowsheet Documentation       Date/Time Healthcare Directive Type of Healthcare Directive Copy in [address] Agent's Name Healthcare Agent's Phone Number    [phone]  Yes, patient has an advance directive for healthcare treatment  Living will  No, copy requested from family  --  --  --            Admitting Physician:  Clare Veliz MD  PCP: Blanca Carbajal DO    Discharging Nurse: [facility] Unit/Room#: 1565/5117-19  Discharging Unit Phone Number: ***    Emergency Contact:   Extended Emergency Contact Information  Primary Emergency Contact: Ozzie Verdin  Address: [address] [place] [address] Phone: [phone]  Work Phone: [phone]  Relation: Spouse   needed? No  Secondary Emergency Contact: Mariah Marcelino   [address] Phone: [phone]  Relation: Child   needed?  No    Past Surgical History:  Past Surgical History:   Procedure Laterality Date    ABDOMEN SURGERY      gastric resection for ulcers    BRONCHOSCOPY  2012    CHOLECYSTECTOMY, OPEN  2012    COLONOSCOPY      ERCP  3/13/12    sphincterotomy; 5 Kinyarwanda cm stent placed    ERCP  2012    pancreatic stent removal, pancreotogram    PRESSURE ULCER DEBRIDEMENT      gastric    STOMACH SURGERY      for ulcer    UPPER GASTROINTESTINAL ENDOSCOPY         Immunization History:   Immunization History   Administered Date(s) Administered    Influenza Vaccine, unspecified formulation 10/06/2015, 2019    Influenza Virus Vaccine 10/12/2014    Influenza Whole 10/31/2012    Pneumococcal Conjugate 7-valent (Celesta Pantera) 10/01/2011    Zoster Live (Zostavax) 2012       Active VAIJ:234099521:::5}  Med Delivery   { AMRITA MED Delivery:970006902:::0}    Wound Care Documentation and Therapy:        Elimination:  Continence: Bowel: {YES / A}  Bladder: {YES / H}  Urinary Catheter: {Urinary Catheter:776843710:::0}   Colostomy/Ileostomy/Ileal Conduit: {YES / DS:77019}       Date of Last BM: ***    Intake/Output Summary (Last 24 hours) at 10/1/2020 1350  Last data filed at 10/1/2020 1009  Gross per 24 hour   Intake 420 ml   Output --   Net 420 ml     I/O last 3 completed shifts:   In: 5 [P.O.:720]  Out: 1 [Urine:1]    Safety Concerns:     [address] [employer] Safety Concerns:507860755:::0}    Impairments/Disabilities:      [address] [employer] Impairments/Disabilities:342025069:::0}    Nutrition Therapy:  Current Nutrition Therapy:   [address] [employer] Diet List:461384255:::0}    Routes of Feeding: {[facility] DME Other Feedings:190034301:::0}  Liquids: {Slp liquid thickness:77600}  Daily Fluid Restriction: {CH DME Yes amt example:946820761:::0}  Last Modified Barium Swallow with Video (Video Swallowing Test): {Done Not Done KLDR:236641073:::2}    Treatments at the Time of Hospital Discharge:   Respiratory Treatments: ***  Oxygen Therapy:  {Therapy; copd oxygen:30230:::0}  Ventilator:    { CC Vent List:467239716:::0}    Rehab Therapies: {THERAPEUTIC INTERVENTION:8237949805}  Weight Bearing Status/Restrictions: [address] [employer] Weight Bearin:::0}  Other Medical Equipment (for information only, NOT a DME order):  {EQUIPMENT:564150217}  Other Treatments: ***    Patient's personal belongings (please select all that are sent with patient):  {[facility] DME Belongings:323690523:::0}    RN SIGNATURE:  {Esignature:479774514:::0}    CASE MANAGEMENT/SOCIAL WORK SECTION    Inpatient Status Date: ***    Readmission Risk Assessment Score:  Readmission Risk              Risk of Unplanned Readmission:        18           Discharging to Facility/ Agency   Name:   Address:  Phone:  Fax:    Dialysis Facility (if applicable)

## 2020-10-01 NOTE — DISCHARGE INSTR - COC
Continuity of Care Form    Patient Name: Alem Frances   :  1940  MRN:  [id number]    Admit date:  2020  Discharge date:  ***    Code Status Order: Full Code   Advance Directives:   Advance Care Flowsheet Documentation     Date/Time Healthcare Directive Type of Healthcare Directive Copy in [address] Agent's Name Healthcare Agent's Phone Number    20 [phone]  Yes, patient has an advance directive for healthcare treatment  Living will  No, copy requested from family  --  --  --          Admitting Physician:  Radha Allred MD  PCP: Guillermo Pimentel DO    Discharging Nurse: [facility] Unit/Room#: 9938/6778-53  Discharging Unit Phone Number: ***    Emergency Contact:   Extended Emergency Contact Information  Primary Emergency Contact: Ozzie Verdin  Address: [address] [facility], [address] Phone: [phone]  Work Phone: [phone]  Relation: Spouse   needed? No  Secondary Emergency Contact: Mariah Marcelino   [address] Phone: [phone]  Relation: Child   needed?  No    Past Surgical History:  Past Surgical History:   Procedure Laterality Date    ABDOMEN SURGERY      gastric resection for ulcers    BRONCHOSCOPY  2012    CHOLECYSTECTOMY, OPEN  2012    COLONOSCOPY      ERCP  3/13/12    sphincterotomy; 5 Romanian cm stent placed    ERCP  2012    pancreatic stent removal, pancreotogram    PRESSURE ULCER DEBRIDEMENT      gastric    STOMACH SURGERY      for ulcer    UPPER GASTROINTESTINAL ENDOSCOPY         Immunization History:   Immunization History   Administered Date(s) Administered    Influenza Vaccine, unspecified formulation 10/06/2015, 2019    Influenza Virus Vaccine 10/12/2014    Influenza Whole 10/31/2012    Pneumococcal Conjugate 7-valent (Tracy Cotton) 10/01/2011    Zoster Live (Zostavax) 2012       Active Problems:  Patient Active Problem List   Diagnosis Code    Cellulitis and abscess L03.90, L02.91    UC (ulcerative colitis) ([facility] [address] K51.90    Elevated blood pressure reading R03.0    Nausea & vomiting R11.2    Pancreatic mass K86.89    S/P cholecystectomy Z90.49    Cholelithiasis K80.20    Hypoxemia requiring supplemental oxygen R09.02, Z99.81    Incisional hernia K43.2    Port-A-Cath in place Z95.828    History of lung cancer Z85. 80    Left kidney mass N28.89    Wound infection after surgery, sequela [id number]    Left arm weakness R29.898    Stroke-like symptom R29.90    Weakness of both lower extremities R29.898    Tingling of left upper extremity R20.2    Acute renal failure (ARF) (HCC) N17.9    Generalized weakness R53.1    Expressive aphasia R47.01    History of ischemic left MCA stroke Z86.73       Isolation/Infection:   Isolation          No Isolation        Patient Infection Status     Infection Onset Added Last Indicated Last Indicated By Review Planned Expiration Resolved Resolved By    None active    Resolved    COVID-19 Rule Out 06/25/20 06/25/20 06/25/20 COVID-19 (Ordered)   06/25/20 Rule-Out Test Resulted    MRSA  12/31/11 12/31/11 Teressa Pham RN   05/01/18 Beka Hooker RN          Nurse Assessment:  Last Vital Signs: BP (!) 147/81   Pulse 88   Temp 98.4 °F (36.9 °C) (Oral)   Resp 18   Ht 5' 2\" (1.575 m)   Wt 142 lb (64.4 kg)   SpO2 92%   BMI 25.97 kg/m²     Last documented pain score (0-10 scale):    Last Weight:   Wt Readings from Last 1 Encounters:   09/27/20 142 lb (64.4 kg)     Mental Status:  {IP PT MENTAL STATUS:20030}    IV Access:  {[facility] IV ACCESS:343532829}    Nursing Mobility/ADLs:  Walking   {CHP DME BZBA:746076290}  Transfer  {CHP DME GMUX:794090030}  Bathing  {CHP DME PRXQ:192843996}  Dressing  {CHP DME MMDO:246348550}  Toileting  {CHP DME TTUL:710464350}  Feeding  {CHP DME GVML:636349496}  Med Admin  {CHICHI HIGGINS ZJZW:853558179}  Med Delivery   { AMRITA MED Delivery:158258015}    Wound Care Documentation and Therapy:        Elimination:  Continence:   · Bowel: {YES / CJ:06275}  · Bladder: {YES / CT:91962}  Urinary Catheter: {Urinary Catheter:332292876}   Colostomy/Ileostomy/Ileal Conduit: {YES / H}       Date of Last BM: ***    Intake/Output Summary (Last 24 hours) at 10/1/2020 1350  Last data filed at 10/1/2020 1009  Gross per 24 hour   Intake 420 ml   Output --   Net 420 ml     I/O last 3 completed shifts:   In: 5 [P.O.:720]  Out: 1 [Urine:1]    Safety Concerns:     [address] [employer] Safety Concerns:656835566}    Impairments/Disabilities:      [address] [employer] Impairments/Disabilities:775046593}    Nutrition Therapy:  Current Nutrition Therapy:   [address] [employer] Diet List:576928268}    Routes of Feeding: {CHP DME Other Feedings:330496477}  Liquids: {Slp liquid thickness:19022}  Daily Fluid Restriction: {CHP DME Yes amt example:903421617}  Last Modified Barium Swallow with Video (Video Swallowing Test): {Done Not Done ALVE:456439247}    Treatments at the Time of Hospital Discharge:   Respiratory Treatments: ***  Oxygen Therapy:  {Therapy; copd oxygen:66437}  Ventilator:    { CC Vent KXYO:942673566}    Rehab Therapies: {THERAPEUTIC INTERVENTION:9709684026}  Weight Bearing Status/Restrictions: [facility] Weight Bearin}  Other Medical Equipment (for information only, NOT a DME order):  {EQUIPMENT:229695175}  Other Treatments: ***    Patient's personal belongings (please select all that are sent with patient):  {CHP DME Belongings:259039386}    RN SIGNATURE:  {Esignature:177703721}    CASE MANAGEMENT/SOCIAL WORK SECTION    Inpatient Status Date: ***    Readmission Risk Assessment Score:  Readmission Risk              Risk of Unplanned Readmission:        Suly            [address], [address] [facility] [address]       Phone: [phone]       Fax: [phone]        charging to Facility/ Agency     / signature: Electronically signed by Herson Michelle RN on 10/1/20 at 1:51 PM EDT    PHYSICIAN SECTION    Prognosis: {Prognosis:3014069596}    Condition at Discharge: Jacinto Sanchez Patient Condition:797469128}    Rehab Potential (if transferring to Rehab): {Prognosis:6248830057}    Recommended Labs or Other Treatments After Discharge: ***    Physician Certification: I certify the above information and transfer of Bennie Morton  is necessary for the continuing treatment of the diagnosis listed and that she requires {Admit to Appropriate Level of Care:24133} for {GREATER/LESS:069131005} 30 days.      Update Admission H&P: {CHP DME Changes in IWSAR:867784731}    PHYSICIAN SIGNATURE:  {Esignature:251311125}

## 2020-10-01 NOTE — DISCHARGE SUMMARY
Hospital Medicine Discharge Summary    Patient ID: Miley Patton   Gender: female  : 1940   Age: 68 y.o. MRN: [id number]  Code Status: Full Code   Patient's PCP: Trace Bello DO    Admit Date: 2020     Discharge Date: 10/01/2020    Admitting Physician: Patsy Rosado MD     Discharge Physician: Layla Flower MD     Discharge Diagnoses:  - Left MCA ischemic stroke  - UTI treated   - Hypertension  - Non-small cell lung cancer stage IV       Active Hospital Problems    Diagnosis Date Noted    History of ischemic left MCA stroke [Z86.73] 2020    Expressive aphasia [R47.01] 2020       The patient was seen and examined on day of discharge and this discharge summary is in conjunction with any daily progress note from day of discharge. Hospital Course: 68 y.o. female with a past medical history of aortic aneurysm, retention, hyperlipidemia, and stage IV non-small cell lung cancer who presented to [facility], [facility] with expressive aphasia. History is obtained from the daughter Enrique Thacker who is at pt's bedside, as pt is not able to give history due to the expressive aphasia. The pt lives with her . Per daughter the pt went to bed at about 7pm last night and this morning her  noticed that she was cought up in her blanket and was having trouble untangling it. At that time he noticed that she was having trouble expressing herself. Per daughter there has been no improvement in her ability to express herself since this morning and the words that are coming out are 'jumbled' and don't make sense. Pt is able to follow commands and her speech is nonslurred. However it does appear that she has some expressive aphasia, although it is intermittently better. She had a history of a cerebrovascular accident in 2019 and has been having right sided weakness.  Per medical records he had an MRI at that time, it was notable for 2 punctate infarcts in the posterior right frontal white matter.  It was thought that it was secondary to embolic nature.  She was seen by neurology at that time, recommended aspirin and Plavix as well as blood pressure control.     Per daughter the pt has some memory problems at baseline involving mostly short term memory. She has also been having frequent falls since the past stroke and is being evaluated for Parkinson's disease. At baseline pt cannot take shower by herself because of fear of falling, but she can go to the bathroom by herself. She normally walks with a walker.     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  She is status post radiation therapy.      The Neurology team was consulted and evaluated pt. A CT head without contrast was performed and was negative for any hemorrhage or acute intracranial changes and CTA neck and CTA head imaging was performed and did not show any acute changes. In addition, an EEG was obtained and showed generalized slowing but no epileptiform discharges. MRI of the brain initially did not show an acute finding potentially due to motion artifact as the pt was moving during the image acquisition. However, repeat MRI of the brain showed a moderate-sized area of infarction in the posterior left MCA distribution involving the posterior left temporal lobe and inferior left parietal white matter. In addition it showed abnormal marrow signal in the posterior dens compatible with metastatic disease. 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 CT chest/abd/pelvis W/WO contrast to evaluate progression of disease revealed no evidence for new or progressive disease of the abdomen or pelvis, but showed the anterior abdominal and pelvic wall hernia containing nonobstructed loops of large and small bowel, which is not significantly changed.  The Heme/Onc team did not indicate any further studies to investigate pt's hypercoagulability status 2/2 malignancy. Pt will need to follow-up with oncology as an outpatient. ECHO was also performed to evaluate pt for AF or other heart abnormality that could have caused the stroke. It revealed asymmetric hypertrophy of the basal septum and an EF of 55-60%. In addition, the electrophysiology team was asked to see for outpatient monitoring to assess for AF as a cause of recent/previous stroke. Will do 30 day heart monitor. Per electrophysiology If no yield on 30 day - will discuss ILR for further monitoring. She will have to follow-up with cardiology as outpatient for cardiac monitor. Pt will need to continue ASA, Statin, and plavix. Pt needs to follow-up with neurology as an outpatient. On day of discharge pt still having some expressive and receptive aphasia with garbled speech. Denies nausea, vomiting, fever, chills. Disposition:  Home with home healthcare    Physical Exam Performed:     BP (!) 147/81   Pulse 88   Temp 98.4 °F (36.9 °C) (Oral)   Resp 18   Ht 5' 2\" (1.575 m)   Wt 142 lb (64.4 kg)   SpO2 92%   BMI 25.97 kg/m²       General appearance: No apparent distress, appears stated age and cooperative. HEENT: Pupils equal, round, and reactive to light. Conjunctivae/corneas clear. Neck: Supple, with full range of motion. No jugular venous distention. Trachea midline. Respiratory:  Normal respiratory effort. Clear to auscultation, bilaterally without Rales/Wheezes/Rhonchi. Cardiovascular: Regular rate and rhythm with normal S1/S2 without murmurs, rubs or gallops. Abdomen: Soft, non-tender, non-distended with normal bowel sounds. Musculoskeletal: No clubbing, cyanosis or edema bilaterally. Full range of motion without deformity. Skin: Skin color, texture, turgor normal.  No rashes or lesions. Neurologic:  expressive and receptive aphasia with garbled speech. Neurovascularly intact without any focal sensory/motor deficits. Cranial nerves: II-XII intact, grossly non-focal.  Psychiatric: Difficult to assess orientation due to pt's expressive aphasia    Labs: For convenience and continuity at follow-up the following most recent labs are provided:      CBC:    Lab Results   Component Value Date    WBC 7.2 10/01/2020    HGB 9.9 10/01/2020    HCT 31.2 10/01/2020     10/01/2020       Renal:    Lab Results   Component Value Date     10/01/2020    K 4.4 10/01/2020     10/01/2020    CO2 24 10/01/2020    BUN 26 10/01/2020    CREATININE 1.1 10/01/2020    CALCIUM 9.0 10/01/2020    PHOS 4.8 10/22/2019         Significant Diagnostic Studies    Radiology:   CT CHEST ABDOMEN PELVIS W CONTRAST   Final Result      CHEST:      1. No acute intra-thoracic abnormality. 2. No evidence for new or progressive disease of the chest.   3. Emphysema. 4. Stable sclerotic lesion of the posterior elements at T11.   5. Atherosclerotic disease. ABDOMEN/PELVIS:      1. No acute intra-abdominopelvic abnormality. 2. No evidence for new or progressive disease of the abdomen or pelvis. 3. Anterior abdominal and pelvic wall hernia containing nonobstructed loops of large and small bowel, which is not significantly changed. MRI BRAIN WO CONTRAST   Final Result   1. Moderate-sized area of infarction in the posterior left MCA distribution involving the posterior left temporal lobe and inferior left parietal white matter. Diffusion restriction compatible with infarcts within the last 7-10 days. 2. Extensive white matter signal abnormality compatible with chronic small vessel ischemic disease. Areas of previous infarct within cephalization the frontal lobes. 3. No evidence of acute intracranial hemorrhage. 4. Abnormal marrow signal in the posterior dens compatible with metastatic disease. MRI BRAIN WO CONTRAST   Final Result      Severely limited study.       Evidence of cerebral atrophy and diffuse chronic small vessel white matter artery. There is moderate chronic plaque at the carotid bifurcation. No flow significant stenosis or focal aneurysm seen. There is flow in the distal left internal carotid    artery. NECK SOFT TISSUES: No neck soft tissue mass or lymphadenopathy. VISUALIZED MEDIASTINUM: No mass or lymphadenopathy. VISUALIZED LUNG APICES: Mild centrilobular emphysematous changes bilaterally. BONES: No destructive osseous process. OTHER: None. IMPRESSION:      No acute CTA findings. No hemodynamically significant stenosis or focal aneurysm seen. PROCEDURE: CT ANGIOGRAPHY HEAD WITH/WITHOUT CONTRAST      INDICATION: altered mental state      COMPARISON: none      TECHNIQUE: Axial CT imaging obtained through the head prior to and following administration of IV contrast. Axial images, multiplanar reformatted images, and maximum intensity projection images were reviewed for CT angiographic technique. IV contrast: 80 mL Isovue 370. FINDINGS:      ANTERIOR CIRCULATION: The intracranial internal carotid arteries, anterior cerebral arteries, and middle cerebral arteries demonstrate no occlusion or stenosis. No evidence for aneurysm or arteriovenous malformation. POSTERIOR CIRCULATION: The bilateral vertebral arteries, basilar artery and posterior cerebral arteries demonstrate no occlusion or significant stenosis. Small patent right posterior communicating artery. No evidence for aneurysm or arteriovenous    malformation. INTRACRANIAL VENOUS SYSTEM: No evidence for intracranial venous thrombosis. OTHER: None. IMPRESSION:      1. No findings for acute CTA findings in the head. No hemodynamically significant stenosis or focal aneurysm identified. CTA HEAD W CONTRAST   Final Result      1. No findings for acute intracranial abnormality.       2.  Age related atrophy with moderate periventricular white matter changes bilaterally consistent with chronic ischemic leukoencephalopathy. 3.  Remote right frontal lobe infarct with multiple small bilateral basal ganglia lacunar infarcts, unchanged. 4.  Chronic right maxillary sinusitis. PROCEDURE: CT ANGIOGRAPHY NECK WITH/WITHOUT CONTRAST      INDICATION: altered mental state      COMPARISON: none      TECHNIQUE: Limited noncontrast CT imaging performed for the purposes of IV contrast bolus tracking. Axial CT imaging obtained from skull base through the lung apices following administration of IV contrast. Axial images, multiplanar reformatted images,    and maximum intensity projection images were reviewed for CT angiographic technique. Up-to-date CT equipment and radiation dose reduction techniques were employed. IV contrast: 80 mL Isovue 370. FINDINGS:      AORTIC ARCH: Standard three-vessel aortic arch anatomy is present. INNOMINATE ARTERY: Normal.      RIGHT SUBCLAVIAN ARTERY: Normal.      RIGHT VERTEBRAL ARTERY: Normal.      RIGHT CAROTID ARTERY: There is contrast opacification of the right common carotid artery. Minimal epiglottic plaque at the carotid bifurcation. No flow significant stenosis seen. There is flow identified in the distal right internal carotid artery. LEFT SUBCLAVIAN ARTERY: Normal.      LEFT VERTEBRAL ARTERY: Normal.      LEFT CAROTID ARTERY: There is contrast opacification of the left common carotid artery. There is moderate chronic plaque at the carotid bifurcation. No flow significant stenosis or focal aneurysm seen. There is flow in the distal left internal carotid    artery. NECK SOFT TISSUES: No neck soft tissue mass or lymphadenopathy. VISUALIZED MEDIASTINUM: No mass or lymphadenopathy. VISUALIZED LUNG APICES: Mild centrilobular emphysematous changes bilaterally. BONES: No destructive osseous process. OTHER: None. IMPRESSION:      No acute CTA findings.   No hemodynamically significant stenosis or focal aneurysm seen.               PROCEDURE: CT ANGIOGRAPHY HEAD WITH/WITHOUT CONTRAST      INDICATION: altered mental state      COMPARISON: none      TECHNIQUE: Axial CT imaging obtained through the head prior to and following administration of IV contrast. Axial images, multiplanar reformatted images, and maximum intensity projection images were reviewed for CT angiographic technique. IV contrast: 80 mL Isovue 370. FINDINGS:      ANTERIOR CIRCULATION: The intracranial internal carotid arteries, anterior cerebral arteries, and middle cerebral arteries demonstrate no occlusion or stenosis. No evidence for aneurysm or arteriovenous malformation. POSTERIOR CIRCULATION: The bilateral vertebral arteries, basilar artery and posterior cerebral arteries demonstrate no occlusion or significant stenosis. Small patent right posterior communicating artery. No evidence for aneurysm or arteriovenous    malformation. INTRACRANIAL VENOUS SYSTEM: No evidence for intracranial venous thrombosis. OTHER: None. IMPRESSION:      1. No findings for acute CTA findings in the head. No hemodynamically significant stenosis or focal aneurysm identified. XR CHEST PORTABLE   Final Result   1. No localized consolidation or pleural effusion. .      CT Head WO Contrast   Final Result      1. No findings for acute intracranial abnormality. 2.  Age related atrophy with moderate periventricular white matter changes bilaterally consistent with chronic ischemic leukoencephalopathy. 3.  Remote right frontal lobe infarct with multiple small bilateral basal ganglia lacunar infarcts, unchanged. 4.  Chronic right maxillary sinusitis. PROCEDURE: CT ANGIOGRAPHY NECK WITH/WITHOUT CONTRAST      INDICATION: altered mental state      COMPARISON: none      TECHNIQUE: Limited noncontrast CT imaging performed for the purposes of IV contrast bolus tracking.  Axial CT imaging obtained from skull base through the internal carotid arteries, anterior cerebral arteries, and middle cerebral arteries demonstrate no occlusion or stenosis. No evidence for aneurysm or arteriovenous malformation. POSTERIOR CIRCULATION: The bilateral vertebral arteries, basilar artery and posterior cerebral arteries demonstrate no occlusion or significant stenosis. Small patent right posterior communicating artery. No evidence for aneurysm or arteriovenous    malformation. INTRACRANIAL VENOUS SYSTEM: No evidence for intracranial venous thrombosis. OTHER: None. IMPRESSION:      1. No findings for acute CTA findings in the head. No hemodynamically significant stenosis or focal aneurysm identified. Consults:     IP CONSULT TO PHARMACY  PHARMACY TO CHANGE BASE FLUIDS  IP CONSULT TO NEUROLOGY  IP CONSULT TO STROKE TEAM  IP CONSULT TO HOSPITALIST  IP CONSULT TO SOCIAL WORK  IP CONSULT TO NEUROLOGY  IP CONSULT TO HOME CARE NEEDS  IP CONSULT TO ONCOLOGY  IP CONSULT TO CARDIOLOGY  IP CONSULT TO HOME CARE NEEDS    Disposition:  To home with home healthcare     Condition at Discharge: Stable    Discharge Instructions/Follow-up:  Please continue dual antiplatelet therapy and Statin. Please follow-up with cardiology as outpatient for cardiac monitor. Please also follow up with your PCP, the neurology clinic, and with oncology as an outpatient. Code Status:  Full Code    Activity: activity as tolerated    Diet: regular      Discharge Medications:     Current Discharge Medication List           Details   aspirin 81 MG EC tablet Take 1 tablet by mouth daily  Qty: 90 tablet, Refills: 1              Details   carbidopa-levodopa (SINEMET)  MG per tablet Take 0.5 tablets by mouth 3 times daily 9/27/20- On day 13 of 21-days of 0.5 mg TID.  Starting Starting Starting 10/5/20 will increase to 1 tablet TID      NIFEdipine (ADALAT CC) 30 MG extended release tablet Take 90 mg by mouth daily      magnesium oxide (MAG-OX) 400 MG tablet Take 1 tablet by mouth 2 times daily  Qty: 30 tablet, Refills: 1      clopidogrel (PLAVIX) 75 MG tablet Take 1 tablet by mouth daily  Qty: 30 tablet, Refills: 0      atorvastatin (LIPITOR) 40 MG tablet Take 1 tablet by mouth daily  Qty: 30 tablet, Refills: 3      LORazepam (ATIVAN) 0.5 MG tablet Take 0.5 mg by mouth every 8 hours as needed for Anxiety. oxyCODONE-acetaminophen (PERCOCET) 5-325 MG per tablet Take 1 tablet by mouth every 8 hours as needed for Pain.      pantoprazole (PROTONIX) 40 MG tablet Take 40 mg by mouth nightly       citalopram (CELEXA) 40 MG tablet Take 40 mg by mouth nightly              Time Spent on discharge is more than 40 minutes in the examination, evaluation, counseling and review of medications and discharge plan. Signed:    Gabriela Villa MD   10/1/2020      Thank you Alexx Edouard DO for the opportunity to be involved in this patient's care.

## 2020-10-06 ENCOUNTER — TELEPHONE (OUTPATIENT)
Dept: CARDIOLOGY CLINIC | Age: 80
End: 2020-10-06

## 2020-10-06 NOTE — TELEPHONE ENCOUNTER
The patients spouse Mariel Radhames called stating that the patient was seen in the hospital by [facility] and he prescribed her carbidopa-levodopa  mg. Mariel Ricci states that the right side of the patients face is swollen, due to this medication.  Please call Mariel Ricci back as soon as possible to advise [phone]

## 2020-10-13 ENCOUNTER — APPOINTMENT (OUTPATIENT)
Dept: CT IMAGING | Age: 80
DRG: 057 | End: 2020-10-13
Payer: MEDICARE

## 2020-10-13 ENCOUNTER — APPOINTMENT (OUTPATIENT)
Dept: GENERAL RADIOLOGY | Age: 80
DRG: 057 | End: 2020-10-13
Payer: MEDICARE

## 2020-10-13 ENCOUNTER — HOSPITAL ENCOUNTER (INPATIENT)
Age: 80
LOS: 3 days | Discharge: NURSING, REHAB OR LONG-TERM CARE | DRG: 057 | End: 2020-10-16
Attending: EMERGENCY MEDICINE | Admitting: INTERNAL MEDICINE
Payer: MEDICARE

## 2020-10-13 PROBLEM — I63.9 ACUTE CVA (CEREBROVASCULAR ACCIDENT) (HCC): Status: ACTIVE | Noted: 2020-10-13

## 2020-10-13 LAB
A/G RATIO: 1.3 (ref 1.1–2.2)
ABO/RH: NORMAL
ALBUMIN SERPL-MCNC: 3.8 G/DL (ref 3.4–5)
ALP BLD-CCNC: 198 U/L (ref 40–129)
ALT SERPL-CCNC: 25 U/L (ref 10–40)
ANION GAP SERPL CALCULATED.3IONS-SCNC: 11 MMOL/L (ref 3–16)
ANTIBODY SCREEN: NORMAL
AST SERPL-CCNC: 23 U/L (ref 15–37)
BACTERIA: ABNORMAL /HPF
BASOPHILS ABSOLUTE: 0.1 K/UL (ref 0–0.2)
BASOPHILS RELATIVE PERCENT: 0.9 %
BILIRUB SERPL-MCNC: <0.2 MG/DL (ref 0–1)
BILIRUBIN URINE: NEGATIVE
BLOOD, URINE: NEGATIVE
BUN BLDV-MCNC: 28 MG/DL (ref 7–20)
CALCIUM SERPL-MCNC: 8.6 MG/DL (ref 8.3–10.6)
CHLORIDE BLD-SCNC: 103 MMOL/L (ref 99–110)
CLARITY: CLEAR
CO2: 23 MMOL/L (ref 21–32)
COLOR: YELLOW
CREAT SERPL-MCNC: 0.9 MG/DL (ref 0.6–1.2)
EOSINOPHILS ABSOLUTE: 1 K/UL (ref 0–0.6)
EOSINOPHILS RELATIVE PERCENT: 13.6 %
GFR AFRICAN AMERICAN: >60
GFR NON-AFRICAN AMERICAN: >60
GLOBULIN: 3 G/DL
GLUCOSE BLD-MCNC: 128 MG/DL (ref 70–99)
GLUCOSE BLD-MCNC: 158 MG/DL (ref 70–99)
GLUCOSE URINE: NEGATIVE MG/DL
HCT VFR BLD CALC: 32.3 % (ref 36–48)
HEMOGLOBIN: 10.2 G/DL (ref 12–16)
INR BLD: 1.03 (ref 0.86–1.14)
KETONES, URINE: NEGATIVE MG/DL
LEUKOCYTE ESTERASE, URINE: ABNORMAL
LYMPHOCYTES ABSOLUTE: 1 K/UL (ref 1–5.1)
LYMPHOCYTES RELATIVE PERCENT: 12.8 %
MCH RBC QN AUTO: 25.6 PG (ref 26–34)
MCHC RBC AUTO-ENTMCNC: 31.6 G/DL (ref 31–36)
MCV RBC AUTO: 81 FL (ref 80–100)
MICROSCOPIC EXAMINATION: YES
MONOCYTES ABSOLUTE: 0.7 K/UL (ref 0–1.3)
MONOCYTES RELATIVE PERCENT: 8.8 %
NEUTROPHILS ABSOLUTE: 4.8 K/UL (ref 1.7–7.7)
NEUTROPHILS RELATIVE PERCENT: 63.9 %
NITRITE, URINE: POSITIVE
PDW BLD-RTO: 17.2 % (ref 12.4–15.4)
PERFORMED ON: ABNORMAL
PH UA: 6 (ref 5–8)
PLATELET # BLD: 274 K/UL (ref 135–450)
PMV BLD AUTO: 7.8 FL (ref 5–10.5)
POTASSIUM REFLEX MAGNESIUM: 4.2 MMOL/L (ref 3.5–5.1)
PROTEIN UA: NEGATIVE MG/DL
PROTHROMBIN TIME: 11.9 SEC (ref 10–13.2)
RBC # BLD: 3.99 M/UL (ref 4–5.2)
RBC UA: ABNORMAL /HPF (ref 0–4)
SODIUM BLD-SCNC: 137 MMOL/L (ref 136–145)
SPECIFIC GRAVITY UA: 1.02 (ref 1–1.03)
TOTAL PROTEIN: 6.8 G/DL (ref 6.4–8.2)
TROPONIN: <0.01 NG/ML
TROPONIN: <0.01 NG/ML
URINE TYPE: ABNORMAL
UROBILINOGEN, URINE: 0.2 E.U./DL
WBC # BLD: 7.5 K/UL (ref 4–11)
WBC UA: ABNORMAL /HPF (ref 0–5)

## 2020-10-13 PROCEDURE — 36415 COLL VENOUS BLD VENIPUNCTURE: CPT

## 2020-10-13 PROCEDURE — G0378 HOSPITAL OBSERVATION PER HR: HCPCS

## 2020-10-13 PROCEDURE — 70496 CT ANGIOGRAPHY HEAD: CPT

## 2020-10-13 PROCEDURE — 86900 BLOOD TYPING SEROLOGIC ABO: CPT

## 2020-10-13 PROCEDURE — 80053 COMPREHEN METABOLIC PANEL: CPT

## 2020-10-13 PROCEDURE — 70450 CT HEAD/BRAIN W/O DYE: CPT

## 2020-10-13 PROCEDURE — 96365 THER/PROPH/DIAG IV INF INIT: CPT

## 2020-10-13 PROCEDURE — 84484 ASSAY OF TROPONIN QUANT: CPT

## 2020-10-13 PROCEDURE — 81001 URINALYSIS AUTO W/SCOPE: CPT

## 2020-10-13 PROCEDURE — 2060000000 HC ICU INTERMEDIATE R&B

## 2020-10-13 PROCEDURE — 2580000003 HC RX 258: Performed by: INTERNAL MEDICINE

## 2020-10-13 PROCEDURE — 87040 BLOOD CULTURE FOR BACTERIA: CPT

## 2020-10-13 PROCEDURE — 85025 COMPLETE CBC W/AUTO DIFF WBC: CPT

## 2020-10-13 PROCEDURE — 85610 PROTHROMBIN TIME: CPT

## 2020-10-13 PROCEDURE — 2580000003 HC RX 258: Performed by: EMERGENCY MEDICINE

## 2020-10-13 PROCEDURE — 6370000000 HC RX 637 (ALT 250 FOR IP): Performed by: INTERNAL MEDICINE

## 2020-10-13 PROCEDURE — 99285 EMERGENCY DEPT VISIT HI MDM: CPT

## 2020-10-13 PROCEDURE — 6360000004 HC RX CONTRAST MEDICATION: Performed by: EMERGENCY MEDICINE

## 2020-10-13 PROCEDURE — 93005 ELECTROCARDIOGRAM TRACING: CPT | Performed by: EMERGENCY MEDICINE

## 2020-10-13 PROCEDURE — 71045 X-RAY EXAM CHEST 1 VIEW: CPT

## 2020-10-13 PROCEDURE — 6360000002 HC RX W HCPCS: Performed by: EMERGENCY MEDICINE

## 2020-10-13 PROCEDURE — 86850 RBC ANTIBODY SCREEN: CPT

## 2020-10-13 PROCEDURE — 86901 BLOOD TYPING SEROLOGIC RH(D): CPT

## 2020-10-13 RX ORDER — SODIUM CHLORIDE 0.9 % (FLUSH) 0.9 %
10 SYRINGE (ML) INJECTION EVERY 12 HOURS SCHEDULED
Status: DISCONTINUED | OUTPATIENT
Start: 2020-10-13 | End: 2020-10-16 | Stop reason: HOSPADM

## 2020-10-13 RX ORDER — ACETAMINOPHEN 325 MG/1
650 TABLET ORAL EVERY 6 HOURS PRN
Status: DISCONTINUED | OUTPATIENT
Start: 2020-10-13 | End: 2020-10-16 | Stop reason: HOSPADM

## 2020-10-13 RX ORDER — ATORVASTATIN CALCIUM 40 MG/1
80 TABLET, FILM COATED ORAL NIGHTLY
Status: DISCONTINUED | OUTPATIENT
Start: 2020-10-13 | End: 2020-10-16 | Stop reason: HOSPADM

## 2020-10-13 RX ORDER — FAMOTIDINE 20 MG/1
20 TABLET, FILM COATED ORAL DAILY PRN
Status: DISCONTINUED | OUTPATIENT
Start: 2020-10-13 | End: 2020-10-16 | Stop reason: HOSPADM

## 2020-10-13 RX ORDER — SODIUM CHLORIDE 9 MG/ML
1000 INJECTION, SOLUTION INTRAVENOUS ONCE
Status: COMPLETED | OUTPATIENT
Start: 2020-10-13 | End: 2020-10-13

## 2020-10-13 RX ORDER — ONDANSETRON 2 MG/ML
4 INJECTION INTRAMUSCULAR; INTRAVENOUS EVERY 6 HOURS PRN
Status: DISCONTINUED | OUTPATIENT
Start: 2020-10-13 | End: 2020-10-16 | Stop reason: HOSPADM

## 2020-10-13 RX ORDER — SODIUM CHLORIDE 0.9 % (FLUSH) 0.9 %
10 SYRINGE (ML) INJECTION PRN
Status: DISCONTINUED | OUTPATIENT
Start: 2020-10-13 | End: 2020-10-16 | Stop reason: HOSPADM

## 2020-10-13 RX ORDER — ASPIRIN 81 MG/1
243 TABLET, CHEWABLE ORAL ONCE
Status: COMPLETED | OUTPATIENT
Start: 2020-10-13 | End: 2020-10-13

## 2020-10-13 RX ORDER — ACETAMINOPHEN 650 MG/1
650 SUPPOSITORY RECTAL EVERY 6 HOURS PRN
Status: DISCONTINUED | OUTPATIENT
Start: 2020-10-13 | End: 2020-10-16 | Stop reason: HOSPADM

## 2020-10-13 RX ORDER — PROMETHAZINE HYDROCHLORIDE 25 MG/1
12.5 TABLET ORAL EVERY 6 HOURS PRN
Status: DISCONTINUED | OUTPATIENT
Start: 2020-10-13 | End: 2020-10-16 | Stop reason: HOSPADM

## 2020-10-13 RX ORDER — POTASSIUM CHLORIDE 7.45 MG/ML
10 INJECTION INTRAVENOUS PRN
Status: DISCONTINUED | OUTPATIENT
Start: 2020-10-13 | End: 2020-10-13

## 2020-10-13 RX ORDER — MAGNESIUM SULFATE IN WATER 40 MG/ML
2 INJECTION, SOLUTION INTRAVENOUS PRN
Status: DISCONTINUED | OUTPATIENT
Start: 2020-10-13 | End: 2020-10-16 | Stop reason: HOSPADM

## 2020-10-13 RX ADMIN — SODIUM CHLORIDE 1000 ML: 0.9 INJECTION, SOLUTION INTRAVENOUS at 23:15

## 2020-10-13 RX ADMIN — Medication 10 ML: at 22:50

## 2020-10-13 RX ADMIN — DEXTROSE MONOHYDRATE 1 G: 5 INJECTION INTRAVENOUS at 23:15

## 2020-10-13 RX ADMIN — ATORVASTATIN CALCIUM 80 MG: 40 TABLET, FILM COATED ORAL at 23:15

## 2020-10-13 RX ADMIN — ASPIRIN 243 MG: 81 TABLET, CHEWABLE ORAL at 23:15

## 2020-10-13 RX ADMIN — IOPAMIDOL 80 ML: 755 INJECTION, SOLUTION INTRAVENOUS at 17:56

## 2020-10-13 NOTE — ED NOTES
Pt straight to CT from squad. Onset of symptoms at 16:15.  Hx of CVA 2 weeks ago     Austin Nunn RN  10/13/20 [id number]

## 2020-10-13 NOTE — ED PROVIDER NOTES
[address] [facility]          ATTENDING PHYSICIAN NOTE       Date of evaluation: 10/13/2020    Chief Complaint     Cerebrovascular Accident (Pt brought to ED by [address])      History of Present Illness     Eric Graham is a 68 y.o. female with history of recent left MCA stroke with residual severe expressive and receptive aphasia presenting to the emergency department today for possible stroke. Per EMS, the patient was undergoing occupational therapy at home and at around [phone], the patient was felt to have possible weakness in her right upper and lower extremity as well as visual deficits felt the right side. Patient is unable to provide any history as her baseline is nonsensical word phrases and is unable to follow many commands due to her aphasia. Review of Systems     Unable to obtain given neurologic and communication deficits    Physical Exam     INITIAL VITALS: BP: (!) 131/92, Temp: 98.5 °F (36.9 °C), Pulse: 85, Resp: 18, SpO2: 96 %     Nursing note and vitals reviewed. General:  Adult female, alert and appropriately interactive. In no distress. HENT: Normocephalic and atraumatic. External ears normal. Nose appears normal externally. Eyes: Conjunctivae normal. No scleral icterus. PERRL, EOMI  Neck: Neck supple. No tracheal deviation present. CV: Normal rate. Regular rhythm. S1/S2 auscultated. No murmurs, gallops or rubs. Chest: Effort normal. Breath sounds clear to auscultation bilaterally. No wheezes. No rales or rhonchi. Abdominal: Soft. No distension. No tenderness. No rebound or guarding. No masses. No peritoneal signs. Musculoskeletal: No edema. No gross deformities. Neurological: Alert unable to assess orientation due to severe expressive and receptive aphasia. Intermittently follows simple commands. Moves all extremities spontaneously. Strength against at least gravity in all 4 extremities.   Unable to assess sensation grossly Facial muscles activate symmetrically. Unable to assess finger-nose-finger or gait. Skin: Warm, dry. No rash. No diaphoresis or erythema. Procedures   Procedures    MEDICAL DECISION MAKING     MDM: Percy Michaud is a 68 y.o. female with history of recent stroke and severe receptive and expressive aphasia presenting to the emergency department today for possible stroke. Last known well is 1615. Initial blood glucose is 158. Her examination is difficult due to inability to follow most simple commands due to her baseline aphasia. She does move all extremities spontaneously and occasionally to command. She is not a TPA candidate due to confirmed stroke on MRI within the past 3 months. CT/CTA demonstrated no acute hemorrhagic conversion or other findings. She does have some PCA stenosis that is moderate at most and likely does not represent her worsening MCA symptoms. After speaking with her daughter, the patient's communication deficit continues to appear worse than baseline, her strength may or may not be back to baseline. Urinalysis with mixed picture, has bacteriuria, no significant leukocyte esterase. After discussion with hospitalist, will treat empirically given her worsening neurologic status. Will admit for further monitoring and neuro consultation. Clinical Impression     1. Stroke-like symptom    2. Bacteriuria        Disposition     DISPOSITION          Vinny Garcia MD  10:54 PM                     Past Medical, Surgical, Family, and Social History     She has a past medical history of CHUYITA (acute kidney injury) ([address] Allergic rhinitis, Aneurysm ([address] Aortic aneurysm ([address] Cancer ([address] Cholelithiasis, Depression, History of ischemic left MCA stroke, History of ischemic left MCA stroke, Hyperlipidemia, Hypertension, Incontinence, MRSA (methicillin resistant staph aureus) culture positive, Ulcer, and Ulcerative colitis ([address]   She has a past surgical history that includes Abdomen surgery; Cholecystectomy, open (5/7/2012); Pressure ulcer debridement (2010); Colonoscopy; Upper gastrointestinal endoscopy; Stomach surgery; ERCP (3/13/12); bronchoscopy (12/11/2012); and ERCP (12-). Her family history includes Arthritis in her mother. She reports that she quit smoking about 11 years ago. She has a 75.00 pack-year smoking history. She has never used smokeless tobacco. She reports that she does not drink alcohol or use drugs. Medications     Current Discharge Medication List      CONTINUE these medications which have NOT CHANGED    Details   aspirin 81 MG EC tablet Take 1 tablet by mouth daily  Qty: 90 tablet, Refills: 1      NIFEdipine (ADALAT CC) 30 MG extended release tablet Take 90 mg by mouth daily      magnesium oxide (MAG-OX) 400 MG tablet Take 1 tablet by mouth 2 times daily  Qty: 30 tablet, Refills: 1      clopidogrel (PLAVIX) 75 MG tablet Take 1 tablet by mouth daily  Qty: 30 tablet, Refills: 0      atorvastatin (LIPITOR) 40 MG tablet Take 1 tablet by mouth daily  Qty: 30 tablet, Refills: 3      LORazepam (ATIVAN) 0.5 MG tablet Take 0.5 mg by mouth every 8 hours as needed for Anxiety. oxyCODONE-acetaminophen (PERCOCET) 5-325 MG per tablet Take 1 tablet by mouth every 8 hours as needed for Pain.      pantoprazole (PROTONIX) 40 MG tablet Take 40 mg by mouth nightly       citalopram (CELEXA) 40 MG tablet Take 40 mg by mouth nightly              Allergies     She has No Known Allergies. ED Course     Nursing Notes, Past Medical Hx, Past Surgical Hx, Social Hx,Allergies, and Family Hx were reviewed.     Patient was given the following medications:  Orders Placed This Encounter   Medications    iopamidol (ISOVUE-370) 76 % injection 80 mL    cefTRIAXone (ROCEPHIN) 1 g IVPB in 50 mL D5W minibag     Order Specific Question:   Antimicrobial Indications     Answer:   Urinary Tract Infection    aspirin chewable tablet 243 mg     3 doses of 81 mg please    0.9 % sodium chloride infusion    sodium chloride flush 0.9 % injection 10 mL    sodium chloride flush 0.9 % injection 10 mL    DISCONTD: potassium chloride 10 mEq/100 mL IVPB (Peripheral Line)    magnesium sulfate 2 g in 50 mL IVPB premix    OR Linked Order Group     acetaminophen (TYLENOL) tablet 650 mg     acetaminophen (TYLENOL) suppository 650 mg    OR Linked Order Group     promethazine (PHENERGAN) tablet 12.5 mg     ondansetron (ZOFRAN) injection 4 mg    famotidine (PEPCID) tablet 20 mg    sodium chloride flush 0.9 % injection 10 mL    sodium chloride flush 0.9 % injection 10 mL    atorvastatin (LIPITOR) tablet 80 mg    potassium chloride 10 mEq in sodium chloride 0.9 % 100 mL IVPB       Diagnostic Results     EKG   Sinus rhythm, no acute ischemia. RECENT VITALS:  BP: 130/79,Temp: 98.5 °F (36.9 °C), Pulse: 79, Resp: 15, SpO2: 96 %     RADIOLOGY:  CTA HEAD NECK W CONTRAST   Final Result      1. No aneurysms or vascular occlusions identified. 2.  Moderate atherosclerotic stenosis of left posterior cerebral artery P1-P2 junction. 3.  No significant stenosis in the extracranial vertebral or carotid arteries. 4.  Stable osseous metastasis at C2. XR CHEST PORTABLE   Final Result      No acute pulmonary disease. CT HEAD WO CONTRAST   Final Result      1. Evolving subacute left posterior MCA territory infarct as seen on MR dated 9/29/2020.   2.  No acute intracranial hemorrhage.           LABS:   Results for orders placed or performed during the hospital encounter of 10/13/20   CBC Auto Differential   Result Value Ref Range    WBC 7.5 4.0 - 11.0 K/uL    RBC 3.99 (L) 4.00 - 5.20 M/uL    Hemoglobin 10.2 (L) 12.0 - 16.0 g/dL    Hematocrit 32.3 (L) 36.0 - 48.0 %    MCV 81.0 80.0 - 100.0 fL    MCH 25.6 (L) 26.0 - 34.0 pg    MCHC 31.6 31.0 - 36.0 g/dL    RDW 17.2 (H) 12.4 - 15.4 %    Platelets 751 940 - 634 K/uL    MPV 7.8 5.0 - 10.5 fL    Neutrophils % 63.9 %    Lymphocytes % 12.8 % Monocytes % 8.8 %    Eosinophils % 13.6 %    Basophils % 0.9 %    Neutrophils Absolute 4.8 1.7 - 7.7 K/uL    Lymphocytes Absolute 1.0 1.0 - 5.1 K/uL    Monocytes Absolute 0.7 0.0 - 1.3 K/uL    Eosinophils Absolute 1.0 (H) 0.0 - 0.6 K/uL    Basophils Absolute 0.1 0.0 - 0.2 K/uL   Comprehensive Metabolic Panel w/ Reflex to MG   Result Value Ref Range    Sodium 137 136 - 145 mmol/L    Potassium reflex Magnesium 4.2 3.5 - 5.1 mmol/L    Chloride 103 99 - 110 mmol/L    CO2 23 21 - 32 mmol/L    Anion Gap 11 3 - 16    Glucose 128 (H) 70 - 99 mg/dL    BUN 28 (H) 7 - 20 mg/dL    CREATININE 0.9 0.6 - 1.2 mg/dL    GFR Non-African American >60 >60    GFR African American >60 >60    Calcium 8.6 8.3 - 10.6 mg/dL    Total Protein 6.8 6.4 - 8.2 g/dL    Alb 3.8 3.4 - 5.0 g/dL    Albumin/Globulin Ratio 1.3 1.1 - 2.2    Total Bilirubin <0.2 0.0 - 1.0 mg/dL    Alkaline Phosphatase 198 (H) 40 - 129 U/L    ALT 25 10 - 40 U/L    AST 23 15 - 37 U/L    Globulin 3.0 g/dL   Troponin   Result Value Ref Range    Troponin <0.01 <0.01 ng/mL   Protime-INR   Result Value Ref Range    Protime 11.9 10.0 - 13.2 sec    INR 1.03 0.86 - 1.14   Urinalysis, reflex to microscopic (Lab)   Result Value Ref Range    Color, UA Yellow Straw/Yellow    Clarity, UA Clear Clear    Glucose, Ur Negative Negative mg/dL    Bilirubin Urine Negative Negative    Ketones, Urine Negative Negative mg/dL    Specific Gravity, UA 1.020 1.005 - 1.030    Blood, Urine Negative Negative    pH, UA 6.0 5.0 - 8.0    Protein, UA Negative Negative mg/dL    Urobilinogen, Urine 0.2 <2.0 E.U./dL    Nitrite, Urine POSITIVE (A) Negative    Leukocyte Esterase, Urine TRACE (A) Negative    Microscopic Examination YES     Urine Type Voided    Microscopic Urinalysis   Result Value Ref Range    WBC, UA 6-9 (A) 0 - 5 /HPF    RBC, UA None seen 0 - 4 /HPF    Bacteria, UA 3+ (A) None Seen /HPF   POCT Glucose   Result Value Ref Range    POC Glucose 158 (H) 70 - 99 mg/dl    Performed on ACCU-CHEK    TYPE AND SCREEN   Result Value Ref Range    ABO/Rh B POS     Antibody Screen NEG        CONSULTS:  IP CONSULT TO PHARMACY  PHARMACY TO CHANGE BASE FLUIDS  IP CONSULT TO HOSPITALIST    PATIENT REFERRED TO:  No follow-up provider specified.     DISCHARGE MEDICATIONS:  Current Discharge Medication List           Zee Kendrick MD  10/13/20 [id number]

## 2020-10-13 NOTE — CONSULTS
Clinical Pharmacy Consult Note    68 y.o. female admitted with stroke. Pharmacy has been asked by Dr. Haja Goode to adjust all drips to normal saline as appropriate based on compatibility to avoid fluid shifts since D5 is osmotically active. The following intermittent IV drips/infusions have been adjusted to saline:  N/A    The following medications must remain in D5W due to incompatibility with normal saline:  Amphotericin  Mycophenolate  Nitroprusside  Penicillin G Potassium    Please be aware that patient has D5W ordered as part of hypoglycemia orderset. Total IV fluid delivered to patient over last 24h: Will assess tomorrow    h will follow daily to ensure all new IVPBs + drips are in NS. Please call with questions:  [phone] ([facility])    Fela Echevarria. Mary OLVERA   10/13/2020 6:50 PM

## 2020-10-14 ENCOUNTER — APPOINTMENT (OUTPATIENT)
Dept: MRI IMAGING | Age: 80
DRG: 057 | End: 2020-10-14
Payer: MEDICARE

## 2020-10-14 PROBLEM — C79.51 BONE METASTASES (HCC): Status: ACTIVE | Noted: 2020-10-14

## 2020-10-14 LAB
A/G RATIO: 1.1 (ref 1.1–2.2)
ALBUMIN SERPL-MCNC: 3.5 G/DL (ref 3.4–5)
ALP BLD-CCNC: 194 U/L (ref 40–129)
ALT SERPL-CCNC: 24 U/L (ref 10–40)
ANION GAP SERPL CALCULATED.3IONS-SCNC: 11 MMOL/L (ref 3–16)
AST SERPL-CCNC: 27 U/L (ref 15–37)
BASOPHILS ABSOLUTE: 0.1 K/UL (ref 0–0.2)
BASOPHILS RELATIVE PERCENT: 1 %
BILIRUB SERPL-MCNC: <0.2 MG/DL (ref 0–1)
BUN BLDV-MCNC: 23 MG/DL (ref 7–20)
CALCIUM SERPL-MCNC: 8.5 MG/DL (ref 8.3–10.6)
CHLORIDE BLD-SCNC: 106 MMOL/L (ref 99–110)
CHOLESTEROL, TOTAL: 100 MG/DL (ref 0–199)
CO2: 21 MMOL/L (ref 21–32)
CREAT SERPL-MCNC: 0.8 MG/DL (ref 0.6–1.2)
EKG ATRIAL RATE: 83 BPM
EKG DIAGNOSIS: NORMAL
EKG P AXIS: 73 DEGREES
EKG P-R INTERVAL: 144 MS
EKG Q-T INTERVAL: 396 MS
EKG QRS DURATION: 72 MS
EKG QTC CALCULATION (BAZETT): 465 MS
EKG R AXIS: 36 DEGREES
EKG T AXIS: 68 DEGREES
EKG VENTRICULAR RATE: 83 BPM
EOSINOPHILS ABSOLUTE: 1.1 K/UL (ref 0–0.6)
EOSINOPHILS RELATIVE PERCENT: 16.5 %
ESTIMATED AVERAGE GLUCOSE: 122.6 MG/DL
GFR AFRICAN AMERICAN: >60
GFR NON-AFRICAN AMERICAN: >60
GLOBULIN: 3.3 G/DL
GLUCOSE BLD-MCNC: 99 MG/DL (ref 70–99)
HBA1C MFR BLD: 5.9 %
HCT VFR BLD CALC: 33.2 % (ref 36–48)
HDLC SERPL-MCNC: 44 MG/DL (ref 40–60)
HEMOGLOBIN: 10.3 G/DL (ref 12–16)
LDL CHOLESTEROL CALCULATED: 38 MG/DL
LYMPHOCYTES ABSOLUTE: 1 K/UL (ref 1–5.1)
LYMPHOCYTES RELATIVE PERCENT: 14.5 %
MCH RBC QN AUTO: 25.3 PG (ref 26–34)
MCHC RBC AUTO-ENTMCNC: 30.9 G/DL (ref 31–36)
MCV RBC AUTO: 81.7 FL (ref 80–100)
MONOCYTES ABSOLUTE: 0.6 K/UL (ref 0–1.3)
MONOCYTES RELATIVE PERCENT: 8.4 %
NEUTROPHILS ABSOLUTE: 4 K/UL (ref 1.7–7.7)
NEUTROPHILS RELATIVE PERCENT: 59.6 %
PDW BLD-RTO: 17.1 % (ref 12.4–15.4)
PLATELET # BLD: 247 K/UL (ref 135–450)
PMV BLD AUTO: 7.8 FL (ref 5–10.5)
POTASSIUM REFLEX MAGNESIUM: 4.2 MMOL/L (ref 3.5–5.1)
RBC # BLD: 4.06 M/UL (ref 4–5.2)
SODIUM BLD-SCNC: 138 MMOL/L (ref 136–145)
TOTAL PROTEIN: 6.8 G/DL (ref 6.4–8.2)
TRIGL SERPL-MCNC: 89 MG/DL (ref 0–150)
TROPONIN: 0.01 NG/ML
VLDLC SERPL CALC-MCNC: 18 MG/DL
WBC # BLD: 6.7 K/UL (ref 4–11)

## 2020-10-14 PROCEDURE — 92610 EVALUATE SWALLOWING FUNCTION: CPT

## 2020-10-14 PROCEDURE — 97535 SELF CARE MNGMENT TRAINING: CPT

## 2020-10-14 PROCEDURE — 2580000003 HC RX 258: Performed by: INTERNAL MEDICINE

## 2020-10-14 PROCEDURE — 6370000000 HC RX 637 (ALT 250 FOR IP): Performed by: INTERNAL MEDICINE

## 2020-10-14 PROCEDURE — 85025 COMPLETE CBC W/AUTO DIFF WBC: CPT

## 2020-10-14 PROCEDURE — 97166 OT EVAL MOD COMPLEX 45 MIN: CPT

## 2020-10-14 PROCEDURE — 80061 LIPID PANEL: CPT

## 2020-10-14 PROCEDURE — G0378 HOSPITAL OBSERVATION PER HR: HCPCS

## 2020-10-14 PROCEDURE — 99221 1ST HOSP IP/OBS SF/LOW 40: CPT | Performed by: NURSE PRACTITIONER

## 2020-10-14 PROCEDURE — 6360000002 HC RX W HCPCS: Performed by: INTERNAL MEDICINE

## 2020-10-14 PROCEDURE — 80053 COMPREHEN METABOLIC PANEL: CPT

## 2020-10-14 PROCEDURE — 2060000000 HC ICU INTERMEDIATE R&B

## 2020-10-14 PROCEDURE — 92523 SPEECH SOUND LANG COMPREHEN: CPT

## 2020-10-14 PROCEDURE — 83036 HEMOGLOBIN GLYCOSYLATED A1C: CPT

## 2020-10-14 PROCEDURE — 36415 COLL VENOUS BLD VENIPUNCTURE: CPT

## 2020-10-14 PROCEDURE — 97530 THERAPEUTIC ACTIVITIES: CPT

## 2020-10-14 PROCEDURE — 97161 PT EVAL LOW COMPLEX 20 MIN: CPT

## 2020-10-14 PROCEDURE — 51798 US URINE CAPACITY MEASURE: CPT

## 2020-10-14 PROCEDURE — 96366 THER/PROPH/DIAG IV INF ADDON: CPT

## 2020-10-14 PROCEDURE — 84484 ASSAY OF TROPONIN QUANT: CPT

## 2020-10-14 RX ORDER — CLOPIDOGREL BISULFATE 75 MG/1
75 TABLET ORAL DAILY
Status: DISCONTINUED | OUTPATIENT
Start: 2020-10-14 | End: 2020-10-16 | Stop reason: HOSPADM

## 2020-10-14 RX ORDER — ASPIRIN 81 MG/1
81 TABLET ORAL DAILY
Status: DISCONTINUED | OUTPATIENT
Start: 2020-10-14 | End: 2020-10-16 | Stop reason: HOSPADM

## 2020-10-14 RX ADMIN — CLOPIDOGREL BISULFATE 75 MG: 75 TABLET ORAL at 08:28

## 2020-10-14 RX ADMIN — CEFTRIAXONE 1 G: 1 INJECTION, POWDER, FOR SOLUTION INTRAMUSCULAR; INTRAVENOUS at 21:42

## 2020-10-14 RX ADMIN — Medication 10 ML: at 08:28

## 2020-10-14 RX ADMIN — ASPIRIN 81 MG: 81 TABLET, COATED ORAL at 08:28

## 2020-10-14 RX ADMIN — Medication 10 ML: at 21:43

## 2020-10-14 RX ADMIN — ATORVASTATIN CALCIUM 80 MG: 40 TABLET, FILM COATED ORAL at 21:43

## 2020-10-14 ASSESSMENT — ENCOUNTER SYMPTOMS
RESPIRATORY NEGATIVE: 1
GASTROINTESTINAL NEGATIVE: 1

## 2020-10-14 NOTE — ED NOTES
Report called to [facility] receiving RN for [phone]   [facility] to transport     Cristina Shultz RN  10/13/20 [id number]

## 2020-10-14 NOTE — PROGRESS NOTES
[facility]    Patient is a 19-year-old female with a past medical history of hypertension and hyperlipidemia as well as metastatic lung cancer. She has had 2 CVAs in October with resultant expressive aphasia and worsening confusion. [facility]. She had been hospitalized from 927 through 10 1. She had a left MCA ischemic stroke and a UTI. CT scans during that hospitalization on 9/30/2020 had shown stable disease for her non-small cell lung cancer. She has not been back in to receive any further treatment. Her last dose of monthly nivolumab was administered on 6/15/2020 at that time she also received Xgeva. Readmitted for weakness and falls and worsening clinical status. An MRI of the brain with and without contrast as well as an MRI of the cervical spine is currently pending. We are consulted to comment on the patient's status relative to her lung cancer. The patient's lung cancer history is rather remarkable and can be summarized as seen below:    1. Metastatic adenocarcinoma, originally diagnosed in 12/2012, presenting with small bowel obstruction due to metastatic disease from a lung primary tumor, surgically resected in 01/2013. EGFR wild type, ALK mutation testing negative. Patient was subsequently treated with 4 cycles of carbo and Alimta through September 2013 followed by maintenance Alimta initiated in October 2013 which she tolerated poorly and subsequently discontinued prematurely. 2.   In January 2018 the patient had a CT scan of the chest abdomen and pelvis. At that time there was a lesion on the left kidney that appeared to enlarge. Previously, this had been described as a renal infarct but with the change seen on CT scan a consideration was made but that this could be a renal cell carcinoma. The patient was sent to Dr. Raymundo Arredondo for further evaluation. The patient had a da Hank left partial nephrectomy.   Pathology showed metastatic and does not plan any intervention. An MRI has been ordered    Wendy James. Magdaleno Conway M.D., MPH  Co-Chair, Department of  Clinical [facility], [address] [place] [address]  Office [phone]  [place] [phone] [phone]. [email or web address]    \"Participating in a clinical trial is the first step to fighting cancer; not the last.\"

## 2020-10-14 NOTE — H&P
Hospital Medicine History & Physical      PCP: Abel Noel DO    Date of Admission: 10/13/2020    Date of Service: Pt seen/examined on 10/13/2020    Pt seen/examined face to face on and admitted as observation with expected LOS less than two midnights but that can change depending on respose to medical therapy and clinical progress. Chief Complaint:    Aphasia    History Of Present Illness:       68 y.o. female who presented to [facility] with past medical history of ulcerative colitis, incontinence, hypertension, hyperlipidemia, history of left MCA stroke with residual expressive aphasia, depression, aortic aneurysm presented to the ED for worsening weakness right upper and lower extremity as well as visual deficits and worsening aphasia. Patient has history of metastatic lung cancer, and receiving chemo last received in June and have been canceling past few visited to receive further treatment because reported laziness per spouse. According to the spouse patient was taking carbo levodopa for tremors and was recently stopped 2 days ago due to swelling . Patient was performing occupational therapy and then developed RUE, and RLE with aphasia thus patient was brought to the ED. Patient baseline is able to ambulate but with using a walker and has been falling the past few days as they do not have a hospital bed at home. Unclear if patient lost consciousness or hit her head. CODE STATUS was discussed in detail and according to the spouse patient is a DNR CCA. Past Medical History:          Diagnosis Date    CHUYITA (acute kidney injury) ([facility] 5/1/2018    Allergic rhinitis     Aneurysm ([facility]     aorta  watching now    Aortic aneurysm [facility])     Currently has     Cancer [facility])     Lung    Cholelithiasis     Depression     History of ischemic left MCA stroke 9/30/2020    With residual expressive aphasia. Noted to be subacute ~ 8days old on MRI 9/29/2020.      History of ischemic left MCA stroke 9/30/2020    Hyperlipidemia     Hypertension     Incontinence     bladder    MRSA (methicillin resistant staph aureus) culture positive 12/28/11    Leg    Ulcer     Ulcerative colitis ([facility]        Past Surgical History:          Procedure Laterality Date    ABDOMEN SURGERY      gastric resection for ulcers    BRONCHOSCOPY  12/11/2012    CHOLECYSTECTOMY, OPEN  5/7/2012    COLONOSCOPY      ERCP  3/13/12    sphincterotomy; 5 French cm stent placed    ERCP  12-    pancreatic stent removal, pancreotogram    PRESSURE ULCER DEBRIDEMENT  2010    gastric    STOMACH SURGERY      for ulcer    UPPER GASTROINTESTINAL ENDOSCOPY         Medications Prior to Admission:      Prior to Admission medications    Medication Sig Start Date End Date Taking? Authorizing Provider   aspirin 81 MG EC tablet Take 1 tablet by mouth daily 10/1/20 3/30/21  Tyrel Oconnor MD   NIFEdipine (ADALAT CC) 30 MG extended release tablet Take 90 mg by mouth daily    Historical Provider, MD   magnesium oxide (MAG-OX) 400 MG tablet Take 1 tablet by mouth 2 times daily 10/31/19   Cristina Mcdonald APRN - CNP   clopidogrel (PLAVIX) 75 MG tablet Take 1 tablet by mouth daily 10/21/19 9/27/20  Anthony Chandler MD   atorvastatin (LIPITOR) 40 MG tablet Take 1 tablet by mouth daily 10/21/19   Destiny Hernadez MD   LORazepam (ATIVAN) 0.5 MG tablet Take 0.5 mg by mouth every 8 hours as needed for Anxiety. Historical Provider, MD   oxyCODONE-acetaminophen (PERCOCET) 5-325 MG per tablet Take 1 tablet by mouth every 8 hours as needed for Pain. Historical Provider, MD   pantoprazole (PROTONIX) 40 MG tablet Take 40 mg by mouth nightly     Historical Provider, MD   citalopram (CELEXA) 40 MG tablet Take 40 mg by mouth nightly     Historical Provider, MD       Allergies:  Patient has no known allergies. Social History:          TOBACCO:   reports that she quit smoking about 11 years ago. She has a 75.00 pack-year smoking history.  She has never used smokeless tobacco.  ETOH:   reports no history of alcohol use. E-Cigarettes Vaping or Juuling     Questions Responses    Vaping Use     Start Date     Does device contain nicotine? Quit Date     Vaping Type             Family History:      Reviewed in detail         Problem Relation Age of Onset    Arthritis Mother        REVIEW OF SYSTEMS:     Unable to assess due to patient's clinical situation  PHYSICAL EXAM PERFORMED:    /69   Pulse 84   Temp 98.3 °F (36.8 °C) (Oral)   Resp 18   Ht 5' 2\" (1.575 m)   Wt 145 lb (65.8 kg)   SpO2 93%   BMI 26.52 kg/m²     General appearance:  mild acute distress, appears older than stated age  HEENT:   atraumatic, sclera anicteric, Conjunctivae clear. Neck: Supple,Trachea midline, no goiter  Respiratory:  Normal respiratory effort. Clear to auscultation, bilaterally without Rales/Wheezes/Rhonchi. Cardiovascular:  Regular rate and rhythm without murmurs, capillary refill 2 seconds  Abdomen: Soft, non-tender, non-distended with normal bowel sounds. Musculoskeletal:  No clubbing, cyanosis or edema bilaterally. Skin: turgor normal.  No new rashes or lesions.   Neurologic: expressive and receptive aphasia, no focal weakness noted on my examination, does have pronator drift    Labs:     Recent Labs     10/13/20  1815   WBC 7.5   HGB 10.2*   HCT 32.3*        Recent Labs     10/13/20  1815      K 4.2      CO2 23   BUN 28*   CREATININE 0.9   CALCIUM 8.6     Recent Labs     10/13/20  1815   AST 23   ALT 25   BILITOT <0.2   ALKPHOS 198*     Recent Labs     10/13/20  1815   INR 1.03     Recent Labs     10/13/20  1815 10/13/20  2319   TROPONINI <0.01 <0.01       Urinalysis:      Lab Results   Component Value Date    NITRU POSITIVE 10/13/2020    WBCUA 6-9 10/13/2020    BACTERIA 3+ 10/13/2020    RBCUA None seen 10/13/2020    BLOODU Negative 10/13/2020    SPECGRAV 1.020 10/13/2020    GLUCOSEU Negative 10/13/2020    GLUCOSEU Negative 05/08/2012 Radiology:     CXR: I have reviewed the CXR with the following interpretation:     EKG:  I have reviewed the EKG with the following interpretation:       CTA HEAD NECK W CONTRAST   Final Result      1. No aneurysms or vascular occlusions identified. 2.  Moderate atherosclerotic stenosis of left posterior cerebral artery P1-P2 junction. 3.  No significant stenosis in the extracranial vertebral or carotid arteries. 4.  Stable osseous metastasis at C2. XR CHEST PORTABLE   Final Result      No acute pulmonary disease. CT HEAD WO CONTRAST   Final Result      1. Evolving subacute left posterior MCA territory infarct as seen on MR dated 9/29/2020.   2.  No acute intracranial hemorrhage. ASSESSMENT AND PLAN:    Active Hospital Problems    Diagnosis Date Noted    Acute CVA (cerebrovascular accident) ([facility] [I63.9] 10/13/2020     1. Acute on chronic worsening expressive aphasia:  Suspected urine tract infection likely contributing to patient's worsening already existing prior CVA. However cannot rule out acute CVA given metastatic cancer been not receiving treatment, hx of cva, and new onset right side weakness noted much decreased from baseline, MRI brain and cervical ordered     2. Acute cystitis:  Ceftriaxone ordered    3. Fall:  Reported multiple falls at home  PT/OT consulted    4. Non-small cell lung cancer: Due to acute reported focal deficits  Metastatic to cervical, family reported was scheduled for radiation therapy and surgery by neurosurgery tomorrow  Neurosurgery consultation    5. Stage IV lung cancer non-small cell was receiving therapy however has missed few treatment past few weeks.   Family is unrealistic in regards to prognosis  Oncology and palliative care consulted    Chronic medical conditions:  Hypertension: Held medication for now  Hyperlipidemia: Continue medication    Diet: NPO except meds ordered    PT/OT Eval Status: consulted    DVT Prophylaxis: Held    Dispo: Expected LOS greater than two [facility]

## 2020-10-14 NOTE — PROGRESS NOTES
Speech Language Pathology  Facility/Department: [facility] 5T ORTHO/NEURO   CLINICAL BEDSIDE SWALLOW EVALUATION & DYSPHAGIA DISCHARGE    NAME: Maicol Seymour  : 1940  MRN: [id number]    ADMISSION DATE: 10/13/2020  ADMITTING DIAGNOSIS: has Cellulitis and abscess; UC (ulcerative colitis) ([facility] [address] Elevated blood pressure reading; Nausea & vomiting; Pancreatic mass; S/P cholecystectomy; Cholelithiasis; Hypoxemia requiring supplemental oxygen; Incisional hernia; Port-A-Cath in place; Malignant neoplasm of overlapping sites of right lung ([address] Left kidney mass; Wound infection after surgery, sequela; Left arm weakness; Stroke-like symptom; Weakness of both lower extremities; Tingling of left upper extremity; Acute renal failure (ARF) ([facility] [address] Generalized weakness; Expressive aphasia; History of ischemic left MCA stroke; and Acute CVA (cerebrovascular accident) ([facility] [address] on their problem list.  ONSET DATE:  10/13/20    Recent Chest Xray/CT of Chest: (10/13/20)  Impression         No acute pulmonary disease. Date of Eval: 10/14/2020  Evaluating Therapist: Mendoza Ugalde    Current Diet level:  NPO    Primary Complaint  \"when will I be able to eat? \"    Pain:  Denies    Reason for Referral  Maicol Seymour was referred for a bedside swallow evaluation to assess the efficiency of her swallow function, identify signs and symptoms of aspiration and make recommendations regarding safe dietary consistencies, effective compensatory strategies, and safe eating environment. HPI (10/13/20):  68 y.o. female who presented to [facility] with past medical history of ulcerative colitis, incontinence, hypertension, hyperlipidemia, history of left MCA stroke with residual expressive aphasia, depression, aortic aneurysm presented to the ED for worsening weakness right upper and lower extremity as well as visual deficits and worsening aphasia.  Patient has history of metastatic lung cancer, and receiving chemo last received in June and have been canceling past few visited to receive further treatment because reported laziness per spouse. According to the spouse patient was taking carbo levodopa for tremors and was recently stopped 2 days ago due to swelling . Patient was performing occupational therapy and then developed RUE, and RLE with aphasia thus patient was brought to the ED. Patient baseline is able to ambulate but with using a walker and has been falling the past few days as they do not have a hospital bed at home. Unclear if patient lost consciousness or hit her head. CODE STATUS was discussed in detail and according to the spouse patient is a DNR CCA. Impression  Dysphagia Diagnosis: Swallow function appears grossly intact  Dysphagia Impression:  Oral-pharyngeal swallow function appears intact. Pt demonstrates adequate labial seal and  Mastication with no oral residue. Pt demonstrates adequate laryngeal elevation and no clinical s/s aspiration at this time, even with consecutive swallows via straw for 3 oz water. Recommend continue Regular texture diet with Thin Liquids. No further ST indicated at this time. Dysphagia Outcome Severity Scale: Level 6: Within functional limits/Modified independence     Treatment Plan  Requires SLP Intervention: Yes(requires SLP for speech/langauge; does not require SLP for swallowing)  Duration/Frequency of Treatment: not indicated for swallowing  Referral To: Speech Evaluation    Recommended Diet and Intervention  Diet Solids Recommendation: Regular  Liquid Consistency Recommendation: Thin  Recommended Form of Meds: PO    Compensatory Swallowing Strategies  N/A    Treatment/Goals  N/A    General  Chart Reviewed: Yes  Behavior/Cognition: Alert; Cooperative;Pleasant mood;Confused    Vision/Hearing  WFL for this assessment    Oral Motor Deficits  Oral/Motor  Oral Motor: Within functional limits    Oral Phase Dysfunction  Oral Phase  Oral Phase: WFL.   Pt is edentulous but wears upper and lower dentures which she has in place now. Labial seal is adequate, mastication (with a cracker) appears adequate, and oral clearance is good. Indicators of Pharyngeal Phase Dysfunction  Pharyngeal Phase  Pharyngeal Phase: WFL. Swallow initiation and laryngeal elevation appear adequate. There are no clinical s/s aspiration observed with any consistency attempted: puree, soft solids, thin liquids (via cup, and 3oz swallowed sequentially). Prognosis  Prognosis  Prognosis for safe diet advancement: good  Individuals consulted  Consulted and agree with results and recommendations: Patient;RN    Education  Patient Education: SLP educated pt re: role of SLP, s/s aspiration to report, and diet recommendations. Patient Education Response: Verbalizes understanding  Safety Devices in place: Yes  Type of devices: All fall risk precautions in place;Nurse notified       Therapy Time  SLP Individual Minutes  Time In: 0845  Time Out: 0900  Minutes: 15          Plan  Recommend Regular Diet with Thin Liquids. Discharge Plan: no further ST for dysphagia indicated at this time  Discussed with RN, Erna Crowder. Needs within reach. Electronically Signed by:  Rupali Brewer, [address] [id number]  Pager #[phone]    This document will serve as a discharge summary if pt discharges before next treatment.    10/14/2020 12:56 PM

## 2020-10-14 NOTE — CONSULTS
Neurology consult Note    Opal Rosa MD requesting this consult. CC: f/u aphasia - recent ischemic stroke  HPI: Patient is a 68 y.o. y/o female w/ hx of HLD, HTN, metastatic lung CA w/ known mets to C2 vertebra, recent ischemic stroke, presented w/ transient worsening of symptoms w/ R sided weakness & aphasia. She returned back to her post stroke baseline prior to evaluation. MRI of brain w/ & w/o pending to assess for possible brain mets. Past Medical History:   Diagnosis Date    CHUYITA (acute kidney injury) ([facility] [address] 5/1/2018    Allergic rhinitis     Aneurysm ([facility] [address]     aorta  watching now    Aortic aneurysm [facility])     Currently has     Cancer [facility])     Lung    Cholelithiasis     Depression     History of ischemic left MCA stroke 9/30/2020    With residual expressive aphasia. Noted to be subacute ~ 8days old on MRI 9/29/2020.      History of ischemic left MCA stroke 9/30/2020    Hyperlipidemia     Hypertension     Incontinence     bladder    MRSA (methicillin resistant staph aureus) culture positive 12/28/11    Leg    Ulcer     Ulcerative colitis ([facility] [address]      Past Surgical History:   Procedure Laterality Date    ABDOMEN SURGERY      gastric resection for ulcers    BRONCHOSCOPY  12/11/2012    CHOLECYSTECTOMY, OPEN  5/7/2012    COLONOSCOPY      ERCP  3/13/12    sphincterotomy; 5 Pashto cm stent placed    ERCP  12-    pancreatic stent removal, pancreotogram    PRESSURE ULCER DEBRIDEMENT  2010    gastric    STOMACH SURGERY      for ulcer    UPPER GASTROINTESTINAL ENDOSCOPY         CURRENT MEDICATIONS:    Current Facility-Administered Medications:     aspirin EC tablet 81 mg, 81 mg, Oral, Daily, Katie Wyatt DO, 81 mg at 10/14/20 0828    clopidogrel (PLAVIX) tablet 75 mg, 75 mg, Oral, Daily, Katie Wyatt DO, 75 mg at 10/14/20 0828    cefTRIAXone (ROCEPHIN) 1 g IVPB in NS 50ml minibag, 1 g, Intravenous, Q24H, Katie Wyatt DO    sodium chloride flush 0.9 % injection 10 mL, 10 Unlabored respiratory pattern    Neurologic  Mental status:  pleasantly confused     Cranial nerves:   CN2: Visual Fields full w/o extinction on confrontational testing  CN 3,4,6: pupils equal and reactive to light, extraocular muscles intact,  CN5: facial sensation symmetric   CN7:face symmetric  CN8: hearing symmetric to voice  CN9: palate elevated symmetrically  CN11: trap full strength on shoulder shrug  CN12: tongue midline with protrusion  Motor Exam:  Moves all extremities    Assessment:  Patient is a 67 y/o F w/ hx of lung ca & recent L parietal ischemic stroke. Presented w/ worsening of aphasia and R sided weakness. Aphasia still present, no further weakness on exam today. Plan:  Patient w/ Zio patch cardiac monitor - placed on 10/1 - plan for 14 days - have family bring in box for return to clinic. DC zio patch in AM.  After Zio patch removed there does not appear to be any other contraindication for MRI. Would proceed w/ MRI of brain and cervical spine w/ & w/o   If MRI unchanged could consider seizure as source of acute worsening w/ resolution of symptoms again. Continue PT/OT/SLP   Thanks for consult.      Shagufta York, APRN - CNP  Neurology  [phone]

## 2020-10-14 NOTE — PROGRESS NOTES
Speech Language Pathology  Facility/Department: [facility] 5T ORTHO/NEURO  Initial Speech/Language/Cognitive Assessment    NAME: Maicol Seymour  : 1940   MRN: [id number]  ADMISSION DATE: 10/13/2020  ADMITTING DIAGNOSIS: has Cellulitis and abscess; UC (ulcerative colitis) ([facility] [address] Elevated blood pressure reading; Nausea & vomiting; Pancreatic mass; S/P cholecystectomy; Cholelithiasis; Hypoxemia requiring supplemental oxygen; Incisional hernia; Port-A-Cath in place; Malignant neoplasm of overlapping sites of right lung ([facility] [address] Left kidney mass; Wound infection after surgery, sequela; Left arm weakness; Stroke-like symptom; Weakness of both lower extremities; Tingling of left upper extremity; Acute renal failure (ARF) ([facility] [address] Generalized weakness; Expressive aphasia; History of ischemic left MCA stroke; and Acute CVA (cerebrovascular accident) [facility]) on their problem list.  DATE ONSET:  10/13/20    Date of Eval: 10/14/2020   Evaluating Therapist: ZACHARIAH Price    RECENT CTA HEAD/NECK:  Impression         1.  No aneurysms or vascular occlusions identified. 2.  Moderate atherosclerotic stenosis of left posterior cerebral artery P1-P2 junction. 3.  No significant stenosis in the extracranial vertebral or carotid arteries. 4.  Stable osseous metastasis at C2. 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 Speech does not appear significantly worse than previous documentation. Patient presents with moderate fluent aphasia characterized by deficits in higher-level auditory comprehension and verbal expression (paraphasic errors and neologisms noted with fair insight). Recommendations:  Requires SLP Intervention: Yes  Duration/Frequency of Treatment: 3-5x/week x LOS  D/C Recommendations:  To be determined  Referral To: Speech Evaluation    Plan:   Goals:  Short-term Goals  Goal 1. Patient will participate in ongoing assessment of cognitive communication skills with goals added as indicated. Goal 2. Patient will communicate wants and needs with SBA. Goal 3:  Patient will follow 2-step instructions with 100% accuracy. Goal 4:  Pt will complete word retrieval exercises with minimal assistance. Patient/family involved in developing goals and treatment plan: pateint    Subjective:  Previous level of function and limitations:  Previous discharge report (10/1/20) indicated: Per daughter the pt has some memory problems at baseline involving mostly short term memory. She has also been having frequent falls since the past stroke and is being evaluated for Parkinson's disease. Objective:  Oral/Motor  Oral Motor: Within functional limits    Auditory Comprehension  Comprehension: Exceptions  Two Step Basic Commands: Mild (75% accuracy for simple 2-stp commands related to body parts)    Expression  Primary Mode of Expression: Verbal    Verbal Expression  Verbal Expression: Exceptions to functional limits  Repetition: Mild  Automatic Speech: Mild  Confrontation: Mild  Divergent:  Moderate  Conversation: Moderate    Motor Speech  Motor Speech: (some apraxia/phonemic paraphasias noted)    Pragmatics/Social Functioning  Pragmatics: Within functional limits(pleasant, often able to laugh about per paraphasias, neologisms, etc.)    Cognition:   Orientation  Overall Orientation Status: Within Functional Limits(difficulty verbalizing responses to orientation questions; however with yes/no questions pt appears basically oriented to person, place and time.)  Memory  Memory: Exceptions to WFL(assessment limited by impaires expressive communication)    Prognosis:  Speech Therapy Prognosis  Prognosis: Fair  Individuals consulted  Consulted and agree with results and recommendations: Patient;RN    Education:  Patient Education: SLP educated pt re: role of SLP, recommended POC, and rationale for treatment tasks. Patient Education Response: Verbalizes understanding  Safety Devices in place: Yes  Type of devices: All fall risk precautions in place;Nurse notified    Therapy Time:   Individual Concurrent Group Co-treatment   Time In 0900         Time Out 0915         Minutes 15                   Plan  Speech Therapy 3-5x/week to address goals above. Discharge Plan:  TBD  Discussed with RN, [facility]. Needs within reach. Electronically Signed by:  Alana Villagran., [id number] [place] [address] [id number]  Pager #[phone]    This document will serve as a discharge summary if pt discharges before next treatment.    10/14/2020 1:07 PM

## 2020-10-14 NOTE — CONSULTS
RADIATION ONCOLOGY CONSULTATION         Patient:  Gabrielle Sanchez  YOB: 1940    10/14/2020    REASON FOR CONSULT: Metastatic non-small cell lung cancer    PCP: Sarbjit Clemons DO    CHIEF COMPLAINT:     Chief Complaint   Patient presents with    Cerebrovascular Accident     Pt brought to ED by [facility]       HISTORY OF PRESENT ILLNESS:     HPI:    Gabrielle Sanchez is a 68 y.o. female who was referred to me for by Negin Bailey CNP  for a Radiation Oncology consult regarding her Diagnosis of metastatic non-small cell lung cancer. He has previously received palliative radiation therapy to C1-C3 in June 2018 and T10-T12 in May 2019. Since that time, scans have been stable in these areas. Patient was recently discharged from the hospital after a left MCA stroke and UTI. She was discharged to home. Patient was in the course of receiving occupational therapy at home and developed progressive weakness on the right side, visual deficits and worsening expressive aphasia with inappropriate verbal responses. CT the head revealed evolving subacute left posterior MCA infarct and CTA of the head neck revealed moderate atherosclerotic stenosis of left posterior cerebral artery and stable osseous metastasis at C2. She was scheduled to have an MRI of the brain and cervical spine today however this was canceled due to an external cardiac monitor that could not be identified as being MRI safe. She continues to have expressive aphasia however is responding to commands and conversations in such way that appears that she understands what is being said to her she just cannot respond appropriately. She was previously scheduled to follow-up with Dr. Patricia Kee as an outpatient tomorrow.     PAST MEDICAL HISTORY:     Past Medical History:   Diagnosis Date    CHUYITA (acute kidney injury) ([address] 5/1/2018    Allergic rhinitis     Aneurysm ([address]     aorta  watching now    Aortic aneurysm ([address] Currently has     Cancer [facility])     Lung    Cholelithiasis     Depression     History of ischemic left MCA stroke 2020    With residual expressive aphasia. Noted to be subacute ~ 8days old on MRI 2020.      History of ischemic left MCA stroke 2020    Hyperlipidemia     Hypertension     Incontinence     bladder    MRSA (methicillin resistant staph aureus) culture positive 11    Leg    Ulcer     Ulcerative colitis ([address]        PAST SURGICAL HISTORY:     Past Surgical History:   Procedure Laterality Date    ABDOMEN SURGERY      gastric resection for ulcers    BRONCHOSCOPY  2012    CHOLECYSTECTOMY, OPEN  2012    COLONOSCOPY      ERCP  3/13/12    sphincterotomy; 5 Greek cm stent placed    ERCP  2012    pancreatic stent removal, pancreotogram    PRESSURE ULCER DEBRIDEMENT      gastric    STOMACH SURGERY      for ulcer    UPPER GASTROINTESTINAL ENDOSCOPY         SOCIAL HISTORY:     Social History     Socioeconomic History    Marital status:      Spouse name: Not on file    Number of children: Not on file    Years of education: Not on file    Highest education level: Not on file   Occupational History    Not on file   Social Needs    Financial resource strain: Not on file    Food insecurity     Worry: Not on file     Inability: Not on file    Transportation needs     Medical: Not on file     Non-medical: Not on file   Tobacco Use    Smoking status: Former Smoker     Packs/day: 1.50     Years: 50.00     Pack years: 75.00     Last attempt to quit: 2008     Years since quittin.8    Smokeless tobacco: Never Used   Substance and Sexual Activity    Alcohol use: No    Drug use: No    Sexual activity: Not on file   Lifestyle    Physical activity     Days per week: Not on file     Minutes per session: Not on file    Stress: Not on file   Relationships    Social connections     Talks on phone: Not on file     Gets together: Not on file 98.1 °F (36.7 °C)   SpO2: 95%       ECOG:Score 3:  Patient is capable of only limited self-care, confined to bed or chair greater than 50% of waking hours. General appearance: alert and cooperative  Head: Normocephalic, without obvious abnormality, atraumatic  Neck: Supple, No palpable lymphadenopathy in supraclavicular or cervical chains  Lungs: Breathing easily on room air. Lungs clear. Heart: Regular rate and rhythm  Abdomen: Benign  Extremities: without cyanosis, clubbing, edema or asymmetry. Muscle strength 5/5 bilateral upper extremities, and 4/5 bilateral lower extremities. Skin: No jaundice, purpura or petechiae  Neuorological: CN II-XII grossly intact with no focal neurological deficits. LABS:     Lab Results   Component Value Date    WBC 6.7 10/14/2020    HGB 10.3 (L) 10/14/2020    HCT 33.2 (L) 10/14/2020    MCV 81.7 10/14/2020       Lab Results   Component Value Date    GLUCOSE 99 10/14/2020    BUN 23 (H) 10/14/2020    CREATININE 0.8 10/14/2020    K 4.2 10/14/2020    BCR 21.0 07/05/2017    PHOS 4.8 10/22/2019       Lab Results   Component Value Date    ALKPHOS 194 (H) 10/14/2020    ALT 24 10/14/2020    AST 27 10/14/2020    BILITOT <0.2 10/14/2020    BILIDIR 0.0 05/16/2012         Lab Results   Component Value Date    PROTIME 11.9 10/13/2020       IMAGING:     Ct Head Wo Contrast    Result Date: 10/13/2020  PROCEDURE: CT head without contrast INDICATION: R sided vision changes and weakness, stroke last week; COMPARISON: 9/29/2019 TECHNIQUE: CT head was performed without contrast according to standard protocol. Axial images and multiplanar reformatted images reviewed. Up-to-date CT equipment and radiation dose reduction techniques were employed. FINDINGS: No acute intra- or extra-axial fluid collections are identified. There is moderate cerebral volume loss with associated ventricular dilatation. The basilar cisterns are patent. No mass effect or midline shift is seen.  An evolving subacute left posterior MCA territory infarct is redemonstrated. There is chronic right frontal lobe encephalomalacia. Moderate periventricular white matter hypoattenuation is indicative of chronic small vessel ischemic disease. There is mucosal thickening in the right maxillary sinus. Bilateral lens placements are noted. The mastoids appear normal.     1.  Evolving subacute left posterior MCA territory infarct as seen on MR dated 9/29/2020. 2.  No acute intracranial hemorrhage. Ct Head Wo Contrast    Result Date: 9/27/2020  CT HEAD WITHOUT CONTRAST Comparison exam 6/24/2020 HISTORY: Altered mental status FINDINGS: Thin section axial images obtained through the head from skull base to vertex. Multiplanar reconstruction images received for interpretation. Low radiation dose CT technique utilized. Diffuse cerebral atrophy with prominence of the sulci and ventricles. Patchy periventricular deep white matter low attenuation bilaterally consistent with changes of chronic small vessel ischemia. Encephalomalacia in the right frontal lobe unchanged. Small inferior infarct in the bilateral basal ganglia. There are no masses or midline shift. No abnormal extra-axial fluid collection seen. Gray-white differentiation is maintained. Brainstem and posterior fossa appear within normal limits. Visualized orbits are intact. There is diffuse mucosal thickening calcification in the right maxillary sinus, similar prior exam.  Remaining paranasal sinuses otherwise unremarkable. Mastoid air cells well pneumatized. No skull abnormality seen. 1.  No findings for acute intracranial abnormality. 2.  Age related atrophy with moderate periventricular white matter changes bilaterally consistent with chronic ischemic leukoencephalopathy. 3.  Remote right frontal lobe infarct with multiple small bilateral basal ganglia lacunar infarcts, unchanged. 4.  Chronic right maxillary sinusitis.  PROCEDURE: CT ANGIOGRAPHY NECK WITH/WITHOUT CONTRAST INDICATION: altered mental state COMPARISON: none TECHNIQUE: Limited noncontrast CT imaging performed for the purposes of IV contrast bolus tracking. Axial CT imaging obtained from skull base through the lung apices following administration of IV contrast. Axial images, multiplanar reformatted images, and maximum intensity projection images were reviewed for CT angiographic technique. Up-to-date CT equipment and radiation dose reduction techniques were employed. IV contrast: 80 mL Isovue 370. FINDINGS: AORTIC ARCH: Standard three-vessel aortic arch anatomy is present. INNOMINATE ARTERY: Normal. RIGHT SUBCLAVIAN ARTERY: Normal. RIGHT VERTEBRAL ARTERY: Normal. RIGHT CAROTID ARTERY: There is contrast opacification of the right common carotid artery. Minimal epiglottic plaque at the carotid bifurcation. No flow significant stenosis seen. There is flow identified in the distal right internal carotid artery. LEFT SUBCLAVIAN ARTERY: Normal. LEFT VERTEBRAL ARTERY: Normal. LEFT CAROTID ARTERY: There is contrast opacification of the left common carotid artery. There is moderate chronic plaque at the carotid bifurcation. No flow significant stenosis or focal aneurysm seen. There is flow in the distal left internal carotid  artery. NECK SOFT TISSUES: No neck soft tissue mass or lymphadenopathy. VISUALIZED MEDIASTINUM: No mass or lymphadenopathy. VISUALIZED LUNG APICES: Mild centrilobular emphysematous changes bilaterally. BONES: No destructive osseous process. OTHER: None. IMPRESSION: No acute CTA findings. No hemodynamically significant stenosis or focal aneurysm seen. PROCEDURE: CT ANGIOGRAPHY HEAD WITH/WITHOUT CONTRAST INDICATION: altered mental state COMPARISON: none TECHNIQUE: Axial CT imaging obtained through the head prior to and following administration of IV contrast. Axial images, multiplanar reformatted images, and maximum intensity projection images were reviewed for CT angiographic technique.  IV contrast: 80 mL Isovue 370. FINDINGS: ANTERIOR CIRCULATION: The intracranial internal carotid arteries, anterior cerebral arteries, and middle cerebral arteries demonstrate no occlusion or stenosis. No evidence for aneurysm or arteriovenous malformation. POSTERIOR CIRCULATION: The bilateral vertebral arteries, basilar artery and posterior cerebral arteries demonstrate no occlusion or significant stenosis. Small patent right posterior communicating artery. No evidence for aneurysm or arteriovenous malformation. INTRACRANIAL VENOUS SYSTEM: No evidence for intracranial venous thrombosis. OTHER: None. IMPRESSION: 1. No findings for acute CTA findings in the head. No hemodynamically significant stenosis or focal aneurysm identified. Xr Chest Portable    Result Date: 10/13/2020  EXAM: PORTABLE AP CHEST X-RAY, 1755 hours INDICATION: stroke COMPARISON: 9/30/2020 FINDINGS: A left subclavian approach port terminates in the distal SVC. The lungs are emphysematous. There is no focal consolidation, pleural effusion, or pneumothorax. The cardiomediastinal silhouette is normal. The visible bony thorax is intact. No acute pulmonary disease. Xr Chest Portable    Result Date: 9/27/2020  PORTABLE CHEST. HISTORY: AMS, expressive aphasia COMPARISON STUDY: 6/24/2020 FINDINGS: Heart size and mediastinal structures appear within normal limits. Mild prominence of lung markings in the lung bases. No change in left sided Port-A-Cath. No localized consolidation or pleural effusion identified. Bony structures are intact. 1. No localized consolidation or pleural effusion. Tomma Emmet Neck W Contrast    Result Date: 9/27/2020  CT HEAD WITHOUT CONTRAST Comparison exam 6/24/2020 HISTORY: Altered mental status FINDINGS: Thin section axial images obtained through the head from skull base to vertex. Multiplanar reconstruction images received for interpretation. Low radiation dose CT technique utilized.  Diffuse cerebral atrophy with prominence of the sulci and ventricles. Patchy periventricular deep white matter low attenuation bilaterally consistent with changes of chronic small vessel ischemia. Encephalomalacia in the right frontal lobe unchanged. Small inferior infarct in the bilateral basal ganglia. There are no masses or midline shift. No abnormal extra-axial fluid collection seen. Gray-white differentiation is maintained. Brainstem and posterior fossa appear within normal limits. Visualized orbits are intact. There is diffuse mucosal thickening calcification in the right maxillary sinus, similar prior exam.  Remaining paranasal sinuses otherwise unremarkable. Mastoid air cells well pneumatized. No skull abnormality seen. 1.  No findings for acute intracranial abnormality. 2.  Age related atrophy with moderate periventricular white matter changes bilaterally consistent with chronic ischemic leukoencephalopathy. 3.  Remote right frontal lobe infarct with multiple small bilateral basal ganglia lacunar infarcts, unchanged. 4.  Chronic right maxillary sinusitis. PROCEDURE: CT ANGIOGRAPHY NECK WITH/WITHOUT CONTRAST INDICATION: altered mental state COMPARISON: none TECHNIQUE: Limited noncontrast CT imaging performed for the purposes of IV contrast bolus tracking. Axial CT imaging obtained from skull base through the lung apices following administration of IV contrast. Axial images, multiplanar reformatted images, and maximum intensity projection images were reviewed for CT angiographic technique. Up-to-date CT equipment and radiation dose reduction techniques were employed. IV contrast: 80 mL Isovue 370. FINDINGS: AORTIC ARCH: Standard three-vessel aortic arch anatomy is present. INNOMINATE ARTERY: Normal. RIGHT SUBCLAVIAN ARTERY: Normal. RIGHT VERTEBRAL ARTERY: Normal. RIGHT CAROTID ARTERY: There is contrast opacification of the right common carotid artery. Minimal epiglottic plaque at the carotid bifurcation.   No flow WITH CONTRAST 2.  CT ANGIOGRAPHY NECK WITH CONTRAST INDICATION: stroke COMPARISON: CT head without contrast from the same day TECHNIQUE: CT angiogram of the head and neck was performed with contrast according to standard protocol. Axial images, multiplanar reformatted images, and maximum intensity projection images were reviewed for CT angiographic technique. Up-to-date CT equipment and radiation dose reduction techniques were employed. IV contrast: 80 mL Isovue 370 FINDINGS: Non-angiographic findings: A sclerotic lesion at C2 which may represent metastatic disease is stable compared to prior examinations. Degenerative changes are present in the cervical spine. The lungs are emphysematous. CTA Neck: There is atherosclerotic disease of the aortic arch. The descending thoracic aorta is mildly ectatic measuring 3.0 cm in diameter. The configuration of the brachiocephalic vessels is typical. The innominate artery and both subclavian arteries appear normal. The common carotid arteries and bilateral carotid bifurcations appear normal with no stenosis by NASCET criteria. There is mild atherosclerotic calcification of the cervical internal carotid arteries without significant focal stenosis. The cervical vertebral arteries appear normal. The right vertebral artery is dominant. CTA Head: The distal internal carotid arteries appear normal. The anterior and middle cerebral arteries appear normal. The distal vertebral arteries appear normal. The basilar artery appears normal. The right posterior cerebral artery appears normal. There is moderate atherosclerotic stenosis of the left posterior cerebral artery P1-P2 junction. No aneurysms or vascular occlusions are identified. 1.  No aneurysms or vascular occlusions identified. 2.  Moderate atherosclerotic stenosis of left posterior cerebral artery P1-P2 junction. 3.  No significant stenosis in the extracranial vertebral or carotid arteries. 4.  Stable osseous metastasis at C2. Ct Chest Abdomen Pelvis W Contrast    Result Date: 10/1/2020  EXAM: CT CHEST ABDOMEN AND PELVIS INDICATION: Provided clinical history: Evaluate for cancer progression. Charted knee demonstrate the patient has a history of lung cancer with osseous metastases. COMPARISON: April 8, 2020 TECHNIQUE: Axial CT imaging obtained through the chest, abdomen and pelvis. Axial images and multiplanar reformatted images were reviewed. Up-to-date CT equipment and radiation dose reduction techniques were employed. IV Contrast: 100 mL Isovue-370 Oral Contrast: Yes. CT CHEST: Evaluation is degraded by motion artifact. LUNGS AND AIRWAYS: Airways are patent. There is a background of emphysema. Linear scarring/fibrosis at the base of the right middle lobe, image 76 of series 601, is similar to the prior study. Scarring anteriorly within the lingula, image 75 of series 601 is also similar in appearance. There is no new or enlarging pulmonary nodularity. PLEURA: No pleural effusions or significant pleural thickening. HEART AND GREAT VESSELS: The heart is stable in size. Atherosclerotic calcification of the thoracic aorta, aortic valve and coronary arteries is unchanged. ADENOPATHY: There is no new or progressive lymphadenopathy of the chest. CHEST WALL / LOWER NECK: No significant abnormality. BONES: Sclerotic lesion of the spinous process and posterior lamina at T11 again noted. This is similar in appearance to the prior study. . There is no new destructive osseous process. CT ABDOMEN AND PELVIS: Evaluation is degraded by motion artifact. LIVER: Normal. GALLBLADDER AND BILIARY DUCTS: The gallbladder is absent. No intra- or extrahepatic biliary dilatation. PANCREAS: Normal. SPLEEN: Normal. ADRENAL GLANDS: Normal. KIDNEYS AND URETERS: Status post left nephrectomy. 3 mm calcification of the mid pole right kidney is favored to be renal vascular in nature. This is unchanged. There is no hydronephrosis.  URINARY BLADDER: Normal. REPRODUCTIVE ORGANS: No associated masses. BOWEL: Surgical changes of the bowel including gastric bypass noted. The bowel itself is nondilated. LYMPH NODES: No new or progressive lymphadenopathy of the abdomen or pelvis. PERITONEUM / RETROPERITONEUM: No ascites or free air. VESSELS: Aortobiiliac stent graft is noted. The excluded aneurysmal sac of the abdominal aorta measures up to 3.7 x 3.2 cm. Splenic vein, SMV, PV and hepatic veins demonstrate enhancement. ABDOMINAL WALL: Broad mild anterior abdominal and pelvic wall hernia noted containing nondilated loops of both large and small bowel. BONES: No acute fracture or destructive osseous process. CHEST: 1. No acute intra-thoracic abnormality. 2. No evidence for new or progressive disease of the chest. 3. Emphysema. 4. Stable sclerotic lesion of the posterior elements at T11. 5. Atherosclerotic disease. ABDOMEN/PELVIS: 1. No acute intra-abdominopelvic abnormality. 2. No evidence for new or progressive disease of the abdomen or pelvis. 3. Anterior abdominal and pelvic wall hernia containing nonobstructed loops of large and small bowel, which is not significantly changed. Cta Head W Contrast    Result Date: 9/27/2020  CT HEAD WITHOUT CONTRAST Comparison exam 6/24/2020 HISTORY: Altered mental status FINDINGS: Thin section axial images obtained through the head from skull base to vertex. Multiplanar reconstruction images received for interpretation. Low radiation dose CT technique utilized. Diffuse cerebral atrophy with prominence of the sulci and ventricles. Patchy periventricular deep white matter low attenuation bilaterally consistent with changes of chronic small vessel ischemia. Encephalomalacia in the right frontal lobe unchanged. Small inferior infarct in the bilateral basal ganglia. There are no masses or midline shift. No abnormal extra-axial fluid collection seen. Gray-white differentiation is maintained.   Brainstem and posterior fossa appear within normal limits. Visualized orbits are intact. There is diffuse mucosal thickening calcification in the right maxillary sinus, similar prior exam.  Remaining paranasal sinuses otherwise unremarkable. Mastoid air cells well pneumatized. No skull abnormality seen. 1.  No findings for acute intracranial abnormality. 2.  Age related atrophy with moderate periventricular white matter changes bilaterally consistent with chronic ischemic leukoencephalopathy. 3.  Remote right frontal lobe infarct with multiple small bilateral basal ganglia lacunar infarcts, unchanged. 4.  Chronic right maxillary sinusitis. PROCEDURE: CT ANGIOGRAPHY NECK WITH/WITHOUT CONTRAST INDICATION: altered mental state COMPARISON: none TECHNIQUE: Limited noncontrast CT imaging performed for the purposes of IV contrast bolus tracking. Axial CT imaging obtained from skull base through the lung apices following administration of IV contrast. Axial images, multiplanar reformatted images, and maximum intensity projection images were reviewed for CT angiographic technique. Up-to-date CT equipment and radiation dose reduction techniques were employed. IV contrast: 80 mL Isovue 370. FINDINGS: AORTIC ARCH: Standard three-vessel aortic arch anatomy is present. INNOMINATE ARTERY: Normal. RIGHT SUBCLAVIAN ARTERY: Normal. RIGHT VERTEBRAL ARTERY: Normal. RIGHT CAROTID ARTERY: There is contrast opacification of the right common carotid artery. Minimal epiglottic plaque at the carotid bifurcation. No flow significant stenosis seen. There is flow identified in the distal right internal carotid artery. LEFT SUBCLAVIAN ARTERY: Normal. LEFT VERTEBRAL ARTERY: Normal. LEFT CAROTID ARTERY: There is contrast opacification of the left common carotid artery. There is moderate chronic plaque at the carotid bifurcation. No flow significant stenosis or focal aneurysm seen. There is flow in the distal left internal carotid  artery.  NECK SOFT TISSUES: No neck orbital abnormality otherwise. Limited overall exam due to motion. MIDLINE STRUCTURES: The sella turcica and pituitary gland are normal. Craniovertebral alignment and cerebellar tonsils are normal. VENTRICLES: Normal size and configuration for patient age. Cisternal spaces are intact. INTRACRANIAL HEMORRHAGE: None. BRAIN PARENCHYMA: Multiple areas of abnormal diffusion signal in the posterior left temporal lobe and inferior left parietal lobe compatible with posterior left MCA distribution infarct. Minimal involvement of the posterior left insula. Areas of prior infarct with encephalomalacia in the right frontal and left frontal lobe. Extensive nonspecific periventricular and subcortical white matter signal and bodies compatible with extensive chronic small vessel ischemic disease. Diffuse sulcal prominence compatible with atrophy. 1. Moderate-sized area of infarction in the posterior left MCA distribution involving the posterior left temporal lobe and inferior left parietal white matter. Diffusion restriction compatible with infarcts within the last 7-10 days. 2. Extensive white matter signal abnormality compatible with chronic small vessel ischemic disease. Areas of previous infarct within cephalization the frontal lobes. 3. No evidence of acute intracranial hemorrhage. 4. Abnormal marrow signal in the posterior dens compatible with metastatic disease. Mri Brain Wo Contrast    Result Date: 9/27/2020  MRI OF THE HEAD WITHOUT CONTRAST: HISTORY: Aphasia, history of stroke and lung cancer. . TECHNIQUE: Routine multiplanar, multisequence imaging protocol was performed. COMPARISON: 10/23/2019. Bernadette Heath FINDINGS: This study is severely limited secondary to significant motion artifact on all imaging sequences obtained. The ventricular system is normal and midline in position. No extra-axial collections, mass effect or midline shift is seen.  Diffuse patchy increased T2-weighted/FLAIR signal seen throughout the periventricular and subcortical white matter bilaterally. Focal area of encephalomalacia is seen in the right posterior frontal lobe, consistent with remote infarct. There is no evidence of intracranial hemorrhage. Diffusion imaging is severely limited. Question small focus of diffusion restriction signal abnormality in the left periventricular white matter adjacent to the atria of the lateral ventricle. No other diffusion restriction signal abnormalities are suggested. Major intracranial vessels including vertebral, basilar and bilateral internal carotid arteries demonstrate normal flow signal. There is diffuse mucosal thickening and near complete opacification of the right maxillary sinus. Severely limited study. Evidence of cerebral atrophy and diffuse chronic small vessel white matter disease bilaterally. Remote right posterior frontal lobe cortical infarct. Small focus of diffusion restriction consistent with small vessel disease in the left periventricular white matter is not excluded. The study is limited significantly by motion. No other acute intracranial findings. Right maxillary chronic sinus disease. CTs and MRIs personally reviewed by myself and compared with previous scans. STAGING:     Cancer Staging  Malignant neoplasm of overlapping sites of right lung [facility])  Staging form: Lung, AJCC 7th Edition  - Clinical: Stage IV (T1b, N0, M1b, Free text: 1. Metastatic adenocarcinoma of the lung, diagnosed 12/2012.) - Unsigned  - Pathologic: Stage IV (T1b, N0, M1, Free text: 1. Metastatic adenocarcinoma of the lung, diagnosed 12/2012.) - Signed by Angle Figueroa MD on 10/13/2014        ASSESSMENT:     Nadine Briseno is a 68-year-old female who previously received palliative radiation to C1-C3 in June 2018 and T10-12 in May 2019. Discussed case with Dr. Mkaayla Ibarra. Most recent scans show both areas are stable. Most recent scans revealed CVA but no evidence of intracranial metastasis.   Unless there is radiographic evidence of progression in these areas, there is no role for radiation therapy at this time. PLAN:      Orders Placed This Encounter   Procedures    Culture, Blood 1    Culture, Blood 2    Culture, Urine    XR CHEST PORTABLE    CT HEAD WO CONTRAST    CTA HEAD NECK W CONTRAST    CBC Auto Differential    Comprehensive Metabolic Panel w/ Reflex to MG    Troponin    Protime-INR    Urinalysis, reflex to microscopic (Lab)    Microscopic Urinalysis    Comprehensive Metabolic Panel w/ Reflex to MG    CBC auto differential    Hemoglobin A1c    Lipid panel - fasting    Troponin    DIET GENERAL;    Swallow screen by nursing before diet and oral medications started    NIH Stroke Scale (NIHSS)    Vital signs per unit routine    Telemetry monitoring - 48 hour duration    Notify physician    Up as tolerated    Daily weights    Intake and output    Straight cath    Adv Diet as Tolerated (nurse communication)  Diet Full Liquid; 4 carb choices (60 gms)/meal; Dysphagia Pureed    NIHSS/Neuro Checks    Swallow screen by nursing before diet and oral medications started.  Stroke education    Up with assistance    Place sequential compression device    DNR comfort care - arrest    Pharmacy to Dose: Other - See Comments: The pharmacist will review this patient's medication profile to evaluate IV medications and change all base solutions to 0.9% sodium chloride if possible based on compatibility and product availability. This. .. [address] [place] [address] to change base solutions.     Inpatient consult to Hospitalist    Inpatient consult to Neurosurgery    Inpatient consult to Palliative Care    Inpatient consult to Oncology    Inpatient consult to Radiation Oncology    Inpatient consult to Neurology    OT eval and treat    OT eval and treat    PT evaluation and treat    PT eval and treat    Initiate Oxygen Therapy Protocol    Speech language pathology evaluation    POCT Glucose

## 2020-10-14 NOTE — CONSULTS
NEUROSURGERY Romana Brownie  [id number]   1940   10/14/2020    Requesting physician: Lisa Flood DO    Reason for consultation: radiation scheduled for spinal metastasis     History of present illness: Patient with expressive aphasia, history taken from prior documentation and communication with . Mrs. Vikas De Jesus is a 68 y.o. female w/ PMH of HTN, HLD, metastatic lung CA, CVA in 10/2019 and 9/20202, aortic aneurysm and ulcerative colitis who presented on 10/14/2020 with right sided weakness and worsening aphasia per her occupational therapist who was working with her at home yesterday afternoon. She was last treated with chemotherapy in June of 2020 for her lung CA and had not made it to her follow up appointments for chemo since then due to fatigue and \"laziness\" per . Patient had a stroke in 10/2019 with residual right sided weakness that had improved, she presented here on 9/27/2020 with new aphasia and was found to have acute infarcts in the left temporal lobe at that time, she was sent home with a 14 day cardiac monitor and is due to follow up with [facility] neurology. Patient typically gets around with a walker at home and her  reports recent frequent falls, says her legs give out and she leaves her walker behind, says she is becoming difficult for him to care for at home. On this admission UA was + bacteriuria, started on Rocephin. 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 Torrie Mendez tomorrow as an outpatient. Neurosurgery was consulted for recommendations on spinal metastasis.        No Known Allergies    Past Medical History:   Diagnosis Date    CHUYITA (acute kidney injury) ([address] 5/1/2018    Allergic rhinitis     Aneurysm ([facility] [address]     aorta  watching now    Aortic aneurysm [facility])     Currently has     Cancer [facility])     Lung    Cholelithiasis     Depression     History of ischemic left MCA stroke 9/30/2020 With residual expressive aphasia. Noted to be subacute ~ 8days old on MRI 2020.  History of ischemic left MCA stroke 2020    Hyperlipidemia     Hypertension     Incontinence     bladder    MRSA (methicillin resistant staph aureus) culture positive 11    Leg    Ulcer     [facility]cerative [address] [address]         Past Surgical History:   Procedure Laterality Date    ABDOMEN SURGERY      gastric resection for ulcers    BRONCHOSCOPY  2012    CHOLECYSTECTOMY, OPEN  2012    COLONOSCOPY      ERCP  3/13/12    sphincterotomy; 5 [place] cm stent placed    ERCP  2012    pancreatic stent removal, pancreotogram    PRESSURE ULCER DEBRIDEMENT      gastric    STOMACH SURGERY      for ulcer    UPPER GASTROINTESTINAL ENDOSCOPY         Social History     Occupational History    Not on file   Tobacco Use    Smoking status: Former Smoker     Packs/day: 1.50     Years: 50.00     Pack years: 75.00     Last attempt to quit: 2008     Years since quittin.8    Smokeless tobacco: Never Used   Substance and Sexual Activity    Alcohol use: No    Drug use: No    Sexual activity: Not on file        Family History   Problem Relation Age of Onset    Arthritis Mother         No outpatient medications have been marked as taking for the 10/13/20 encounter [facility] Encounter).       Current Facility-Administered Medications   Medication Dose Route Frequency Provider Last Rate Last Dose    aspirin EC tablet 81 mg  81 mg Oral Daily Katie Wyatt DO   81 mg at 10/14/20 0828    clopidogrel (PLAVIX) tablet 75 mg  75 mg Oral Daily Katie Wyatt DO   75 mg at 10/14/20 0828    cefTRIAXone (ROCEPHIN) 1 g IVPB in NS 50ml minibag  1 g Intravenous Q24H Katie Wyatt DO        sodium chloride flush 0.9 % injection 10 mL  10 mL Intravenous 2 times per day Sunshine Wyatt DO   Stopped at 10/14/20 1002    sodium chloride flush 0.9 % injection 10 mL  10 mL Intravenous PRN Negro Claudio DO  magnesium sulfate 2 g in 50 mL IVPB premix  2 g Intravenous PRN Marlin Chuyita Wyatt, DO        acetaminophen (TYLENOL) tablet 650 mg  650 mg Oral Q6H PRN Katie Wyatt, DO        Or    acetaminophen (TYLENOL) suppository 650 mg  650 mg Rectal Q6H PRN Nissa Wyatt, DO        promethazine (PHENERGAN) tablet 12.5 mg  12.5 mg Oral Q6H PRN Katie Wyatt, DO        Or    ondansetron (ZOFRAN) injection 4 mg  4 mg Intravenous Q6H PRN Saraid AMELIA Wyatt, DO        famotidine (PEPCID) tablet 20 mg  20 mg Oral Daily PRN Nissa Wyatt, DO        sodium chloride flush 0.9 % injection 10 mL  10 mL Intravenous 2 times per day Nissa Wyatt, DO   10 mL at 10/14/20 0828    sodium chloride flush 0.9 % injection 10 mL  10 mL Intravenous PRN Nissa Wyatt, DO        atorvastatin (LIPITOR) tablet 80 mg  80 mg Oral Nightly Katie Wyatt, DO   80 mg at 10/13/20 2315    potassium chloride 10 mEq in sodium chloride 0.9 % 100 mL IVPB   Intravenous PRN Katie Wyatt, DO          Objective:  BP (!) 145/80   Pulse 81   Temp 97.7 °F (36.5 °C) (Oral)   Resp 16   Ht 5' 2\" (1.575 m)   Wt 145 lb (65.8 kg)   SpO2 97%   BMI 26.52 kg/m²     Physical Exam:  Patient seen and examined   General: Well developed. Alert and cooperative in no acute distress. HENT: atraumatic, neck supple  Eyes: Optic discs: Not tested  Pulmonary: unlabored respiratory effort  Cardiovascular:  Warm well perfused.  No peripheral edema  Gastrointestinal: abdomen soft, NT, ND    Neurologic Exam:  Neurological:  Mental Status: Awake, alert, oriented to person, hospital and year with choices  Attention: Intact  Language: expressive asphasia  Sensation: Intact to all extremities to light touch  Coordination: Intact    Cranial Nerves:  Cranial Nerves:  II: Visual acuity not tested, denies new visual changes / diplopia  III, IV, VI: PERRL, 3 mm bilaterally, EOMI, no nystagmus noted  V: Facial sensation intact bilaterally to touch  VII: Face symmetric  VIII: 04/08/2012    Elevated blood pressure reading 12/30/2011    Cellulitis and abscess 12/28/2011    UC (ulcerative colitis) ([facility] [address] 12/28/2011       Assessment:  68year old female with history of stage IV lung CA with known osseous metastasis at C2 & T11 and prior CVA who presented with transient worsening right sided weakness and aphasia, + bacteriuria in ED. Plan:  1. No emergent neurosurgical intervention indicated  2. Advance diet / activity per primary team  3. Was scheduled to see Dr. Patricia Kee for radiation of spinal metastasis, consult radiation oncology inpatient   4. Thanks for consult, will follow peripherally while in house and review cervical MRI when completed, please call if there are any acute questions     DISPO- dispo up to primary medical team   [phone]    Patient was discussed with Dr. Marlon Cabot who agrees with above assessment and plan.      Electronically signed  10/14/2020 10:11 AM

## 2020-10-14 NOTE — CONSULTS
[facility]  Palliative Medicine Consultation Note      Date Of Admission:10/13/2020  Date of consult: 10/14/20  Seen by [facility] in the past:  No    Recommendations:        Introduced palliative care to pt and  Shelley Gross at the bedside. Shelley Gross reports pt requires a lot of assistance at home and he wishes she'd qualify for SNF. We discussed measures he could try at home for extra support, including DME, [place] on Aging, private caregivers, or respite. He says pt has been refusing to go to her oncology appointments since June, and so I also discussed the option of home hospice with Shelley Gross, which he seemed surprised to hear since he says her cancer has been very stable even without treatment for several months. I briefly explained hospice services for his information for the future, if pt continues to not go to her appointments. For now, pt and  are wanting to proceed with radiation therapy and then d/c home when ready, and he plans on bringing her to Dr. Jose Polo office to f/u for treatment options. 1. Goals of Care/Advanced Care planning/Code status: DNR-CCA. Pt/ want her to return home and plan on completing radiation and following up with Dr. Kristopher Krueger. 2. Pain: pt denies pain/discomfort. 3. SOB: pt denies sob and is breathing comfortably on room air. 4. Aphasia/AMS: pt and  feel she is at her baseline with expressive aphasia, which she has had since her stroke earlier this year. She's being treated for UTI with ceftriaxone.    5. Disposition: d/c home with [facility] [address]    Reason for Consult:         [x]  Goals of Care  [x]  Code Status Discussion/Advanced Care Planning   []  Psychosocial/Family Support  []  Symptom Management  []  Other (Specify)    Requesting Physician: Dr. Michael Ray: Aphasia    History Obtained From:  patient, spouse, electronic medical record    History of Present Illness:         Pasha Bryne is a 68 y.o. female with PMH of ulcerative colitis, metastatic lung cancer (last chemo was in June), incontinence, hypertension, hyperlipidemia, history of left MCA stroke with residual expressive aphasia, depression, aortic aneurysm who presented with worsening weakness right upper and lower extremity as well as visual deficits and worsening aphasia.  reported pt uses a walker at baseline and has fallen recently. She was admitted with UTI and started on ceftriaxone. She has spine mets with plans for radiation and neurosurgery consult. Subjective:         Past Medical History:        Diagnosis Date    CHUYITA (acute kidney injury) ([facility] [address] 5/1/2018    Allergic rhinitis     Aneurysm ([facility] [address]     aorta  watching now    Aortic aneurysm [facility])     Currently has     Cancer [facility])     Lung    Cholelithiasis     Depression     History of ischemic left MCA stroke 9/30/2020    With residual expressive aphasia. Noted to be subacute ~ 8days old on MRI 9/29/2020.      History of ischemic left MCA stroke 9/30/2020    Hyperlipidemia     Hypertension     Incontinence     bladder    MRSA (methicillin resistant staph aureus) culture positive 12/28/11    Leg    Ulcer     Ulcerative colitis ([facility] [address]        Past Surgical History:        Procedure Laterality Date    ABDOMEN SURGERY      gastric resection for ulcers    BRONCHOSCOPY  12/11/2012    CHOLECYSTECTOMY, OPEN  5/7/2012    COLONOSCOPY      ERCP  3/13/12    sphincterotomy; 5 [place] cm stent placed    ERCP  12-    pancreatic stent removal, pancreotogram    PRESSURE ULCER DEBRIDEMENT  2010    gastric    STOMACH SURGERY      for ulcer    UPPER GASTROINTESTINAL ENDOSCOPY         Current Medications:    Medications Prior to Admission: aspirin 81 MG EC tablet, Take 1 tablet by mouth daily  NIFEdipine (ADALAT CC) 30 MG extended release tablet, Take 90 mg by mouth daily  magnesium oxide (MAG-OX) 400 MG tablet, Take 1 tablet by mouth 2 times daily  clopidogrel (PLAVIX) 75 MG tablet, Take 1 tablet by mouth daily  atorvastatin (LIPITOR) 40 MG tablet, Take 1 tablet by mouth daily  LORazepam (ATIVAN) 0.5 MG tablet, Take 0.5 mg by mouth every 8 hours as needed for Anxiety. oxyCODONE-acetaminophen (PERCOCET) 5-325 MG per tablet, Take 1 tablet by mouth every 8 hours as needed for Pain.  pantoprazole (PROTONIX) 40 MG tablet, Take 40 mg by mouth nightly   citalopram (CELEXA) 40 MG tablet, Take 40 mg by mouth nightly     Allergies:  Patient has no known allergies. Social History:    · TOBACCO: reports that she quit smoking about 11 years ago. She has a 75.00 pack-year smoking history. She has never used smokeless tobacco.  · ETOH:   reports no history of alcohol use. · Patient currently lives with family -     Review of Systems -   Review of Systems   Constitutional: Negative. HENT: Negative. Respiratory: Negative. Cardiovascular: Negative. Gastrointestinal: Negative. Genitourinary: Negative. Musculoskeletal: Negative. Uses a walker   Skin: Negative. Neurological: Positive for speech difficulty. Psychiatric/Behavioral: Negative.    :     Objective:        Physical Exam  Constitutional:       Appearance: Normal appearance. HENT:      Head: Normocephalic and atraumatic. Cardiovascular:      Rate and Rhythm: Normal rate and regular rhythm. Pulmonary:      Effort: Pulmonary effort is normal.      Breath sounds: Normal breath sounds. Abdominal:      General: Bowel sounds are normal.      Palpations: Abdomen is soft. Musculoskeletal: Normal range of motion. General: No swelling. Skin:     General: Skin is warm and dry. Neurological:      Mental Status: She is alert. Comments: Expressive aphasia          Palliative Performance Scale:  [] 60% Ambulation reduced; Significant disease; Can't do hobbies/housework; intake normal or reduced; occasional assist; LOC full/confusion  [x] 50% Mainly sit/lie;  Extensive disease; Can't do any work; Considerable assist; intake normal  Or reduced; LOC full/confusion  [] 40% Mainly in bed; Extensive disease; Mainly assist; intake normal or reduced; occasional assist; LOC full/confusion  [] 30% Bed Bound; Extensive disease; Total care; intake reduced; LOC full/confusion  [] 20% Bed Bound; Extensive disease; Total care; intake minimal; Drowsy/coma  [] 10% Bed Bound; Extensive disease; Total care; Mouth care only; Drowsy/coma  [] 0% Death      Vitals:    BP (!) 145/80   Pulse 81   Temp 97.7 °F (36.5 °C) (Oral)   Resp 16   Ht 5' 2\" (1.575 m)   Wt 145 lb (65.8 kg)   SpO2 97%   BMI 26.52 kg/m²     Labs:    BMP:   Recent Labs     10/13/20  1815 10/14/20  0515    138   K 4.2 4.2    106   CO2 23 21   BUN 28* 23*   CREATININE 0.9 0.8   GLUCOSE 128* 99     CBC:   Recent Labs     10/13/20  1815 10/14/20  0515   WBC 7.5 6.7   HGB 10.2* 10.3*   HCT 32.3* 33.2*    247       LFT's:   Recent Labs     10/13/20  1815 10/14/20  0515   AST 23 27   ALT 25 24   BILITOT <0.2 <0.2   ALKPHOS 198* 194*     Troponin:   Recent Labs     10/13/20  1815 10/13/20  2319 10/14/20  0130   TROPONINI <0.01 <0.01 0.01     BNP: No results for input(s): BNP in the last 72 hours. ABGs: No results for input(s): PHART, WZW6HPF, PO2ART in the last 72 hours. INR:   Recent Labs     10/13/20  1815   INR 1.03       U/A:  Recent Labs     10/13/20  1920   COLORU Yellow   PHUR 6.0   WBCUA 6-9*   RBCUA None seen   BACTERIA 3+*   CLARITYU Clear   SPECGRAV 1.020   LEUKOCYTESUR TRACE*   UROBILINOGEN 0.2   BILIRUBINUR Negative   BLOODU Negative   GLUCOSEU Negative       CTA HEAD NECK W CONTRAST   Final Result      1. No aneurysms or vascular occlusions identified. 2.  Moderate atherosclerotic stenosis of left posterior cerebral artery P1-P2 junction. 3.  No significant stenosis in the extracranial vertebral or carotid arteries. 4.  Stable osseous metastasis at C2. XR CHEST PORTABLE   Final Result      No acute pulmonary disease.          CT HEAD WO CONTRAST   Final Result      1. Evolving subacute left posterior MCA territory infarct as seen on MR dated 9/29/2020.   2.  No acute intracranial hemorrhage. MRI BRAIN W WO CONTRAST    (Results Pending)   MRI CERVICAL SPINE W [address]    (Results Pending)         Conclusion/Time spent:         Recommendations see above    Pt/family [phone]  Time spent with patient and/or family: 25  Time reviewing records: 10 min   Time communicating with staff: 5 min     A total of 40 minutes spent with the patient and family on unit greater than 50% in counseling regarding palliative care and in goals of care for the patient. Thank you to Dr. Paulina Robbins for this consultation. We will continue to follow Ms. Verdin's care as needed.     Tico Alejandro NP  [address]

## 2020-10-14 NOTE — PROGRESS NOTES
Occupational Therapy   Occupational Therapy Initial Assessment/Treatment  Date: 10/14/2020   Patient Name: Marta Maria  MRN: [id number]     : 1940    Date of Service: 10/14/2020    Discharge Recommendations:    Marta Maria scored a 18/24 on the AM-PAC ADL Inpatient form. Current research shows that an AM-PAC score of 18 or greater is typically associated with a discharge to the patient's home setting. Based on the patient's AM-PAC score, and their current ADL deficits, it is recommended that the patient have 2-3 sessions per week of Occupational Therapy at d/c to increase the patient's independence. At this time, this patient demonstrates the endurance and safety to discharge home with  home with home services  and a follow up treatment frequency of 2-3x/wk. Please see assessment section for further patient specific details. If patient discharges prior to next session this note will serve as a discharge summary. Please see below for the latest assessment towards goals. OT Equipment Recommendations  Equipment Needed: No    Assessment   Performance deficits / Impairments: Decreased functional mobility ; Decreased ADL status; Decreased endurance  Assessment: Pt presents with decreased functional independence, but is likely close to her most recent baseline. Pt is from home with her  and was receiving home PT and OT services. Feel pt would benefit from further OT services. If discharged to home, recommend 24 hr assist.  Treatment Diagnosis: Impaired ADL and functional mobility  Prognosis: Fair  Decision Making: Medium Complexity  OT Education: OT Role  Patient Education: Pt verbalized understanding  REQUIRES OT FOLLOW UP: Yes  Safety Devices  Safety Devices in place: Yes  Type of devices: Left in chair;Call light within reach; Chair alarm in place;Nurse notified         Treatment Diagnosis: Impaired ADL and functional mobility      Restrictions  Position Activity Restriction  Other position/activity restrictions: up with assist    Subjective   General  Chart Reviewed: Yes  Patient assessed for rehabilitation services?: Yes  Additional Pertinent Hx: Pt admitted 10/13/2020 for possible stroke. Per EMS, the patient was undergoing occupational therapy at home, the patient was felt to have possible weakness in her right upper and lower extremity as well as visual deficits felt the right side. Head CT= Evolving subacute left posterior MCA territory infarct as seen on MR dated 9/29/2020. PMH includes: Stage 4 Lung CA, Depression, HTN, Aortic aneurysm, CVA, CHUYITA, Abd surgery, MRSA  Family / Caregiver Present: Yes()  Referring Practitioner: Dr Marylee Arias  Diagnosis: Acute CVA  Subjective  Subjective: Pt in bed upon entry. Pt agreeable to activity. Patient Currently in Pain: Denies  Vital Signs  Patient Currently in Pain: Denies  Social/Functional History  Social/Functional History  Lives With: Spouse  Type of Home: House  Home Layout: One level  Home Access: Level entry  Bathroom Shower/Tub: Walk-in shower  Bathroom Toilet: Standard  Bathroom Equipment: Grab bars in shower, Shower chair, Hand-held shower, Grab bars around toilet, 3-in-1 commode  Home Equipment: Rolling walker(Transport chair)  Receives Help From: Family(spouse and daughters)  ADL Assistance: Needs assistance  Homemaking Assistance: Needs assistance  Homemaking Responsibilities: No  Active : No  Occupation: Retired  Additional Comments: Pt was getting Home PT and OT. Information confirmed by pt's .        Objective   Vision: Impaired  Vision Exceptions: Wears glasses for reading  Hearing: Within functional limits    Orientation  Overall Orientation Status: (expressive aphasia)  Observation/Palpation  Posture: Fair(fwd head posture)  Balance  Sitting Balance: Stand by assistance  Standing Balance: Contact guard assistance(to SBA)  Standing Balance  Time: ~2 min  Activity: bathroom mobililty/activity  Functional Mobility  Functional - Mobility Device: Rolling Walker  Activity: To/from bathroom  Assist Level: Minimal assistance(req assist with walker management)  Toilet Transfers  Toilet - Technique: Ambulating  Equipment Used: Standard toilet(grab bar)  Toilet Transfer: Contact guard assistance  ADL  Feeding: Setup  Grooming: Stand by assistance(to wash hands standing ar sink)  LE Dressing: Minimal assistance  Toileting: Minimal assistance(assist for clothing management)  Tone RUE  RUE Tone: Normotonic  Tone LUE  LUE Tone: Normotonic  Coordination  Movements Are Fluid And Coordinated: Yes     Bed mobility  Supine to Sit: Minimal assistance(HOB elevated, Pt reached for therapist's hand)  Scooting: Stand by assistance  Transfers  Stand Step Transfers: Contact guard assistance  Sit to stand: Contact guard assistance  Stand to sit: Contact guard assistance     Cognition  Overall Cognitive Status: Exceptions  Arousal/Alertness: Appropriate responses to stimuli  Following Commands: Follows one step commands consistently  Attention Span: Attends with cues to redirect  Memory: Decreased short term memory  Safety Judgement: Decreased awareness of need for assistance  Problem Solving: Assistance required to generate solutions;Assistance required to identify errors made;Assistance required to implement solutions  Insights: Decreased awareness of deficits  Initiation: Requires cues for some  Sequencing: Requires cues for some        Sensation  Overall Sensation Status: WFL        LUE AROM (degrees)  LUE AROM : WFL  RUE AROM (degrees)  RUE AROM : WFL  LUE Strength  Gross LUE Strength: WFL  RUE Strength  Gross RUE Strength: WFL     Hand Dominance  Hand Dominance: Right     Treatment included ADL and transfer training.         Plan   Plan  Times per week: 5-7  Current Treatment Recommendations: Strengthening, ROM, Balance Training, Functional Mobility Training, Endurance Training, Self-Care / ADL, Cognitive Reorientation      AM-PAC Score        AM-PAC Inpatient Daily Activity Raw Score: 18 (10/14/20 1257)  AM-PAC Inpatient ADL T-Scale Score : 38.66 (10/14/20 1257)  ADL Inpatient CMS 0-100% Score: 46.65 (10/14/20 1257)  ADL Inpatient CMS G-Code Modifier : CK (10/14/20 1257)    Goals                          No goals met  Short term goals  Time Frame for Short term goals: Discharge  Short term goal 1: Transfer to/from toilet with SBA  Short term goal 2: Stance with SBA x6 min while engaging in ADL/functional mobility  Short term goal 3: Complete toileting with SBA  Short term goal 4: Lower body dressing with SBA  Patient Goals   Patient goals : Go home       Therapy Time   Individual Concurrent Group Co-treatment   Time In 7471         Time Out 1205         Minutes 33         Timed Code Treatment Minutes: 23 Minutes    Total Treatment 0088 [facility], OTR/L 79248

## 2020-10-14 NOTE — PROGRESS NOTES
Hospitalist Progress Note      PCP: Blanca Carbajal DO    Date of Admission: 10/13/2020      Subjective: no new complaints  MRI pending, hopefully done tomorrow ,  has a zio patch placed during last admission      Medications:  Reviewed    Infusion Medications   Scheduled Medications    aspirin  81 mg Oral Daily    clopidogrel  75 mg Oral Daily    cefTRIAXone (ROCEPHIN) IV  1 g Intravenous Q24H    sodium chloride flush  10 mL Intravenous 2 times per day    sodium chloride flush  10 mL Intravenous 2 times per day    atorvastatin  80 mg Oral Nightly     PRN Meds: sodium chloride flush, magnesium sulfate, acetaminophen **OR** acetaminophen, promethazine **OR** ondansetron, famotidine, sodium chloride flush, IVPB builder      Intake/Output Summary (Last 24 hours) at 10/14/2020 1326  Last data filed at 10/14/2020 0950  Gross per 24 hour   Intake 10 ml   Output 200 ml   Net -190 ml       Physical Exam Performed:    BP (!) 144/85   Pulse 86   Temp 97.2 °F (36.2 °C) (Oral)   Resp 16   Ht 5' 2\" (1.575 m)   Wt 145 lb (65.8 kg)   SpO2 95%   BMI 26.52 kg/m²     General appearance:  mild acute distress, appears older than stated age  HEENT:   atraumatic, sclera anicteric, Conjunctivae clear. Neck: Supple,Trachea midline, no goiter  Respiratory:  Normal respiratory effort. Clear to auscultation, bilaterally without Rales/Wheezes/Rhonchi. Cardiovascular:  Regular rate and rhythm without murmurs, capillary refill 2 seconds  Abdomen: Soft, non-tender, non-distended with normal bowel sounds. Musculoskeletal:  No clubbing, cyanosis or edema bilaterally. Skin: turgor normal.  No new rashes or lesions.   Neurologic: expressive and receptive aphasia, no focal weakness noted on my examination, does have pronator drift      Labs:   Recent Labs     10/13/20  1815 10/14/20  0515   WBC 7.5 6.7   HGB 10.2* 10.3*   HCT 32.3* 33.2*    247     Recent Labs     10/13/20  1815 10/14/20  0515    138   K 4.2 4.2   CL 103 106   CO2 23 21   BUN 28* 23*   CREATININE 0.9 0.8   CALCIUM 8.6 8.5     Recent Labs     10/13/20  1815 10/14/20  0515   AST 23 27   ALT 25 24   BILITOT <0.2 <0.2   ALKPHOS 198* 194*     Recent Labs     10/13/20  1815   INR 1.03     Recent Labs     10/13/20  1815 10/13/20  2319 10/14/20  0130   TROPONINI <0.01 <0.01 0.01       Urinalysis:      Lab Results   Component Value Date    NITRU POSITIVE 10/13/2020    WBCUA 6-9 10/13/2020    BACTERIA 3+ 10/13/2020    RBCUA None seen 10/13/2020    BLOODU Negative 10/13/2020    SPECGRAV 1.020 10/13/2020    GLUCOSEU Negative 10/13/2020    GLUCOSEU Negative 05/08/2012       Radiology:  CTA HEAD NECK W CONTRAST   Final Result      1. No aneurysms or vascular occlusions identified. 2.  Moderate atherosclerotic stenosis of left posterior cerebral artery P1-P2 junction. 3.  No significant stenosis in the extracranial vertebral or carotid arteries. 4.  Stable osseous metastasis at C2. XR CHEST PORTABLE   Final Result      No acute pulmonary disease. CT HEAD WO CONTRAST   Final Result      1. Evolving subacute left posterior MCA territory infarct as seen on MR dated 9/29/2020.   2.  No acute intracranial hemorrhage. MRI BRAIN W WO CONTRAST    (Results Pending)   MRI CERVICAL SPINE W WO CONTRAST    (Results Pending)           Assessment/Plan:    Active Hospital Problems    Diagnosis    Acute CVA (cerebrovascular accident) ([facility] [address] [I63.9]          1.Acute on chronic worsening expressive aphasia:  Suspected urine tract infection likely contributing to patient's worsening already existing prior CVA. However cannot rule out acute CVA given metastatic cancer been not receiving treatment, hx of cva, and new onset right side weakness noted much decreased from baseline, MRI brain and cervical ordered     2. Acute cystitis:  Urine cx pending   Ceftriaxone ordered     3. Fall:  Reported multiple falls at home  PT/OT consulted     4. Non-small cell lung cancer: Due to acute reported focal deficits  Metastatic to cervical, family reported was scheduled for radiation therapy and surgery by neurosurgery tomorrow  Neurosurgery consultation  Oncology, palliative care consulted     5. Stage IV lung cancer non-small cell was receiving therapy however has missed few treatment past few weeks.   Family is unrealistic in regards to prognosis  Oncology and palliative care consulted     Chronic medical conditions:  Hypertension: Held medication for now  Hyperlipidemia: Continue medication      Diet: DIET GENERAL;  Code Status: DNR-CCA    PT/OT Eval Status: ordered    Dispo - inpatient     Pablo Grayson MD

## 2020-10-14 NOTE — FLOWSHEET NOTE
10/14/20 1451   Readmission Assessment   Number of Days since last admission? 1-7 days   Previous Disposition Home with Home Health   Who is being Interviewed Caregiver;Patient   What was the patient's/caregiver's perception as to why they think they needed to return back to the hospital? Not enough help at home   Did you visit your Primary Care Physician after you left the hospital, before you returned this time? No   Why weren't you able to visit your PCP? Did not have an appointment   Did you see a specialist, such as Cardiac, Pulmonary, Orthopedic Physician, etc. after you left the hospital? No   Who advised the patient to return to the hospital? [address] Staff   Does the patient report anything that got in the way of taking their medications? No   In our efforts to provide the best possible care to you and others like you, can you think of anything that we could have done to help you after you left the hospital the first time, so that you might not have needed to return so soon?  Arrange for more help when leaving the hospital;Other (Comment)  (Family feels patient needs short-term rehab)       VINNIE Louise, [place]  Social Work/Case Management  [facility] [employer] [employer]   [phone]

## 2020-10-14 NOTE — PROGRESS NOTES
Physical Therapy    [facility]  Initial Assessment and treatment    NAME: Prentiss Dubin  : 1940  MRN: [id number]    Date of Service: 10/14/2020    Discharge Recommendations:Wilma CISNEROS Velvet scored a 18/24 on the AM-PAC short mobility form. Current research shows that an AM-PAC score of 18 or greater is typically associated with a discharge to the patient's home setting. Based on the patient's AM-PAC score and their current functional mobility deficits, it is recommended that the patient have 2-3 sessions per week of Physical Therapy at d/c to increase the patient's independence. At this time, this patient demonstrates the endurance and safety to discharge home with home services and a follow up treatment frequency of 2-3x/wk. Please see assessment section for further patient specific details. If patient discharges prior to next session this note will serve as a discharge summary. Please see below for the latest assessment towards goals. PT Equipment Recommendations  Equipment Needed: No(pt has walker)    Assessment   Body structures, Functions, Activity limitations: Decreased functional mobility ; Decreased safe awareness;Decreased balance;Decreased strength  Assessment: Pt is a 67 y/o admitted for  stroke workup after presenting to ER with R sided weakness which has since resolved. The pt has residual expressive aphasia from previous stroke. The pt did not have any significant strength or coordination deficits when comparing L to R. The pt does have overall generalized weakness and fatigue with little mobility. She is a poor historian, but the chart states that she ambulates with walker and has had some falls recently. Therapist recommends continued therapy in the acute setting as well as upon discharge to improve her independence with functional mobility.   If she returns home will need 24 hr sup/assist.  Treatment Diagnosis: weakness with mobility  Prognosis: Good  Decision Making: Low Complexity  PT Education: Transfer Training;Gait Training;Functional Mobility Training;Plan of Care;PT Role;Goals;Orientation;General Safety  Patient Education: pt will need reinforcement secondary to imoaired cognition  Barriers to Learning: cognition  REQUIRES PT FOLLOW UP: Yes  Activity Tolerance  Activity Tolerance: Patient limited by fatigue  Activity Tolerance: pt ambulated short distance limited by fatigue and wanting to return to bed. Patient Diagnosis(es): The primary encounter diagnosis was Stroke-like symptom. A diagnosis of Bacteriuria was also pertinent to this visit. has a past medical history of CHUYITA (acute kidney injury) ([facility] [address] Allergic rhinitis, Aneurysm ([facility] [address] Aortic aneurysm ([facility] [address] Cancer ([facility] [address] Cholelithiasis, Depression, History of ischemic left MCA stroke, History of ischemic left MCA stroke, Hyperlipidemia, Hypertension, Incontinence, MRSA (methicillin resistant staph aureus) culture positive, Ulcer, and Ulcerative colitis ([facility] [address] has a past surgical history that includes Abdomen surgery; Cholecystectomy, open (5/7/2012); Pressure ulcer debridement (2010); Colonoscopy; Upper gastrointestinal endoscopy; Stomach surgery; ERCP (3/13/12); bronchoscopy (12/11/2012); and ERCP (12-). Restrictions  Position Activity Restriction  Other position/activity restrictions: up with assist  Vision/Hearing  Vision: Impaired  Vision Exceptions: Wears glasses for reading  Hearing: Within functional limits     Subjective  General  Chart Reviewed: Yes  Patient assessed for rehabilitation services?: Yes  Additional Pertinent Hx: Pt is a 69 y/o female who presented with R sided weakness and suspected CVA. She has PMH ulcerative colitis, incontinence, HTN, HLD, L MCA CVA with expressive aphasia, depression, and aortic aneurysm. She has also been undergoing treatment for metastatic lung cancer.   Family / Caregiver Present: No  Referring Practitioner: posture)       Strength RLE  Comment: grossly 4/5 with MMT- no considerable weakness compared to L LE  Strength LLE  Comment: grossly 4/5 with MMT- no considerable weakness compared to R LE  Tone RLE  RLE Tone: Normotonic  Tone LLE  LLE Tone: Normotonic  Motor Control  Gross Motor?: WFL  Coordination  Rapid Alternating Movements: Normal  Heel to Shin: Normal  Sensation  Overall Sensation Status: WFL  Bed mobility  Supine to Sit: Minimal assistance(with HOB elevated and use of rail)  Sit to Supine: Stand by assistance  Comment: pt needed cueing for sequencing with supine>sit  Transfers  Sit to Stand: Contact guard assistance  Stand to sit: Contact guard assistance  Comment: with RW, cueing for hand placement though pt continued to pull on walker to stand  Ambulation  Ambulation?: Yes  Ambulation 1  Surface: level tile  Device: Rolling Walker  Assistance: Contact guard assistance  Quality of Gait: short step length, decreased jose, decreased step height, no heel strike. All deviations bilaterally without significant differences between sides. Distance: 13'  Comments: pt steady with ambulation, no LOB, does tend to look down at feet when ambulating     Balance  Sitting - Static: Fair;+  Sitting - Dynamic: Fair  Standing - Static: Fair  Standing - Dynamic: Fair;-  Comments: Pt supervision sitting EOB statically and able to scoot to the edge with SBA.   She stands with SBA and ambulates CGA       Treatment:  Functional mobility training, gait training,  and pt education  Plan   Plan  Times per week: 5  Times per day: Daily  Current Treatment Recommendations: Strengthening, Transfer Training, Endurance Training, Neuromuscular Re-education, Balance Training, IADL Training, Gait Training, Functional Mobility Training, Safety Education & Training, Home Exercise Program, Cognitive Reorientation, Patient/Caregiver Education & Training  Safety Devices  Type of devices: Bed alarm in place, Left in bed, Call light within reach, Nurse notified    AM-PAC Score  AM-PAC Inpatient Mobility Raw Score : 18 (10/14/20 0827)  AM-PAC Inpatient T-Scale Score : 43.63 (10/14/20 0827)  Mobility Inpatient CMS 0-100% Score: 46.58 (10/14/20 0827)  Mobility Inpatient CMS G-Code Modifier : CK (10/14/20 0827)          Goals  Short term goals  Time Frame for Short term goals: By discharge  Short term goal 1: Pt will perform supine<> sit with SBA  Short term goal 2: Pt will transfer to walker with supervision  Short term goal 3: Pt will ambulate 150 ft with walker and SBA  Patient Goals   Patient goals :  To go home       Therapy Time   Individual Concurrent Group Co-treatment   Time In 0745         Time Out 0808         Minutes 23         Timed Code Treatment Minutes: 23 Minutes    Timed Code Treatment Minutes:  8 Minutes    Total Treatment Minutes:  23 minutes      Sharri Macias, PT

## 2020-10-14 NOTE — CARE COORDINATION
Case Management Assessment           Initial Evaluation                Date / Time of Evaluation: 10/14/2020 12:48 PM                 Assessment Completed by: Dejuan Mijares Day    Patient Name: Christine Galindo     YOB: 1940  Diagnosis: Acute CVA (cerebrovascular accident) [facility]) [I63.9]     Date / Time: 10/13/2020  5:53 PM    Patient Admission Status: Inpatient    If patient is discharged prior to next notation, then this note serves as note for discharge by case management. Current PCP: Mauro Ornelas,   Clinic Patient: No    Chart Reviewed: Yes  Patient/ Family Interviewed: Yes    Initial assessment completed at bedside with: Patient, Spouse, and daughter Lolis Singer via phone     Hospitalization in the last 30 days: No    Emergency Contacts:  Extended Emergency Contact Information  Primary Emergency Contact: Michael Ruano  Address: [address] [place] [address] Phone: [phone]  Work Phone: [phone]  Relation: Spouse   needed? No  Secondary Emergency Contact: Mariah Marcelino   [address] Phone: [phone]  Relation: Child   needed? No    Advance Directives:   Code Status: DNR-CCA    Healthcare Power of : No  \  Financial:  Payor: Veto Gains / Plan: Kurtis Zuluaga PPO / Product Type: Medicare /     Pre-cert required for SNF: Yes    Pharmacy:    Alethea Pinto [address] [place] [address], [address]  [address]  [address] [facility] [address]  Phone: [phone] Fax: [phone]      Potential assistance Purchasing Medications: Potential Assistance Purchasing Medications: No  Does Patient want to participate in local refill/ meds to beds program?: No    Meds To Beds General Rules:  1. Can ONLY be done Monday- Friday between 8:30am-5pm  2.  Prescription(s) must be in pharmacy by 3pm to be filled same day  3. Copy of patient's insurance/ prescription drug card and patient face sheet must be sent along with the prescription(s)  4. Cost of Rx cannot be added to hospital bill. If financial assistance is needed, please contact unit  or ;  or  CANNOT provide pharmacy voucher for patients co-pays  5. Patients can then  the prescription on their way out of the hospital at discharge, or pharmacy can deliver to the bedside if staff is available. (payment due at time of pick-up or delivery - cash, check, or card accepted)     Able to afford home medications/ co-pay costs: Yes    ADLS:  Support Systems: Family Members  Lives With: Spouse  Type of Home: House  Home Layout: One level  Home Access: Level entry  Bathroom Shower/Tub: Walk-in shower  Bathroom Toilet: Standard  Bathroom Equipment: Grab bars in shower, Shower chair  Home Equipment: Rolling walker  Receives Help From: Family(spouse)  ADL Assistance: Needs assistance  Homemaking Assistance: Needs assistance  Homemaking Responsibilities: No  Active : No  Occupation: Retired    PT AM-PAC: 18 /24  R Ally-Omar 21: /24    Plans to [address] [facility] to current housing: Family wants placement, but insurance will not authorize it. Barrier(s) to RETURNING to current housing: Medical Clearance.      Home Care Information:  Currently ACTIVE with Home Health Care: Yes  Home Care Agency: [facility]   Phone: [phone]  Fax: [phone]    Currently ACTIVE with [place] [facility]: No    Durable Medical Equipment:  DME Provider: unknown   Equipment: [address] and Respiratory Equipment:  Has HOME OXYGEN prior to admission: No  Oxygen Company: Not Applicable    Dialysis:  Active with HD/PD prior to admission: No    DISCHARGE PLAN:  Disposition: Home with spouse and Home Health (Active wit Care [facility])     Transportation PLAN for discharge: family     Factors

## 2020-10-14 NOTE — CONSULTS
Clinical Pharmacy Consult Note    68 y.o. female admitted with acute on chronic worsening aphasia with concern for CVA. Pharmacy has been asked by Dr. Karla Becker to adjust all drips to normal saline as appropriate based on compatibility to avoid fluid shifts since D5 is osmotically active. The following intermittent IV drips/infusions have been adjusted to saline:  Potassium chloride    Please be aware that patient has D5W ordered as part of hypoglycemia orderset. Total IV fluid delivered to patient over last 24h: 1050 mL    RPh will follow daily to ensure all new IVPBs + drips are in NS. Please call with questions.   Sharlene Pool, PharmD, BCPS  Wireless: [id number]  or [phone]  10/14/2020 7:27 AM

## 2020-10-14 NOTE — PROGRESS NOTES
Patient has unidentified electronic device in chest that was NOT in place for scan in September. Device may be safe for MRI but until we can identify it, we cannot proceed with MRI. This was communicated to Dr. Estuardo Pringle. I was instructed to cancel MRI's for now until device can be identified.

## 2020-10-14 NOTE — PLAN OF CARE
Problem: Neurological  Intervention: Speech Evaluation/treatment  SLP completed evaluation. Please refer to notes in EMR. Problem: Nutrition  Intervention: Swallow Evaluation/treatment  SLP completed evaluation. Please refer to notes in EMR.

## 2020-10-15 ENCOUNTER — APPOINTMENT (OUTPATIENT)
Dept: MRI IMAGING | Age: 80
DRG: 057 | End: 2020-10-15
Payer: MEDICARE

## 2020-10-15 LAB
A/G RATIO: 1.1 (ref 1.1–2.2)
ALBUMIN SERPL-MCNC: 3.7 G/DL (ref 3.4–5)
ALP BLD-CCNC: 197 U/L (ref 40–129)
ALT SERPL-CCNC: 22 U/L (ref 10–40)
ANION GAP SERPL CALCULATED.3IONS-SCNC: 14 MMOL/L (ref 3–16)
AST SERPL-CCNC: 37 U/L (ref 15–37)
BASOPHILS ABSOLUTE: 0.1 K/UL (ref 0–0.2)
BASOPHILS RELATIVE PERCENT: 1.1 %
BILIRUB SERPL-MCNC: <0.2 MG/DL (ref 0–1)
BUN BLDV-MCNC: 18 MG/DL (ref 7–20)
CALCIUM SERPL-MCNC: 8.8 MG/DL (ref 8.3–10.6)
CHLORIDE BLD-SCNC: 107 MMOL/L (ref 99–110)
CO2: 18 MMOL/L (ref 21–32)
CREAT SERPL-MCNC: 0.8 MG/DL (ref 0.6–1.2)
EOSINOPHILS ABSOLUTE: 1 K/UL (ref 0–0.6)
EOSINOPHILS RELATIVE PERCENT: 15.6 %
GFR AFRICAN AMERICAN: >60
GFR NON-AFRICAN AMERICAN: >60
GLOBULIN: 3.3 G/DL
GLUCOSE BLD-MCNC: 91 MG/DL (ref 70–99)
HCT VFR BLD CALC: 35.6 % (ref 36–48)
HEMOGLOBIN: 10.9 G/DL (ref 12–16)
LYMPHOCYTES ABSOLUTE: 0.8 K/UL (ref 1–5.1)
LYMPHOCYTES RELATIVE PERCENT: 12.8 %
MCH RBC QN AUTO: 25.6 PG (ref 26–34)
MCHC RBC AUTO-ENTMCNC: 30.5 G/DL (ref 31–36)
MCV RBC AUTO: 83.9 FL (ref 80–100)
MONOCYTES ABSOLUTE: 0.5 K/UL (ref 0–1.3)
MONOCYTES RELATIVE PERCENT: 8 %
NEUTROPHILS ABSOLUTE: 4.1 K/UL (ref 1.7–7.7)
NEUTROPHILS RELATIVE PERCENT: 62.5 %
PDW BLD-RTO: 17.6 % (ref 12.4–15.4)
PLATELET # BLD: 222 K/UL (ref 135–450)
PMV BLD AUTO: 8.3 FL (ref 5–10.5)
POTASSIUM REFLEX MAGNESIUM: 4.9 MMOL/L (ref 3.5–5.1)
RBC # BLD: 4.25 M/UL (ref 4–5.2)
SODIUM BLD-SCNC: 139 MMOL/L (ref 136–145)
TOTAL PROTEIN: 7 G/DL (ref 6.4–8.2)
WBC # BLD: 6.5 K/UL (ref 4–11)

## 2020-10-15 PROCEDURE — 36415 COLL VENOUS BLD VENIPUNCTURE: CPT

## 2020-10-15 PROCEDURE — 72141 MRI NECK SPINE W/O DYE: CPT

## 2020-10-15 PROCEDURE — 97110 THERAPEUTIC EXERCISES: CPT

## 2020-10-15 PROCEDURE — 2580000003 HC RX 258: Performed by: INTERNAL MEDICINE

## 2020-10-15 PROCEDURE — 97116 GAIT TRAINING THERAPY: CPT

## 2020-10-15 PROCEDURE — 6360000002 HC RX W HCPCS: Performed by: INTERNAL MEDICINE

## 2020-10-15 PROCEDURE — 6370000000 HC RX 637 (ALT 250 FOR IP): Performed by: INTERNAL MEDICINE

## 2020-10-15 PROCEDURE — 85025 COMPLETE CBC W/AUTO DIFF WBC: CPT

## 2020-10-15 PROCEDURE — 2060000000 HC ICU INTERMEDIATE R&B

## 2020-10-15 PROCEDURE — 92507 TX SP LANG VOICE COMM INDIV: CPT

## 2020-10-15 PROCEDURE — G0378 HOSPITAL OBSERVATION PER HR: HCPCS

## 2020-10-15 PROCEDURE — 70551 MRI BRAIN STEM W/O DYE: CPT

## 2020-10-15 PROCEDURE — 96366 THER/PROPH/DIAG IV INF ADDON: CPT

## 2020-10-15 PROCEDURE — 80053 COMPREHEN METABOLIC PANEL: CPT

## 2020-10-15 RX ORDER — LORAZEPAM 2 MG/ML
1 INJECTION INTRAMUSCULAR ONCE
Status: DISCONTINUED | OUTPATIENT
Start: 2020-10-15 | End: 2020-10-16 | Stop reason: HOSPADM

## 2020-10-15 RX ADMIN — Medication 10 ML: at 07:43

## 2020-10-15 RX ADMIN — Medication 10 ML: at 21:05

## 2020-10-15 RX ADMIN — CEFTRIAXONE 1 G: 1 INJECTION, POWDER, FOR SOLUTION INTRAMUSCULAR; INTRAVENOUS at 21:05

## 2020-10-15 RX ADMIN — CLOPIDOGREL BISULFATE 75 MG: 75 TABLET ORAL at 07:43

## 2020-10-15 RX ADMIN — ATORVASTATIN CALCIUM 80 MG: 40 TABLET, FILM COATED ORAL at 21:05

## 2020-10-15 RX ADMIN — ASPIRIN 81 MG: 81 TABLET, COATED ORAL at 07:43

## 2020-10-15 ASSESSMENT — ENCOUNTER SYMPTOMS
RESPIRATORY NEGATIVE: 1
EYES NEGATIVE: 1
ALLERGIC/IMMUNOLOGIC NEGATIVE: 1
GASTROINTESTINAL NEGATIVE: 1

## 2020-10-15 ASSESSMENT — PAIN SCALES - GENERAL
PAINLEVEL_OUTOF10: 0

## 2020-10-15 NOTE — PROGRESS NOTES
Physical Therapy  Daily Treatment Note    Discharge Recommendations: Robert Part scored a 18/24 on the AM-PAC short mobility form. Current research shows that an AM-PAC score of 17 or less is typically not associated with a discharge to the patient's home setting. Based on the patient's AM-PAC score and their current functional mobility deficits, it is recommended that the patient have 3-5 sessions per week of Physical Therapy at d/c to increase the patient's independence. Please see assessment section for further patient specific details. Assessment:  Pt with improved gait tolerance today. Pt with needing cues for safe transfers. Pt from home with elderly spouse. Per family, pt with history of falls even prior to this admission. Pt would benefit from continued IP PT to maximize strength and independence prior to going home with spouse. If pt goes home, would benefit from 24 hour assist and home PT. No DME needs. Equipment Needs: No new needs anticipated    Chart Reviewed: Yes     Other position/activity restrictions: up with assist   Additional Pertinent Hx: Pt is a 67 y/o female who presented with R sided weakness and suspected CVA. She has PMH ulcerative colitis, incontinence, HTN, HLD, L MCA CVA with expressive aphasia, depression, and aortic aneurysm. She has also been undergoing treatment for metastatic lung cancer. Diagnosis: acute CVA   Treatment Diagnosis: weakness with mobility    Subjective: Pt in bed initially. Agreeable to working with PT on 2nd attempt. Expressive aphasia noted. Daughter present and expressing concerns about pt going home at D/C. States her father (pt's spouse) is 80years old and even prior to admission needed to provide hands on assist whenever pt was on her feet. Pt also with falls at home. Family hopeful that pt can get a short IP PT stay prior to going home with spouse.      Pain: Pt denies    Objective:    Bed mobility  Supine to sit: Min assist x 1, HOB up partially  Scooting: Min assist to EOB    Transfers  Sit to stand: Min assist x 1 from bed; Min assist x 1 from chair  Stand to sit: Min assist x 1 into chair (twice)  Other: Pt needing verbal/tactile cues for safety with each transfer. Pt not backing up completely to chair prior to sitting. Also needing cues for hand placement on chair/bed vs pulling up from walker. Ambulation  Assistance Level: [employer]  Assistive device: Wheeled walker  Distance: 50 ft, 100 ft in quinones. Seated rest break between walks. Quality of gait: Step-through pattern; decreased pace; decreased step length/height; no LOB with walker. Exercises  15 reps B LE seated ex while seated in chair: heel raises, toe raises, LAQ, hip flexion, hip abd/add. Cues for quality of exercises. Balance  Sat EOB with SBA  Static stance with walker CGA  Ambulation with wheeled walker CGA    Patient Education  Calling for assist with needs. Expressed understanding. Safety with transfers (backing up completely to chair, hand placement). Needing cues/reinforcement. Safety Devices  Pt left with needs in reach. In chair (reclined) with chair alarm on. RN updated. Daughter present.      AM-PAC score  AM-PAC Inpatient Mobility Raw Score : 18  AM-PAC Inpatient T-Scale Score : 43.63  Mobility Inpatient CMS 0-100% Score: 46.58  Mobility Inpatient CMS G-Code Modifier : CK    Goals: (as determined and assessed by primary PT)  Time Frame for Short term goals: By discharge  Short term goal 1: Pt will perform supine<> sit with SBA   Short term goal 2: Pt will transfer to walker with supervision   Short term goal 3: Pt will ambulate 150 ft with walker and SBA      Plan:  Times per week: 5; Times per day: Daily  Current Treatment Recommendations: Strengthening, Transfer Training, Endurance Training, Neuromuscular Re-education, Balance Training, IADL Training, Gait Training, Functional Mobility Training, Safety Education & Training, Home Exercise Program, Cognitive Reorientation, Patient/Caregiver Education & Training    Therapy Time    Individual  Concurrent  Group  Co-treatment    Time In  1124            Time Out  1151            Minutes  27              Timed Code Treatment Minutes: 27  Total Treatment Minutes: 27    Will continue per plan of care. If patient is discharged prior to next treatment, this note will serve as the discharge summary.     Feroz Pichardo #[id number]

## 2020-10-15 NOTE — PROGRESS NOTES
Speech Language Pathology  Facility/Department: [facility] 5T ORTHO/NEURO  Daily Treatment Note    NAME: Raine Lewis  : 1940  MRN: [id number]    Patient Diagnosis(es):   Patient Active Problem List    Diagnosis Date Noted    Malignant neoplasm of overlapping sites of right lung ([facility] 10/13/2014     Priority: High    Bone metastases ([facility] 10/14/2020    Acute CVA (cerebrovascular accident) ([facility] 10/13/2020    History of ischemic left MCA stroke 2020    Expressive aphasia 2020    Generalized weakness 2020    Stroke-like symptom 10/23/2019    Weakness of both lower extremities 10/23/2019    Tingling of left upper extremity 10/23/2019    Acute renal failure (ARF) ([facility] 10/23/2019    Left arm weakness 10/18/2019    Wound infection after surgery, sequela 2018    Left kidney mass 2017    Port-A-Cath in place 10/13/2014    Incisional hernia 2014    Hypoxemia requiring supplemental oxygen 2012    Cholelithiasis 05/10/2012    S/P cholecystectomy 2012    Nausea & vomiting 2012    Pancreatic mass 2012    Elevated blood pressure reading 2011    Cellulitis and abscess 2011    UC (ulcerative colitis) ([facility] 2011     Allergies: No Known Allergies  Onset Date: 10/13/20    Pain:  Pain Assessment  Pain Assessment: 0-10  Pain Level: 0    Chart reviewed. Medical diagnosis:  Treatment diagnosis:  Aphasia (R47.01), fluent type    Treatment:  Pt seen to address the following goals:  Goal 1. Patient will participate in ongoing assessment of cognitive communication skills with goals added as indicated. 10/15/20:  Pt is unable to tell time, either using analog or digital clock. Continue goal.    Goal 2. Patient will communicate wants and needs with SBA. 10/15/20:  Pt is able to provide personal information with minimal assistance.   When given open-ended speech task (like \"tell me what you had for breakfast\") her word salad

## 2020-10-15 NOTE — CARE COORDINATION
Case Management Daily Note                    Date: 10/15/2020     Patient Name: Gilmar Albino    Date of Admission: 10/13/2020  5:53 PM  YOB: 1940    Length of Stay: 2         Patient Admission Status: Inpatient  Diagnosis:Acute CVA (cerebrovascular accident) [facility]) [I63.9]     ________________________________________________________________________________________  Discharge Plan: To Be Determined DUE TO: SNF: [facility] (Pre-cert started 26/21) vs. Home with [facility] (resumption)   From home with spouse (multiple falls recently)     Insurance: Payor: Arley Morton / Plan: Blanca Onofre PPO / Product Type: Medicare /   Is pre-cert/notification needed: Yes, Pre-cert pending since 59/83     Tentative discharge date: 10/16 vs 10/17    Current barriers: Pre-cert pending, medical clearance     Referrals completed: SNF: [address] [place] [address] E: [facility]     Resources/ information provided: Not indicated at this time   ________________________________________________________________________________________  PT AM-PAC: 18 / 24 per last evaluation on: 10/14    OT AM-PAC: 18 / 24 per last evaluation on: 10/14    DME Needs for discharge: defer  ________________________________________________________________________________________  Notes/Plan of Care:   CHRISTINE rounded on this date. CHRISTINE spoke with patient, daughter, and spouse today and updated them regarding pre-cert pending for Grecia. Family appreciates the attempt to get patient into placement. If declined, plan will be for home with resumption of home care with [facility]. Grecia had clinicals from home care agency as well when they submitted pre-cert. CHRISTINE to follow. Lisa and/or her family were provided with choice of provider; she and/or her family are in agreement with the discharge plan at this time.     Care Transition Patient: VINNIE Munoz  [facility] Case Management Department  Ph: [phone]

## 2020-10-15 NOTE — PROGRESS NOTES
Av.9 °F (36.6 °C)  Min: 97.2 °F (36.2 °C)  Max: 98.3 °F (36.8 °C)  PULSE OXIMETRY RANGE: SpO2  Av.2 %  Min: 92 %  Max: 95 %  24HR INTAKE/OUTPUT:      Intake/Output Summary (Last 24 hours) at 10/15/2020 0746  Last data filed at 10/15/2020 0743  Gross per 24 hour   Intake 30 ml   Output 200 ml   Net -170 ml       Physical Exam  Constitutional:       Appearance: She is ill-appearing. Eyes:      General: No scleral icterus. Cardiovascular:      Rate and Rhythm: Normal rate and regular rhythm. Pulmonary:      Effort: Pulmonary effort is normal.      Breath sounds: Normal breath sounds. Abdominal:      General: Abdomen is flat. Palpations: Abdomen is soft. Tenderness: There is no abdominal tenderness. Musculoskeletal:         General: No swelling. Lymphadenopathy:      Cervical: No cervical adenopathy. Skin:     General: Skin is warm and dry. Findings: No rash. Neurological:      Mental Status: She is alert. Labs  Recent Labs     10/13/20  1815 10/14/20  0515   WBC 7.5 6.7   HGB 10.2* 10.3*   HCT 32.3* 33.2*    247   MCV 81.0 81.7       Recent Labs     10/13/20  1815 10/14/20  0515    138   K 4.2 4.2    106   CO2 23 21   BUN 28* 23*   CREATININE 0.9 0.8       Recent Labs     10/13/20  1815 10/14/20  0515   AST 23 27   ALT 25 24   BILITOT <0.2 <0.2   ALKPHOS 198* 194*       No results for input(s): MG in the last 72 hours. Radiology  2020  CT CHEST ABDOMEN AND PELVIS         INDICATION: Provided clinical history: Evaluate for cancer progression. Charted knee demonstrate the patient has a history of lung cancer with osseous metastases.         COMPARISON: 2020         TECHNIQUE: Axial CT imaging obtained through the chest, abdomen and pelvis.  Axial images and multiplanar reformatted images were reviewed.   Up-to-date CT equipment and radiation dose reduction techniques were employed.         IV Contrast: 100 mL Isovue-370    Oral Contrast: Yes.         CT CHEST:         Evaluation is degraded by motion artifact.         LUNGS AND AIRWAYS: Airways are patent. Amedeo Nails is a background of emphysema. Linear scarring/fibrosis at the base of the right middle lobe, image 76 of series 601, is similar to the prior study. Scarring anteriorly within the lingula, image 75 of series    601 is also similar in appearance. There is no new or enlarging pulmonary nodularity.         PLEURA: No pleural effusions or significant pleural thickening.         HEART AND GREAT VESSELS: The heart is stable in size. Atherosclerotic calcification of the thoracic aorta, aortic valve and coronary arteries is unchanged.         ADENOPATHY: There is no new or progressive lymphadenopathy of the chest.         CHEST WALL / LOWER NECK: No significant abnormality.         BONES: Sclerotic lesion of the spinous process and posterior lamina at T11 again noted. This is similar in appearance to the prior study. . There is no new destructive osseous process.                        CT ABDOMEN AND PELVIS:         Evaluation is degraded by motion artifact.         LIVER: Normal.         GALLBLADDER AND BILIARY DUCTS: The gallbladder is absent.  No intra- or extrahepatic biliary dilatation.         PANCREAS: Normal.         SPLEEN: Normal.         ADRENAL GLANDS: Normal.         KIDNEYS AND URETERS: Status post left nephrectomy. 3 mm calcification of the mid pole right kidney is favored to be renal vascular in nature. This is unchanged. There is no hydronephrosis.         URINARY BLADDER: Normal.         REPRODUCTIVE ORGANS: No associated masses.         BOWEL: Surgical changes of the bowel including gastric bypass noted.  The bowel itself is nondilated.         LYMPH NODES: No new or progressive lymphadenopathy of the abdomen or pelvis.         PERITONEUM / RETROPERITONEUM: No ascites or free air.         VESSELS: Aortobiiliac stent graft is noted.  The excluded aneurysmal sac of the abdominal aorta measures up to 3.7 x 3.2 cm. Splenic vein, SMV, PV and hepatic veins demonstrate enhancement.         ABDOMINAL WALL: Broad mild anterior abdominal and pelvic wall hernia noted containing nondilated loops of both large and small bowel.         BONES: No acute fracture or destructive osseous process.                   Impression         CHEST:         1. No acute intra-thoracic abnormality. 2. No evidence for new or progressive disease of the chest.    3. Emphysema. 4. Stable sclerotic lesion of the posterior elements at T11.    5. Atherosclerotic disease.         ABDOMEN/PELVIS:         1. No acute intra-abdominopelvic abnormality.      2. No evidence for new or progressive disease of the abdomen or pelvis. 3. Anterior abdominal and pelvic wall hernia containing nonobstructed loops of large and small bowel, which is not significantly changed. Pathology  No new path    Problem List  Patient Active Problem List   Diagnosis    Cellulitis and abscess    UC (ulcerative colitis) ([address]    Elevated blood pressure reading    Nausea & vomiting    Pancreatic mass    S/P cholecystectomy    Cholelithiasis    Hypoxemia requiring supplemental oxygen    Incisional hernia    Port-A-Cath in place    Malignant neoplasm of overlapping sites of right lung ([address]    Left kidney mass    Wound infection after surgery, sequela    Left arm weakness    Stroke-like symptom    Weakness of both lower extremities    Tingling of left upper extremity    Acute renal failure (ARF) ([facility])    Generalized weakness    Expressive aphasia    History of ischemic left MCA stroke    Acute CVA (cerebrovascular accident) ([address]    Bone metastases ([address]       Assessment and Plan:     Patient is a 71-year-old female with a past medical history of hypertension and hyperlipidemia as well as metastatic lung cancer. She has had 2 CVAs in October with resultant expressive aphasia and worsening confusion.   She was recently discharged from [facility] on 10/1/2020. She had been hospitalized from 9/27 through 10/1. She had a left MCA ischemic stroke and a UTI. CT scans during that hospitalization on 9/30/2020 had shown stable disease for her non-small cell lung cancer. She has not been back in to receive any further treatment. Her last dose of monthly nivolumab was administered on 6/15/2020 at that time she also received Xgeva.     Readmitted for weakness and falls and worsening clinical status. An MRI of the brain with and without contrast as well as an MRI of the cervical spine is currently pending. We were consulted to comment on the patient's status relative to her lung cancer.     The patient's lung cancer history is rather remarkable and can be summarized as seen below:     1. Metastatic adenocarcinoma, originally diagnosed in 12/2012, presenting with small bowel obstruction due to metastatic disease from a lung primary tumor, surgically resected in 01/2013. EGFR wild type, ALK mutation testing negative. Patient was subsequently treated with 4 cycles of carbo and Alimta through September 2013 followed by maintenance Alimta initiated in October 2013 which she tolerated poorly and subsequently discontinued prematurely.     2. In January 2018 the patient had a CT scan of the chest abdomen and pelvis. At that time there was a lesion on the left kidney that appeared to enlarge. Previously, this had been described as a renal infarct but with the change seen on CT scan a consideration was made but that this could be a renal cell carcinoma. The patient was sent to Dr. Karma Srivastava for further evaluation. The patient had a da Hank left partial nephrectomy. Pathology showed metastatic pulmonary adenocarcinoma. The tumor invaded perinephric adipose tissue. Surgical margins were negative. In aggregate the tumor measured 4.0 x 3.1 x 2.0 cm. There was a separate tumor mass measuring 4.5 x 3.0 x 1.9 cm.   Both masses showed an invasive moderately to poorly rigid adenocarcinoma. The patient had to go back to surgery 2 days later for exploratory laparotomy and small bowel resection for jejunal perforation. Path from the small bowel resection was negative for malignancy. She has since had trouble with healing and has a midline wound VAC which is being managed by [facility] general surgery.     Previous Therapies  1.  carboplatin and Alimta, completed in 09/2013  2. Maintenance Alimta started in 10/2013, tolerated poorly, discontinued. 3.  Nivolumab, initiated 5/6/2018, therapy is ongoing   4. On CT scan 3/13/2019 there was a new sclerotic lesion seen at the T11 spinous process. This was associated with moderate pain. The patient was referred for radiation therapy. On 5/7/2019 the patient begun treatment with Dr. Daryl Killian. She was treated from T10-T12 starting treatment on 5/13/2019 and completing on 5/17/2019 to a total dose of 2000 cGY.     In short, the patient's lung cancer is stable. She has been off of her therapy for quite some time but could resume in the future. Her prognosis is largely dictated by her neurovascular disease at this moment. She is currently DNR. She has acute on chronic worsening expressive aphasia with worsening right-sided weakness. Scan September 30, 2020 indicate no evidence of new or progressive lung cancer.       June Carrington MD  10/15/2020

## 2020-10-15 NOTE — PROGRESS NOTES
Patient has expressive aphasia, follows commands. Has a difficult time finding the right words but does not get frustrated. Neuro checks are unchanged. NIH remains 3. Fall precautions in place, call light within reach, bed alarm on, bed in lowest position, and non skid socks on. VSS. Denies needs at this time. Will continue to monitor.

## 2020-10-15 NOTE — PROGRESS NOTES
Hospitalist Progress Note      PCP: Diomedes Figueroa,     Date of Admission: 10/13/2020      Subjective: no new complaints  MRI pending, hopefully done today ,  has a zio patch placed during last admission, removed now   at bedside      Medications:  Reviewed    Infusion Medications   Scheduled Medications    aspirin  81 mg Oral Daily    clopidogrel  75 mg Oral Daily    cefTRIAXone (ROCEPHIN) IV  1 g Intravenous Q24H    sodium chloride flush  10 mL Intravenous 2 times per day    sodium chloride flush  10 mL Intravenous 2 times per day    atorvastatin  80 mg Oral Nightly     PRN Meds: sodium chloride flush, magnesium sulfate, acetaminophen **OR** acetaminophen, promethazine **OR** ondansetron, famotidine, sodium chloride flush, IVPB builder      Intake/Output Summary (Last 24 hours) at 10/15/2020 1343  Last data filed at 10/15/2020 1256  Gross per 24 hour   Intake 380 ml   Output --   Net 380 ml       Physical Exam Performed:    BP (!) 162/94   Pulse 86   Temp 98.2 °F (36.8 °C) (Oral)   Resp 16   Ht 5' 2\" (1.575 m)   Wt 145 lb (65.8 kg)   SpO2 95%   BMI 26.52 kg/m²     General appearance:  mild acute distress, appears older than stated age  HEENT:   atraumatic, sclera anicteric, Conjunctivae clear. Neck: Supple,Trachea midline, no goiter  Respiratory:  Normal respiratory effort. Clear to auscultation, bilaterally without Rales/Wheezes/Rhonchi. Cardiovascular:  Regular rate and rhythm without murmurs, capillary refill 2 seconds  Abdomen: Soft, non-tender, non-distended with normal bowel sounds. Musculoskeletal:  No clubbing, cyanosis or edema bilaterally. Skin: turgor normal.  No new rashes or lesions.   Neurologic: expressive and receptive aphasia, no focal weakness noted on my examination, does have pronator drift      Labs:   Recent Labs     10/13/20  1815 10/14/20  0515 10/15/20  0620   WBC 7.5 6.7 6.5   HGB 10.2* 10.3* 10.9*   HCT 32.3* 33.2* 35.6*    247 222     Recent Labs 10/13/20  1815 10/14/20  0515 10/15/20  0621    138 139   K 4.2 4.2 4.9    106 107   CO2 23 21 18*   BUN 28* 23* 18   CREATININE 0.9 0.8 0.8   CALCIUM 8.6 8.5 8.8     Recent Labs     10/13/20  1815 10/14/20  0515 10/15/20  0621   AST 23 27 37   ALT 25 24 22   BILITOT <0.2 <0.2 <0.2   ALKPHOS 198* 194* 197*     Recent Labs     10/13/20  1815   INR 1.03     Recent Labs     10/13/20  1815 10/13/20  2319 10/14/20  0130   TROPONINI <0.01 <0.01 0.01       Urinalysis:      Lab Results   Component Value Date    NITRU POSITIVE 10/13/2020    WBCUA 6-9 10/13/2020    BACTERIA 3+ 10/13/2020    RBCUA None seen 10/13/2020    BLOODU Negative 10/13/2020    SPECGRAV 1.020 10/13/2020    GLUCOSEU Negative 10/13/2020    GLUCOSEU Negative 05/08/2012       Radiology:  CTA HEAD NECK W CONTRAST   Final Result      1. No aneurysms or vascular occlusions identified. 2.  Moderate atherosclerotic stenosis of left posterior cerebral artery P1-P2 junction. 3.  No significant stenosis in the extracranial vertebral or carotid arteries. 4.  Stable osseous metastasis at C2. XR CHEST PORTABLE   Final Result      No acute pulmonary disease. CT HEAD WO CONTRAST   Final Result      1. Evolving subacute left posterior MCA territory infarct as seen on MR dated 9/29/2020.   2.  No acute intracranial hemorrhage. MRI CERVICAL SPINE W WO CONTRAST    (Results Pending)   MRI BRAIN W WO CONTRAST    (Results Pending)           Assessment/Plan:    Active Hospital Problems    Diagnosis    Bone metastases ([address] [C79.51]    Acute CVA (cerebrovascular accident) ([address] [I63.9]          1.Acute on chronic worsening expressive aphasia:  Suspected urine tract infection likely contributing to patient's worsening already existing prior CVA.   However cannot rule out acute CVA given metastatic cancer been not receiving treatment, hx of cva, and new onset right side weakness noted much decreased from baseline, MRI brain and cervical ordered     2. Acute cystitis:  Urine cx pending   Ceftriaxone ordered     3. Fall:  Reported multiple falls at home  PT/OT consulted     4. Non-small cell lung cancer: Due to acute reported focal deficits  Metastatic to cervical, family reported was scheduled for radiation therapy and surgery by neurosurgery tomorrow  Neurosurgery consultation  Oncology, palliative care consulted     5. Stage IV lung cancer non-small cell was receiving therapy however has missed few treatment past few weeks.   Family is unrealistic in regards to prognosis  Oncology and palliative care consulted     Chronic medical conditions:  Hypertension: Held medication for now  Hyperlipidemia: Continue medication      Diet: DIET GENERAL;  Code Status: DNR-CCA    PT/OT Eval Status: ordered    Dispo - inpatient 1 to 2 days    Ez Lim MD

## 2020-10-15 NOTE — PLAN OF CARE
Problem: Falls - Risk of:  Goal: Will remain free from falls  Description: Will remain free from falls  10/15/2020 1958 by Rissa Espinoza RN  Outcome: Ongoing  Note: Patient has remained free from falls. Fall precautions in place, call light within reach, bed alarm on, bed in lowest position, and non skid socks on. Problem: Falls - Risk of:  Goal: Absence of physical injury  Description: Absence of physical injury  Outcome: Ongoing  Note: Patient has remained free from physical injury. Fall precautions are in place and patients needs are being met. Problem: Skin Integrity:  Goal: Absence of new skin breakdown  Description: Absence of new skin breakdown  Outcome: Ongoing  Note: There has been no evidence of new skin breakdown. Current skin integrity has been documented and skin is assessed frequently. Interventions have been implemented to prevent new skin breakdown.

## 2020-10-15 NOTE — PROGRESS NOTES
Holter monitor from outside hospital removed. Called MRI and notified Verneice Crooked NP order needs to be put back in per MRI.

## 2020-10-15 NOTE — PROGRESS NOTES
Secure message sent to Dr. Temo Marley NIH score increased by 2 for limb ataxia for putting right finger to mouth and not able to do heel slides but not change per night RN. Dannielle Schmidt NP also aware. Em Musa NP aware and said to take external cardiac monitor off that was placed from outside of hospital off.

## 2020-10-15 NOTE — PROGRESS NOTES
Call placed to answering service for Dr. Angle Ryan and waiting for call back regarding MRI results. She called back and is aware of results. No new orders.

## 2020-10-15 NOTE — PROGRESS NOTES
Brought patient down for MRI of brain and cervical spine with and without contrast. Patient did not want to continue any imaging once without scans were obtained. Called nurse to see if we could get any medication but nurse was unfortunately not available at the time. Sent without images to radiologist. We will wait for radiologist's dictation and if more imaging is still needed, then we will obtain those at a later time/date with new order. Call MRI with any questions.

## 2020-10-15 NOTE — PROGRESS NOTES
Clinical Pharmacy Progress Note    68 y.o. female admitted with acute on chronic worsening aphasia with concern for CVA. Pharmacy has been asked by Dr. Ellie Layton to adjust all drips to normal saline as appropriate based on compatibility to avoid fluid shifts since D5 is osmotically active. The following intermittent IV drips/infusions have been adjusted to saline:  Potassium chloride    Please be aware that patient has D5W ordered as part of hypoglycemia orderset. Total IV fluid delivered to patient over last 24h: 50 mL    RPh will follow daily to ensure all new IVPBs + drips are in NS. Please call with questions.   Tiara ArredondoD, BCPS  Wireless: [phone]  or [phone]  10/15/2020 12:18 PM

## 2020-10-15 NOTE — PROGRESS NOTES
Patient has expressive aphasia, is able to tell her name and answers some questions. Follows commands. Neuro checks are unchanged. NIH is 3. pt is up x1 with GB. Mostly incontinent, voiding adequately. Fall precautions in place, call light within reach, bed alarm on, bed in lowest position, and non skid socks on. VSS. Denies needs at this time. Will continue to monitor.

## 2020-10-16 VITALS
BODY MASS INDEX: 26.41 KG/M2 | DIASTOLIC BLOOD PRESSURE: 75 MMHG | SYSTOLIC BLOOD PRESSURE: 132 MMHG | OXYGEN SATURATION: 95 % | RESPIRATION RATE: 16 BRPM | HEIGHT: 62 IN | HEART RATE: 97 BPM | WEIGHT: 143.52 LBS | TEMPERATURE: 98.7 F

## 2020-10-16 LAB
GLUCOSE BLD-MCNC: 128 MG/DL (ref 70–99)
PERFORMED ON: ABNORMAL
SARS-COV-2, NAAT: NOT DETECTED

## 2020-10-16 PROCEDURE — U0002 COVID-19 LAB TEST NON-CDC: HCPCS

## 2020-10-16 PROCEDURE — 6370000000 HC RX 637 (ALT 250 FOR IP): Performed by: INTERNAL MEDICINE

## 2020-10-16 PROCEDURE — 2580000003 HC RX 258: Performed by: INTERNAL MEDICINE

## 2020-10-16 PROCEDURE — 6360000002 HC RX W HCPCS: Performed by: INTERNAL MEDICINE

## 2020-10-16 PROCEDURE — 97535 SELF CARE MNGMENT TRAINING: CPT

## 2020-10-16 PROCEDURE — 97530 THERAPEUTIC ACTIVITIES: CPT

## 2020-10-16 PROCEDURE — 92507 TX SP LANG VOICE COMM INDIV: CPT

## 2020-10-16 PROCEDURE — G0378 HOSPITAL OBSERVATION PER HR: HCPCS

## 2020-10-16 PROCEDURE — 96375 TX/PRO/DX INJ NEW DRUG ADDON: CPT

## 2020-10-16 PROCEDURE — 97116 GAIT TRAINING THERAPY: CPT

## 2020-10-16 RX ORDER — HEPARIN SODIUM (PORCINE) LOCK FLUSH IV SOLN 100 UNIT/ML 100 UNIT/ML
500 SOLUTION INTRAVENOUS ONCE
Status: COMPLETED | OUTPATIENT
Start: 2020-10-16 | End: 2020-10-16

## 2020-10-16 RX ADMIN — Medication 10 ML: at 10:17

## 2020-10-16 RX ADMIN — CLOPIDOGREL BISULFATE 75 MG: 75 TABLET ORAL at 10:16

## 2020-10-16 RX ADMIN — HEPARIN SODIUM (PORCINE) LOCK FLUSH IV SOLN 100 UNIT/ML 500 UNITS: 100 SOLUTION at 17:32

## 2020-10-16 RX ADMIN — ASPIRIN 81 MG: 81 TABLET, COATED ORAL at 10:16

## 2020-10-16 ASSESSMENT — PAIN SCALES - GENERAL: PAINLEVEL_OUTOF10: 0

## 2020-10-16 NOTE — CARE COORDINATION
Case Management            Discharge Note                    Date / Time of Note: 10/16/2020 2:31 PM                  Discharge Note Completed by: Gina Burnett    Patient Name: Maicol Seymour   YOB: 1940  Diagnosis: Acute CVA (cerebrovascular accident) [facility]) [I63.9]   Date / Time: 10/13/2020  5:53 PM    Current PCP: Maura Aleman DO  Clinic patient: No    Hospitalization in the last 30 days: No    Advance Directives:  Code Status: DNR-CCA  [facility] DNR form completed and on chart: Not Indicated    Financial:  Payor: Raphael Olvera / Plan: Sylvain Sargent PPO / Product Type: Medicare /      Pharmacy:    [facility] [address] [facility], [address]  [address] [facility] [address]  Phone: [phone] Fax: [phone]      Assistance purchasing medications?: Potential Assistance Purchasing Medications: No  Assistance provided by Case Management: None at this time    Does patient want to participate in local refill/ meds to beds program?: No    Meds To Beds General Rules:  1. Can ONLY be done Monday- Friday between 8:30am-5pm  2. Prescription(s) must be in pharmacy by 3pm to be filled same day  3. Copy of patient's insurance/ prescription drug card and patient face sheet must be sent along with the prescription(s)  4. Cost of Rx cannot be added to hospital bill. If financial assistance is needed, please contact unit  or ;  or  CANNOT provide pharmacy voucher for patients co-pays  5.  Patients can then  the prescription on their way out of the hospital at discharge, or pharmacy can deliver to the bedside if staff is available. (payment due at time of pick-up or delivery - cash, check, or card accepted)     Able to afford home medications/ co-pay costs: No    ADLS:  Current PT AM-PAC Score: 18 /24  Current OT AM-PAC Score: 18 /24      Discharge Disposition: [place] (SNF):   [facility], [address]  Phone: [phone]   Fax: [phone]    LOC at discharge: Skilled  AMRITA Completed: Yes    Notification completed in HENS/PAS?:  Yes : CM has completed HENS online through secure website for SNF admission at [employer]. Document ID #: [id number]     IMM Completed:   No    Transportation:  Transportation Plan for discharge: EMS transportation   Mode of Transport: Ambulance stretcher - S    Reason for medical transport: Other: NWB on right leg, confusion, high fall risk  Name of Transport Company: [employer]  Phone: [phone]  Transport Time: 5:00 pm     Transportation form completed: Yes    Additional CM Notes:   SW met with patient at bedside. Medically ready for discharge. Pre-cert was approved. Transport scheduled for 5:00pm daughter Mariah aware. Bedside RN and charge nurse notified. The Plan for Transition of Care is related to the following treatment goals Acute CVA (cerebrovascular accident) [facility]) [I63.9]      The Patient and/or patient representative Patient  was provided with a choice of provider and agrees with the discharge plan Yes    Freedom of choice list was provided with basic dialogue that supports the patient's individualized plan of care/goals and shares the quality data associated with the providers.  Yes    Care Transitions patient: No    VINNIE Gomez  [facility] [employer]   Case Management Department  Ph: [phone]

## 2020-10-16 NOTE — DISCHARGE INSTR - COC
Continuity of Care Form    Patient Name: Pam Lopez   :  1940  MRN:  [id number]    Admit date:  10/13/2020  Discharge date:  10/16/2020    Code Status Order: DNR-CCA   Advance Directives:   [address] Directive Type of Healthcare Directive Copy in [address] Agent's Name Healthcare Agent's Phone Number    10/13/20 4700  Yes, patient has an advance directive for healthcare treatment -- --  --  --  --            Admitting Physician:  Melisa Morales DO  PCP: Millie Saldivar DO    Discharging Nurse: [facility] Unit/Room#: 9441/0992-32  Discharging Unit Phone Number: [phone]    Emergency Contact:   Extended Emergency Contact Information  Primary Emergency Contact: Daniele Salcido  Address: [address] [place] [address] Phone: [phone]  Work Phone: [phone]  Relation: Spouse   needed? No  Secondary Emergency Contact: Mariah Marcelino   [facility] [address] [facility] Phone: [phone]  Relation: Child   needed?  No    Past Surgical History:  Past Surgical History:   Procedure Laterality Date    ABDOMEN SURGERY      gastric resection for ulcers    BRONCHOSCOPY  2012    CHOLECYSTECTOMY, OPEN  2012    COLONOSCOPY      ERCP  3/13/12    sphincterotomy; 5 Swedish cm stent placed    ERCP  2012    pancreatic stent removal, pancreotogram    PRESSURE ULCER DEBRIDEMENT      gastric    STOMACH SURGERY      for ulcer    UPPER GASTROINTESTINAL ENDOSCOPY         Immunization History:   Immunization History   Administered Date(s) Administered    Influenza Vaccine, unspecified formulation 10/06/2015, 2019    Influenza Virus Vaccine 10/12/2014    Influenza Whole 10/31/2012    Pneumococcal Conjugate 7-valent (Betzy Bojorquez) 10/01/2011    Zoster Live (Zostavax) 2012       Active Problems:  Patient Active Problem List   Diagnosis Code    Cellulitis and abscess L03.90, L02.91    UC (ulcerative colitis) ([facility] [address] K51.90    Elevated blood pressure reading R03.0    Nausea & vomiting R11.2    Pancreatic mass K86.89    S/P cholecystectomy Z90.49    Cholelithiasis K80.20    Hypoxemia requiring supplemental oxygen R09.02, Z99.81    Incisional hernia K43.2    Port-A-Cath in place Z95.828    Malignant neoplasm of overlapping sites of right lung (HCC) C34.81    Left kidney mass N28.89    Wound infection after surgery, sequela [id number]    Left arm weakness R29.898    Stroke-like symptom R29.90    Weakness of both lower extremities R29.898    Tingling of left upper extremity R20.2    Acute renal failure (ARF) (HCC) N17.9    Generalized weakness R53.1    Expressive aphasia R47.01    History of ischemic left MCA stroke Z86.73    Acute CVA (cerebrovascular accident) ([facility] [address] I63.9    Bone metastases (HCC) C79.51       Isolation/Infection:   Isolation            No Isolation          Patient Infection Status       Infection Onset Added Last Indicated Last Indicated By Review Planned Expiration Resolved Resolved By    None active    Resolved    COVID-19 Rule Out 06/25/20 06/25/20 06/25/20 COVID-19 (Ordered)   06/25/20 Rule-Out Test Resulted    MRSA  12/31/11 12/31/11 Linda Petersen, NAVEED   05/01/18 Magaly Gastelum, RN            Nurse Assessment:  Last Vital Signs: BP (!) 140/87   Pulse 89   Temp 98.1 °F (36.7 °C) (Oral)   Resp 16   Ht 5' 2\" (1.575 m)   Wt 143 lb 8.3 oz (65.1 kg)   SpO2 92%   BMI 26.25 kg/m²     Last documented pain score (0-10 scale): Pain Level: 0  Last Weight:   Wt Readings from Last 1 Encounters:   10/16/20 143 lb 8.3 oz (65.1 kg)     Mental Status:  disoriented, alert and able to concentrate and follow conversation    IV Access:  - None    Nursing Mobility/ADLs:  Walking   Assisted  Transfer  Assisted  Bathing  Assisted  Dressing  Assisted  Toileting  Assisted  Feeding Assisted  Med Admin  Assisted  Med Delivery   whole    Wound Care Documentation and Therapy:        Elimination:  Continence:   · Bowel: No  · Bladder: No  Urinary Catheter: None   Colostomy/Ileostomy/Ileal Conduit: No       Date of Last BM: yesterday  No intake or output data in the 24 hours ending 10/16/20 1312  I/O last 3 completed shifts: In: 200 [P.O.:360; I.V.:20]  Out: -     Safety Concerns: At Risk for Falls    Impairments/Disabilities:      Speech    Nutrition Therapy:  Current Nutrition Therapy:   - Oral Diet:  General    Routes of Feeding: Oral  Liquids: Thin Liquids  Daily Fluid Restriction: no  Last Modified Barium Swallow with Video (Video Swallowing Test): not done    Treatments at the Time of Hospital Discharge:   Respiratory Treatments: none  Oxygen Therapy:  is not on home oxygen therapy.   Ventilator:    - No ventilator support    Rehab Therapies: Physical Therapy, Occupational Therapy and Speech/Language Therapy  Weight Bearing Status/Restrictions: No weight bearing restirctions  Other Medical Equipment (for information only, NOT a DME order):  walker  Other Treatments: n/a    Patient's personal belongings (please select all that are sent with patient):  None    RN SIGNATURE:  Electronically signed by Sherice Slater RN on 10/16/20 at 5:46 PM EDT    CASE MANAGEMENT/SOCIAL WORK SECTION    Inpatient Status Date: 10/13/2020    Readmission Risk Assessment Score:  Readmission Risk              Risk of Unplanned Readmission:        20           Discharging to Facility/ Agency   [address] [place] [address], [address] [place] [address] [address]  Phone: [phone]   Fax: [phone]    / signature: Electronically signed by VINNIE Bermudez on 10/16/20 at 2:33 PM EDT    PHYSICIAN SECTION    Prognosis: Fair    Condition at Discharge: Stable    Rehab Potential (if transferring to Rehab): Guarded    Recommended Labs or Other Treatments After Discharge: needs repeat MRI brain with contrast at some point in the next couple of weeks. Follow up with PCP and oncology     Physician Certification: I certify the above information and transfer of Miley Patton  is necessary for the continuing treatment of the diagnosis listed and that she requires [facility] for less 30 days.      Update Admission H&P: No change in H&P    PHYSICIAN SIGNATURE:  Electronically signed by Tahira Cooper MD on 10/16/20 at 1:13 PM EDT

## 2020-10-16 NOTE — PROGRESS NOTES
Speech Language Pathology  Facility/Department: [facility] 5T ORTHO/NEURO  Daily Treatment Note    NAME: Raine Lewis  : 1940  MRN: [id number]    Patient Diagnosis(es):   Patient Active Problem List    Diagnosis Date Noted    Malignant neoplasm of overlapping sites of right lung ([facility] [address] 10/13/2014     Priority: High    Bone metastases ([facility] [address] 10/14/2020    Acute CVA (cerebrovascular accident) ([facility] [address] 10/13/2020    History of ischemic left MCA stroke 2020    Expressive aphasia 2020    Generalized weakness 2020    Stroke-like symptom 10/23/2019    Weakness of both lower extremities 10/23/2019    Tingling of left upper extremity 10/23/2019    [facility] 10/23/2019    Left arm weakness 10/18/2019    Wound infection after surgery, sequela 2018    Left kidney mass 2017    Port-A-Cath in place 10/13/2014    Incisional hernia 2014    Hypoxemia requiring supplemental oxygen 2012    Cholelithiasis 05/10/2012    S/P cholecystectomy 2012    Nausea & vomiting 2012    Pancreatic mass 2012    Elevated blood pressure reading 2011    Cellulitis and abscess 2011    UC (ulcerative colitis) ([facility] [address] 2011     Allergies: No Known Allergies  Onset Date: 10/13/20    Pain:  Pain Assessment  Pain Assessment: 0-10  Pain Level: 0    Chart reviewed. Medical diagnosis:  Treatment diagnosis:  Aphasia (R47.01), fluent type    Treatment:  Pt seen to address the following goals:  Goal 1. Patient will participate in ongoing assessment of cognitive communication skills with goals added as indicated. 10/15/20:  Pt is unable to tell time, either using analog or digital clock. Continue goal.  10/16: pt read single words and short phrases appropriately. Attempted to write name, however this resulted in perseveration of unrelated letters. Cont goal    Goal 2. Patient will communicate wants and needs with SBA.     10/15/20:  Pt is able to provide personal information with minimal assistance. When given open-ended speech task (like \"tell me what you had for breakfast\") her word salad increases. Continue goal.  10/16:  Pt not able to state name, perseverated on spouse name. Pt conversed with family on phone fairly appropriately. Pt making needs known through gestures and word associations. Cont goal    Goal 3:  Patient will follow 2-step instructions with 100% accuracy. 10/15/20:  Pt is able to follow 1-step direction with 80% accuracy. She is able to follow 2-step directions with 60% accuracy. Continue goal.  10/16: pt followed 1 and 2 step commands with 100% accuracy. Goal met, modify to 3 step commands    Goal 4:  Pt will complete word retrieval exercises with minimal assistance. 10/15/20:  Pt requires minimal assistance to name physically-present objects. Continue goal.  10/16: pt named common objects accurately, completed short sentences, however had difficulty answering basic questions, with mod cues provided  Cont goal    Education:  SLP educated pt re: role of SLP, POC and strategies to facilitate communication. Pt needs reinforcement. Plan:  Continue speech/language therapy to address above goals, 3-5 x/week x LOS  DC recommendations: ongoing treatment is indicated  D/W nursing  Needs met prior to leaving room, call button in reach. Treatment time: [id number]  Pg.  # [id number]  If patient is discharged prior to next treatment, this note will serve as the discharge summary

## 2020-10-16 NOTE — PROGRESS NOTES
Pt is alert to self and able to answer some questions, but has expressive aphasia. Neuro checks remain unchanged with an NIH of 4. Pt is up x1 with a gb. Voiding adequately though incont at times. Tolerating diet well. Denies n/v and pain at this time. Pt will go to a SNF today. Will continue to monitor.

## 2020-10-16 NOTE — PROGRESS NOTES
Clinical Pharmacy Progress Note    68 y.o. female admitted with acute on chronic worsening aphasia with concern for CVA. Pharmacy has been asked by Dr. Chente Greer to adjust all drips to normal saline as appropriate based on compatibility to avoid fluid shifts since D5 is osmotically active. The following intermittent IV drips/infusions have been adjusted to saline:  Potassium chloride  Ceftriaxone    Please be aware that patient has D5W ordered as part of hypoglycemia orderset. Total IV fluid delivered to patient over last 24h: 50 mL    As patient is not being treated for pituitary tumor resection or requiring hypertonic saline (defined as >0.9% NaCl), pharmacy will sign off of IV review consult, per protocol. Please call with questions.   Ashwini Green PharmD, [facility]  Wireless: [phone]  or [phone]  10/16/2020 11:10 AM

## 2020-10-16 NOTE — DISCHARGE SUMMARY
Hospital Medicine Discharge Summary    Patient ID: Trip Olvera      Patient's PCP: Louise Gresham DO    Admit Date: 10/13/2020     Discharge Date:   10/16/2020     Admitting Physician: Maylin Goode DO     Discharge Physician: Lisa Huntley MD     Discharge Diagnoses: Active Hospital Problems    Diagnosis    Bone metastases ([facility] [address] [C79.51]    Acute CVA (cerebrovascular accident) ([facility] [address] [I63.9]       The patient was seen and examined on day of discharge and this discharge summary is in conjunction with any daily progress note from day of discharge. Hospital Course:     67 yo F with history of metastatic NSCLC presented with, recent CVA presented with progressive RUE and LE weakness with worsening aphasia. Suspect symptoms were due to recrudescence of CVA 2/2 acute cystitis on admission. Repeat MRI was done and negative for new stroke, showed only evolving changes from prior CVA. Patient will need contrasted study in the future to ensure no metastatic lesions. Hem/onc, radiation oncology, neurosurgery consulted while inpatient, no plans for further inpatient workup at this time. Completed 3 d course of rocephin while inpatient, urine culture negative. PT/OT consulted, and patient discharged to SNF in stable condition with plan for close follow up with hem/onc, radiation oncology and PCP. Continued on ASA, plavix, statin. Currently undergoing workup for occult afib to evaluate for underlying embolic source of CVA, follow up results of zio. Physical Exam Performed:     /75   Pulse 97   Temp 98.7 °F (37.1 °C) (Oral)   Resp 16   Ht 5' 2\" (1.575 m)   Wt 143 lb 8.3 oz (65.1 kg)   SpO2 95%   BMI 26.25 kg/m²       General appearance:  No apparent distress, appears stated age and cooperative. HEENT:  Normal cephalic, atraumatic without obvious deformity. Pupils equal, round, and reactive to light. Extra ocular muscles intact. Conjunctivae/corneas clear.   Neck: Supple, with full range of motion. No jugular venous distention. Trachea midline. Respiratory:  Normal respiratory effort. Clear to auscultation, bilaterally without Rales/Wheezes/Rhonchi. Cardiovascular:  Regular rate and rhythm with normal S1/S2 without murmurs, rubs or gallops. Abdomen: Soft, non-tender, non-distended with normal bowel sounds. Musculoskeletal:  No clubbing, cyanosis or edema bilaterally. Full range of motion without deformity. Skin: Skin color, texture, turgor normal.  No rashes or lesions. Neurologic:  Neurovascularly intact without any focal sensory/motor deficits. Cranial nerves: II-XII intact, grossly non-focal.  Psychiatric:  Alert and oriented, thought content appropriate, normal insight  Capillary Refill: Brisk,< 3 seconds   Peripheral Pulses: +2 palpable, equal bilaterally       Labs: For convenience and continuity at follow-up the following most recent labs are provided:      CBC:    Lab Results   Component Value Date    WBC 6.5 10/15/2020    HGB 10.9 10/15/2020    HCT 35.6 10/15/2020     10/15/2020       Renal:    Lab Results   Component Value Date     10/15/2020    K 4.9 10/15/2020     10/15/2020    CO2 18 10/15/2020    BUN 18 10/15/2020    CREATININE 0.8 10/15/2020    CALCIUM 8.8 10/15/2020    PHOS 4.8 10/22/2019         Significant Diagnostic Studies    Radiology:   MRI BRAIN WO CONTRAST   Final Result      1. Acute bland infarct in the posterior left MCA distribution in a similar location but slightly increased in size compared to 9/29/2020. MRI CERVICAL SPINE WO CONTRAST   Final Result      1. Stable sclerotic osseous metastasis involving C2 and stable osseous metastasis versus degenerative change involving the C6 inferior endplate as described above, unchanged compared to 10/23/2019. No acute fracture. 2.  Mild cervical spondylosis with mild C4-5 and C5-6 spinal canal stenosis and moderate bilateral C5-6 and C6-7 neural foraminal narrowing. DO for the opportunity to be involved in this patient's care. If you have any questions or concerns please feel free to contact me at [phone].

## 2020-10-16 NOTE — PROGRESS NOTES
unable to state reason for admission to hospital.)    Objective    ADL  Grooming: Contact guard assistance(CGA in stance at sink to wash hands)  Toileting: Contact guard assistance(CGA for pants management up and down. Pt completed ellen care seated. Pt was unaware that she did not throw toilet paper into toilet and required VCs to deposit from hand)          Balance  Sitting Balance: Stand by assistance(SBA at EOB)  Standing Balance: Contact guard assistance  Standing Balance  Time: ~3 mins total  Activity: functional mobility to/from bathroom, stance for toileting and hand hygiene  Functional Mobility  Functional - Mobility Device: Rolling Walker  Activity: To/from bathroom  Assist Level: Contact guard assistance  Functional Mobility Comments: Pt required VCs to maintain BUE support on RW. Pt attempted to abandon RW while leaving sink. Toilet Transfers  Toilet - Technique: Ambulating  Equipment Used: Standard toilet  Toilet Transfer: Contact guard assistance  Toilet Transfers Comments: VCs and TCs needed to back up to toilet completely before sitting. Bed mobility  Supine to Sit: Minimal assistance(w/ HOB elevated and use of bed rail)    Transfers  Sit to stand: Contact guard assistance  Stand to sit: Contact guard assistance  Transfer Comments: Sit to stand from EOB to RW. VCs needed for hand placement upon ascent and descent                       Cognition  Overall Cognitive Status: Exceptions  Arousal/Alertness: Appropriate responses to stimuli  Following Commands: Follows one step commands with increased time; Follows one step commands with repetition  Attention Span: Attends with cues to redirect  Memory: Decreased recall of recent events  Safety Judgement: Decreased awareness of need for assistance;Decreased awareness of need for safety  Problem Solving: Assistance required to generate solutions;Assistance required to implement solutions  Insights: Decreased awareness of deficits  Initiation: Requires cues for some  Sequencing: Requires cues for some  Cognition Comment: Pt is limited by aphasia, expressive worse than receptive. Pt w/ \"word salad\" at times resulting in incoherent responses. Pt was reluctant to participate in OT today and was only agreeable to walk to the bathroom       Perception  Overall Perceptual Status: (Pt presents w/ motor planning deficits as evident during dysdiadochokinesia assessment. Pt able to alternate clapping hands and slapping knees w/ medium pace. Pt unable to follow 3 sequence directions of slapping knees, clapping hands, and crossing arms.)                                     Plan   Plan  Times per week: 5-7  Current Treatment Recommendations: Strengthening, ROM, Balance Training, Functional Mobility Training, Endurance Training, Self-Care / ADL, Cognitive Reorientation  G-Code     OutComes Score                                                  AM-PAC Score        AM-PAC Inpatient Daily Activity Raw Score: 16 (10/16/20 1443)  AM-PAC Inpatient ADL T-Scale Score : 35.96 (10/16/20 1443)  ADL Inpatient CMS 0-100% Score: 53.32 (10/16/20 1443)  ADL Inpatient CMS G-Code Modifier : CK (10/16/20 1443)    Goals  Short term goals  Time Frame for Short term goals: Discharge- all goals ongoing  Short term goal 1: Transfer to/from toilet with SBA  Short term goal 2: Stance with SBA x6 min while engaging in ADL/functional mobility  Short term goal 3: Complete toileting with SBA  Short term goal 4: Lower body dressing with SBA  Patient Goals   Patient goals : Go home       Therapy Time   Individual Concurrent Group Co-treatment   Time In 8925         Time Out 1427         Minutes 23         Timed Code Treatment Minutes: 23 Minutes       Rip Parks OT   If patient discharges prior to next treatment, this note will serve as discharge summary. WiIll continue per plan of care if patient does not discharge.

## 2020-10-16 NOTE — PLAN OF CARE
Problem: Falls - Risk of:  Goal: Will remain free from falls  Description: Will remain free from falls  Outcome: Ongoing  Fall precautions in place. Bed is in lowest position, wheels locked, bed alarm on, non skid socks on. Call light and bedside table within reach. Pt calls out appropriately. Pt is up x1 with a gb and walker. Will continue to assess and monitor. Problem: Skin Integrity:  Goal: Will show no infection signs and symptoms  Description: Will show no infection signs and symptoms  Outcome: Ongoing  Pt's skin is intact. Able to turn and reposition self as needed. Checked regularly to make sure she is dry to prevent skin breakdown. Will continue to monitor.

## 2020-10-17 LAB
BLOOD CULTURE, ROUTINE: NORMAL
CULTURE, BLOOD 2: NORMAL

## 2020-10-28 ENCOUNTER — APPOINTMENT (OUTPATIENT)
Dept: CT IMAGING | Age: 80
DRG: 689 | End: 2020-10-28
Payer: MEDICARE

## 2020-10-28 ENCOUNTER — APPOINTMENT (OUTPATIENT)
Dept: GENERAL RADIOLOGY | Age: 80
DRG: 689 | End: 2020-10-28
Payer: MEDICARE

## 2020-10-28 ENCOUNTER — HOSPITAL ENCOUNTER (INPATIENT)
Age: 80
LOS: 2 days | Discharge: HOME HEALTH | DRG: 689 | End: 2020-10-30
Attending: EMERGENCY MEDICINE | Admitting: INTERNAL MEDICINE
Payer: MEDICARE

## 2020-10-28 PROBLEM — R29.898 WEAKNESS OF LEFT UPPER EXTREMITY: Status: ACTIVE | Noted: 2020-10-28

## 2020-10-28 LAB
ANION GAP SERPL CALCULATED.3IONS-SCNC: 11 MMOL/L (ref 3–16)
BASOPHILS ABSOLUTE: 0.1 K/UL (ref 0–0.2)
BASOPHILS RELATIVE PERCENT: 0.8 %
BUN BLDV-MCNC: 25 MG/DL (ref 7–20)
CALCIUM SERPL-MCNC: 9.5 MG/DL (ref 8.3–10.6)
CHLORIDE BLD-SCNC: 106 MMOL/L (ref 99–110)
CO2: 24 MMOL/L (ref 21–32)
CREAT SERPL-MCNC: 1 MG/DL (ref 0.6–1.2)
EOSINOPHILS ABSOLUTE: 1 K/UL (ref 0–0.6)
EOSINOPHILS RELATIVE PERCENT: 10.4 %
GFR AFRICAN AMERICAN: >60
GFR NON-AFRICAN AMERICAN: 54
GLUCOSE BLD-MCNC: 109 MG/DL (ref 70–99)
GLUCOSE BLD-MCNC: 114 MG/DL (ref 70–99)
HCT VFR BLD CALC: 36.8 % (ref 36–48)
HEMOGLOBIN: 11.6 G/DL (ref 12–16)
LYMPHOCYTES ABSOLUTE: 1.3 K/UL (ref 1–5.1)
LYMPHOCYTES RELATIVE PERCENT: 14 %
MCH RBC QN AUTO: 25.3 PG (ref 26–34)
MCHC RBC AUTO-ENTMCNC: 31.5 G/DL (ref 31–36)
MCV RBC AUTO: 80.2 FL (ref 80–100)
MONOCYTES ABSOLUTE: 0.9 K/UL (ref 0–1.3)
MONOCYTES RELATIVE PERCENT: 9.6 %
NEUTROPHILS ABSOLUTE: 6.1 K/UL (ref 1.7–7.7)
NEUTROPHILS RELATIVE PERCENT: 65.2 %
PDW BLD-RTO: 17 % (ref 12.4–15.4)
PERFORMED ON: ABNORMAL
PLATELET # BLD: 329 K/UL (ref 135–450)
PMV BLD AUTO: 8.1 FL (ref 5–10.5)
POTASSIUM REFLEX MAGNESIUM: 4.6 MMOL/L (ref 3.5–5.1)
PRO-BNP: 169 PG/ML (ref 0–449)
RBC # BLD: 4.59 M/UL (ref 4–5.2)
SODIUM BLD-SCNC: 141 MMOL/L (ref 136–145)
TROPONIN: <0.01 NG/ML
WBC # BLD: 9.4 K/UL (ref 4–11)

## 2020-10-28 PROCEDURE — 85025 COMPLETE CBC W/AUTO DIFF WBC: CPT

## 2020-10-28 PROCEDURE — 93005 ELECTROCARDIOGRAM TRACING: CPT | Performed by: EMERGENCY MEDICINE

## 2020-10-28 PROCEDURE — 70496 CT ANGIOGRAPHY HEAD: CPT

## 2020-10-28 PROCEDURE — 84484 ASSAY OF TROPONIN QUANT: CPT

## 2020-10-28 PROCEDURE — 80048 BASIC METABOLIC PNL TOTAL CA: CPT

## 2020-10-28 PROCEDURE — 71045 X-RAY EXAM CHEST 1 VIEW: CPT

## 2020-10-28 PROCEDURE — G0378 HOSPITAL OBSERVATION PER HR: HCPCS

## 2020-10-28 PROCEDURE — 83880 ASSAY OF NATRIURETIC PEPTIDE: CPT

## 2020-10-28 PROCEDURE — 2060000000 HC ICU INTERMEDIATE R&B

## 2020-10-28 PROCEDURE — 70450 CT HEAD/BRAIN W/O DYE: CPT

## 2020-10-28 PROCEDURE — 6360000004 HC RX CONTRAST MEDICATION: Performed by: EMERGENCY MEDICINE

## 2020-10-28 PROCEDURE — 99285 EMERGENCY DEPT VISIT HI MDM: CPT

## 2020-10-28 RX ORDER — CITALOPRAM 20 MG/1
40 TABLET ORAL NIGHTLY
Status: DISCONTINUED | OUTPATIENT
Start: 2020-10-29 | End: 2020-10-30 | Stop reason: HOSPADM

## 2020-10-28 RX ORDER — PANTOPRAZOLE SODIUM 40 MG/1
40 TABLET, DELAYED RELEASE ORAL NIGHTLY
Status: DISCONTINUED | OUTPATIENT
Start: 2020-10-29 | End: 2020-10-30 | Stop reason: HOSPADM

## 2020-10-28 RX ORDER — SODIUM CHLORIDE 0.9 % (FLUSH) 0.9 %
10 SYRINGE (ML) INJECTION PRN
Status: DISCONTINUED | OUTPATIENT
Start: 2020-10-28 | End: 2020-10-30 | Stop reason: HOSPADM

## 2020-10-28 RX ORDER — CLOPIDOGREL BISULFATE 75 MG/1
75 TABLET ORAL DAILY
Status: DISCONTINUED | OUTPATIENT
Start: 2020-10-29 | End: 2020-10-30 | Stop reason: HOSPADM

## 2020-10-28 RX ORDER — POLYETHYLENE GLYCOL 3350 17 G/17G
17 POWDER, FOR SOLUTION ORAL DAILY PRN
Status: DISCONTINUED | OUTPATIENT
Start: 2020-10-28 | End: 2020-10-30 | Stop reason: HOSPADM

## 2020-10-28 RX ORDER — SODIUM CHLORIDE 0.9 % (FLUSH) 0.9 %
10 SYRINGE (ML) INJECTION EVERY 12 HOURS SCHEDULED
Status: DISCONTINUED | OUTPATIENT
Start: 2020-10-29 | End: 2020-10-30 | Stop reason: HOSPADM

## 2020-10-28 RX ORDER — PROMETHAZINE HYDROCHLORIDE 12.5 MG/1
12.5 TABLET ORAL EVERY 6 HOURS PRN
Status: DISCONTINUED | OUTPATIENT
Start: 2020-10-28 | End: 2020-10-30 | Stop reason: HOSPADM

## 2020-10-28 RX ORDER — ATORVASTATIN CALCIUM 20 MG/1
40 TABLET, FILM COATED ORAL DAILY
Status: DISCONTINUED | OUTPATIENT
Start: 2020-10-29 | End: 2020-10-30 | Stop reason: HOSPADM

## 2020-10-28 RX ORDER — ONDANSETRON 2 MG/ML
4 INJECTION INTRAMUSCULAR; INTRAVENOUS EVERY 6 HOURS PRN
Status: DISCONTINUED | OUTPATIENT
Start: 2020-10-28 | End: 2020-10-30 | Stop reason: HOSPADM

## 2020-10-28 RX ORDER — ASPIRIN 81 MG/1
81 TABLET ORAL DAILY
Status: DISCONTINUED | OUTPATIENT
Start: 2020-10-29 | End: 2020-10-30 | Stop reason: HOSPADM

## 2020-10-28 RX ADMIN — IOPAMIDOL 80 ML: 755 INJECTION, SOLUTION INTRAVENOUS at 19:52

## 2020-10-28 ASSESSMENT — PAIN SCALES - WONG BAKER: WONGBAKER_NUMERICALRESPONSE: 8

## 2020-10-28 ASSESSMENT — PAIN DESCRIPTION - PAIN TYPE: TYPE: ACUTE PAIN

## 2020-10-28 ASSESSMENT — PAIN DESCRIPTION - ORIENTATION: ORIENTATION: LEFT

## 2020-10-28 ASSESSMENT — PAIN DESCRIPTION - LOCATION: LOCATION: BREAST;ARM

## 2020-10-28 NOTE — ED TRIAGE NOTES
Pt brought to ED by EMS for possible stroke. They state that they have been called to the patient's residence several times in the last few years for TIAs and are unsure what the patient's baseline deficits are, if any. EMS was told by family that the patient started having expressive aphasia and left side weakness at 1530 today. But then family told that the patient started with weakness and inability to walk last night at 2200. EKG completed. . IV in L wrist. Pt on telemetry monitor, CT called, MD at bedside.

## 2020-10-28 NOTE — ED NOTES
Bed: A04-04  Expected date:   Expected time:   Means of arrival:   Comments:  [place] - left side weakness     Prashant Lara, NAVEED  10/28/20 1928

## 2020-10-29 ENCOUNTER — APPOINTMENT (OUTPATIENT)
Dept: MRI IMAGING | Age: 80
DRG: 689 | End: 2020-10-29
Payer: MEDICARE

## 2020-10-29 ENCOUNTER — APPOINTMENT (OUTPATIENT)
Dept: GENERAL RADIOLOGY | Age: 80
DRG: 689 | End: 2020-10-29
Payer: MEDICARE

## 2020-10-29 LAB
ALBUMIN SERPL-MCNC: 3.9 G/DL (ref 3.4–5)
ANION GAP SERPL CALCULATED.3IONS-SCNC: 11 MMOL/L (ref 3–16)
BACTERIA: ABNORMAL /HPF
BILIRUBIN URINE: NEGATIVE
BLOOD, URINE: ABNORMAL
BUN BLDV-MCNC: 24 MG/DL (ref 7–20)
CALCIUM SERPL-MCNC: 9.4 MG/DL (ref 8.3–10.6)
CHLORIDE BLD-SCNC: 103 MMOL/L (ref 99–110)
CHOLESTEROL, TOTAL: 128 MG/DL (ref 0–199)
CLARITY: CLEAR
CO2: 24 MMOL/L (ref 21–32)
COLOR: YELLOW
CREAT SERPL-MCNC: 1 MG/DL (ref 0.6–1.2)
EKG ATRIAL RATE: 106 BPM
EKG DIAGNOSIS: NORMAL
EKG P AXIS: 46 DEGREES
EKG P-R INTERVAL: 138 MS
EKG Q-T INTERVAL: 360 MS
EKG QRS DURATION: 70 MS
EKG QTC CALCULATION (BAZETT): 478 MS
EKG R AXIS: 6 DEGREES
EKG T AXIS: 62 DEGREES
EKG VENTRICULAR RATE: 106 BPM
GFR AFRICAN AMERICAN: >60
GFR NON-AFRICAN AMERICAN: 54
GLUCOSE BLD-MCNC: 112 MG/DL (ref 70–99)
GLUCOSE URINE: NEGATIVE MG/DL
HCT VFR BLD CALC: 39 % (ref 36–48)
HDLC SERPL-MCNC: 48 MG/DL (ref 40–60)
HEMOGLOBIN: 12.2 G/DL (ref 12–16)
KETONES, URINE: NEGATIVE MG/DL
LDL CHOLESTEROL CALCULATED: 62 MG/DL
LEUKOCYTE ESTERASE, URINE: ABNORMAL
MCH RBC QN AUTO: 25.1 PG (ref 26–34)
MCHC RBC AUTO-ENTMCNC: 31.2 G/DL (ref 31–36)
MCV RBC AUTO: 80.5 FL (ref 80–100)
MICROSCOPIC EXAMINATION: YES
NITRITE, URINE: POSITIVE
PDW BLD-RTO: 17 % (ref 12.4–15.4)
PH UA: 6 (ref 5–8)
PHOSPHORUS: 3.5 MG/DL (ref 2.5–4.9)
PLATELET # BLD: 282 K/UL (ref 135–450)
PMV BLD AUTO: 8 FL (ref 5–10.5)
POTASSIUM SERPL-SCNC: 3.9 MMOL/L (ref 3.5–5.1)
PROTEIN UA: NEGATIVE MG/DL
RBC # BLD: 4.85 M/UL (ref 4–5.2)
RBC UA: ABNORMAL /HPF (ref 0–4)
SODIUM BLD-SCNC: 138 MMOL/L (ref 136–145)
SPECIFIC GRAVITY UA: 1.01 (ref 1–1.03)
TRIGL SERPL-MCNC: 91 MG/DL (ref 0–150)
URINE TYPE: ABNORMAL
UROBILINOGEN, URINE: 0.2 E.U./DL
VLDLC SERPL CALC-MCNC: 18 MG/DL
WBC # BLD: 7.5 K/UL (ref 4–11)
WBC UA: ABNORMAL /HPF (ref 0–5)

## 2020-10-29 PROCEDURE — 6360000002 HC RX W HCPCS: Performed by: STUDENT IN AN ORGANIZED HEALTH CARE EDUCATION/TRAINING PROGRAM

## 2020-10-29 PROCEDURE — 73030 X-RAY EXAM OF SHOULDER: CPT

## 2020-10-29 PROCEDURE — 81001 URINALYSIS AUTO W/SCOPE: CPT

## 2020-10-29 PROCEDURE — G0378 HOSPITAL OBSERVATION PER HR: HCPCS

## 2020-10-29 PROCEDURE — 97535 SELF CARE MNGMENT TRAINING: CPT

## 2020-10-29 PROCEDURE — 36415 COLL VENOUS BLD VENIPUNCTURE: CPT

## 2020-10-29 PROCEDURE — 87086 URINE CULTURE/COLONY COUNT: CPT

## 2020-10-29 PROCEDURE — 97162 PT EVAL MOD COMPLEX 30 MIN: CPT

## 2020-10-29 PROCEDURE — A9576 INJ PROHANCE MULTIPACK: HCPCS | Performed by: INTERNAL MEDICINE

## 2020-10-29 PROCEDURE — 85027 COMPLETE CBC AUTOMATED: CPT

## 2020-10-29 PROCEDURE — 2580000003 HC RX 258: Performed by: STUDENT IN AN ORGANIZED HEALTH CARE EDUCATION/TRAINING PROGRAM

## 2020-10-29 PROCEDURE — 92610 EVALUATE SWALLOWING FUNCTION: CPT

## 2020-10-29 PROCEDURE — 97116 GAIT TRAINING THERAPY: CPT

## 2020-10-29 PROCEDURE — 70553 MRI BRAIN STEM W/O & W/DYE: CPT

## 2020-10-29 PROCEDURE — 2060000000 HC ICU INTERMEDIATE R&B

## 2020-10-29 PROCEDURE — 83036 HEMOGLOBIN GLYCOSYLATED A1C: CPT

## 2020-10-29 PROCEDURE — 80061 LIPID PANEL: CPT

## 2020-10-29 PROCEDURE — 87077 CULTURE AEROBIC IDENTIFY: CPT

## 2020-10-29 PROCEDURE — 6360000004 HC RX CONTRAST MEDICATION: Performed by: INTERNAL MEDICINE

## 2020-10-29 PROCEDURE — 96365 THER/PROPH/DIAG IV INF INIT: CPT

## 2020-10-29 PROCEDURE — 80069 RENAL FUNCTION PANEL: CPT

## 2020-10-29 PROCEDURE — 6370000000 HC RX 637 (ALT 250 FOR IP): Performed by: STUDENT IN AN ORGANIZED HEALTH CARE EDUCATION/TRAINING PROGRAM

## 2020-10-29 PROCEDURE — 92523 SPEECH SOUND LANG COMPREHEN: CPT

## 2020-10-29 PROCEDURE — 96372 THER/PROPH/DIAG INJ SC/IM: CPT

## 2020-10-29 PROCEDURE — 97166 OT EVAL MOD COMPLEX 45 MIN: CPT

## 2020-10-29 PROCEDURE — 87186 SC STD MICRODIL/AGAR DIL: CPT

## 2020-10-29 RX ADMIN — ASPIRIN 81 MG: 81 TABLET, COATED ORAL at 09:44

## 2020-10-29 RX ADMIN — CITALOPRAM HYDROBROMIDE 40 MG: 20 TABLET ORAL at 20:15

## 2020-10-29 RX ADMIN — Medication 10 ML: at 20:15

## 2020-10-29 RX ADMIN — PANTOPRAZOLE SODIUM 40 MG: 40 TABLET, DELAYED RELEASE ORAL at 20:15

## 2020-10-29 RX ADMIN — Medication 10 ML: at 09:44

## 2020-10-29 RX ADMIN — CEFTRIAXONE 1 G: 1 INJECTION, POWDER, FOR SOLUTION INTRAMUSCULAR; INTRAVENOUS at 13:16

## 2020-10-29 RX ADMIN — GADOTERIDOL 14 ML: 279.3 INJECTION, SOLUTION INTRAVENOUS at 16:32

## 2020-10-29 RX ADMIN — ENOXAPARIN SODIUM 40 MG: 40 INJECTION SUBCUTANEOUS at 09:43

## 2020-10-29 RX ADMIN — CLOPIDOGREL BISULFATE 75 MG: 75 TABLET ORAL at 09:44

## 2020-10-29 RX ADMIN — CITALOPRAM HYDROBROMIDE 40 MG: 20 TABLET ORAL at 03:31

## 2020-10-29 RX ADMIN — PANTOPRAZOLE SODIUM 40 MG: 40 TABLET, DELAYED RELEASE ORAL at 03:31

## 2020-10-29 RX ADMIN — ATORVASTATIN CALCIUM 40 MG: 20 TABLET, FILM COATED ORAL at 09:44

## 2020-10-29 ASSESSMENT — PAIN SCALES - GENERAL
PAINLEVEL_OUTOF10: 0

## 2020-10-29 NOTE — PROGRESS NOTES
Urine orders sent to lab.

## 2020-10-29 NOTE — PROGRESS NOTES
Occupational Therapy   Occupational Therapy Initial Assessment/Treatment  Date: 10/29/2020   Patient Name: Raine Lewis  MRN: [id number]     : 1940    Date of Service: 10/29/2020    Discharge Recommendations: Raine Lewis scored a 17/24 on the AM-PAC ADL Inpatient form. Current research shows that an AM-PAC score of 18 or greater is typically associated with a discharge to the patient's home setting. Based on the patient's AM-PAC score, and their current ADL deficits, it is recommended that the patient have 2-3 sessions per week of Occupational Therapy at d/c to increase the patient's independence. At this time, this patient demonstrates the endurance and safety to discharge home with home services and a follow up treatment frequency of 2-3x/wk. Please see assessment section for further patient specific details. If patient discharges prior to next session this note will serve as a discharge summary. Please see below for the latest assessment towards goals. HOME HEALTH CARE: LEVEL 1 STANDARD    - Initial home health evaluation to occur within 24-48 hours, in patient home   - Therapy to evaluate with goal of regaining prior level of functioning   - Therapy to evaluate if patient has [address] needs for personal care         Assessment   Performance deficits / Impairments: Decreased functional mobility ; Decreased ADL status; Decreased ROM  Assessment: Pt from home w/ her . PTA pt received total assist w/ dressing and bathing, w/ some assist from  for transfers. Currently pt requiring SBA to Min A for transfers and CGA for functional mobility. Pt currently demonstrating CGA for clothing management during toileting, and total dependent to adjust B socks. Pt requiring CGA to Min A for toileting task. Pt plans to d/c home w/ , home health nurse, and home OT.   Treatment Diagnosis: Impaired ADLs and functional mobility  Prognosis: Fair  Decision Making: Medium session)  Referring Practitioner: Chelly Bobby MD  Diagnosis: Weakness of left upper extremity  Subjective  Subjective: Pt supine in bed upon arrival, agreeable to OT eval/treat. \"I feel fine today\"     Social/Functional History  Social/Functional History  Lives With: Spouse  Type of Home: Mobile home  Home Access: Level entry  Bathroom Shower/Tub: Walk-in shower  Bathroom Toilet: Handicap height  Bathroom Equipment: Grab bars in shower, Grab bars around toilet  Home Equipment: Rolling walker, [address] Help From: Other (comment)(Care connections-PT, OT, and nurse, SLP about to start)  ADL Assistance: Needs assistance  Toileting: Needs assistance  Ambulation Assistance: Needs assistance(with walker- spouse walks with her)  Transfer Assistance: Needs assistance(spouse helps)  Active : No  Additional Comments: 2 falls recently per spouse- fell with therapist at home which prompted recent visit to [facility] followed by 5 days at rehab & then had been home approx 5-6 days & now is back at hospital       Objective   Vision: Impaired  Vision Exceptions: Wears glasses for reading  Hearing: Within functional limits    Orientation  Overall Orientation Status: (Oriented to person. Through conversation could determine the pt knew she was in a hospital. Unable to formally assess further d/t expressive aphasia.)     Balance  Sitting Balance: Stand by assistance  Standing Balance: Contact guard assistance  Standing Balance  Time: x3 minutes total  Activity: Transfers, clothing management for toileting, washing hands at sink  Functional Mobility  Functional - Mobility Device: Rolling Walker  Activity: To/from bathroom  Assist Level: Contact guard assistance  Toilet Transfers  Toilet - Technique: Ambulating  Equipment Used: Standard toilet  Toilet Transfer: Minimal assistance(Cues for hand placement)  Toilet Transfers Comments: Pt is used to using raised toilet seat at home.   ADL  LE Dressing:

## 2020-10-29 NOTE — H&P
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 History from the patient is limited by her expressive aphasia. Patient's daughter reports that the night prior to admission, her father stated that her mother felt heaviness in her bilateral lower extremities and had difficulty standing up on her own. Following this, this morning family members noted that the patient had left upper extremity weakness, which prompted the visit to the ED. Patient's daughter states that she has had expressive aphasia since her prior ischemic CVA to the left MCA. She reportedly has not had any other deficits, and uses walker to ambulate at home. She lives at home with her , and had reportedly not complained of any headaches, blurred or double vision, numbness or tingling of her extremities. She also reportedly denied any swallowing difficulties. Patient denies chest pain, shortness of breath, palpitations, lightheadedness, abdominal pain, nausea, vomiting, dysuria or changes in frequency of urination. However, her daughter does state that patient has been incontinent of urine for the last week and has been wetting her bed daily.   She also reportedly has a history of multiple urinary tract infections, and on her prior admission recently, she was noted to have urinary tract infection which was thought to have contributed to recrudescence of her prior ischemic CVA leading to her symptoms on that admission. Patient's daughter states that the patient had been reporting left upper extremity pain in her shoulder region and the daughter initially thought that may have contributed to her weakness which led to a delay in presentation. The daughter and patient both deny any recent falls or trauma to the shoulder. Of note, patient was started on immunotherapy for her metastatic lung adenocarcinoma in 2019, and reportedly she has not been compliant with her treatment. She has reportedly missed her treatment since at least 9/2020 due to her overall generalized weakness. On presentation to the ED, patient was afebrile, tachycardic with a heart rate in the 100s, normotensive with a normal respiratory rate and saturating well on room air. CBC and renal function panel were largely unremarkable except for mild chronic anemia with a hemoglobin of 11.6. Past Medical History:        Diagnosis Date    CHUYITA (acute kidney injury) ([facility] 5/1/2018    Allergic rhinitis     Aneurysm ([facility]     aorta  watching now    Aortic aneurysm [facility])     Currently has     Cancer [facility])     Lung    Cholelithiasis     Depression     History of ischemic left MCA stroke 9/30/2020    With residual expressive aphasia. Noted to be subacute ~ 8days old on MRI 9/29/2020.      History of ischemic left MCA stroke 9/30/2020    Hyperlipidemia     Hypertension     Incontinence     bladder    MRSA (methicillin resistant staph aureus) culture positive 12/28/11    Leg    Ulcer     Ulcerative colitis ([facility]    ·     Past Surgical History:        Procedure Laterality Date    ABDOMEN SURGERY      gastric resection for ulcers    BRONCHOSCOPY  12/11/2012    CHOLECYSTECTOMY, OPEN  5/7/2012    COLONOSCOPY      ERCP  3/13/12    sphincterotomy; 5 [place] cm stent placed    ERCP  12-    pancreatic stent removal, pancreotogram    PRESSURE ULCER DEBRIDEMENT  2010    gastric    STOMACH deficit. Motor: No weakness. Comments: Oriented to person, not to place or time  Difficulty with orientation seems to stem partly from her expressive aphasia  Expressive aphasia noted  Though there is a history of reported lower extremity and left upper extremity weakness, I did not appreciate either of these on exam       Physical exam:       Vitals:    10/28/20 2330   BP: 116/74   Pulse: 98   Resp: 16   Temp: 98.2 °F (36.8 °C)   SpO2: 94%       DATA:    Labs:  CBC:   Recent Labs     10/28/20  1954   WBC 9.4   HGB 11.6*   HCT 36.8          BMP:   Recent Labs     10/28/20  1954      K 4.6      CO2 24   BUN 25*   CREATININE 1.0   GLUCOSE 109*     LFT's: No results for input(s): AST, ALT, ALB, BILITOT, ALKPHOS in the last 72 hours. Troponin:   Recent Labs     10/28/20  1954   TROPONINI <0.01     BNP:No results for input(s): BNP in the last 72 hours. ABGs: No results for input(s): PHART, RZW4QYO, PO2ART in the last 72 hours. INR: No results for input(s): INR in the last 72 hours. U/A:No results for input(s): NITRITE, COLORU, PHUR, LABCAST, WBCUA, RBCUA, MUCUS, TRICHOMONAS, YEAST, BACTERIA, CLARITYU, SPECGRAV, LEUKOCYTESUR, UROBILINOGEN, BILIRUBINUR, BLOODU, GLUCOSEU, AMORPHOUS in the last 72 hours. Invalid input(s): KETONESU    XR CHEST PORTABLE   Final Result      No acute cardiopulmonary findings. CTA HEAD NECK W CONTRAST   Final Result      1. No acute findings. 2.  No large vessel steno-occlusive disease. 3.  Stable moderate atherosclerotic stenosis in the left PCA P1-P2 junction      CT Head WO Contrast   Final Result      1. No acute intracranial hemorrhage or mass effect. 2.  Stable appearance of multifocal chronic ischemia and chronic small vessel ischemic disease. 3.  Stable atrophy.               ASSESSMENT AND PLAN:    Bilateral lower extremity weakness and left upper extremity weakness  These deficits are not present on my exam, and this may be explained by TIA.   Given patient's history of multiple urinary tract infections, symptoms may also be due to recrudescence of her prior ischemic CVA  However, given patient's noncompliance with her immunotherapy for metastatic lung adenocarcinoma, there is concern for metastatic disease to the brain  -N.p.o. pending swallow evaluation by SLP  -Continue aspirin, statin, and Plavix  -Hold BP meds overnight and allow permissive hypertension   -We will check UA with reflex to culture given urinary incontinence -UTI may be contributing to recrudescence of prior ischemic CVA symptoms  -Echo with bubble study performed in 9/2020 fails to show evidence of shunting  -Neurology consulted-we will likely need MRI brain with and without contrast to evaluate for metastatic disease, consult with nephrology given left partial nephrectomy    Stage IV lung adenocarcinoma status post multiple rounds of chemotherapy and left partial nephrectomy  Currently undergoing immunotherapy, but non-adherent with therapy  -Oncology consult    Hypertension  -Hold home nifedipine overnight to allow permissive hypertension    Hyperlipidemia  -Continue atorvastatin    Depression  -Continue citalopram    GERD  -Continue Protonix 40 mg nightly       Will discuss with attending physician Dr. King Hallmark Status: DNR-CCA   FEN: NS 75 cc/hr  PPX: Lovenox  DISPO: Nanette Hicks MD  10/29/2020,  1:32 AM

## 2020-10-29 NOTE — PROGRESS NOTES
Physical Therapy    Facility/Department: [facility]  Initial Assessment/Treatment    NAME: Samreen Matson  : 1940  MRN: [id number]    Date of Service: 10/29/2020    Discharge Recommendations: Samreen Matson scored a 18/24 on the AM-PAC short mobility form. Current research shows that an AM-PAC score of 18 or greater is typically associated with a discharge to the patient's home setting. Based on the patient's AM-PAC score and their current functional mobility deficits, it is recommended that the patient have 2-3 sessions per week of Physical Therapy at d/c to increase the patient's independence. At this time, this patient demonstrates the endurance and safety to discharge home with ** (home vs OP services) and a follow up treatment frequency of 2-3x/wk. Please see assessment section for further patient specific details. If patient discharges prior to next session this note will serve as a discharge summary. Please see below for the latest assessment towards goals. HOME HEALTH CARE: LEVEL 1 STANDARD    - Initial home health evaluation to occur within 24-48 hours, in patient home   - Therapy to evaluate with goal of regaining prior level of functioning   - Therapy to evaluate if patient has [address] needs for personal care  PT Equipment Recommendations  Equipment Needed: No    Assessment   Body structures, Functions, Activity limitations: Decreased functional mobility   Assessment: 69 yo admitted with weakness. Able to walk in room today with A of 1 & walker. Spouse reports that pt now looks closer to her baseline & states as long as pt can walk like this he will have no trouble taking her home. Recommend home PT & continued 24 hr A. Will continue to follow.   Treatment Diagnosis: impaired mobility  Prognosis: Good  Decision Making: Medium Complexity  PT Education: Goals;PT Role;Plan of Care;Transfer Training;Functional Mobility Training  Patient Education: spouse verbalized understanding; pt with aphasia  REQUIRES PT FOLLOW UP: Yes  Activity Tolerance  Activity Tolerance: Patient Tolerated treatment well       Patient Diagnosis(es): The primary encounter diagnosis was Altered mental status, unspecified altered mental status type. A diagnosis of Left arm weakness was also pertinent to this visit. has a past medical history of CHUYITA (acute kidney injury) ([facility] [address] Allergic rhinitis, Aneurysm ([facility] [address] Aortic aneurysm ([facility] [address] [facility] ([facility] Cholelithiasis, Depression, History of ischemic left MCA stroke, History of ischemic left MCA stroke, Hyperlipidemia, Hypertension, Incontinence, MRSA (methicillin resistant staph aureus) culture positive, Ulcer, and Ulcerative colitis ([facility] has a past surgical history that includes Abdomen surgery; Cholecystectomy, open (5/7/2012); Pressure ulcer debridement (2010); Colonoscopy; Upper gastrointestinal endoscopy; Stomach surgery; ERCP (3/13/12); bronchoscopy (12/11/2012); and ERCP (12-). Restrictions  Position Activity Restriction  Other position/activity restrictions: up as lionel prn        Subjective  General  Patient assessed for rehabilitation services?: Yes  Additional Pertinent Hx: Adm 10/28 with extremity weakness & aphasia. Head CT (-). Hx of CVA with aphasia. Hx of Stage IV lung adenocarcinoma status post multiple rounds of chemotherapy and left partial nephrectomy. MRI head pending. Family / Caregiver Present: Yes(spouse)  Referring Practitioner: Geo Parikh MD  Diagnosis: weakness of LUE  Follows Commands: Impaired(at times)  Subjective  Subjective: Found in bed, appears agreeable to PT. Pleasant but demo expressive aphasia. Spouse states she can be stubborn.   Pain Screening  Patient Currently in Pain: Denies  Vital Signs  Patient Currently in Pain: Denies       Orientation  Orientation  Overall Orientation Status: Impaired(difficult to assess due to aphasia- was able to state her name; follows most commands)  Social/Functional History  Social/Functional History  Lives With: Spouse  Type of Home: Mobile home  Home Access: Level entry  Bathroom Shower/Tub: Walk-in shower  Bathroom Toilet: Handicap height  Bathroom Equipment: Grab bars in shower, Grab bars around toilet  Home Equipment: Rolling walker, [address] Help From: Other (comment)(Care connections-PT and OT, nurse SLP about to start)  ADL Assistance: Needs assistance  Toileting: Needs assistance  Ambulation Assistance: Needs assistance(with walker- spouse walks with her)  Transfer Assistance: Needs assistance(spouse helps)  Active : No  Additional Comments: 2 falls recently per spouse- fell with therapist at home which prompted recent visit to [facility] followed by 5 days at rehab & then had been home approx 5-6 days & now is back at hospital       Objective          AROM RLE (degrees)  RLE AROM: WFL  AROM LLE (degrees)  LLE AROM : WFL  Strength RLE  Strength RLE: WFL  Strength LLE  Strength LLE: WFL        Bed mobility  Supine to Sit: Minimal assistance(HOB elev with rail)  Transfers  Sit to Stand: Contact guard assistance;Stand by assistance(CGA from bed 1st trial; SBA 2nd trial)  Stand to sit: Contact guard assistance  Ambulation  Ambulation?: Yes  Ambulation 1  Surface: level tile  Device: Rolling Walker  Assistance: Contact guard assistance  Quality of Gait: steady gait with walker, no loss of balance  Distance: 15' x 2, 30'  Stairs/Curb  Stairs?: No     Balance  Sitting - Static: Good(EOB sitting with supervision)    Treatment included gait/transfer training, pt education. Plan   Plan  Times per week: 2-5  Current Treatment Recommendations: Balance Training, Functional Mobility Training, Transfer Training, Gait Training  Safety Devices  Type of devices: Call light within reach, Bed alarm in place, Left in bed, Nurse notified  Refused to sit in chair.                                                       AM-PAC Score  AM-PAC Inpatient Mobility Raw Score : 18 (10/29/20 1326)  AM-PAC Inpatient T-Scale Score : 43.63 (10/29/20 1326)  Mobility Inpatient CMS 0-100% Score: 46.58 (10/29/20 1326)  Mobility Inpatient CMS G-Code Modifier : CK (10/29/20 1326)          Goals  Short term goals  Time Frame for Short term goals: dc  Short term goal 1: Bed Mobility S  Short term goal 2: Transfers S  Short term goal 3: Ambulate 80' with RW SBA  Patient Goals   Patient goals : unable to state; spouse prefers she come home if moving this well       Therapy Time   Individual Concurrent Group Co-treatment   Time In 1036         Time Out 1108         Minutes 32           Timed Code Treatment Minutes:   16    Total Treatment Minutes:  [address], [address] [facility]

## 2020-10-29 NOTE — CARE COORDINATION
Case Management Assessment           Initial Evaluation                Date / Time of Evaluation: 10/29/2020 12:48 PM                 Assessment Completed by: Vahe Kapadia Day    Patient Name: Alem Frances     YOB: 1940  Diagnosis: Weakness of left upper extremity [R29.898]     Date / Time: 10/28/2020  7:27 PM    Patient Admission Status: Inpatient    If patient is discharged prior to next notation, then this note serves as note for discharge by case management. Current PCP: Guillermo Pimentel DO  Clinic Patient: No    Chart Reviewed: Yes  Patient/ Family Interviewed: Yes    Initial assessment completed at bedside with: Patient     Hospitalization in the last 30 days: No    Emergency Contacts:  Extended Emergency Contact Information  Primary Emergency Contact: Ozzie Verdin  Address: [address] [place] [address], [address] Phone: [phone]  Work Phone: [phone]  Relation: Spouse   needed? No  Secondary Emergency Contact: Mariah Marcelino   [address] Phone: [phone]  Relation: Child   needed? No    Advance Directives:   Code Status: DNR-CCA    Healthcare Power of : No    Financial:  Payor: Radha Littlejohn / Plan: Kati Garcia PPO / Product Type: Medicare /     Pre-cert required for SNF: Yes    Pharmacy:    [address], [address]  [address] [facility] [address]  Phone: [phone] Fax: [phone]      Potential assistance Purchasing Medications:    Does Patient want to participate in local refill/ meds to beds program?:      Meds To Beds General Rules:  1. Can ONLY be done Monday- Friday between 8:30am-5pm  2. Prescription(s) must be in pharmacy by 3pm to be filled same day  3. Copy of patient's insurance/ prescription drug card and patient face sheet must be sent along with the prescription(s)  4. Cost of Rx cannot be added to hospital bill. If financial assistance is needed, please contact unit  or ;  or  CANNOT provide pharmacy voucher for patients co-pays  5. Patients can then  the prescription on their way out of the hospital at discharge, or pharmacy can deliver to the bedside if staff is available. (payment due at time of pick-up or delivery - cash, check, or card accepted)     Able to afford home medications/ co-pay costs: Yes    ADLS:  Support Systems:      PT AM-PAC:     OT AM-PAC:       Housing:  Home Environment: Home with family support   Steps: yes     Plans to RETURN to current housing: Yes  Barrier(s) to RETURNING to current housin[address] [place] [address]:  Currently ACTIVE with [address]: No  Home Care Agency: [facility]   Phone: [phone]  Fax: [phone]    Currently ACTIVE with [place] on Aging: No    Durable Medical Equipment:  DME Provider: [facility]   Equipment: [address] and Respiratory Equipment:  Has HOME OXYGEN prior to admission: No      Dialysis:  Active with HD/PD prior to admission: No      DISCHARGE PLAN:  Disposition: Home with possible home health care. Transportation PLAN for discharge: family     Factors facilitating achievement of predicted outcomes: Family support, Cooperative, Pleasant and Has needed Durable Medical Equipment at home    Barriers to discharge: Limited insight into deficits and multiple re-admissions     Additional Case Management Notes:   SW met with patient at bedside. Patient is from home with family. Patient has a rolling walker which she uses for mobility around the home. Patient reported she would be open to home health. She reportss having family and friends that help her and assist with ADL's in the home. Patient is open to home health care at discharge. [facility]. SW to follow up with [facility] regarding patient care and needs. The Plan for Transition of Care is related to the following treatment goals Weakness of left upper extremity [R29.898]      The Patient and/or patient representative Patient  was provided with a choice of provider and agrees with the discharge plan Yes    Freedom of choice list was provided with basic dialogue that supports the patient's individualized plan of care/goals and shares the quality data associated with the providers.  Yes    Care Transition patient: No    VINNIE Fletcher  [facility]   Case Management Department  Ph: [phone]

## 2020-10-29 NOTE — PLAN OF CARE
Completed speech evaluation. Please refer to EMR.     Bernabe Viveros M.A., Sandra Solis   Speech-Language Pathologist

## 2020-10-29 NOTE — PROGRESS NOTES
Patient alert and oriented x3-4, VSS. No complaints of pain. No change in neuro status overnight. NIH a 1 for some aphasia at times. On tele. Fall precautions in place, will continue to monitor.

## 2020-10-29 NOTE — CONSULTS
Fifi Veloz MD        promethazine (PHENERGAN) tablet 12.5 mg  12.5 mg Oral Q6H PRN Juanito Toney MD        Or    ondansetron (ZOFRAN) injection 4 mg  4 mg Intravenous Q6H PRN Juanito Toney MD        enoxaparin (LOVENOX) injection 40 mg  40 mg Subcutaneous Daily Juanito Toney MD         Allergies:    No Known Allergies   Social History:    reports that she quit smoking about 11 years ago. She has a 75.00 pack-year smoking history. She has never used smokeless tobacco. She reports that she does not drink alcohol or use drugs. Family History:     family history includes Arthritis in her mother. REVIEW OF SYSTEMS:      · Constitutional: Denies fever, chills, sweats, weight loss. Denies pain. · Eyes: No visual changes or diplopia. No scleral icterus. · ENT: No Headaches, hearing loss or vertigo. No mouth sores or sore throat. · Cardiovascular: No chest pain, dyspnea on exertion, palpitations or loss of consciousness. · Respiratory: No cough or wheezing, no sputum production. No hemoptysis. · Gastrointestinal: No abdominal pain, appetite loss, blood in stools. No change in bowel habits. · Genitourinary: No dysuria, trouble voiding, or hematuria. · Musculoskeletal:   No generalized weakness. No joint complaints. · Integumentary: No rash or pruritis. · Neurological: No headache, diplopia. No change in gait, balance, or coordination. No numbness, or tingling. +weakness  · Endocrine: No temperature intolerance. No excessive thirst, fluid intake, or urination. · Hematologic/Lymphatic: No abnormal bruising or ecchymoses, blood clots or swollen lymph nodes. · Allergic/Immunologic: No nasal congestion or hives.   ·     PHYSICAL EXAM:    Vitals:  Vitals:    10/29/20 0718   BP: 115/78   Pulse: 108   Resp: 16   Temp: 97.8 °F (36.6 °C)   SpO2: 95%      CONSTITUTIONAL:  awake, alert, cooperative, no apparent distress  EYES:  pupils equal, round and reactive to light, sclera clear Up-to-date CT equipment and radiation dose reduction techniques were employed. Findings: No evidence of acute intracranial hemorrhage or extra-axial fluid collection. No mass effect or midline shift. Multifocal chronic ischemic changes and diffuse white matter chronic small vessel ischemia is similar to prior study. Gray-white matter differentiation is otherwise maintained. Ventricles are stable. A cisterns are patent. Atherosclerotic disease in the internal carotid arteries and vertebral arteries. Nonspecific fluid within the right maxillary sinus. Mild mucosal thickening in the paranasal sinuses. Mastoid air cells are clear. The calvarium is intact. 1.  No acute intracranial hemorrhage or mass effect. 2.  Stable appearance of multifocal chronic ischemia and chronic small vessel ischemic disease. 3.  Stable atrophy. Ct Head Wo Contrast    Result Date: 10/13/2020  PROCEDURE: CT head without contrast INDICATION: R sided vision changes and weakness, stroke last week; COMPARISON: 9/29/2019 TECHNIQUE: CT head was performed without contrast according to standard protocol. Axial images and multiplanar reformatted images reviewed. Up-to-date CT equipment and radiation dose reduction techniques were employed. FINDINGS: No acute intra- or extra-axial fluid collections are identified. There is moderate cerebral volume loss with associated ventricular dilatation. The basilar cisterns are patent. No mass effect or midline shift is seen. An evolving subacute left posterior MCA territory infarct is redemonstrated. There is chronic right frontal lobe encephalomalacia. Moderate periventricular white matter hypoattenuation is indicative of chronic small vessel ischemic disease. There is mucosal thickening in the right maxillary sinus. Bilateral lens placements are noted.  The mastoids appear normal.     1.  Evolving subacute left posterior MCA territory infarct as seen on MR dated 9/29/2020. 2.  No acute intracranial hemorrhage. Mri Cervical Spine Wo Contrast    Result Date: 10/15/2020  PROCEDURE: Magnetic resonance imaging (MRI) of the cervical spine without contrast INDICATION: Metastatic disease COMPARISON: CT dated 6/24/2020 and MR dated 10/23/2019 TECHNIQUE: MRI of the cervical spine was performed without contrast according to standard protocol. Postcontrast sequences were not performed due to patient inability to complete the examination. FINDINGS: The examination is mildly degraded due to motion artifact. There is stable 2 mm grade 1 anterolisthesis of C4 on C5 and 1 mm retrolisthesis of C5 on C6. Vertebral bodies are normal in height without evidence of compression fractures. T1 and T2 hypointense sclerotic metastasis involving a 2.5 cm CC by 0.9 cm AP area the posterior aspect of the dens and C2 vertebral body appears stable compared to 10/23/2019. A 6 mm mixed lytic and sclerotic lesion involving the right posterior inferior endplate  of C6 also appears unchanged and may represent stable metastatic disease versus degenerative change. The craniocervical junction and visualized portions of the posterior fossa otherwise appear normal. The spinal cord appears normal. There is moderate C5-6 and C6-7 disc height loss. There is very mild C4-5 and C5-6 spinal canal stenosis. There is mild multilevel uncovertebral joint osteoarthritis with moderate bilateral C5-6 and C6-7 neural foraminal narrowing. No soft tissue abnormality is identified. 1.  Stable sclerotic osseous metastasis involving C2 and stable osseous metastasis versus degenerative change involving the C6 inferior endplate as described above, unchanged compared to 10/23/2019. No acute fracture. 2.  Mild cervical spondylosis with mild C4-5 and C5-6 spinal canal stenosis and moderate bilateral C5-6 and C6-7 neural foraminal narrowing.      Xr Chest Portable    Result Date: 10/28/2020  PORTABLE CHEST Indication: Altered mental status Comparison: 10/13/20 Findings: The cardiomediastinal silhouette is within normal limits. Left subclavian PowerPort tip in the distal SVC. No focal consolidation. No pleural effusion. No pneumothorax. Bones appear diffusely osteopenic. No acute cardiopulmonary findings. Xr Chest Portable    Result Date: 10/13/2020  EXAM: PORTABLE AP CHEST X-RAY, 1755 hours INDICATION: stroke COMPARISON: 9/30/2020 FINDINGS: A left subclavian approach port terminates in the distal SVC. The lungs are emphysematous. There is no focal consolidation, pleural effusion, or pneumothorax. The cardiomediastinal silhouette is normal. The visible bony thorax is intact. No acute pulmonary disease. Cta Head Neck W Contrast    Result Date: 10/28/2020  CTA HEAD NECK W CONTRAST Indication: Stroke Comparison: 9/27/20 Technique:  Head and neck CTA was performed with IV contrast. Multiplanar MIP reconstructions were created. 80 mL of Isovue-370 IV. Up-to-date CT equipment and radiation dose reduction techniques were employed. Any reported stenosis measurements were calculated on the basis of diameter stenosis, per NASCET criteria. Findings:   CTA NECK: Atherosclerotic disease and mildly ectatic aortic arch. Common origins of the right innominate artery and left common carotid artery. Atherosclerotic plaque within the great vessels; no significant stenosis. Minimal atherosclerotic plaque within the common carotid artery; no stenosis. Mild atherosclerosis in the origins of the bilateral internal carotid arteries; no stenosis. Atherosclerosis and mild to moderate stenosis at the origins of the bilateral external carotid arteries. The vertebral arteries arise from the subclavian arteries. The right vertebral artery is dominant. Mild atherosclerosis. No significant stenosis. Moderate emphysema in the visualized lungs. Some fluid in the dilated esophagus, suggesting reflux and/or dysmotility.  Osteoblastic metastasis reidentified in the C2 vertebral segment and unchanged CTA HEAD: The distal internal carotid arteries are patent. Mild atherosclerosis bilaterally no significant stenosis. Anterior communicating artery is patent. The proximal anterior cerebral branches are patent and without significant stenosis. The proximal middle cerebral artery branches are patent without significant stenosis. The basilar artery is patent, mildly tortuous and without stenosis. Moderate stenosis in the P1-P2 left PCA junction, unchanged. No significant stenosis in the right posterior cerebral artery. Origins of the superior cerebellar arteries and origins of the posterior inferior cerebellar arteries are patent. No intracranial aneurysm or arteriovenous malformation. 1.  No acute findings. 2.  No large vessel steno-occlusive disease. 3.  Stable moderate atherosclerotic stenosis in the left PCA P1-P2 junction    Cta Head Neck W Contrast    Result Date: 10/13/2020  PROCEDURE: 1.  CT ANGIOGRAPHY HEAD WITH CONTRAST 2.  CT ANGIOGRAPHY NECK WITH CONTRAST INDICATION: stroke COMPARISON: CT head without contrast from the same day TECHNIQUE: CT angiogram of the head and neck was performed with contrast according to standard protocol. Axial images, multiplanar reformatted images, and maximum intensity projection images were reviewed for CT angiographic technique. Up-to-date CT equipment and radiation dose reduction techniques were employed. IV contrast: 80 mL Isovue 370 FINDINGS: Non-angiographic findings: A sclerotic lesion at C2 which may represent metastatic disease is stable compared to prior examinations. Degenerative changes are present in the cervical spine. The lungs are emphysematous. CTA Neck: There is atherosclerotic disease of the aortic arch. The descending thoracic aorta is mildly ectatic measuring 3.0 cm in diameter.  The configuration of the brachiocephalic vessels is typical. The innominate artery and both subclavian arteries appear normal. The common carotid arteries and bilateral carotid bifurcations appear normal with no stenosis by NASCET criteria. There is mild atherosclerotic calcification of the cervical internal carotid arteries without significant focal stenosis. The cervical vertebral arteries appear normal. The right vertebral artery is dominant. CTA Head: The distal internal carotid arteries appear normal. The anterior and middle cerebral arteries appear normal. The distal vertebral arteries appear normal. The basilar artery appears normal. The right posterior cerebral artery appears normal. There is moderate atherosclerotic stenosis of the left posterior cerebral artery P1-P2 junction. No aneurysms or vascular occlusions are identified. 1.  No aneurysms or vascular occlusions identified. 2.  Moderate atherosclerotic stenosis of left posterior cerebral artery P1-P2 junction. 3.  No significant stenosis in the extracranial vertebral or carotid arteries. 4.  Stable osseous metastasis at C2. Ct Chest Abdomen Pelvis W Contrast    Result Date: 10/1/2020  EXAM: CT CHEST ABDOMEN AND PELVIS INDICATION: Provided clinical history: Evaluate for cancer progression. Charted knee demonstrate the patient has a history of lung cancer with osseous metastases. COMPARISON: April 8, 2020 TECHNIQUE: Axial CT imaging obtained through the chest, abdomen and pelvis. Axial images and multiplanar reformatted images were reviewed. Up-to-date CT equipment and radiation dose reduction techniques were employed. IV Contrast: 100 mL Isovue-370 Oral Contrast: Yes. CT CHEST: Evaluation is degraded by motion artifact. LUNGS AND AIRWAYS: Airways are patent. There is a background of emphysema. Linear scarring/fibrosis at the base of the right middle lobe, image 76 of series 601, is similar to the prior study. Scarring anteriorly within the lingula, image 75 of series 601 is also similar in appearance. There is no new or enlarging pulmonary nodularity. PLEURA: No pleural effusions or significant pleural thickening. HEART AND GREAT VESSELS: The heart is stable in size. Atherosclerotic calcification of the thoracic aorta, aortic valve and coronary arteries is unchanged. ADENOPATHY: There is no new or progressive lymphadenopathy of the chest. CHEST WALL / LOWER NECK: No significant abnormality. BONES: Sclerotic lesion of the spinous process and posterior lamina at T11 again noted. This is similar in appearance to the prior study. . There is no new destructive osseous process. CT ABDOMEN AND PELVIS: Evaluation is degraded by motion artifact. LIVER: Normal. GALLBLADDER AND BILIARY DUCTS: The gallbladder is absent. No intra- or extrahepatic biliary dilatation. PANCREAS: Normal. SPLEEN: Normal. ADRENAL GLANDS: Normal. KIDNEYS AND URETERS: Status post left nephrectomy. 3 mm calcification of the mid pole right kidney is favored to be renal vascular in nature. This is unchanged. There is no hydronephrosis. URINARY BLADDER: Normal. REPRODUCTIVE ORGANS: No associated masses. BOWEL: Surgical changes of the bowel including gastric bypass noted. The bowel itself is nondilated. LYMPH NODES: No new or progressive lymphadenopathy of the abdomen or pelvis. PERITONEUM / RETROPERITONEUM: No ascites or free air. VESSELS: Aortobiiliac stent graft is noted. The excluded aneurysmal sac of the abdominal aorta measures up to 3.7 x 3.2 cm. Splenic vein, SMV, PV and hepatic veins demonstrate enhancement. ABDOMINAL WALL: Broad mild anterior abdominal and pelvic wall hernia noted containing nondilated loops of both large and small bowel. BONES: No acute fracture or destructive osseous process. CHEST: 1. No acute intra-thoracic abnormality. 2. No evidence for new or progressive disease of the chest. 3. Emphysema. 4. Stable sclerotic lesion of the posterior elements at T11. 5. Atherosclerotic disease. ABDOMEN/PELVIS: 1. No acute intra-abdominopelvic abnormality. 2. No evidence for new or progressive disease of the abdomen or pelvis.  3. Anterior right. On ethmoid sinus disease. Mastoid air cells are clear. Abnormal signal in the dens and in the body of C2 compatible with osseous metastasis. ORBITS: Prior cataract surgery. No orbital abnormality otherwise. Limited overall exam due to motion. MIDLINE STRUCTURES: The sella turcica and pituitary gland are normal. Craniovertebral alignment and cerebellar tonsils are normal. VENTRICLES: Normal size and configuration for patient age. Cisternal spaces are intact. INTRACRANIAL HEMORRHAGE: None. BRAIN PARENCHYMA: Multiple areas of abnormal diffusion signal in the posterior left temporal lobe and inferior left parietal lobe compatible with posterior left MCA distribution infarct. Minimal involvement of the posterior left insula. Areas of prior infarct with encephalomalacia in the right frontal and left frontal lobe. Extensive nonspecific periventricular and subcortical white matter signal and bodies compatible with extensive chronic small vessel ischemic disease. Diffuse sulcal prominence compatible with atrophy. 1. Moderate-sized area of infarction in the posterior left MCA distribution involving the posterior left temporal lobe and inferior left parietal white matter. Diffusion restriction compatible with infarcts within the last 7-10 days. 2. Extensive white matter signal abnormality compatible with chronic small vessel ischemic disease. Areas of previous infarct within cephalization the frontal lobes. 3. No evidence of acute intracranial hemorrhage. 4. Abnormal marrow signal in the posterior dens compatible with metastatic disease. Assessment & Plan:  Patient is a 77-year-old female with a past medical history of hypertension and hyperlipidemia as well as metastatic lung cancer. Jairo Fernández has had 2 CVAs in October with resultant expressive aphasia and worsening confusion. Jairo Fern[facility].  She had recurrent symptoms from left MCA CVA. CT scans during that hospitalization on 9/30/2020 had shown stable disease for her non-small cell lung cancer. Ella Phan has not been back in to receive any further treatment. [place] last dose of monthly nivolumab was administered on 6/15/2020 at that time she also received Xgeva. Lower extremity weakness  Stage IV lung adenocarcinoma  Hx bony metastasis to T11 spiny process s/p XRT and Denosumab. Hx abdominal metastasis s/p resection  Hx renal metastasis s/p left partial nephrectomy     - outpatient plan was to continue immunotherapy for metastasis. Had previously responded to this treatment. But treatment was been waylaid given functional status issues from stroke. - C2 lesion was stable on previous MRI  - Her lung cancer has been stable. Do not feel that brain mets explain her presentation. Is most likely related to her CVA history which at this time is the more concerning medical problem for Mrs Martha Coffey.   - Patient should follow up with Dr Scar Dan outpatient after discharge from hospital.     Previous Therapies  1.  carboplatin and Alimta, completed in 09/2013  2. Ambika [place] started in 10/2013, tolerated poorly, discontinued. 3.  Nivolumab, initiated 5/6/2018, therapy is ongoing   4.  On CT scan 3/13/2019 there was a new sclerotic lesion seen at the T11 spinous process.  This was associated with moderate pain.  The patient was referred for radiation therapy.  On 5/7/2019 the patient begun treatment with Dr. Freedom Aguayo. She was treated from T10-T12 starting treatment on 5/13/2019 and completing on 5/17/2019 to a total dose of 2000 cGY. I have discussed the above stated plan with the patient and they verbalized understanding and agreed with the plan. Thank you for allowing us to participate in this patients care. Jenna Herrera MD  10/29/2020, 8:42 AM     Will Discuss with Dr Kenya Canseco    Attending:     The patient's lung cancer history is rather remarkable and can be summarized as seen below:     1.   Metastatic adenocarcinoma, originally diagnosed in 12/2012, presenting with small bowel obstruction due to metastatic disease from a lung primary tumor, surgically resected in 01/2013. EGFR wild type, ALK mutation testing negative.  Patient was subsequently treated with 4 cycles of carbo and Alimta through September 2013 followed by maintenance Alimta initiated in October 2013 which she tolerated poorly and subsequently discontinued prematurely.     2.   In January 2018 the patient had a CT scan of the chest abdomen and pelvis.  At that time there was a lesion on the left kidney that appeared to enlarge.  Previously, this had been described as a renal infarct but with the change seen on CT scan a consideration was made but that this could be a renal cell carcinoma.  The patient was sent to Dr. Renetta Peñaloza for further evaluation. The patient had a da Hank left partial nephrectomy.  Pathology showed metastatic pulmonary adenocarcinoma.  The tumor invaded perinephric adipose tissue.  Surgical margins were negative.  In aggregate the tumor measured 4.0 x 3.1 x 2.0 cm.  There was a separate tumor mass measuring 4.5 x 3.0 x 1.9 cm.  Both masses showed an invasive moderately to poorly rigid adenocarcinoma.  The patient had to go back to surgery 2 days later for exploratory laparotomy and small bowel resection for jejunal perforation.  Path from the small bowel resection was negative for malignancy.  She has since had trouble with healing and has a midline wound VAC which is being managed by [facility] general surgery.     Previous Therapies  1.  carboplatin and Alimta, completed in 09/2013  2.  Maintenance Alimta started in 10/2013, tolerated poorly, discontinued.   3.  Nivolumab, initiated 5/6/2018, therapy is ongoing   4.  On CT scan 3/13/2019 there was a new sclerotic lesion seen at the T11 spinous process.  This was associated with moderate pain.  The patient was referred for radiation therapy.  On 5/7/2019 the patient begun treatment with Dr. Sandra Mullins. She was treated from T10-T12 starting treatment on 5/13/2019 and completing on 5/17/2019 to a total dose of 2000 cGY.     In short, the patient's lung cancer is stable.  She has been off of her therapy for quite some time but could resume in the future.  Her prognosis is largely dictated by her neurovascular disease at this moment. Zita Robison is currently DNR. She has acute on chronic worsening expressive aphasia with worsening right-sided weakness. Scan September 30, 2020 indicate no evidence of new or progressive lung cancer.     Maida Nguyen MD

## 2020-10-29 NOTE — PROGRESS NOTES
discharge       Plan:   Goals:  Short-term Goals  Timeframe for Short-term Goals: 1-2 weeks or LOS  Goal 1: Patient will complete automatic speech tasks with 90% accuracy given min cues. Goal 2: Patient will yes/no tasks with 100% accuracy independently. Goal 3: Patient will complete naming tasks with 90% accuracy. Goal 4: Patient will follow multi-step directions with 80% accuracy or min cues. Goal 5: Patient will tolerate ongoing cognitive-linguistic assessment. Patient/family involved in developing goals and treatment plan: yes, patient    Subjective:   Previous level of function and limitations: modified independent  General  Chart Reviewed: Yes  Patient assessed for rehabilitation services?: Yes  Family / Caregiver Present: No  General Comment  Comments: Per admitting H&P (10/28/2020): '82-year-old female with a past medical history of ischemic CVA to the left MCA with residual expressive aphasia, stage IVb metastatic adenocarcinoma of the lung originally diagnosed in 12/2012 status post surgical resection and multiple rounds of chemo complicated by metastases to the left kidney requiring left partial nephrectomy presents to the emergency department after she was noted to have left upper extremity weakness. History from the patient is limited by her expressive aphasia. Patient's daughter reports that the night prior to admission, her father stated that her mother felt heaviness in her bilateral lower extremities and had difficulty standing up on her own. Following this, this morning family members noted that the patient had left upper extremity weakness, which prompted the visit to the ED. Patient's daughter states that she has had expressive aphasia since her prior ischemic CVA to the left MCA. She reportedly has not had any other deficits, and uses walker to ambulate at home.   She lives at home with her , and had reportedly not complained of any headaches, blurred or double vision, numbness or tingling of her extremities. She also reportedly denied any swallowing difficulties. Patient denies chest pain, shortness of breath, palpitations, lightheadedness, abdominal pain, nausea, vomiting, dysuria or changes in frequency of urination. However, her daughter does state that patient has been incontinent of urine for the last week and has been wetting her bed daily. She also reportedly has a history of multiple urinary tract infections, and on her prior admission recently, she was noted to have urinary tract infection which was thought to have contributed to recrudescence of her prior ischemic CVA leading to her symptoms on that admission. Patient's daughter states that the patient had been reporting left upper extremity pain in her shoulder region and the daughter initially thought that may have contributed to her weakness which led to a delay in presentation. The daughter and patient both deny any recent falls or trauma to the shoulder. On presentation to the ED, patient was afebrile, tachycardic with a heart rate in the 100s, normotensive with a normal respiratory rate and saturating well on room air. CBC and renal function panel were largely unremarkable except for mild chronic anemia with a hemoglobin of 11.6.'  Subjective  Subjective: Patient awake, alert, and pleasant. Fluent aphasia evident. Agreeable to evaluation. Hearing  Hearing: Within functional limits        Objective:     Oral/Motor  Oral Motor: Within functional limits    Auditory Comprehension  Comprehension: Exceptions  Yes/No Questions: Mild  Two Step Basic Commands: To be assessed in therapy    Expression  Primary Mode of Expression: Verbal    Verbal Expression  Verbal Expression: Exceptions to functional limits  Repetition: Mild  Automatic Speech: Mild  Responsive: Mild  Conversation: Moderate    Written Expression  Dominant Hand: Right    Motor Speech  Motor Speech:  Within Functional Limits    Pragmatics/Social

## 2020-10-29 NOTE — H&P
Internal Medicine Medical Student History & Physical      PCP: Guillermo Pimentel DO    Date of Admission: 10/28/2020    Date of Service: Pt seen/examined on 10/28/20 and Admitted to Inpatient with expected LOS greaterthan two midnights due to medical therapy. Placed in Observation. 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 History from the patient is limited by her expressive aphasia. Patient's daughter reports that the night prior to admission, her father stated that her mother felt heaviness in her bilateral lower extremities and had difficulty standing up on her own. Following this, this morning family members noted that the patient had left upper extremity weakness, which prompted the visit to the ED. Patient's daughter states that she has had expressive aphasia since her prior ischemic CVA to the left MCA. She reportedly has not had any other deficits, and uses walker to ambulate at home. She lives at home with her , and had reportedly not complained of any headaches, blurred or double vision, numbness or tingling of her extremities. She also reportedly denied any swallowing difficulties. Patient denies chest pain, shortness of breath, palpitations, lightheadedness, abdominal pain, nausea, vomiting, dysuria or changes in frequency of urination. However, her daughter does state that patient has been incontinent of urine for the last week and has been wetting her bed daily.   She also reportedly has a history of multiple urinary tract infections, and on her prior admission recently, she was noted to have urinary tract infection which was thought to have contributed to recrudescence of her prior ischemic CVA leading to her symptoms on that admission. Patient's daughter states that the patient had been reporting left upper extremity pain in her shoulder region and the daughter initially thought that may have contributed to her weakness which led to a delay in presentation. The daughter and patient both deny any recent falls or trauma to the shoulder. Of note, patient was started on immunotherapy for her metastatic lung adenocarcinoma in 2019, and reportedly she has not been compliant with her treatment. She has reportedly missed her treatment since at least 9/2020 due to her overall generalized weakness. On presentation to the ED, patient was afebrile, tachycardic with a heart rate in the 100s, normotensive with a normal respiratory rate and saturating well on room air. CBC and renal function panel were largely unremarkable except for mild chronic anemia with a hemoglobin of 11.6. Past Medical History:          Diagnosis Date    CHUYITA (acute kidney injury) ([address] 5/1/2018    Allergic rhinitis     Aneurys[address] [address]     aorta  watching now    Aortic aneurysm [facility])     Currently has     Cancer [facility])     Lung    Cholelithiasis     Depression     History of ischemic left MCA stroke 9/30/2020    With residual expressive aphasia. Noted to be subacute ~ 8days old on MRI 9/29/2020.      History of ischemic left MCA stroke 9/30/2020    Hyperlipidemia     Hypertension     Incontinence     bladder    MRSA (methicillin resistant staph aureus) culture positive 12/28/11    Leg    Ulcer     Ulcerative colitis ([address]        Past Surgical History:          Procedure Laterality Date    ABDOMEN SURGERY      gastric resection for ulcers    BRONCHOSCOPY  12/11/2012    CHOLECYSTECTOMY, OPEN  5/7/2012    COLONOSCOPY  ERCP  3/13/12    sphincterotomy; 5 Guyanese cm stent placed    ERCP  12-    pancreatic stent removal, pancreotogram    PRESSURE ULCER DEBRIDEMENT  2010    gastric    STOMACH SURGERY      for ulcer    UPPER GASTROINTESTINAL ENDOSCOPY         Medications Prior to Admission:      Prior to Admission medications    Medication Sig Start Date End Date Taking? Authorizing Provider   aspirin 81 MG EC tablet Take 1 tablet by mouth daily 10/1/20 3/30/21 Yes Hussein Olson MD   NIFEdipine (ADALAT CC) 30 MG extended release tablet Take 90 mg by mouth daily   Yes Historical Provider, MD   magnesium oxide (MAG-OX) 400 MG tablet Take 1 tablet by mouth 2 times daily 10/31/19  Yes Jennifer Figueredo, APRN - CNP   atorvastatin (LIPITOR) 40 MG tablet Take 1 tablet by mouth daily 10/21/19  Yes Karlie Chowdhury MD   pantoprazole (PROTONIX) 40 MG tablet Take 40 mg by mouth nightly    Yes Historical Provider, MD   citalopram (CELEXA) 40 MG tablet Take 40 mg by mouth nightly    Yes Historical Provider, MD   clopidogrel (PLAVIX) 75 MG tablet Take 1 tablet by mouth daily 10/21/19 9/27/20  Gabriela Guerra MD       Allergies:  Patient has no known allergies. Social History:    Lives at home w/ . TOBACCO:   reports that she quit smoking about 11 years ago. She has a 75.00 pack-year smoking history. She has never used smokeless tobacco.  ETOH:  reports no history of alcohol use. Family History:     Reviewed in detail and negative for DM, CAD, Cancer, CVA. Positive as follows:        Problem Relation Age of Onset    Arthritis Mother        REVIEW OF SYSTEMS: Pertinent positives as noted in the HPI. All other systems reviewed and negative.   ROS: Review of Systems    PHYSICAL EXAM PERFORMED:    /78   Pulse 108   Temp 97.8 °F (36.6 °C) (Oral)   Resp 16   Ht 5' 2\" (1.575 m)   Wt 142 lb 4.8 oz (64.5 kg)   SpO2 95%   Breastfeeding No   BMI 26.03 kg/m²     General appearance:  No apparent distress, appears stated age and cooperative. Neck: Full range of motion. Respiratory:  Normal respiratory effort. Clear to auscultation, bilaterally without Rales/Wheezes/Rhonchi. Cardiovascular:  Regular rate and rhythm with normal S1/S2 without murmurs, rubs or gallops. Abdomen: Soft, non-tender, non-distended with normal bowel sounds. Musculoskeletal:  No clubbing, cyanosis oredema bilaterally. Full range of motion without deformity. Skin: Skin color, texture, turgor normal.  Norashes or lesions. Neurologic:  Cranial nerves: II-XII intact. Oriented to person but not time or place. Expressive aphasia is present. Proximal muscle weakness 4/5 in upper extremity bilaterally, 4/5 bilaterally in lower extremity. Psychiatric:  Alert and oriented, thought content appropriate,normal insight  Capillary Refill: Brisk,< 3 seconds   Peripheral Pulses: +2 palpable, equal bilaterally       Labs:     Recent Labs     10/28/20  1954 10/29/20  0559   WBC 9.4 7.5   HGB 11.6* 12.2   HCT 36.8 39.0    282     Recent Labs     10/28/20  1954 10/29/20  0559    138   K 4.6 3.9    103   CO2 24 24   BUN 25* 24*   CREATININE 1.0 1.0   CALCIUM 9.5 9.4   PHOS  --  3.5     No results for input(s): AST, ALT, BILIDIR, BILITOT, ALKPHOS in the last 72 hours. No results for input(s): INR in the last 72 hours. Recent Labs     10/28/20  1954   TROPONINI <0.01       Urinalysis:      Lab Results   Component Value Date    NITRU POSITIVE 10/29/2020    WBCUA 21-50 10/29/2020    BACTERIA 3+ 10/29/2020    RBCUA 0-2 10/29/2020    BLOODU SMALL 10/29/2020    SPECGRAV 1.015 10/29/2020    GLUCOSEU Negative 10/29/2020    GLUCOSEU Negative 05/08/2012       Radiology:       XR CHEST PORTABLE   Final Result      No acute cardiopulmonary findings. CTA HEAD NECK W CONTRAST   Final Result      1. No acute findings. 2.  No large vessel steno-occlusive disease.    3.  Stable moderate atherosclerotic stenosis in the left PCA P1-P2 junction      CT Head WO Contrast   Final Result      1. No acute intracranial hemorrhage or mass effect. 2.  Stable appearance of multifocal chronic ischemia and chronic small vessel ischemic disease. 3.  Stable atrophy. ASSESSMENT & PLAN:  Jose Miguel Vinson is a 68 y.o. female w/ PMHx of stage IV metastatic adenocarcinoma and ischemic CVA presented w/ weakness and aphasia. Bilateral lower extremity weakness and left upper extremity weakness  Patient presented d/t weakness in left upper extremity as well as bilateral lower extremity onset last night. Pt has hx of ischemic CVA and HTN. Although weakness has improved on physical exam, symptoms may be due to TIA. Pt also has hx of stage IV lung adenocarcinoma and is noncompliant w/ immunotherapy so there is concern for metastasis to the brain.  Given patient's history of multiple urinary tract infections, symptoms may also be due to recrudescence of her prior ischemic CVA.   -N.P.O. pending swallow evaluation by SLP  -continue Asprin, statin, Plavix  -Hold nifedipine overnight to allow permissive HTN  -UA w/ reflex to culture given urinary incontinence as UTI may be contributing to recrudescence of prior ischemic CVA symptoms  -Neurology consulted, may need MRI of brain w/ and w/o contrast to evaluate for metastatic disease  -Nephrology consult given hx of partial nephrectomy  -Oncology consult  - Echo with bubble study performed in 9/2020 fails to show evidence of shunting    Stage IV Lung Adenocarcinoma s/p multiple rounds of chemotherapy and left partial nephrectomy  -currently undergoing immunotherapy but noncompliant w/ therapy  -oncology consult    Hypertension  -currently takes Nifedipine at home but on hold temporarily to allow permissive hypertension    Chronic Expressive Aphasia  Hx of ischemic CVA    Hyperlipidemia  -continue atorvastatin    Depression  -continue citalopram    GERD  -continue protonix         Active Hospital Problems    Diagnosis Date Noted    Weakness of left upper extremity [R29.898] 10/28/2020                 DVT Prophylaxis: Levonox   Diet: Diet NPO Effective Now  Code Status: DNR-CCA      I will discuss the patient with the senior resident and MD Shira Roberson, MS4

## 2020-10-29 NOTE — PLAN OF CARE
Problem: Falls - Risk of:  Goal: Will remain free from falls  Description: Will remain free from falls  10/29/2020 1552 by Serafin Warner RN  Outcome: Ongoing   Fall risk precautions in place. Bed is locked and in lowest position. 2/4 side rails up. Call light within reach. Fall risk Bracelet in place. Frequent checks on patient. Free from falls at this time. Will continue to monitor. Problem: Activity:  Goal: Ability to tolerate increased activity will improve  Description: Ability to tolerate increased activity will improve  Outcome: Ongoing   Patient ambulating to bathroom w/ walker/GB and assist x1 tolerating well.

## 2020-10-29 NOTE — PLAN OF CARE
Problem: Pain:  Goal: Pain level will decrease  Description: Pain level will decrease  Outcome: Ongoing   Patient denies pain at this time. Will continue to monitor. Problem: Falls - Risk of:  Goal: Will remain free from falls  Description: Will remain free from falls  Outcome: Ongoing   Patient has remained free of falls. 2/4 bed rails up, bed locked and in lowest position, call light within reach. Patient instructed on use of call light and uses appropriately. Bed alarm on. Non-skid footwear and fall band on. Will continue to monitor.

## 2020-10-29 NOTE — PROGRESS NOTES
RN screened patients swallowing by administering the [facility] Protocol. The patient consumed 3 ounces of water by cup in sequential swallows without signs/symptoms of aspiration.

## 2020-10-29 NOTE — PROGRESS NOTES
Internal Medicine Medical Student History & Physical        PCP: Jonathon Giang DO     Date of Admission: 10/28/2020     Date of Service: Pt seen/examined on 10/28/20 and Admitted to Inpatient with expected LOS greaterthan two midnights due to medical therapy. Placed in Observation.     Chief Complaint:  Bilateral lower extremity heaviness, LUE weakness     Interval HX  Pt seen at bedside, answering questions with some expressive aphasia.  Denies chest pain, abdominal pain, N/V, weakness, numbness and tingling, dysuria, urgency.      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  History from the patient is limited by her expressive aphasia.  Patient's daughter reports that the night prior to admission, her father stated that her mother felt heaviness in her bilateral lower extremities and had difficulty standing up on her own.  Following this, this morning family members noted that the patient had left upper extremity weakness, which prompted the visit to the ED.  Patient's daughter states that she has had expressive aphasia since her prior ischemic CVA to the left MCA.  She reportedly has not had any other deficits, and uses walker to ambulate at home.  She lives at home with her , and had reportedly not complained of any headaches, blurred or double vision, numbness or tingling of her extremities.  She also reportedly denied any swallowing difficulties.  Patient denies chest pain, shortness of breath, palpitations, lightheadedness, abdominal pain, nausea, vomiting, dysuria or changes in frequency of urination. Steffen Mcelroy, her daughter does state that patient has been incontinent of urine for the last week and has been wetting her bed daily.  She also reportedly has a history of multiple urinary tract infections, and on her prior admission recently, she was noted to have urinary tract infection which was thought to have contributed to recrudescence of her prior ischemic CVA leading to her symptoms on that admission. Patient's daughter states that the patient had been reporting left upper extremity pain in her shoulder region and the daughter initially thought that may have contributed to her weakness which led to a delay in presentation.  The daughter and patient both deny any recent falls or trauma to the shoulder. Of note, patient was started on immunotherapy for her metastatic lung adenocarcinoma in 2019, and reportedly she has not been compliant with her treatment. Denzel Hair has reportedly missed her treatment since at least 9/2020 due to her overall generalized weakness. On presentation to the ED, patient was afebrile, tachycardic with a heart rate in the 100s, normotensive with a normal respiratory rate and saturating well on room air. [address] and renal function panel were largely unremarkable except for mild chronic anemia with a hemoglobin of 11.6.     Past Medical History:       Past Medical History             Diagnosis Date    CHUYITA (acute kidney injury) ([facility] [address] 5/1/2018    Allergic rhinitis      Aneurysm ([facility] [address]       aorta  watching now    Aortic aneurysm ([facility] [address]       Currently has     Cancer [facility])       Lung    Cholelithiasis      Depression      History of ischemic left MCA stroke 9/30/2020     With residual expressive aphasia. Noted to be subacute ~ 8days old on MRI 9/29/2020.      History of ischemic left MCA stroke 9/30/2020    Hyperlipidemia      Hypertension      Incontinence       bladder    MRSA (methicillin resistant staph aureus) culture positive 12/28/11     Leg    Ulcer    Ulcerative colitis [address] [address]             Past Surgical History:       Past Surgical History             Procedure Laterality Date    ABDOMEN SURGERY         gastric resection for ulcers    BRONCHOSCOPY   12/11/2012    CHOLECYSTECTOMY, OPEN   5/7/2012    COLONOSCOPY        ERCP   3/13/12     sphincterotomy; 5 Mauritian cm stent placed    ERCP   12-     pancreatic stent removal, pancreotogram    PRESSURE ULCER DEBRIDEMENT   2010     gastric    STOMACH SURGERY         for ulcer    UPPER GASTROINTESTINAL ENDOSCOPY               Medications Prior to Admission:      Home Medications           Prior to Admission medications    Medication Sig Start Date End Date Taking? Authorizing Provider   aspirin 81 MG EC tablet Take 1 tablet by mouth daily 10/1/20 3/30/21 Yes Pito Juarez MD   NIFEdipine (ADALAT CC) 30 MG extended release tablet Take 90 mg by mouth daily     Yes Historical Provider, MD   magnesium oxide (MAG-OX) 400 MG tablet Take 1 tablet by mouth 2 times daily 10/31/19   Yes Fani Dobbs APRN - CNP   atorvastatin (LIPITOR) 40 MG tablet Take 1 tablet by mouth daily 10/21/19   Yes Codie Colunga MD   pantoprazole (PROTONIX) 40 MG tablet Take 40 mg by mouth nightly      Yes Historical Provider, MD   citalopram (CELEXA) 40 MG tablet Take 40 mg by mouth nightly      Yes Historical Provider, MD   clopidogrel (PLAVIX) 75 MG tablet Take 1 tablet by mouth daily 10/21/19 9/27/20   Neo Cervantes MD           Allergies:  Patient has no known allergies.     Social History:    Lives at home w/ .     TOBACCO:   reports that she quit smoking about 11 years ago. She has a 75.00 pack-year smoking history. She has never used smokeless tobacco.  ETOH:  reports no history of alcohol use.        Family History:      Reviewed in detail and negative for DM, CAD, Cancer, CVA.  Positive as follows:     Family History             Problem Relation Age of Onset    Arthritis Mother             REVIEW OF SYSTEMS: Pertinent positives as noted in the HPI. All other systems reviewed and negative. ROS: Review of Systems     PHYSICAL EXAM PERFORMED:     /78   Pulse 108   Temp 97.8 °F (36.6 °C) (Oral)   Resp 16   Ht 5' 2\" (1.575 m)   Wt 142 lb 4.8 oz (64.5 kg)   SpO2 95%   Breastfeeding No   BMI 26.03 kg/m²      General appearance:  No apparent distress, appears stated age and cooperative, some expressive aphasia but answering most questions appropriately. Neck: Full range of motion. Respiratory:  Normal respiratory effort. Clear to auscultation, bilaterally without Rales/Wheezes/Rhonchi. Cardiovascular:  Tachycardic with normal S1/S2 without murmurs, rubs or gallops. Abdomen: Soft, non-tender, non-distended with normal bowel sounds. Musculoskeletal:  No clubbing, cyanosis oredema bilaterally. Full range of motion without deformity. Skin: Skin color, texture, turgor normal.  Norashes or lesions. Neurologic:  Cranial nerves: II-XII intact. Oriented to person but not time or place. Expressive aphasia is present. Very subtle LUE weakness vs RUE, but overall 5/5. 5/5 strength in bilateral LE. Finger to nose and heel to shin intact bilaterally. NO FNDs noted. Intact sensation. Psychiatric:  Alert and oriented, thought content appropriate,normal insight  Capillary Refill: Brisk,< 3 seconds   Peripheral Pulses: +2 palpable, equal bilaterally         Labs:           Recent Labs     10/28/20  1954 10/29/20  0559   WBC 9.4 7.5   HGB 11.6* 12.2   HCT 36.8 39.0    282           Recent Labs     10/28/20  1954 10/29/20  0559    138   K 4.6 3.9    103   CO2 24 24   BUN 25* 24*   CREATININE 1.0 1.0   CALCIUM 9.5 9.4   PHOS  --  3.5      No results for input(s): AST, ALT, BILIDIR, BILITOT, ALKPHOS in the last 72 hours. No results for input(s): INR in the last 72 hours.       Recent Labs     10/28/20  1954   TROPONINI <0.01         Urinalysis:            Lab Results   Component Value Date     NITRU POSITIVE 10/29/2020     WBCUA 21-50 10/29/2020     BACTERIA 3+ 10/29/2020     RBCUA 0-2 10/29/2020     BLOODU SMALL 10/29/2020     SPECGRAV 1.015 10/29/2020     GLUCOSEU Negative 10/29/2020     GLUCOSEU Negative 05/08/2012         Radiology:         XR CHEST PORTABLE   Final Result       No acute cardiopulmonary findings.       CTA HEAD NECK W CONTRAST   Final Result       1. No acute findings. 2.  No large vessel steno-occlusive disease. 3.  Stable moderate atherosclerotic stenosis in the left PCA P1-P2 junction       CT Head WO Contrast   Final Result       1. No acute intracranial hemorrhage or mass effect. 2.  Stable appearance of multifocal chronic ischemia and chronic small vessel ischemic disease. 3.  Stable atrophy.             ASSESSMENT & PLAN:  Gustavo Tapia is a 68 y.o. female w/ PMHx of stage IV metastatic adenocarcinoma and ischemic CVA presented w/ weakness and aphasia.     Bilateral lower extremity weakness and left upper extremity weakness  Patient presented d/t weakness in left upper extremity as well as bilateral lower extremity onset last night. Pt has hx of ischemic CVA and HTN. Although weakness has improved on physical exam, symptoms may be due to TIA. Pt also has hx of stage IV lung adenocarcinoma and is noncompliant w/ immunotherapy so there is concern for metastasis to the brain.  Given patient's history of multiple urinary tract infections, symptoms may also be due to recrudescence of her prior ischemic CVA.   -N.P.O. pending swallow evaluation by SLP  - continue Asprin, statin, Plavix  - Hold nifedipine to allow permissive HTN  - UA w/ reflex to culture given urinary incontinence as UTI may be contributing to recrudescence of prior ischemic CVA symptoms, does look suspicious for UTI but has hx with prior labs, no dysuria or urgency, but WBC increased in new UA, will treat empirically until cultures return with rocephin  - Neurology consulted, MRI of brain w/ contrast ordered to evaluate for metastatic disease  - Has had MRI with contrast in past with hx of partial nephrectomy  - Oncology consult  - Echo with bubble study performed in 9/2020 fails to show evidence of shunting     Stage IV Lung Adenocarcinoma s/p multiple rounds of chemotherapy and left partial nephrectomy  - Currently undergoing immunotherapy but noncompliant w/ therapy  - Oncology consult     Hypertension  - Currently takes Nifedipine at home but on hold temporarily to allow permissive hypertension     Chronic Expressive Aphasia  Hx of ischemic CVA     Hyperlipidemia  - Continue atorvastatin     Depression  - Continue citalopram     GERD  - Continue protonix         Recent L MCA ishcemic stroke  Discharged on 10/01/2020. Electrophysiology team was asked to see for outpatient monitoring to assess for AF as a cause of recent/previous stroke. She was supposed to do a 30 day heart monitor. Per electrophysiology If no yield on 30 day - will discuss ILR for further monitoring. She will have to follow-up with cardiology as outpatient for cardiac monitor. At this time no note of said encounter exists.     DVT Prophylaxis: Levonox   Diet: Diet NPO Effective Now  Code Status: DNR-CCA        I will discuss the patient with the senior resident and MD Nelson Wilson, MS4, acted as irenaibalia Turcios DO, PGY1

## 2020-10-29 NOTE — PROGRESS NOTES
Stroke Admission    I agree as the admission nurse that I have completed a thorough stroke assessment and completed the admission on the patient. ALL assessment areas listed below have been addressed and completed. Presentation: TIA    Handoff assessment completed with NAVEED Ramos. Current NIHSS 1.     [x]   Education Assessment  [x]   Individualized Stroke/TIA Education template added, including patient specific risk factors: Hypertension, Personal history of previous Stroke/TIA, Personal history of heart disease and Family history of heart disease  [x]   Individualized Stroke/TIA Care Plan template added  [x]   Swallow screen completed using the [employer] Protocol, and documented on flowsheet PRIOR to oral intake: Pass  [x]   VTE Prophylaxis ordered/addressed; SCDs: N/A  [x]   Stroke education booklet given, and education initiated with patient and/or caregiver      Nurse eSignature: Electronically signed by Mercy Allen RN on 10/29/20 at 5:45 AM EDT

## 2020-10-29 NOTE — PLAN OF CARE
Problem: HEMODYNAMIC STATUS  Goal: Patient has stable vital signs and fluid balance  Outcome: Ongoing   VSS. No complaints of pain. NIH 1. Will continue to monitor.

## 2020-10-29 NOTE — CONSULTS
EC tablet 81 mg, 81 mg, Oral, Daily, Casey Gautam MD, 81 mg at 10/29/20 0944    atorvastatin (LIPITOR) tablet 40 mg, 40 mg, Oral, Daily, Casey Gautam MD, 40 mg at 10/29/20 0944    citalopram (CELEXA) tablet 40 mg, 40 mg, Oral, Nightly, Casey Gautam MD, 40 mg at 10/29/20 0331    clopidogrel (PLAVIX) tablet 75 mg, 75 mg, Oral, Daily, Casey Gautam MD, 75 mg at 10/29/20 0944    pantoprazole (PROTONIX) tablet 40 mg, 40 mg, Oral, Nightly, Casey Gautam MD, 40 mg at 10/29/20 0331    sodium chloride flush 0.9 % injection 10 mL, 10 mL, Intravenous, 2 times per day, Casey Gautam MD, 10 mL at 10/29/20 0944    sodium chloride flush 0.9 % injection 10 mL, 10 mL, Intravenous, PRN, Casey Gautam MD    polyethylene glycol (GLYCOLAX) packet 17 g, 17 g, Oral, Daily PRN, Casey Gautam MD    promethazine (PHENERGAN) tablet 12.5 mg, 12.5 mg, Oral, Q6H PRN **OR** ondansetron (ZOFRAN) injection 4 mg, 4 mg, Intravenous, Q6H PRN, Casey Gautam MD    enoxaparin (LOVENOX) injection 40 mg, 40 mg, Subcutaneous, Daily, Casey Gautam MD, 40 mg at 10/29/20 0943    ROS:   Constitutional- No weight loss or fevers   Eyes- No diplopia. No photophobia. Ears/nose/throat- No dysphagia. No Dysarthria   Cardiovascular- No palpitations. No chest pain   Respiratory- No dyspnea. No Cough   Gastrointestinal- No Abdominal pain. No Vomiting. Genitourinary- No incontinence. No urinary retention   Musculoskeletal- +L shoulder pain  Skin- No rash. No easy bruising. Psychiatric- No depression. No anxiety   Endocrine- No diabetes. No thyroid issues. Hematologic- No bleeding difficulty. No fatigue   Neurologic- No weakness. No Headache.     Constitutional  /78   Pulse 108   Temp 97.8 °F (36.6 °C) (Oral)   Resp 16   Ht 5' 2\" (1.575 m)   Wt 142 lb 4.8 oz (64.5 kg)   SpO2 95%   Breastfeeding No   BMI 26.03 kg/m²     General Alert, no distress, well-nourished  Cardiovascular: Warm well perfused   Respiratory: Unlabored respiratory pattern    Neurologic  Mental status:  Awake, alert, pleasant   Aphasic, no dysarthria noted when talking  PERRL EOMI FS TML  Moves all extremities. Has limited ROM in L shoulder - pain w/ abduction   Weaker dorsi flexion on L otherwise BLE w/ good strength   Sensation intact to light touch    Images:  CT head wo contrast: 10/28 - No changes from prior study    MRI wo contrast: MRI brain w/o contrast 10/15/2020      Assessment:  Patient is a 67 y/o F w/ known metastatic lung CA, recent L posterior ischemia. Presents now w/ bilateral LE weakness and LUE weakness which has resolved. Plan:  Patient had recent non-contrast MRI on 10/15 - recommended contrasted study, not completed as patient requested to stop study. Will place order for MRI brain w/ contrast to assess for cerebral mets  Will discuss need for EEG w/ Dr. Keli Serrano. Had EEG in September that did not show epileptiform discharges. L shoulder xray - weakness may be pain related. Thanks for consult. Will follow up once MRI completed.      Clyde Mendez, APRN - CNP  Neurology  [phone]

## 2020-10-29 NOTE — ED PROVIDER NOTES
[address] [facility]          ATTENDING PHYSICIAN NOTE       Date of evaluation: 10/28/2020    Chief Complaint     Extremity Weakness and Aphasia      History of Present Illness     Corina Truong is a 68 y.o. female who presents with a chief complaint of extremity weakness and aphasia. Patient unable to provide history. She does have moderate aphasia. According to EMS, patient started having aphasia around 20, and daughter and  called EMS. Reportedly per EMS patient was having some left-sided weakness last night to. Patient denies any pain but is unable to provide me any history otherwise other than to answer yes or no questions. According to daughter, patient was walking fine yesterday with her walker, saw her yesterday morning. Patient's  told daughter that patient seemed to have trouble standing at bedtime last night - \"couldn't hold her body up with her legs\" -  used wheelchair and put her to bed. He thought she was tired. This am, daughter went over and noted patient was unable to stand up, and patient was c/o L arm pain. Daughter states that patient had a stroke a few weeks ago and has been having difficulty with aphasia ever since, daughter did not think that aphasia today was new. Daughter is concerned about UTI, states her urine is odorous, and she has a history of urinary tract infections in the past.    Daughter also states that patient is a patient of Dr. 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 She does not know the last time patient saw Dr. Megan Arnold. Review of Systems     Review of Systems -unable to obtain due to patient's significant aphasia.     Past Medical, Surgical, Family, and Social History     She has a past medical history of CHUYITA (acute kidney injury) [facility]), Allergic rhinitis, Aneurysm ([facility] [address] Aortic aneurysm ([facility] [address] Cancer ([facility] Cholelithiasis, Depression, History of ischemic left MCA stroke, History of ischemic left MCA stroke, Hyperlipidemia, Hypertension, Incontinence, MRSA (methicillin resistant staph aureus) culture positive, Ulcer, and Ulcerative colitis ([facility] [address] She has a past surgical history that includes Abdomen surgery; Cholecystectomy, open (5/7/2012); Pressure ulcer debridement (2010); Colonoscopy; Upper gastrointestinal endoscopy; Stomach surgery; ERCP (3/13/12); bronchoscopy (12/11/2012); and ERCP (12-). Her family history includes Arthritis in her mother. She reports that she quit smoking about 11 years ago. She has a 75.00 pack-year smoking history. She has never used smokeless tobacco. She reports that she does not drink alcohol or use drugs. Medications     Previous Medications    ASPIRIN 81 MG EC TABLET    Take 1 tablet by mouth daily    ATORVASTATIN (LIPITOR) 40 MG TABLET    Take 1 tablet by mouth daily    CITALOPRAM (CELEXA) 40 MG TABLET    Take 40 mg by mouth nightly     CLOPIDOGREL (PLAVIX) 75 MG TABLET    Take 1 tablet by mouth daily    MAGNESIUM OXIDE (MAG-OX) 400 MG TABLET    Take 1 tablet by mouth 2 times daily    NIFEDIPINE (ADALAT CC) 30 MG EXTENDED RELEASE TABLET    Take 90 mg by mouth daily    PANTOPRAZOLE (PROTONIX) 40 MG TABLET    Take 40 mg by mouth nightly        Allergies     She has No Known Allergies. Physical Exam     INITIAL VITALS: BP: (!) 124/103, Temp: 98 °F (36.7 °C), Pulse: 102, Resp: 16, SpO2: 91 %   Physical Exam  Constitutional:       General: She is not in acute distress. Appearance: She is well-developed. She is not diaphoretic. HENT:      Head: Normocephalic and atraumatic. Mouth/Throat:      Pharynx: No oropharyngeal exudate. Eyes:      General:         Right eye: No discharge. Left eye: No discharge.       Conjunctiva/sclera: Conjunctivae normal.      Pupils: Pupils are equal, No drift for 5 seconds  7: Limb Ataxia --> 1 = Ataxia in 1 Limb  8: Sensation --> 0 = Normal; no sensory loss  9: Language/aphasia --> 1 = Mild-moderate aphasia: some obvious changes, without significant limitation  10: Dysarthria --> 1 = Mild-moderate dysarthria: slurring but can be understood  11: Extinction/inattention --> 0 = No abnormality      Diagnostic Results     EKG   Indication: Altered mental status. Heart rate 106, intervals normal, axis normal.  No signs of ST elevation or depression, no T wave inversions. Comparison to prior EKG with no changes. Impression: Sinus tachycardia without active ischemia. RADIOLOGY:  XR CHEST PORTABLE   Final Result      No acute cardiopulmonary findings. CTA HEAD NECK W CONTRAST   Final Result      1. No acute findings. 2.  No large vessel steno-occlusive disease. 3.  Stable moderate atherosclerotic stenosis in the left PCA P1-P2 junction      CT Head WO Contrast   Final Result      1. No acute intracranial hemorrhage or mass effect. 2.  Stable appearance of multifocal chronic ischemia and chronic small vessel ischemic disease. 3.  Stable atrophy.           LABS:   Results for orders placed or performed during the hospital encounter of 10/28/20   CBC Auto Differential   Result Value Ref Range    WBC 9.4 4.0 - 11.0 K/uL    RBC 4.59 4.00 - 5.20 M/uL    Hemoglobin 11.6 (L) 12.0 - 16.0 g/dL    Hematocrit 36.8 36.0 - 48.0 %    MCV 80.2 80.0 - 100.0 fL    MCH 25.3 (L) 26.0 - 34.0 pg    MCHC 31.5 31.0 - 36.0 g/dL    RDW 17.0 (H) 12.4 - 15.4 %    Platelets 056 901 - 244 K/uL    MPV 8.1 5.0 - 10.5 fL    Neutrophils % 65.2 %    Lymphocytes % 14.0 %    Monocytes % 9.6 %    Eosinophils % 10.4 %    Basophils % 0.8 %    Neutrophils Absolute 6.1 1.7 - 7.7 K/uL    Lymphocytes Absolute 1.3 1.0 - 5.1 K/uL    Monocytes Absolute 0.9 0.0 - 1.3 K/uL    Eosinophils Absolute 1.0 (H) 0.0 - 0.6 K/uL    Basophils Absolute 0.1 0.0 - 0.2 K/uL   Basic Metabolic Panel w/ Reflex to MG Result Value Ref Range    Sodium 141 136 - 145 mmol/L    Potassium reflex Magnesium 4.6 3.5 - 5.1 mmol/L    Chloride 106 99 - 110 mmol/L    CO2 24 21 - 32 mmol/L    Anion Gap 11 3 - 16    Glucose 109 (H) 70 - 99 mg/dL    BUN 25 (H) 7 - 20 mg/dL    CREATININE 1.0 0.6 - 1.2 mg/dL    GFR Non-African American 54 (A) >60    GFR African American >60 >60   Troponin   Result Value Ref Range    Troponin <0.01 <0.01 ng/mL   Brain Natriuretic Peptide   Result Value Ref Range    Pro- 0 - 449 pg/mL   POCT Glucose   Result Value Ref Range    POC Glucose 114 (H) 70 - 99 mg/dl    Performed on ACCU-CHEK        ED BEDSIDE ULTRASOUND:  none    RECENT VITALS:  BP: (!) 124/103,Temp: 98 °F (36.7 °C), Pulse: 102, Resp: 16, SpO2: 91 %     Procedures     none    ED Course     Nursing Notes, Past Medical Hx, Past Surgical Hx, Social Hx,Allergies, and Family Hx were reviewed. patient was given the following medications:  Orders Placed This Encounter   Medications    iopamidol (ISOVUE-370) 76 % injection 80 mL       CONSULTS:  [facility] DECISIONMAKING / ASSESSMENT / Lasha Yasmin is a 68 y.o. female who presents with a chief complaint of extremity weakness, aphasia. Initial exam revealed a well-appearing female in no acute distress with intermittent tachycardia, intermittent hypoxia with pulse ox of 91 to 90%, diastolic hypertension. Afebrile. Physical exam remarkable for significant neurologic deficits including aphasia, left-sided extremity weakness versus limitation due to pain. Although patient is outside of window for TPA, stroke alert was called to discuss possible large vessel occlusion. Patient taken emergently to CT head and CTA. Spoke with stroke team who reviewed her studies and did not see obvious large vessel occlusion. They agreed patient is not a TPA candidate due to last known normal likely being last evening.   It does not sound like patient's aphasia is new.    Infectious work-up still pending. Patient has not provided urine sample yet. She continues to be altered and globally weak, and therefore will be admitted for further work-up. Still with continued neurologic deficits on reassessment. CRITICAL CARE TIME    Upon my evaluation, this patient had a critical illness or injury that acutely impaired one or more vital organ systems such that there was a high probability of imminent or life threatening deterioration without intervention. In this patient that illness with a high probability of imminent or life threatening deterioration was altered mental status, concerning for new ischemic stroke, which required my direct attention, intervention, and personal management. I have personally provided 40 minutes of critical care time exclusive of separately billed procedures and treating other patients. Critical care was necessary to treat or prevent imminent or life threatening deterioration of the following condition(s): cns compromise. Critical care time was spent personally by me on the following activities: obtaining a history; discussing history at length with daughter as patient cannot provide a full history, examining the patient; continuous pulse oximetry; ordering and review of studies including CXR, CT / CTA, discussion with stroke team about CT results,  discussions with hospitalist.      Clinical Impression     1. Altered mental status, unspecified altered mental status type    2. Left arm weakness        Disposition     PATIENT REFERRED TO:  No follow-up provider specified.     DISCHARGE MEDICATIONS:  New Prescriptions    No medications on file       DISPOSITION  - admit       Casey Gonzalez MD  10/28/20 6756

## 2020-10-30 VITALS
RESPIRATION RATE: 16 BRPM | BODY MASS INDEX: 26.19 KG/M2 | WEIGHT: 142.3 LBS | SYSTOLIC BLOOD PRESSURE: 137 MMHG | OXYGEN SATURATION: 94 % | HEIGHT: 62 IN | HEART RATE: 100 BPM | TEMPERATURE: 98.1 F | DIASTOLIC BLOOD PRESSURE: 74 MMHG

## 2020-10-30 LAB
ALBUMIN SERPL-MCNC: 3.9 G/DL (ref 3.4–5)
ANION GAP SERPL CALCULATED.3IONS-SCNC: 9 MMOL/L (ref 3–16)
BUN BLDV-MCNC: 24 MG/DL (ref 7–20)
CALCIUM SERPL-MCNC: 8.8 MG/DL (ref 8.3–10.6)
CHLORIDE BLD-SCNC: 106 MMOL/L (ref 99–110)
CO2: 23 MMOL/L (ref 21–32)
CREAT SERPL-MCNC: 1 MG/DL (ref 0.6–1.2)
ESTIMATED AVERAGE GLUCOSE: 119.8 MG/DL
GFR AFRICAN AMERICAN: >60
GFR NON-AFRICAN AMERICAN: 54
GLUCOSE BLD-MCNC: 102 MG/DL (ref 70–99)
HBA1C MFR BLD: 5.8 %
HCT VFR BLD CALC: 34.6 % (ref 36–48)
HEMOGLOBIN: 11.2 G/DL (ref 12–16)
IRON SATURATION: 10 % (ref 15–50)
IRON: 34 UG/DL (ref 37–145)
MCH RBC QN AUTO: 25.4 PG (ref 26–34)
MCHC RBC AUTO-ENTMCNC: 32.3 G/DL (ref 31–36)
MCV RBC AUTO: 78.7 FL (ref 80–100)
PDW BLD-RTO: 17.4 % (ref 12.4–15.4)
PHOSPHORUS: 3.5 MG/DL (ref 2.5–4.9)
PLATELET # BLD: 251 K/UL (ref 135–450)
PMV BLD AUTO: 8.1 FL (ref 5–10.5)
POTASSIUM SERPL-SCNC: 4.4 MMOL/L (ref 3.5–5.1)
RBC # BLD: 4.4 M/UL (ref 4–5.2)
SODIUM BLD-SCNC: 138 MMOL/L (ref 136–145)
TOTAL IRON BINDING CAPACITY: 353 UG/DL (ref 260–445)
WBC # BLD: 8.1 K/UL (ref 4–11)

## 2020-10-30 PROCEDURE — 85613 RUSSELL VIPER VENOM DILUTED: CPT

## 2020-10-30 PROCEDURE — 96366 THER/PROPH/DIAG IV INF ADDON: CPT

## 2020-10-30 PROCEDURE — 85027 COMPLETE CBC AUTOMATED: CPT

## 2020-10-30 PROCEDURE — 2580000003 HC RX 258: Performed by: STUDENT IN AN ORGANIZED HEALTH CARE EDUCATION/TRAINING PROGRAM

## 2020-10-30 PROCEDURE — 36415 COLL VENOUS BLD VENIPUNCTURE: CPT

## 2020-10-30 PROCEDURE — 85730 THROMBOPLASTIN TIME PARTIAL: CPT

## 2020-10-30 PROCEDURE — 6360000002 HC RX W HCPCS: Performed by: STUDENT IN AN ORGANIZED HEALTH CARE EDUCATION/TRAINING PROGRAM

## 2020-10-30 PROCEDURE — 96372 THER/PROPH/DIAG INJ SC/IM: CPT

## 2020-10-30 PROCEDURE — 83540 ASSAY OF IRON: CPT

## 2020-10-30 PROCEDURE — 86146 BETA-2 GLYCOPROTEIN ANTIBODY: CPT

## 2020-10-30 PROCEDURE — 97110 THERAPEUTIC EXERCISES: CPT

## 2020-10-30 PROCEDURE — 97116 GAIT TRAINING THERAPY: CPT

## 2020-10-30 PROCEDURE — 80069 RENAL FUNCTION PANEL: CPT

## 2020-10-30 PROCEDURE — 85598 HEXAGNAL PHOSPH PLTLT NEUTRL: CPT

## 2020-10-30 PROCEDURE — 6370000000 HC RX 637 (ALT 250 FOR IP): Performed by: STUDENT IN AN ORGANIZED HEALTH CARE EDUCATION/TRAINING PROGRAM

## 2020-10-30 PROCEDURE — 81291 MTHFR GENE: CPT

## 2020-10-30 PROCEDURE — 92507 TX SP LANG VOICE COMM INDIV: CPT

## 2020-10-30 PROCEDURE — 81400 MOPATH PROCEDURE LEVEL 1: CPT

## 2020-10-30 PROCEDURE — 83550 IRON BINDING TEST: CPT

## 2020-10-30 PROCEDURE — 81241 F5 GENE: CPT

## 2020-10-30 PROCEDURE — 86147 CARDIOLIPIN ANTIBODY EA IG: CPT

## 2020-10-30 PROCEDURE — G0378 HOSPITAL OBSERVATION PER HR: HCPCS

## 2020-10-30 PROCEDURE — 81240 F2 GENE: CPT

## 2020-10-30 RX ORDER — AMLODIPINE BESYLATE 10 MG/1
10 TABLET ORAL DAILY
COMMUNITY

## 2020-10-30 RX ORDER — CEFDINIR 300 MG/1
300 CAPSULE ORAL 2 TIMES DAILY
Qty: 10 CAPSULE | Refills: 0 | Status: SHIPPED | OUTPATIENT
Start: 2020-10-30 | End: 2020-11-04

## 2020-10-30 RX ADMIN — CLOPIDOGREL BISULFATE 75 MG: 75 TABLET ORAL at 08:48

## 2020-10-30 RX ADMIN — ENOXAPARIN SODIUM 40 MG: 40 INJECTION SUBCUTANEOUS at 08:48

## 2020-10-30 RX ADMIN — ASPIRIN 81 MG: 81 TABLET, COATED ORAL at 08:48

## 2020-10-30 RX ADMIN — ATORVASTATIN CALCIUM 40 MG: 20 TABLET, FILM COATED ORAL at 08:48

## 2020-10-30 RX ADMIN — CEFTRIAXONE 1 G: 1 INJECTION, POWDER, FOR SOLUTION INTRAMUSCULAR; INTRAVENOUS at 12:11

## 2020-10-30 RX ADMIN — Medication 10 ML: at 08:49

## 2020-10-30 ASSESSMENT — PAIN SCALES - GENERAL: PAINLEVEL_OUTOF10: 0

## 2020-10-30 NOTE — PROGRESS NOTES
Internal Medicine Medical Student Progress Note        PCP: Cheryle Litter, DO    Date of Admission: 10/28/2020    Chief Complaint: Fall    Subjective: Pt seen at bedside, sleepy but still answering questions by shaking head. Denied headache, chest pain, SOB, N/V, weakness, numbness or tingling, dysuria.     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  History from the patient is limited by her expressive aphasia.  Patient's daughter reports that the night prior to admission, her father stated that her mother felt heaviness in her bilateral lower extremities and had difficulty standing up on her own.  Following this, this morning family members noted that the patient had left upper extremity weakness, which prompted the visit to the ED.  Patient's daughter states that she has had expressive aphasia since her prior ischemic CVA to the left MCA.  She reportedly has not had any other deficits, and uses walker to ambulate at home.  She lives at home with her , and had reportedly not complained of any headaches, blurred or double vision, numbness or tingling of her extremities.  She also reportedly denied any swallowing difficulties.  Patient denies chest pain, shortness of breath, palpitations, lightheadedness, abdominal pain, nausea, vomiting, dysuria or changes in frequency of urination. Elias Jean-Baptiste, her daughter does state that patient has been incontinent of urine for the last week and has been wetting her bed daily.  She also reportedly has a history of multiple urinary tract infections, and on her prior admission recently, she was noted to have urinary tract infection which was thought to have contributed to recrudescence of her prior ischemic CVA leading to her symptoms on that admission. Patient's daughter states that the patient had been reporting left upper extremity pain in her shoulder region and the daughter initially thought that may have contributed to her weakness which led to a delay in presentation.  The daughter and patient both deny any recent falls or trauma to the shoulder. Of note, patient was started on immunotherapy for her metastatic lung adenocarcinoma in 2019, and reportedly she has not been compliant with her treatment. Dipesh Rubio has reportedly missed her treatment since at least 9/2020 due to her overall generalized weakness. On presentation to the ED, patient was afebrile, tachycardic with a heart rate in the 100s, normotensive with a normal respiratory rate and saturating well on room air. [address] and renal function panel were largely unremarkable except for mild chronic anemia with a hemoglobin of 11.6.     Medications:  Reviewed    Infusion Medications   Scheduled Medications    cefTRIAXone (ROCEPHIN) IV  1 g Intravenous Q24H    aspirin  81 mg Oral Daily    atorvastatin  40 mg Oral Daily    citalopram  40 mg Oral Nightly    clopidogrel  75 mg Oral Daily    pantoprazole  40 mg Oral Nightly    sodium chloride flush  10 mL Intravenous 2 times per day    enoxaparin  40 mg Subcutaneous Daily     PRN Meds: sodium chloride flush, polyethylene glycol, promethazine **OR** ondansetron      Intake/Output Summary (Last 24 hours) at 10/30/2020 0914  Last data filed at 10/30/2020 9554  Gross per 24 hour   Intake 970 ml   Output --   Net 970 ml       Physical Exam Performed:    /84   Pulse 82   Temp 97.5 °F (36.4 °C) (Oral)   Resp 18   Ht 5' 2\" (1.575 m)   Wt 142 lb 4.8 oz (64.5 kg)   SpO2 94%   Breastfeeding No   BMI 26.03 kg/m²     General appearance: No apparent distress, XR SHOULDER LEFT (MIN 2 VIEWS)   Final Result      4 views demonstrate no fracture or dislocation. There is moderate acromioclavicular arthritis. Glenohumeral joint is unremarkable. Left subclavian venous port is noted. XR CHEST PORTABLE   Final Result      No acute cardiopulmonary findings. CTA HEAD NECK W CONTRAST   Final Result      1. No acute findings. 2.  No large vessel steno-occlusive disease. 3.  Stable moderate atherosclerotic stenosis in the left PCA P1-P2 junction      CT Head WO Contrast   Final Result      1. No acute intracranial hemorrhage or mass effect. 2.  Stable appearance of multifocal chronic ischemia and chronic small vessel ischemic disease. 3.  Stable atrophy. Assessment/Plan:    Gabrielle Sanchez, 68 y.o. female w/ PMHx of stage IV metastatic adenocarcinoma and ischemic CVA presented w/ weakness and aphasia. Bilateral lower extremity weakness and left upper extremity weakness  Patient presented d/t weakness in left upper extremity as well as bilateral lower extremity onset last night. Pt has hx of ischemic CVA and HTN. Although weakness has improved on physical exam, symptoms may be due to TIA. Pt also has hx of stage IV lung adenocarcinoma and is noncompliant w/ immunotherapy so there is concern for metastasis to the brain. Given patient's history of multiple urinary tract infections, symptoms may also be due to recrudescence of her prior ischemic CVA. Oncology consulted, recommended for pt to follow up outpatient w/ Dr. Pat Chowdhury after discharge. - continue Asprin, statin, Plavix  - Resume Amlodipine 10mg daily  - UA w/ reflex to culture given urinary incontinence as UTI may be contributing to recrudescence of prior ischemic CVA symptoms, does look suspicious for UTI but has hx with prior labs, no dysuria or urgency, but WBC increased in new UA, will continue to treat empirically until cultures return with rocephin.  Discontinuing rocephin for discharge, will give prescription for cefdinir 300 mg BID for 5 days.  - Neurology following- OK for discharge. Rec cont ASA and plavix. Follow up w/ Dr. Oliveira See as previously recommended in 1-2 months  - MRI 10/29/20 revealed no acute hemorrhage, evolving subacute L posterior MCA territory infarct in L temporoparietal lobe w/ new associated enhancement commonly seen in subacute infarcts as well as moderate cerebral atrophy and moderate chronic small vessel ischemic disease and chronic R frontal lobe encephalomalacia.   - Echo with bubble study performed in 9/2020 failed to show evidence of shunting  - Zio patch revealed 10 runs of SVT     Stage IV Lung Adenocarcinoma s/p multiple rounds of chemotherapy and left partial nephrectomy  Hx bony metastasis to T11 spiny process s/p XRT and Denosumab. Hx abdominal metastasis s/p resection. Hx renal metastasis s/p left partial nephrectomy   - outpatient plan was to continue immunotherapy for metastasis. Had previously responded to this treatment. But treatment was been waylaid given functional status issues from stroke. - C2 lesion was stable on previous MRI  - Her lung cancer has been stable. Do not feel that brain mets explain her presentation.  Is most likely related to her CVA history which at this time is the more concerning medical problem for Mrs Elena Blackwood.   - Patient should follow up with Dr Brisa Jones outpatient after discharge from hospital.   - will do hypercoagulable work up in hospital to be followed up at PCP    Hypertension  - Resume amlodipine 10mg     Chronic Expressive Aphasia  Hx of ischemic CVA     Hyperlipidemia  - Continue atorvastatin     Depression  - Continue citalopram     GERD  - Continue protonix     Active Hospital Problems    Diagnosis Date Noted    Weakness of left upper extremity [R29.898] 10/28/2020     DVT Prophylaxis: Levonox  Diet: DIET CARDIAC;  Code Status: DNR-CCA  Dispo - Home with home health    I will discuss the patient with the

## 2020-10-30 NOTE — PROGRESS NOTES
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 She reportedly has not had any other deficits, and uses walker to ambulate at home. She lives at home with her , and had reportedly not complained of any headaches, blurred or double vision, numbness or tingling of her extremities. She also reportedly denied any swallowing difficulties. Patient denies chest pain, shortness of breath, palpitations, lightheadedness, abdominal pain, nausea, vomiting, dysuria or changes in frequency of urination. However, her daughter does state that patient has been incontinent of urine for the last week and has been wetting her bed daily. She also reportedly has a history of multiple urinary tract infections, and on her prior admission recently, she was noted to have urinary tract infection which was thought to have contributed to recrudescence of her prior ischemic CVA leading to her symptoms on that admission. Patient's daughter states that the patient had been reporting left upper extremity pain in her shoulder region and the daughter initially thought that may have contributed to her weakness which led to a delay in presentation. The daughter and patient both deny any recent falls or trauma to the shoulder. On presentation to the ED, patient was afebrile, tachycardic with a heart rate in the 100s, normotensive with a normal respiratory rate and saturating well on room air. CBC and renal function panel were largely unremarkable except for mild chronic anemia with a hemoglobin of 11.6.'    Pain: none indicated    RECENT RESULTS  CT OF HEAD: (10/28/2020)  Impression         1.  No acute intracranial hemorrhage or mass effect. 2.  Stable appearance of multifocal chronic ischemia and chronic small vessel ischemic disease. 3.  Stable atrophy. Speech evaluation findings:  10/29/2020  Patient presents with mild-moderate fluent aphasia, with expressive language skills more impaired than receptive.  Deficits appear similiar to previous hospitalization when she was seen by speech. Medical diagnosis: Weakness of left upper extremity  Treatment diagnosis:  Mild moderate fluent aphasia     Treatment:  Pt seen to address the following goals:  Goal 1: Patient will complete automatic speech tasks with 90% accuracy given min cues  10/30:  Pt able to complete 1-20 WFL, days of the week with min cues for initial day, mod cues for months of the year. Pt with increased perserveration with months of the year, educated to take small break, close eyes/clear mind and retry which assisted some with success with second and third attempt, along with slowly down production of words. Cont goal    Goal 2: Patient will yes/no tasks with 100% accuracy independently. 10/30:  Pt able to answer simple, functional yes/no questions with 80% accuracy. Cont goal   Goal 3: Patient will complete naming tasks with 90% accuracy. 10/30:    Pt able to name pictures of items of varying complexity with 75% accuracy, up to 100% with sentence and/or phonemic cue. Pt with awareness of errors. Perseverative response noted x1 out of 12 pictures. Cont goal  Goal 4: Patient will follow multi-step directions with 80% accuracy or min cues. 10/30:    Pt able to follow 1 step commands with 85% accuracy, 2 step with 50% accuracy, mod cues. Pt educated to request for repetition/clarification when needed. Pt with difficulty with repeating direction verbally (which could be used as a strategy if effective). Cont goal  Goal 5: Patient will tolerate ongoing cognitive-linguistic assessment. 10/30: Pt able to name 2 animals in one minute (up to 3 with sentence cue). Pt able to repeat short basic familiar phrases with 75-80% accuracy, significant decline if longer or less common phrases/sentences. Pt with fair awareness of errors but not consistent; perserverative and paraphasic errors (phonemic and semantic errors) noted.    Cont goal.     Education:  No family present. Pt educated to role of speech therapy, strategies for word recall and provided examples (gesturing, description, association) with some recall for description with mod cues. Plan:  Continue speech/language therapy to address above goals, 3-5 x/week x LOS  DC recommendations: TBD closer to discharge    D/W nursing, Blease Mynor. Needs met prior to leaving room, call button in reach.   Treatment time: [address] MA CCC/SLP 2428  If patient is discharged prior to next treatment, this note will serve as the discharge summary

## 2020-10-30 NOTE — DISCHARGE SUMMARY
Hospital Medicine Discharge Summary    Patient ID: Jennifer Grossman   Gender: female  : 1940   Age: 68 y.o. MRN: [id number]  Code Status: DNR-CCA    Patient's PCP: Jojo Kimball DO    Admit Date: 10/28/2020     Discharge Date:   10/30/2020    Admitting Physician: Andrés Steve MD     Discharge Physician: Adela Che DO     Discharge Diagnoses: Active Hospital Problems    Diagnosis Date Noted    Weakness of left upper extremity [R29.898]    Uncomplicated UTI    The patient was seen and examined on day of discharge and this discharge summary is in conjunction with any daily progress note from day of discharge. 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  History from the patient is limited by her expressive aphasia.  Patient's daughter reports that the night prior to admission, her father stated that her mother felt heaviness in her bilateral lower extremities and had difficulty standing up on her own. Patient's daughter states that she has had expressive aphasia since her prior ischemic CVA to the left MCA.  She reportedly has not had any other deficits, and uses walker to ambulate at home. Daughter does state that patient has been incontinent of urine for the last week and has been wetting her bed daily.  She also reportedly has a history of multiple urinary tract infections, and on her prior admission recently, she was noted to have urinary tract infection which was thought to have contributed to recrudescence of her prior ischemic CVA leading to her symptoms on that admission.     On presentation to the ED, patient was afebrile, tachycardic with a heart rate in the 100s, normotensive with a normal respiratory rate and saturating well on room air. [address] and renal function panel were largely unremarkable except for mild chronic anemia with a hemoglobin of 11.6. Pts UA suspicious for UTI, so she was started on rocephin. There was concern mets could explain her symptoms so MRI ordered. MRI brain 10/29/20 revealed no acute hemorrhage, evolving subacute L posterior MCA territory infarct in L temporoparietal lobe w/ new associated enhancement commonly seen in subacute infarcts. Pt was recently sent home and had a zio patch done in an attempt to find out why she had this ischemic stroke, but it revealed only 10 runs of SVT. Neuro rec cont Asa + plavix, and following up with Dr. Francy Bender in 1-2 months. We will give her a prescription for cefdinir 300mg BID for 5 days. We have also done some lab work to work her up for possible hypercoaguloapthy considering her hx cancer, which should be followed up by PCP outpatient. Onc was consulted while patient was here and rec following up with Dr. Lukasz Mckeon outpatient after discharge. Disposition: Home with Home Health Care: PT,OT,Skilled Nursing      Physical Exam Performed:     /74   Pulse 100   Temp 98.1 °F (36.7 °C) (Oral)   Resp 16   Ht 5' 2\" (1.575 m)   Wt 142 lb 4.8 oz (64.5 kg)   SpO2 94%   Breastfeeding No   BMI 26.03 kg/m²       General appearance: No apparent distress, appears stated age and cooperative. Neck: Full range of motion. Respiratory:  Normal respiratory effort. Clear to auscultation, bilaterally without Rales/Wheezes/Rhonchi. Cardiovascular: Regular rate and rhythm with normal S1/S2 without murmurs, rubs or gallops. Abdomen: Soft, non-tender, non-distended with normal bowel sounds. Musculoskeletal: No clubbing, cyanosis or edema bilaterally. Skin: Skin color, texture, turgor normal.  No rashes or lesions. Neurologic:  Cranial nerves: II-XII intact.  5/5 strength bilaterally LE and UE TO HOME CARE NEEDS  IP CONSULT TO HOME CARE NEEDS    Disposition:  Home with Home Health Care: PT,OT,Skilled Nursing      Condition at Discharge: Stable    Discharge Instructions/Follow-up:      Please follow up with PCP, Dr. Roxanne Mohamud, and Dr. Brisa Jones. Code Status:  DNR-CCA     Activity: activity as tolerated    Diet: cardiac diet      Discharge Medications:     Current Discharge Medication List           Details   cefdinir (OMNICEF) 300 MG capsule Take 1 capsule by mouth 2 times daily for 5 days  Qty: 10 capsule, Refills: 0              Details   amLODIPine (NORVASC) 10 MG tablet Take 10 mg by mouth daily      aspirin 81 MG EC tablet Take 1 tablet by mouth daily  Qty: 90 tablet, Refills: 1      magnesium oxide (MAG-OX) 400 MG tablet Take 1 tablet by mouth 2 times daily  Qty: 30 tablet, Refills: 1      clopidogrel (PLAVIX) 75 MG tablet Take 1 tablet by mouth daily  Qty: 30 tablet, Refills: 0      atorvastatin (LIPITOR) 40 MG tablet Take 1 tablet by mouth daily  Qty: 30 tablet, Refills: 3      pantoprazole (PROTONIX) 40 MG tablet Take 40 mg by mouth nightly       citalopram (CELEXA) 40 MG tablet Take 40 mg by mouth nightly              Time Spent on discharge is more than 30 minutes in the examination, evaluation, counseling and review of medications and discharge plan. Signed:     Guillermo De La Cruz DO   10/30/2020

## 2020-10-30 NOTE — PROGRESS NOTES
Physical Therapy  Facility/Department: [facility] 5T ORTHO/NEURO  Daily Treatment Note  NAME: Montserrat Ptaino  : 1940  MRN: [id number]    Date of Service: 10/30/2020    Discharge Recommendations:  Montserrat Patino scored a 18/24 on the AM-PAC short mobility form. Current research shows that an AM-PAC score of 18 or greater is typically associated with a discharge to the patient's home setting. Based on the patient's AM-PAC score and their current functional mobility deficits, it is recommended that the patient have 2-3 sessions per week of Physical Therapy at d/c to increase the patient's independence.  At this time, this patient demonstrates the endurance and safety to discharge home with ** (home vs OP services) and a follow up treatment frequency of 2-3x/wk. Please see assessment section for further patient specific details. PT Equipment Recommendations  Equipment Needed: No    Assessment   Assessment: Pt was limited by fatigue and and BLE weakness during gait training. Presented with fair trunk and LE control during bed mobility with the HOB elevated and use of the handrails to get EOB. Transfers and gait training were steady and safe without sequencing cues. Pt refused to sit up in the chair; returned to the supine position. Treatment Diagnosis: impaired mobility  Prognosis: Good  Decision Making: Medium Complexity  PT Education: Goals;PT Role;Plan of Care;Transfer Training;Functional Mobility Training  Patient Education: spouse verbalized understanding; pt with aphasia  REQUIRES PT FOLLOW UP: Yes     Patient Diagnosis(es): The primary encounter diagnosis was Altered mental status, unspecified altered mental status type. A diagnosis of Left arm weakness was also pertinent to this visit.      has a past medical history of CHUYITA (acute kidney injury) ([facility] [address] Allergic rhinitis, Aneurysm ([facility] [address] Aortic aneurysm ([facility] [address] Cancer ([facility] [address] Cholelithiasis, Depression, History of ischemic left MCA stroke, History of ischemic left MCA stroke, Hyperlipidemia, Hypertension, Incontinence, MRSA (methicillin resistant staph aureus) culture positive, Ulcer, and Ulcerative colitis ([facility] [address] has a past surgical history that includes Abdomen surgery; Cholecystectomy, open (5/7/2012); Pressure ulcer debridement (2010); Colonoscopy; Upper gastrointestinal endoscopy; Stomach surgery; ERCP (3/13/12); bronchoscopy (12/11/2012); and ERCP (12-). Restrictions  Position Activity Restriction  Other position/activity restrictions: up as lionel prn  Subjective   General  Additional Pertinent Hx: Adm 10/28 with extremity weakness & aphasia. Head CT (-). Hx of CVA with aphasia. Hx of Stage IV lung adenocarcinoma status post multiple rounds of chemotherapy and left partial nephrectomy. MRI head pending.   Referring Practitioner: Marcial Pastrana MD  Subjective  Subjective: Pt was in bed willing to participate  Pain Screening  Patient Currently in Pain: No  Vital Signs  Patient Currently in Pain: No       Orientation  Orientation  Overall Orientation Status: Impaired  Objective   Bed mobility  Supine to Sit: SBA  Transfers  Sit to Stand:Stand by assistance  Stand to sit: Contact guard assistance  Ambulation  Ambulation?: Yes  Ambulation 1  Device: Rolling Walker  Assistance: Stand by assistance  Quality of Gait: steady gait with walker, no loss of balance  Distance: 120 ft     Balance  Sitting - Static: Good  Sitting - Dynamic: Good  Standing - Static: Fair  Standing - Dynamic: Fair  Exercises  Straight Leg Raise: x10 B  Heelslides: x15 B  Hip Abduction: x15 B  Ankle Pumps: x20 B     AM-PAC Score  AM-PAC Inpatient Mobility Raw Score : 18 (10/30/20 1243)  AM-PAC Inpatient T-Scale Score : 43.63 (10/30/20 1243)  Mobility Inpatient CMS 0-100% Score: 46.58 (10/30/20 1243)  Mobility Inpatient CMS G-Code Modifier : CK (10/30/20 1243)          Goals  Short term goals  Time Frame for Short term goals: dc  Short term goal 1: Bed Mobility S  Short term goal 2: Transfers S  Short term goal 3: Ambulate 100' with RW SBA- MET 10/30.   Revised:  Ambulate 200' with RW S  Patient Goals   Patient goals : unable to state; spouse prefers she come home if moving this well    Plan    Plan  Times per week: 2-5  Current Treatment Recommendations: Balance Training, Functional Mobility Training, Transfer Training, Gait Training  Safety Devices  Type of devices: Left in bed, Nurse notified, Left in chair, Chair alarm in place, Call light within reach     Therapy Time   Individual Concurrent Group Co-treatment   Time In 1225         Time Out 1240         Minutes 15         Timed Code Treatment Minutes: [address], [address] [facility], [address] [facility]

## 2020-10-30 NOTE — DISCHARGE INSTR - COC
Continuity of Care Form    Patient Name: Betsy Sparks   :  1940  MRN:  [id number]    Admit date:  10/28/2020  Discharge date:  10/30/2020    Code Status Order: DNR-CCA   Advance Directives:      Admitting Physician:  Ignacio Yen MD  PCP: Juliann Meyers DO    Discharging Nurse: [facility] Unit/Room#: 1316/3400-41  Discharging Unit Phone Number: ***    Emergency Contact:   Extended Emergency Contact Information  Primary Emergency Contact: Ozzie Verdin  Address: [address] [place] [address] Phone: [phone]  Work Phone: [phone]  Relation: Spouse   needed? No  Secondary Emergency Contact: Mariah Marcelino   [address] Phone: [phone]  Relation: Child   needed?  No    Past Surgical History:  Past Surgical History:   Procedure Laterality Date    ABDOMEN SURGERY      gastric resection for ulcers    BRONCHOSCOPY  2012    CHOLECYSTECTOMY, OPEN  2012    COLONOSCOPY      ERCP  3/13/12    sphincterotomy; 5 Maltese cm stent placed    ERCP  2012    pancreatic stent removal, pancreotogram    PRESSURE ULCER DEBRIDEMENT      gastric    STOMACH SURGERY      for ulcer    UPPER GASTROINTESTINAL ENDOSCOPY         Immunization History:   Immunization History   Administered Date(s) Administered    Influenza Vaccine, unspecified formulation 10/06/2015, 2019    Influenza Virus Vaccine 10/12/2014    Influenza Whole 10/31/2012    Pneumococcal Conjugate 7-valent (Steve Bal) 10/01/2011    Zoster Live (Zostavax) 2012       Active Problems:  Patient Active Problem List   Diagnosis Code    Cellulitis and abscess L03.90, L02.91    UC (ulcerative colitis) ([facility] [address] K51.90    Elevated blood pressure reading R03.0    Nausea & vomiting R11.2    Pancreatic mass K86.89    S/P cholecystectomy Z90.49    Cholelithiasis K80.20    Hypoxemia requiring supplemental oxygen R09.02, Z99.81 Delivery:[id number]:::0}    Wound Care Documentation and Therapy:        Elimination:  Continence: Bowel: {YES / ON:36556}  Bladder: {YES / LOPEZ:59058}  Urinary Catheter: {Urinary Catheter:390776923:::0}   Colostomy/Ileostomy/Ileal Conduit: {YES / OD:42414}       Date of Last BM: ***    Intake/Output Summary (Last 24 hours) at 10/30/2020 1332  Last data filed at 10/30/2020 1300  Gross per 24 hour   Intake 960 ml   Output --   Net 960 ml     I/O last 3 completed shifts:   In: 1 [P.O.:960; I.V.:10]  Out: -     Safety Concerns:     [address] Safety Concerns:596014433:::0}    Impairments/Disabilities:      [address] Impairments/Disabilities:276353954:::0}    Nutrition Therapy:  Current Nutrition Therapy:   [address] Diet List:166349044:::0}    Routes of Feeding: {[facility] DME Other Feedings:016802237:::0}  Liquids: {Slp liquid thickness:57301}  Daily Fluid Restriction: {CHP DME Yes amt example:240369152:::0}  Last Modified Barium Swallow with Video (Video Swallowing Test): {Done Not Done TVQC:664779977:::2}    Treatments at the Time of Hospital Discharge:   Respiratory Treatments: ***  Oxygen Therapy:  {Therapy; copd oxygen:32643:::0}  Ventilator:    {[facility] Vent List:323286839:::0}    Rehab Therapies: {THERAPEUTIC INTERVENTION:1979716266}  Weight Bearing Status/Restrictions: {[facility] Weight Bearin:::0}  Other Medical Equipment (for information only, NOT a DME order):  {EQUIPMENT:992086901}  Other Treatments: ***    Patient's personal belongings (please select all that are sent with patient):  {[facility] DME Belongings:746087497:::0}    RN SIGNATURE:  {Esignature:214069290:::0}    CASE MANAGEMENT/SOCIAL WORK SECTION    Inpatient Status Date: 10/28/2020    Readmission Risk Assessment Score:  Readmission Risk              Risk of Unplanned Readmission:        25           Discharging to Facility/ [address] [place] [address]   Phone: [phone]  Fax: [phone]    / signature: Electronically signed by Ghassan Clayton

## 2020-10-30 NOTE — PLAN OF CARE
Problem: Pain:  Goal: Pain level will decrease  Description: Pain level will decrease  10/30/2020 0745 by Joey Dumont RN  Outcome: Ongoing  Pt denies any pain at this time, will continue to monitor. Problem: Falls - Risk of:  Goal: Will remain free from falls  Description: Will remain free from falls  10/30/2020 0745 by Joey Dumont RN  Outcome: Ongoing  Fall precautions in place. Bed is in lowest position, wheels locked, bed alarm on, non skid socks on. Call light and bedside table within reach. Pt calls out appropriately. Pt is up x1 SBA. Will continue to assess and monitor. Problem: HEMODYNAMIC STATUS  Goal: Patient has stable vital signs and fluid balance  Outcome: Ongoing  VSS, tolerating fluids well, voiding adequately. Will continue to monitor.

## 2020-10-30 NOTE — PROGRESS NOTES
Neurology Consult Note    Updates:  MRI of brain completed and reviewed  Exam stable     Exam:  Blood pressure 138/84, pulse 82, temperature 97.5 °F (36.4 °C), temperature source Oral, resp. rate 18, height 5' 2\" (1.575 m), weight 142 lb 4.8 oz (64.5 kg), SpO2 94 %, not currently breastfeeding. Constitutional                          Vital signs: BP, HR, and RR reviewed            General Alert, no distress, well-nourished  Neurologic  Mental status:   Aphasic, no dysarthria  Cranial nerves:   CN2: Visual Fields full w/o extinction on confrontational testing,   CN 3,4,6: extraocular muscles intact,  CN5: facial sensation symmetric   CN7:face symmetric without dysarthria,   Strength: No prontator drift. Good strength in all 4 extremities       Impression:  Macho Maciel is a 68 y.o. female who presented with intermittent bilateral leg weakness and L arm weakness, no resolved. MRI of brain shows no new strokes, L posterior ischemia now appears subacute, does enhance though pattern c/w stroke, does not seem c/w mets. Has probable UTI and receiving empiric treatment. Symptoms most likely related to UTI. Less likely seizure, no mets on MRI, no new strokes. Recommendations:  Continue ASA / Plavix  MRI reassuring   No need for EEG w/ improvement in exam w/ treatment of probable UTI  No further inpatient w/u from neurology standpoint  Follow up w/ Dr. Greg Matthew as previously recommended in 1-2 months  We will sign off. Please call with questions.      A copy of this note was provided for Dr Ara Cordero, MD Victor Hugo Francisco, APRN - CNP  Neurology

## 2020-10-30 NOTE — PROGRESS NOTES
Pt is A&O x3, disoriented to time. Denies n/v and pain at this time. Tolerating diet well. Voiding adequately per bathroom. Neuro checks remain unchanged, NIH of 1. All fall precautions in place. Will continue to monitor.

## 2020-10-30 NOTE — CARE COORDINATION
Case Management            Discharge Note                    Date / Time of Note: 10/30/2020 1:37 PM                  Discharge Note Completed by: Ofelia Amherst Day    Patient Name: Raine Lewis   YOB: 1940  Diagnosis: Weakness of left upper extremity [R29.898]   Date / Time: 10/28/2020  7:27 PM    Current PCP: Susie Patel DO  Clinic patient: No    Hospitalization in the last 30 days: No    Advance Directives:  Code Status: DNR-CCA  [facility] DNR form completed and on chart: Not Indicated    Financial:  Payor: Claribel Gurrola / Plan: Arnulfo Vee PPO / Product Type: Medicare /      Pharmacy:    [facility] [address] [facility], [address]  [address] [facility] [address]  Phone: [phone] Fax: [phone]      Assistance purchasing medications?:    Assistance provided by Case Management: None at this time    Does patient want to participate in local refill/ meds to beds program?:      Meds To Beds General Rules:  1. Can ONLY be done Monday- Friday between 8:30am-5pm  2. Prescription(s) must be in pharmacy by 3pm to be filled same day  3. Copy of patient's insurance/ prescription drug card and patient face sheet must be sent along with the prescription(s)  4. Cost of Rx cannot be added to hospital bill. If financial assistance is needed, please contact unit  or ;  or  CANNOT provide pharmacy voucher for patients co-pays  5.  Patients can then  the prescription on their way out of the hospital at discharge, or pharmacy can deliver to the bedside if staff is available. (payment due at time of pick-up or delivery - cash, check, or card accepted)     Able to afford home medications/ co-pay costs: No    ADLS:  Current PT AM-PAC Score: 18 /24  Current OT AM-PAC Score: 17 /24      Discharge Disposition: Home with [address]: PT,OT,Skilled Nursing      LOC at discharge: Skilled  AMRITA Completed: Yes    Notification completed in HENS/PAS?:  No    IMM Completed:   No    Transportation:  Transportation Plan for discharge: family   Mode of Transport: [address]  ordered at discharge: Yes  [address]: [facility]   Phone: [phone]  Fax: [phone]  Orders faxed: Yes    Durable Medical Equipment:  DME Provider: None   Equipment obtained during hospitalization: No New DME     Home Oxygen and Respiratory Equipment:  Oxygen needed at discharge?: Not Indicated    Referrals made at [facility] for outpatient continued care:  Not Applicable    Additional CM Notes:   SW met with patient at bedside this morning. Patient to discharge home today. Patient agreeable to resume care with [facility] for PT,OT and Nursing. Spouse to transport at discharge. SW updated AMRITA and worked with MD to receive home health care orders. SW notified bedside RN of plan. SW encouraged patient to continue to work with home care, keep all outpatient appointments, and stay medication compliant     The Plan for Transition of Care is related to the following treatment goals Weakness of left upper extremity [R29.898]      The Patient and/or patient representative Patient  was provided with a choice of provider and agrees with the discharge plan Yes    Freedom of choice list was provided with basic dialogue that supports the patient's individualized plan of care/goals and shares the quality data associated with the providers.  Yes    Care Transitions patient: No    VINNIE Malcolm  [facility]  Case Management Department  Ph: [phone]

## 2020-10-30 NOTE — PLAN OF CARE
Problem: Pain:  Goal: Pain level will decrease  Description: Pain level will decrease  Outcome: Ongoing  Note: Patient currently denies pain. Will continue to assess and monitor. Problem: Falls - Risk of:  Goal: Will remain free from falls  Description: Will remain free from falls  10/30/2020 0014 by Beryle Keto, RN  Outcome: Ongoing  Note: Patient will remain free from falls. Patient alert and orientated and uses call light appropriately. Fall precautions in place. Bed locked and in lowest position, bed alarm on, nonskid socks on. Call light within reach.

## 2020-10-30 NOTE — PROGRESS NOTES
Patient alert and orientated. VSS. Neuro status stable and unchanged. NIH 1. Patient denies needs at this time. Fall precautions in place. Call light within reach. Will continue to assess and monitor.

## 2020-10-30 NOTE — CONSULTS
Pharmacy Medication Reconciliation Note     List of medications patient is currently taking is complete. Source of information:   1. Interview with Anupama Deangelo  2. Catherine Gastelum cited discharge medication list from [address] [facility]    Notes regarding home medications:   1. Patient was receiving amlodipine 10 mg daily at [facility], but resumed taking nifedipine ER 90 mg daily after discharge home on 10/23. Home health RN clarified with patient's PCP on 10/27 who would like patient to d/c nifedipine and instead take amlodipine 10 mg daily. Will notify Dr Dominguez Pantoja of the above. Please call with questions.    Thanks -   Keerthi Elmore, PharmD, BCPS  10/30/2020 12:01 PM  Wireless: [phone] or [phone]

## 2020-10-31 LAB
ORGANISM: ABNORMAL
URINE CULTURE, ROUTINE: ABNORMAL
URINE CULTURE, ROUTINE: ABNORMAL

## 2020-10-31 PROCEDURE — 0296T PR EXT ECG > 48HR TO 21 DAY RCRD W/CONECT INTL RCRD: CPT | Performed by: INTERNAL MEDICINE

## 2020-11-05 NOTE — PROGRESS NOTES
Physician Progress Note      PATIENT:               Cate Godinez  CSN #:                  [id number]  :                       1940  ADMIT DATE:       10/28/2020 7:27 PM  [address] [place] DATE:        10/30/2020 3:09 PM  RESPONDING  PROVIDER #:        Elizabeth Gaston DO          QUERY TEXT:    Pt admitted with weaknes, confusion and UTI. Per Neurology consultant on 10/30   \"Symptoms most likely related to UTI\". If possible, please document in   progress notes and discharge summary:    The medical record reflects the following:  Risk Factors: UTI,  Recent CVA  Clinical Indicators: Per Neuro 10/30 \"Has probable UTI and receiving empiric   treatment. Symptoms most likely related to UTI. Less likely seizure, no mets   on MRI, no new strokes\"; Urine cx on admit: Citrobacter braakii; Acute   posterior left MCA stroke 10/15/2020; MRI 10/29 neg for acute infarct/ Neuro  Treatment: Rocephin, ASA, Plavix, MRI  Options provided:  -- Weakness & metabolic encephalopathy 2/2 UTI  -- Weakness & encephalopathy 2/2 sequela of recent CVA  -- Weakness & metabolic encephalopathy 2/2 other, Please specify. -- Other - I will add my own diagnosis  -- Disagree - Not applicable / Not valid  -- Disagree - Clinically unable to determine / Unknown  -- Refer to Clinical Documentation Reviewer    PROVIDER RESPONSE TEXT:    Weakness & metabolic encephalopathy secondary to UTI. Query created by:  Almas Hall on 11/3/2020 4:02 PM      Electronically signed by:  Elizabeth Gaston DO 2020 8:49 AM

## 2020-11-18 PROCEDURE — 0298T PR EXT ECG > 48HR TO 21 DAY REVIEW AND INTERPRETATN: CPT | Performed by: INTERNAL MEDICINE

## 2020-11-24 LAB — REPORT: NORMAL

## 2020-12-04 NOTE — PROGRESS NOTES
17-Jan-2021 Pt is back from MRI, the pt does try to get out of bed when staff isn't in the room, I will stay in the room for pt safety. When I cannot be in room the pt is also on camera and on the bed alarm as well. The pt is calm and stays in bed when staff is in the room at this time, pt still has expressive aphasia some words she says make sense while others do not.   Darwin Marie

## 2021-01-27 ENCOUNTER — HOSPITAL ENCOUNTER (OUTPATIENT)
Dept: CT IMAGING | Age: 81
Discharge: HOME | End: 2021-01-27
Payer: MEDICARE

## 2021-01-27 DIAGNOSIS — C34.90 PRIMARY MALIGNANT NEOPLASM OF LUNG METASTATIC TO OTHER SITE, UNSPECIFIED LATERALITY (HCC): ICD-10-CM

## 2021-01-27 LAB
GFR AFRICAN AMERICAN: >60
GFR NON-AFRICAN AMERICAN: >60
PERFORMED ON: NORMAL
POC CREATININE: 0.8 MG/DL (ref 0.6–1.2)
POC SAMPLE TYPE: NORMAL

## 2021-01-27 PROCEDURE — 6360000004 HC RX CONTRAST MEDICATION: Performed by: INTERNAL MEDICINE

## 2021-01-27 PROCEDURE — 74177 CT ABD & PELVIS W/CONTRAST: CPT

## 2021-01-27 PROCEDURE — 82565 ASSAY OF CREATININE: CPT

## 2021-01-27 RX ADMIN — IOPAMIDOL 80 ML: 755 INJECTION, SOLUTION INTRAVENOUS at 14:14

## 2021-08-05 ENCOUNTER — HOSPITAL ENCOUNTER (INPATIENT)
Age: 81
LOS: 2 days | Discharge: HOME | DRG: 919 | End: 2021-08-07
Attending: EMERGENCY MEDICINE | Admitting: INTERNAL MEDICINE
Payer: MEDICARE

## 2021-08-05 DIAGNOSIS — K92.2 UPPER GI BLEED: Primary | ICD-10-CM

## 2021-08-05 LAB
A/G RATIO: 0.9 (ref 1.1–2.2)
ABO/RH: NORMAL
ALBUMIN SERPL-MCNC: 3.4 G/DL (ref 3.4–5)
ALP BLD-CCNC: 231 U/L (ref 40–129)
ALT SERPL-CCNC: 19 U/L (ref 10–40)
ANION GAP SERPL CALCULATED.3IONS-SCNC: 12 MMOL/L (ref 3–16)
ANTIBODY SCREEN: NORMAL
AST SERPL-CCNC: 18 U/L (ref 15–37)
BACTERIA: ABNORMAL /HPF
BASE EXCESS VENOUS: -0.9 MMOL/L (ref -2–3)
BASOPHILS ABSOLUTE: 0 K/UL (ref 0–0.2)
BASOPHILS RELATIVE PERCENT: 0.3 %
BILIRUB SERPL-MCNC: 0.3 MG/DL (ref 0–1)
BILIRUBIN URINE: NEGATIVE
BLOOD, URINE: NEGATIVE
BUN BLDV-MCNC: 45 MG/DL (ref 7–20)
CALCIUM SERPL-MCNC: 8.9 MG/DL (ref 8.3–10.6)
CARBOXYHEMOGLOBIN: 1.6 % (ref 0–1.5)
CHLORIDE BLD-SCNC: 105 MMOL/L (ref 99–110)
CLARITY: CLEAR
CO2: 22 MMOL/L (ref 21–32)
COLOR: YELLOW
CREAT SERPL-MCNC: 1 MG/DL (ref 0.6–1.2)
EKG ATRIAL RATE: 94 BPM
EKG DIAGNOSIS: NORMAL
EKG P AXIS: 72 DEGREES
EKG P-R INTERVAL: 140 MS
EKG Q-T INTERVAL: 362 MS
EKG QRS DURATION: 68 MS
EKG QTC CALCULATION (BAZETT): 452 MS
EKG R AXIS: 39 DEGREES
EKG T AXIS: 75 DEGREES
EKG VENTRICULAR RATE: 94 BPM
EOSINOPHILS ABSOLUTE: 0.2 K/UL (ref 0–0.6)
EOSINOPHILS RELATIVE PERCENT: 2.2 %
GFR AFRICAN AMERICAN: >60
GFR NON-AFRICAN AMERICAN: 53
GLOBULIN: 3.7 G/DL
GLUCOSE BLD-MCNC: 128 MG/DL (ref 70–99)
GLUCOSE URINE: NEGATIVE MG/DL
HCO3 VENOUS: 25.2 MMOL/L (ref 24–28)
HCT VFR BLD CALC: 25.5 % (ref 36–48)
HEMOGLOBIN, VEN, REDUCED: 45.1 %
HEMOGLOBIN: 8.4 G/DL (ref 12–16)
INR BLD: 0.99 (ref 0.88–1.12)
KETONES, URINE: NEGATIVE MG/DL
LACTIC ACID: 2.5 MMOL/L (ref 0.4–2)
LEUKOCYTE ESTERASE, URINE: NEGATIVE
LYMPHOCYTES ABSOLUTE: 1.1 K/UL (ref 1–5.1)
LYMPHOCYTES RELATIVE PERCENT: 10.8 %
MCH RBC QN AUTO: 28.6 PG (ref 26–34)
MCHC RBC AUTO-ENTMCNC: 33 G/DL (ref 31–36)
MCV RBC AUTO: 86.7 FL (ref 80–100)
METHEMOGLOBIN VENOUS: 0.6 % (ref 0–1.5)
MICROSCOPIC EXAMINATION: YES
MONOCYTES ABSOLUTE: 0.5 K/UL (ref 0–1.3)
MONOCYTES RELATIVE PERCENT: 4.9 %
NEUTROPHILS ABSOLUTE: 8.5 K/UL (ref 1.7–7.7)
NEUTROPHILS RELATIVE PERCENT: 81.8 %
NITRITE, URINE: POSITIVE
O2 SAT, VEN: 54 %
PCO2, VEN: 48.4 MMHG (ref 41–51)
PDW BLD-RTO: 15.4 % (ref 12.4–15.4)
PH UA: 5.5 (ref 5–8)
PH VENOUS: 7.33 (ref 7.35–7.45)
PLATELET # BLD: 286 K/UL (ref 135–450)
PMV BLD AUTO: 7.5 FL (ref 5–10.5)
PO2, VEN: 33 MMHG (ref 25–40)
POC OCCULT BLOOD STOOL: POSITIVE
POTASSIUM REFLEX MAGNESIUM: 4 MMOL/L (ref 3.5–5.1)
PRO-BNP: 103 PG/ML (ref 0–449)
PROTEIN UA: NEGATIVE MG/DL
PROTHROMBIN TIME: 11.2 SEC (ref 9.9–12.7)
RBC # BLD: 2.94 M/UL (ref 4–5.2)
RBC UA: ABNORMAL /HPF (ref 0–4)
SODIUM BLD-SCNC: 139 MMOL/L (ref 136–145)
SPECIFIC GRAVITY UA: 1.01 (ref 1–1.03)
TCO2 CALC VENOUS: 27 MMOL/L
TOTAL PROTEIN: 7.1 G/DL (ref 6.4–8.2)
TROPONIN: 0.02 NG/ML
URINE TYPE: ABNORMAL
UROBILINOGEN, URINE: 0.2 E.U./DL
WBC # BLD: 10.4 K/UL (ref 4–11)
WBC UA: ABNORMAL /HPF (ref 0–5)

## 2021-08-05 PROCEDURE — 2580000003 HC RX 258: Performed by: INTERNAL MEDICINE

## 2021-08-05 PROCEDURE — 96365 THER/PROPH/DIAG IV INF INIT: CPT

## 2021-08-05 PROCEDURE — 81001 URINALYSIS AUTO W/SCOPE: CPT

## 2021-08-05 PROCEDURE — 82272 OCCULT BLD FECES 1-3 TESTS: CPT

## 2021-08-05 PROCEDURE — C9113 INJ PANTOPRAZOLE SODIUM, VIA: HCPCS | Performed by: EMERGENCY MEDICINE

## 2021-08-05 PROCEDURE — 86923 COMPATIBILITY TEST ELECTRIC: CPT

## 2021-08-05 PROCEDURE — 80053 COMPREHEN METABOLIC PANEL: CPT

## 2021-08-05 PROCEDURE — 6360000002 HC RX W HCPCS: Performed by: INTERNAL MEDICINE

## 2021-08-05 PROCEDURE — 99284 EMERGENCY DEPT VISIT MOD MDM: CPT

## 2021-08-05 PROCEDURE — 82803 BLOOD GASES ANY COMBINATION: CPT

## 2021-08-05 PROCEDURE — 84484 ASSAY OF TROPONIN QUANT: CPT

## 2021-08-05 PROCEDURE — 96375 TX/PRO/DX INJ NEW DRUG ADDON: CPT

## 2021-08-05 PROCEDURE — C9113 INJ PANTOPRAZOLE SODIUM, VIA: HCPCS | Performed by: INTERNAL MEDICINE

## 2021-08-05 PROCEDURE — 2580000003 HC RX 258: Performed by: EMERGENCY MEDICINE

## 2021-08-05 PROCEDURE — P9016 RBC LEUKOCYTES REDUCED: HCPCS

## 2021-08-05 PROCEDURE — 86900 BLOOD TYPING SEROLOGIC ABO: CPT

## 2021-08-05 PROCEDURE — 85018 HEMOGLOBIN: CPT

## 2021-08-05 PROCEDURE — 86901 BLOOD TYPING SEROLOGIC RH(D): CPT

## 2021-08-05 PROCEDURE — 93005 ELECTROCARDIOGRAM TRACING: CPT | Performed by: EMERGENCY MEDICINE

## 2021-08-05 PROCEDURE — 85014 HEMATOCRIT: CPT

## 2021-08-05 PROCEDURE — 83605 ASSAY OF LACTIC ACID: CPT

## 2021-08-05 PROCEDURE — 85025 COMPLETE CBC W/AUTO DIFF WBC: CPT

## 2021-08-05 PROCEDURE — 6370000000 HC RX 637 (ALT 250 FOR IP): Performed by: INTERNAL MEDICINE

## 2021-08-05 PROCEDURE — 6360000002 HC RX W HCPCS: Performed by: EMERGENCY MEDICINE

## 2021-08-05 PROCEDURE — 36415 COLL VENOUS BLD VENIPUNCTURE: CPT

## 2021-08-05 PROCEDURE — 86850 RBC ANTIBODY SCREEN: CPT

## 2021-08-05 PROCEDURE — 85610 PROTHROMBIN TIME: CPT

## 2021-08-05 PROCEDURE — 83880 ASSAY OF NATRIURETIC PEPTIDE: CPT

## 2021-08-05 PROCEDURE — 1200000000 HC SEMI PRIVATE

## 2021-08-05 RX ORDER — SODIUM CHLORIDE 0.9 % (FLUSH) 0.9 %
5-40 SYRINGE (ML) INJECTION PRN
Status: DISCONTINUED | OUTPATIENT
Start: 2021-08-05 | End: 2021-08-07 | Stop reason: HOSPADM

## 2021-08-05 RX ORDER — SODIUM CHLORIDE 9 MG/ML
25 INJECTION, SOLUTION INTRAVENOUS PRN
Status: DISCONTINUED | OUTPATIENT
Start: 2021-08-05 | End: 2021-08-07 | Stop reason: HOSPADM

## 2021-08-05 RX ORDER — LANOLIN ALCOHOL/MO/W.PET/CERES
3 CREAM (GRAM) TOPICAL NIGHTLY PRN
Status: DISCONTINUED | OUTPATIENT
Start: 2021-08-05 | End: 2021-08-07 | Stop reason: HOSPADM

## 2021-08-05 RX ORDER — ATORVASTATIN CALCIUM 40 MG/1
40 TABLET, FILM COATED ORAL NIGHTLY
Status: DISCONTINUED | OUTPATIENT
Start: 2021-08-05 | End: 2021-08-07 | Stop reason: HOSPADM

## 2021-08-05 RX ORDER — PANTOPRAZOLE SODIUM 40 MG/10ML
40 INJECTION, POWDER, LYOPHILIZED, FOR SOLUTION INTRAVENOUS EVERY 12 HOURS
Status: DISCONTINUED | OUTPATIENT
Start: 2021-08-05 | End: 2021-08-07 | Stop reason: HOSPADM

## 2021-08-05 RX ORDER — SODIUM CHLORIDE, SODIUM LACTATE, POTASSIUM CHLORIDE, AND CALCIUM CHLORIDE .6; .31; .03; .02 G/100ML; G/100ML; G/100ML; G/100ML
1000 INJECTION, SOLUTION INTRAVENOUS ONCE
Status: COMPLETED | OUTPATIENT
Start: 2021-08-05 | End: 2021-08-05

## 2021-08-05 RX ORDER — ACETAMINOPHEN 325 MG/1
650 TABLET ORAL EVERY 6 HOURS PRN
Status: DISCONTINUED | OUTPATIENT
Start: 2021-08-05 | End: 2021-08-07 | Stop reason: HOSPADM

## 2021-08-05 RX ORDER — ATORVASTATIN CALCIUM 40 MG/1
40 TABLET, FILM COATED ORAL NIGHTLY
COMMUNITY

## 2021-08-05 RX ORDER — SODIUM CHLORIDE 0.9 % (FLUSH) 0.9 %
5-40 SYRINGE (ML) INJECTION EVERY 12 HOURS SCHEDULED
Status: DISCONTINUED | OUTPATIENT
Start: 2021-08-05 | End: 2021-08-07 | Stop reason: HOSPADM

## 2021-08-05 RX ORDER — CLOPIDOGREL BISULFATE 75 MG/1
75 TABLET ORAL NIGHTLY
Status: ON HOLD | COMMUNITY
End: 2021-08-07 | Stop reason: SDUPTHER

## 2021-08-05 RX ORDER — ACETAMINOPHEN 650 MG/1
650 SUPPOSITORY RECTAL EVERY 6 HOURS PRN
Status: DISCONTINUED | OUTPATIENT
Start: 2021-08-05 | End: 2021-08-07 | Stop reason: HOSPADM

## 2021-08-05 RX ORDER — ONDANSETRON 4 MG/1
4 TABLET, ORALLY DISINTEGRATING ORAL EVERY 8 HOURS PRN
Status: DISCONTINUED | OUTPATIENT
Start: 2021-08-05 | End: 2021-08-07 | Stop reason: HOSPADM

## 2021-08-05 RX ORDER — CITALOPRAM 40 MG/1
40 TABLET ORAL DAILY
Status: DISCONTINUED | OUTPATIENT
Start: 2021-08-06 | End: 2021-08-07 | Stop reason: HOSPADM

## 2021-08-05 RX ORDER — ONDANSETRON 2 MG/ML
4 INJECTION INTRAMUSCULAR; INTRAVENOUS EVERY 6 HOURS PRN
Status: DISCONTINUED | OUTPATIENT
Start: 2021-08-05 | End: 2021-08-07 | Stop reason: HOSPADM

## 2021-08-05 RX ORDER — SODIUM CHLORIDE 9 MG/ML
10 INJECTION INTRAVENOUS EVERY 12 HOURS
Status: DISCONTINUED | OUTPATIENT
Start: 2021-08-05 | End: 2021-08-07 | Stop reason: HOSPADM

## 2021-08-05 RX ORDER — ASPIRIN 81 MG/1
81 TABLET ORAL DAILY
COMMUNITY

## 2021-08-05 RX ORDER — SODIUM CHLORIDE 9 MG/ML
INJECTION, SOLUTION INTRAVENOUS CONTINUOUS
Status: DISCONTINUED | OUTPATIENT
Start: 2021-08-05 | End: 2021-08-07

## 2021-08-05 RX ADMIN — PANTOPRAZOLE SODIUM 40 MG: 40 INJECTION, POWDER, FOR SOLUTION INTRAVENOUS at 18:09

## 2021-08-05 RX ADMIN — PANTOPRAZOLE SODIUM 80 MG: 40 INJECTION, POWDER, FOR SOLUTION INTRAVENOUS at 15:29

## 2021-08-05 RX ADMIN — Medication 10 ML: at 21:02

## 2021-08-05 RX ADMIN — Medication 10 ML: at 21:01

## 2021-08-05 RX ADMIN — SODIUM CHLORIDE: 9 INJECTION, SOLUTION INTRAVENOUS at 17:52

## 2021-08-05 RX ADMIN — CEFTRIAXONE 1000 MG: 1 INJECTION, POWDER, FOR SOLUTION INTRAMUSCULAR; INTRAVENOUS at 15:20

## 2021-08-05 RX ADMIN — ATORVASTATIN CALCIUM 40 MG: 40 TABLET, FILM COATED ORAL at 21:01

## 2021-08-05 RX ADMIN — Medication 10 ML: at 18:10

## 2021-08-05 RX ADMIN — SODIUM CHLORIDE, POTASSIUM CHLORIDE, SODIUM LACTATE AND CALCIUM CHLORIDE 1000 ML: 600; 310; 30; 20 INJECTION, SOLUTION INTRAVENOUS at 15:23

## 2021-08-05 ASSESSMENT — ENCOUNTER SYMPTOMS
COUGH: 0
DIARRHEA: 0
RHINORRHEA: 0
BACK PAIN: 0
ABDOMINAL PAIN: 1
NAUSEA: 0
SHORTNESS OF BREATH: 0
VOMITING: 0
EYES NEGATIVE: 1
RESPIRATORY NEGATIVE: 1

## 2021-08-05 ASSESSMENT — PAIN SCALES - GENERAL: PAINLEVEL_OUTOF10: 0

## 2021-08-05 NOTE — ED NOTES
Home Medication List is complete. Spoke with patient and  in the ED.  very knowledgeable about her medications. Confirmed with [employer]. Patient states that she did have routine morning meds today.       Changes made to the Home Medication List:   Removed:    [address]uelos PharmD., [facility]   8/5/2021 4:24 PM  Wireless: [phone]

## 2021-08-05 NOTE — ACP (ADVANCE CARE PLANNING)
ADVANCED CARE PLANNING    Name:Wilma Verdin       :  1940              MRN:  [id number]      Purpose of Encounter: Advanced care planning in light of stage IV lung cancer, GI bleed. Parties in attendance: :Guillermina Pretty, Arden Tipton MD, Family members: Lauryn Lezama  Decisional Capacity:Yes    Subjective/Patient Story: Patient is 72-year-old female with history of stage IV lung cancer, prior CVA on DAPT came into ER with low energy noted to have GI bleed. Status discussed in light of stage IV lung cancer and GI bleed in light of previous PCP note when patient was referred to hospice service due to declining mental status. Patient wished to be limited code DNR/DNI    Goals of Care Determinations: Patient wishes to focus on medical management. Plan: Will notify Marium Arnold DO of change in care plan. Will look at further interventions as needed. Code Status: At this time patient wishes to be Prior admitted code DNR/DNI yes for resuscitative medications, blood transfusion, defibrillation    Time Spent on Advanced Planning Documents: 17 minutes    Advanced Care Planning Documents: Completed advances directives, advanced directives in chart. Electronically signed by Arden Tipton MD on 2021 at 5:01 PM  Thank you Marium Arnold DO for the opportunity to be involved in this patient's care. If you have any questions or concerns please feel free to contact me at [phone].

## 2021-08-05 NOTE — PROGRESS NOTES
4 Eyes Admission Assessment     I agree as the admission nurse that 2 RN's have performed a thorough Head to Toe Skin Assessment on the patient. ALL assessment sites listed below have been assessed on admission. Areas assessed by both nurses: arjun and idania  [x]   Head, Face, and Ears   [x]   Shoulders, Back, and Chest  [x]   Arms, Elbows, and Hands   [x]   Coccyx, Sacrum, and Ischum  [x]   Legs, Feet, and Heels        Does the Patient have Skin Breakdown?   No         Margarito Prevention initiated:  Yes   Wound Care Orders initiated:  No      [facility] nurse consulted for Pressure Injury (Stage 3,4, Unstageable, DTI, NWPT, and Complex wounds):  No      Nurse 1 eSignature: Electronically signed by Suzanne Segal RN on 8/5/21 at 7:22 PM EDT    **SHARE this note so that the co-signing nurse is able to place an eSignature**    Nurse 2 eSignature: {Esignature:[id number]}

## 2021-08-05 NOTE — H&P
Hospital Medicine History & Physical      PCP: Art Holliday DO    Date of Admission: 8/5/2021    Date of Service: Pt seen/examined on 8/5/2021 and Admitted to Inpatient with expected LOS greater than two midnights due to medical therapy. Chief Complaint:  Generalized weakness      History Of Present Illness: Patient is 82-year-old female with history of stage IV lung cancer, CVA with mild expressive aphasia residual, hypertension hypertension came into ER with a complaint of generalized weakness. Patient reported that she is having on and off abdominal pain for last few weeks episodic cannot elicit any aggravating or elevating factors denies any nausea, vomiting, fever or chills. This morning she was trying to go to the bathroom was too weak to take a shower so came into ER. She also noticed dark bowel movements this morning. She also endorses weakness progressively getting worse for last week or 2 denies any lightheadedness or dizziness. On arrival patient was noted to be hypotensive. Hemoglobin 8.4 hemoglobin April 2021 11.5. Per ED physician patient had naren melena. Last PCP office note reviewed. Patient's functional status has been declining progressively. Past Medical History:          Diagnosis Date    CHUYITA [facility]acute kidney [facility]) ([facility] [address] 5/1/2018    Allergic rhinitis     Aneurysm ([facility] [address]     aorta  watching now    Aortic aneurysm [facility])     Currently has     Cancer [facility])     Lung    Cholelithiasis     Depression     History of ischemic left MCA stroke 9/30/2020    With residual expressive aphasia. Noted to be subacute ~ 8days old on MRI 9/29/2020.      History of ischemic left MCA stroke 9/30/2020    Hyperlipidemia     Hypertension     Incontinence     bladder    MRSA (methicillin resistant staph aureus) culture positive 12/28/11    Leg    Ulcer     Ulcerative colitis ([facility] [address]        Past Surgical History:          Procedure Laterality Date    ABDOMEN SURGERY gastric resection for ulcers    BRONCHOSCOPY  12/11/2012    CHOLECYSTECTOMY, OPEN  5/7/2012    COLONOSCOPY      ERCP  3/13/12    sphincterotomy; 5 Indonesian cm stent placed    ERCP  12-    pancreatic stent removal, pancreotogram    PRESSURE ULCER DEBRIDEMENT  2010    gastric    STOMACH SURGERY      for ulcer    UPPER GASTROINTESTINAL ENDOSCOPY         Medications Prior to Admission:      Prior to Admission medications    Medication Sig Start Date End Date Taking? Authorizing Provider   clopidogrel (PLAVIX) 75 MG tablet Take 75 mg by mouth nightly   Yes Historical Provider, MD   aspirin 81 MG EC tablet Take 81 mg by mouth daily   Yes Historical Provider, MD   atorvastatin (LIPITOR) 40 MG tablet Take 40 mg by mouth nightly   Yes Historical Provider, MD   amLODIPine (NORVASC) 10 MG tablet Take 10 mg by mouth daily   Yes Historical Provider, MD   citalopram (CELEXA) 40 MG tablet Take 40 mg by mouth daily    Yes Historical Provider, MD   pantoprazole (PROTONIX) 40 MG tablet Take 40 mg by mouth nightly     Historical Provider, MD       Allergies:  Patient has no known allergies. Social History:      The patient currently lives home uses walker    TOBACCO:   reports that she quit smoking about 12 years ago. She has a 75.00 pack-year smoking history. She has never used smokeless tobacco.  ETOH:   reports no history of alcohol use. Family History:       Reviewed in detail and negative for DM, CAD, Cancer, CVA. Positive as follows:        Problem Relation Age of Onset    Arthritis Mother        REVIEW OF SYSTEMS:   Pertinent positives as noted in the HPI. All other systems reviewed and negative. PHYSICAL EXAM PERFORMED:    /64   Pulse 92   Resp 16   SpO2 98%     General appearance: Elderly, frail, appears stated age and cooperative. HEENT:  Normal cephalic, atraumatic without obvious deformity. Pupils equal, round, and reactive to light. Extra ocular muscles intact.  Conjunctivae pale/corneas clear.  Neck: Supple, with full range of motion. No jugular venous distention. Trachea midline. Respiratory:  Normal respiratory effort. Clear to auscultation, bilaterally without Rales/Wheezes/Rhonchi. Cardiovascular:  Regular rate and rhythm with normal S1/S2 without murmurs, rubs or gallops. Abdomen: Soft, non-tender, non-distended with normal bowel sounds. Previous laparotomy scar with hernia  Musculoskeletal:  No clubbing, cyanosis or edema bilaterally. Full range of motion without deformity. Skin: Skin color pale, texture, turgor normal.  No rashes or lesions. Neurologic:  Neurovascularly intact without any focal sensory/motor deficits. Cranial nerves: II-XII intact, grossly non-focal.  Some expressive aphasia  Psychiatric:  Alert and oriented, thought content appropriate, normal insight  Capillary Refill: Brisk,< 3 seconds   Peripheral Pulses: +2 palpable, equal bilaterally       Labs:     Recent Labs     08/05/21  1355   WBC 10.4   HGB 8.4*   HCT 25.5*        Recent Labs     08/05/21  1355      K 4.0      CO2 22   BUN 45*   CREATININE 1.0   CALCIUM 8.9     Recent Labs     08/05/21  1355   AST 18   ALT 19   BILITOT 0.3   ALKPHOS 231*     Recent Labs     08/05/21  1355   INR 0.99     Recent Labs     08/05/21  1355   TROPONINI 0.02*       Urinalysis:      Lab Results   Component Value Date    NITRU POSITIVE 08/05/2021    WBCUA 0-2 08/05/2021    BACTERIA 2+ 08/05/2021    RBCUA None seen 08/05/2021    BLOODU Negative 08/05/2021    SPECGRAV 1.015 08/05/2021    GLUCOSEU Negative 08/05/2021    GLUCOSEU Negative 05/08/2012       Radiology:     CXR: I have reviewed the CXR with the following interpretation: N/A  EKG:  I have reviewed the EKG with the following interpretation: Normal sinus rhythm, nonspecific ST changes.   Ventricular rate 94    No orders to display       ASSESSMENT:    Active Hospital Problems    Diagnosis Date Noted    GI bleed [K92.2] 08/05/2021     #Acute blood loss anemia  #Suspected upper GI bleed  Patient has a history of gastric ulcers. On DAPT due to her stroke  IV Protonix 40 twice daily, clear liquid diet every 6 hemoglobin check,   Transfuse if hemoglobin is less than 7. IV fluid resuscitation. GI consulted. Per  patient had a EGD and colonoscopy done with Dr. Neida Wagner in past unable to find records. 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 Small hiatal hernia. Normal colonoscopy    #History of prior CVA with expressive aphasia  Hold DAPT continue statin. #Hypertension currently hypotensive hold amlodipine. #Stage IV lung cancer  Follow-up with [facility] as an outpatient    PLAN:        DVT Prophylaxis: SCD  Diet: No diet orders on file clear liquid diet  Code Status: Prior admitted code DNR/DNI    PT/OT Eval Status: Consulted    Dispo - inpatient GI eval    This chart was likely completed using voice recognition technology and may contain unintended grammatical , phraseology,and/or punctuation errors         José Miguel Umana MD    Thank you Lise Leon DO for the opportunity to be involved in this patient's care. If you have any questions or concerns please feel free to contact me at [phone]. no chest pain and no edema.

## 2021-08-05 NOTE — ED PROVIDER NOTES
[address] [facility]          ATTENDING PHYSICIAN NOTE       Date of evaluation: 8/5/2021    Chief Complaint     Fatigue and Rectal Bleeding (very dark in color x1 episode)      History of Present Illness     Marisol Sorto is a 68 y.o. female who presents by EMS from home with fatigue and generalized weakness. The patient states that she was feeling well yesterday, but today she has been so fatigued and generally weak that she has had difficulty getting out of bed. She states that she took a shower late in the morning this morning, and then went right back to bed. She does state that she has been having some difficulty with intermittent abdominal pain for about the last week. She states that it is not current presently, but that it has been painful when she eats. She denies any nausea or vomiting, and states that she has been eating a good diet. She denies any falls. She denies any current pain. She denies any recent illnesses, URI symptoms, cough, shortness of breath. She denies any headaches, visual changes, focal numbness or weakness of her extremities. She does state that she sometimes has difficulty speaking due to a prior stroke, but this is unchanged from previously. She denies any changes in bowel habits or urinary symptoms. It is noted that there is a phone note from her primary care provider's office that her  had called and indicated that she had fallen and that he was finally able to get her back into bed, but that he is having difficulty taking care of her on his own. At the time of my initial interview of the patient, family members are not present for ancillary history.     Patient's  later does present to the emergency department, and is able to give further history that she has been very weak today, and that although she did not fall at home, she came near to falling while walking down the hallway with her walker, and he and another family lowered her to the floor. There was no trauma associated with this event. He also noted that she had very dark stool today, almost black, which was concerning to them for bleeding. Review of Systems     Review of Systems   Constitutional: Positive for activity change and fatigue. Negative for appetite change, chills and fever. HENT: Negative. Negative for congestion and rhinorrhea. Eyes: Negative. Negative for visual disturbance. Respiratory: Negative. Negative for cough and shortness of breath. Cardiovascular: Negative. Negative for chest pain. Gastrointestinal: Positive for abdominal pain. Negative for diarrhea, nausea and vomiting. Intermittent mid abdominal pain, not present currently   Genitourinary: Negative. Musculoskeletal: Negative. Negative for back pain and neck pain. Skin: Negative. Negative for wound. Neurological: Positive for speech difficulty and weakness. Negative for dizziness, syncope and headaches. Mild chronic aphasia  New generalized weakness   Psychiatric/Behavioral: Negative. Past Medical, Surgical, Family, and Social History     She has a past medical history of CHUYITA (acute kidney injury) ([facility] [address] Allergic rhinitis, Aneurysm ([facility] [address] Aortic aneurysm ([facility] [address] Cancer ([facility] [address] Cholelithiasis, Depression, History of ischemic left MCA stroke, History of ischemic left MCA stroke, Hyperlipidemia, Hypertension, Incontinence, MRSA (methicillin resistant staph aureus) culture positive, Ulcer, and Ulcerative colitis ([facility] [address] She has a past surgical history that includes Abdomen surgery; Cholecystectomy, open (5/7/2012); Pressure ulcer debridement (2010); Colonoscopy; Upper gastrointestinal endoscopy; Stomach surgery; ERCP (3/13/12); bronchoscopy (12/11/2012); and ERCP (12-). Her family history includes Arthritis in her mother. She reports that she quit smoking about 12 years ago. She has a 75.00 pack-year smoking history.  She has never used smokeless tobacco. She reports that she does not drink alcohol and does not use drugs. Medications     Previous Medications    AMLODIPINE (NORVASC) 10 MG TABLET    Take 10 mg by mouth daily    ASPIRIN 81 MG EC TABLET    Take 1 tablet by mouth daily    ATORVASTATIN (LIPITOR) 40 MG TABLET    Take 1 tablet by mouth daily    CITALOPRAM (CELEXA) 40 MG TABLET    Take 40 mg by mouth nightly     CLOPIDOGREL (PLAVIX) 75 MG TABLET    Take 1 tablet by mouth daily    MAGNESIUM OXIDE (MAG-OX) 400 MG TABLET    Take 1 tablet by mouth 2 times daily    PANTOPRAZOLE (PROTONIX) 40 MG TABLET    Take 40 mg by mouth nightly        Allergies     She has No Known Allergies. Physical Exam     INITIAL VITALS: BP: 98/63,  , Pulse: 87, Resp: 16, SpO2: 100 %     General: Somewhat frail-appearing elderly patient. Generally well appearing. Pleasantly conversational, and in no acute distress. HEENT: Head is atraumatic, normocephalic. Pupils are equal, round, and reactive to light. Extraocular muscles are intact. Conjunctivae are clear and moist. No redness or drainage from the eyes. No drainage from the nose. The oropharynx appeared to be normal.    Neck: Supple, with full range of motion. No midline C-spine tenderness to palpation, crepitus, or step-offs. Back: No midline T or L spine tenderness to palpation, crepitus, or step-offs. Chest: Not tender to palpation. Cardiovascular: Normal S1-S2 without murmur rub or gallop. 2+ radial pulses bilaterally. Respiratory: Unlabored breathing with equal chest rise and fall. Lungs are clear to auscultation bilaterally. No adventitious lung sounds heard. Abdomen: Soft and nondistended. Her relatively extensive well-healed surgical scars. There is mild generalized periumbilical and supraumbilical discomfort to palpation, without guarding or rebound tenderness. No masses or hepatosplenomegaly. Rectal: No external rectal abnormalities.   However, the patient has been incontinent of a moderate amount of melena, which is unsurprisingly Hemoccult positive. Skin: Warm and dry, without rashes or ecchymoses, lacerations or abrasions. Neuro: Alert and oriented x3. The patient has very occasional aphasia noted, essentially consisting of mild occasional paraphasic errors, but is generally fluent in conversation. She has no facial droop, full and equal strength in all 4 extremities, normal sensation all 4 extremities. No other focal neurologic deficits are noted. Extremities: Warm and well-perfused, without clubbing, cyanosis, or edema. The patient moves all extremities equally. Psych: The patient's mood and affect are generally within normal limits for their presentation. Diagnostic Results     EKG   Normal sinus rhythm with a ventricular rate of 94 bpm.  Normal axis. Normal intervals, HI interval 140 ms, QRS duration 68 ms,  ms. There is somewhat poor R wave progression in the anterior precordial leads. There is mild T wave flattening in leads I and aVL. No ST segment changes are noted. This is very similar to a prior EKG dated October 28, 2020.     RADIOLOGY:  No orders to display       LABS:   Results for orders placed or performed during the hospital encounter of 08/05/21   CBC auto differential   Result Value Ref Range    WBC 10.4 4.0 - 11.0 K/uL    RBC 2.94 (L) 4.00 - 5.20 M/uL    Hemoglobin 8.4 (L) 12.0 - 16.0 g/dL    Hematocrit 25.5 (L) 36.0 - 48.0 %    MCV 86.7 80.0 - 100.0 fL    MCH 28.6 26.0 - 34.0 pg    MCHC 33.0 31.0 - 36.0 g/dL    RDW 15.4 12.4 - 15.4 %    Platelets 077 090 - 144 K/uL    MPV 7.5 5.0 - 10.5 fL    Neutrophils % 81.8 %    Lymphocytes % 10.8 %    Monocytes % 4.9 %    Eosinophils % 2.2 %    Basophils % 0.3 %    Neutrophils Absolute 8.5 (H) 1.7 - 7.7 K/uL    Lymphocytes Absolute 1.1 1.0 - 5.1 K/uL    Monocytes Absolute 0.5 0.0 - 1.3 K/uL    Eosinophils Absolute 0.2 0.0 - 0.6 K/uL    Basophils Absolute 0.0 0.0 - 0.2 K/uL Comprehensive Metabolic Panel w/ Reflex to MG   Result Value Ref Range    Sodium 139 136 - 145 mmol/L    Potassium reflex Magnesium 4.0 3.5 - 5.1 mmol/L    Chloride 105 99 - 110 mmol/L    CO2 22 21 - 32 mmol/L    Anion Gap 12 3 - 16    Glucose 128 (H) 70 - 99 mg/dL    BUN 45 (H) 7 - 20 mg/dL    CREATININE 1.0 0.6 - 1.2 mg/dL    GFR Non- 53 (A) >60    GFR African American >60 >60    Calcium 8.9 8.3 - 10.6 mg/dL    Total Protein 7.1 6.4 - 8.2 g/dL    Albumin 3.4 3.4 - 5.0 g/dL    Albumin/Globulin Ratio 0.9 (L) 1.1 - 2.2    Total Bilirubin 0.3 0.0 - 1.0 mg/dL    Alkaline Phosphatase 231 (H) 40 - 129 U/L    ALT 19 10 - 40 U/L    AST 18 15 - 37 U/L    Globulin 3.7 g/dL   PT - INR   Result Value Ref Range    Protime 11.2 9.9 - 12.7 sec    INR 0.99 0.88 - 1.12   Brain Natriuretic Peptide   Result Value Ref Range    Pro- 0 - 449 pg/mL   Troponin (lab)   Result Value Ref Range    Troponin 0.02 (H) <0.01 ng/mL   Blood gas, venous (Lab)   Result Value Ref Range    pH, Alvarez 7.326 (L) 7.350 - 7.450    pCO2, Alvarez 48.4 41.0 - 51.0 mmHg    pO2, Alvarez 33.0 25 - 40 mmHg    HCO3, Venous 25.2 24.0 - 28.0 mmol/L    Base Excess, Alvarez -0.9 -2.0 - 3.0 mmol/L    O2 Sat, Alvarez 54 Not established %    Carboxyhemoglobin 1.6 (H) 0.0 - 1.5 %    MetHgb, Alvarez 0.6 0.0 - 1.5 %    TC02 (Calc), Alvarez 27 mmol/L    Hemoglobin, Alvarez, Reduced 45.10 %   Lactate, plasma   Result Value Ref Range    Lactic Acid 2.5 (H) 0.4 - 2.0 mmol/L   POCT Blood Occult   Result Value Ref Range    POC Occult Blood Stool Positive Negative   EKG 12 Lead   Result Value Ref Range    Ventricular Rate 94 BPM    Atrial Rate 94 BPM    P-R Interval 140 ms    QRS Duration 68 ms    Q-T Interval 362 ms    QTc Calculation (Bazett) 452 ms    P Axis 72 degrees    R Axis 39 degrees    T Axis 75 degrees    Diagnosis       EKG performed in ER and to be interpreted by ER physician. Confirmed by MD, ER ([id number]),  Justin Meza ([id number]) on 8/5/2021 2:32:18 PM         RECENT VITALS:  BP: 112/63,  , Pulse: 87, Resp: 16, SpO2: 99 %     Procedures       ED Course     Nursing Notes, Past Medical Hx, Past Surgical Hx, Social Hx, Allergies, and Family Hx were reviewed. The patient was given the following medications:  Orders Placed This Encounter   Medications    pantoprazole (PROTONIX) 80 mg in sodium chloride 0.9 % 50 mL bolus    cefTRIAXone (ROCEPHIN) 1000 mg IVPB in 50 mL D5W minibag     Order Specific Question:   Antimicrobial Indications     Answer: Other     Order Specific Question:   Other Abx Indication     Answer:   GI bleed    lactated ringers bolus       CONSULTS:  IP CONSULT TO HOSPITALIST  IP CONSULT TO GI    MEDICAL DECISION MAKING / ASSESSMENT / Lissett Royal is a 68 y.o. female who presents to the emergency department with significant weakness, fatigue, and a near fall today at home, in the setting of black stools with obvious melena in her depends on the presentation to the emergency department today. She has had a decrease of her hemoglobin from 11.2-8.4 since October of last year, although the total chronicity of this is uncertain. Her abdomen is soft and benign. Her laboratory studies otherwise are consistent with upper GI bleed. She is awake, alert, pleasantly conversational, with blood pressures that are somewhat soft, in the 42J to 840S systolic, but not tachycardic. GI has been consulted, patient has been started on Protonix, Rocephin, and gentle fluid hydration. Type and screen has been ordered, but blood administration has not been ordered at this point in time given her stability. The patient's case will be discussed with the hospitalist for admission. Critical Care:  Due to the immediate potential for life-threatening deterioration due to upper GI bleed, I spent a total of approximately 37 minutes providing critical care.   This time excludes time spent performing procedures but includes time spent on direct patient care, history retrieval, review of the chart, and discussions with patient, family, and consultant(s). Clinical Impression     1. Upper GI bleed        Disposition     PATIENT REFERRED TO:  No follow-up provider specified. DISCHARGE MEDICATIONS:  New Prescriptions    No medications on file       DISPOSITION Decision To Admit    (Please note that portions of this note were completed with voice recognition software.   Efforts were made to edit the dictations but occasionally words are mis-transcribed.)     Niurka Cavazos MD  08/05/21 [id number]

## 2021-08-05 NOTE — ED TRIAGE NOTES
Pt brought in by ems with fatigue and dark stool that happened this am. Pt states she took a shower and felt better but still feeling weak.

## 2021-08-05 NOTE — ED NOTES
Pt meets fall risk, precautions in place, pt did not want socks on, at bedside. family at bedside, bracelet,side rails up x 2, call light within reach. Pt educated to call for help before getting up. Verbalized understanding.         Chaka Hernandez RN  08/05/21 9336

## 2021-08-05 NOTE — PROGRESS NOTES
Pt admitted to room 6318 from ED. Alert and oriented to person, place, and situation. Disoriented to year/month but easy to re-orient. Mild chronic aphasia noted. Denies chest pain, shortness of breath, nausea/vomiting. Denies pain but c/o tenderness to abdomen upon palpation. , Akiko Sheets at bedside. Fall precautions in place. Will continue to monitor.       Vitals:    08/05/21 1742   BP: (!) 142/77   Pulse: 88   Resp: 18   Temp: 99.1 °F (37.3 °C)   SpO2: 96%

## 2021-08-06 ENCOUNTER — ANESTHESIA (OUTPATIENT)
Dept: ENDOSCOPY | Age: 81
DRG: 919 | End: 2021-08-06
Payer: MEDICARE

## 2021-08-06 ENCOUNTER — ANESTHESIA EVENT (OUTPATIENT)
Dept: ENDOSCOPY | Age: 81
DRG: 919 | End: 2021-08-06
Payer: MEDICARE

## 2021-08-06 VITALS
DIASTOLIC BLOOD PRESSURE: 59 MMHG | OXYGEN SATURATION: 100 % | SYSTOLIC BLOOD PRESSURE: 114 MMHG | RESPIRATION RATE: 15 BRPM

## 2021-08-06 LAB
A/G RATIO: 1.1 (ref 1.1–2.2)
ALBUMIN SERPL-MCNC: 3.1 G/DL (ref 3.4–5)
ALP BLD-CCNC: 215 U/L (ref 40–129)
ALT SERPL-CCNC: 18 U/L (ref 10–40)
ANION GAP SERPL CALCULATED.3IONS-SCNC: 13 MMOL/L (ref 3–16)
AST SERPL-CCNC: 28 U/L (ref 15–37)
BASOPHILS ABSOLUTE: 0.1 K/UL (ref 0–0.2)
BASOPHILS RELATIVE PERCENT: 0.7 %
BILIRUB SERPL-MCNC: 0.3 MG/DL (ref 0–1)
BLOOD BANK DISPENSE STATUS: NORMAL
BLOOD BANK PRODUCT CODE: NORMAL
BPU ID: NORMAL
BUN BLDV-MCNC: 26 MG/DL (ref 7–20)
CALCIUM SERPL-MCNC: 8.8 MG/DL (ref 8.3–10.6)
CHLORIDE BLD-SCNC: 109 MMOL/L (ref 99–110)
CO2: 17 MMOL/L (ref 21–32)
CREAT SERPL-MCNC: 0.8 MG/DL (ref 0.6–1.2)
DESCRIPTION BLOOD BANK: NORMAL
EOSINOPHILS ABSOLUTE: 1 K/UL (ref 0–0.6)
EOSINOPHILS RELATIVE PERCENT: 11.6 %
GFR AFRICAN AMERICAN: >60
GFR NON-AFRICAN AMERICAN: >60
GLOBULIN: 2.9 G/DL
GLUCOSE BLD-MCNC: 98 MG/DL (ref 70–99)
HCT VFR BLD CALC: 21.4 % (ref 36–48)
HCT VFR BLD CALC: 26.8 % (ref 36–48)
HCT VFR BLD CALC: 27.3 % (ref 36–48)
HCT VFR BLD CALC: 27.9 % (ref 36–48)
HEMOGLOBIN: 6.9 G/DL (ref 12–16)
HEMOGLOBIN: 8.9 G/DL (ref 12–16)
HEMOGLOBIN: 9.1 G/DL (ref 12–16)
HEMOGLOBIN: 9.2 G/DL (ref 12–16)
IRON SATURATION: 10 % (ref 15–50)
IRON: 34 UG/DL (ref 37–145)
LYMPHOCYTES ABSOLUTE: 1.4 K/UL (ref 1–5.1)
LYMPHOCYTES RELATIVE PERCENT: 16.5 %
MCH RBC QN AUTO: 29.3 PG (ref 26–34)
MCHC RBC AUTO-ENTMCNC: 33.1 G/DL (ref 31–36)
MCV RBC AUTO: 88.7 FL (ref 80–100)
MONOCYTES ABSOLUTE: 0.8 K/UL (ref 0–1.3)
MONOCYTES RELATIVE PERCENT: 9 %
NEUTROPHILS ABSOLUTE: 5.4 K/UL (ref 1.7–7.7)
NEUTROPHILS RELATIVE PERCENT: 62.2 %
PDW BLD-RTO: 15.3 % (ref 12.4–15.4)
PLATELET # BLD: 210 K/UL (ref 135–450)
PMV BLD AUTO: 7.9 FL (ref 5–10.5)
POTASSIUM REFLEX MAGNESIUM: 4.1 MMOL/L (ref 3.5–5.1)
RBC # BLD: 3.14 M/UL (ref 4–5.2)
SODIUM BLD-SCNC: 139 MMOL/L (ref 136–145)
TOTAL IRON BINDING CAPACITY: 333 UG/DL (ref 260–445)
TOTAL PROTEIN: 6 G/DL (ref 6.4–8.2)
WBC # BLD: 8.6 K/UL (ref 4–11)

## 2021-08-06 PROCEDURE — 3609012400 HC EGD TRANSORAL BIOPSY SINGLE/MULTIPLE: Performed by: INTERNAL MEDICINE

## 2021-08-06 PROCEDURE — 7100000011 HC PHASE II RECOVERY - ADDTL 15 MIN: Performed by: INTERNAL MEDICINE

## 2021-08-06 PROCEDURE — 0DB68ZX EXCISION OF STOMACH, VIA NATURAL OR ARTIFICIAL OPENING ENDOSCOPIC, DIAGNOSTIC: ICD-10-PCS | Performed by: INTERNAL MEDICINE

## 2021-08-06 PROCEDURE — 36415 COLL VENOUS BLD VENIPUNCTURE: CPT

## 2021-08-06 PROCEDURE — 2580000003 HC RX 258: Performed by: INTERNAL MEDICINE

## 2021-08-06 PROCEDURE — 6370000000 HC RX 637 (ALT 250 FOR IP): Performed by: INTERNAL MEDICINE

## 2021-08-06 PROCEDURE — 83540 ASSAY OF IRON: CPT

## 2021-08-06 PROCEDURE — 85025 COMPLETE CBC W/AUTO DIFF WBC: CPT

## 2021-08-06 PROCEDURE — 3700000000 HC ANESTHESIA ATTENDED CARE: Performed by: INTERNAL MEDICINE

## 2021-08-06 PROCEDURE — C9113 INJ PANTOPRAZOLE SODIUM, VIA: HCPCS | Performed by: INTERNAL MEDICINE

## 2021-08-06 PROCEDURE — 6360000002 HC RX W HCPCS: Performed by: INTERNAL MEDICINE

## 2021-08-06 PROCEDURE — 83550 IRON BINDING TEST: CPT

## 2021-08-06 PROCEDURE — 88305 TISSUE EXAM BY PATHOLOGIST: CPT

## 2021-08-06 PROCEDURE — 97535 SELF CARE MNGMENT TRAINING: CPT

## 2021-08-06 PROCEDURE — 6360000002 HC RX W HCPCS: Performed by: NURSE ANESTHETIST, CERTIFIED REGISTERED

## 2021-08-06 PROCEDURE — P9016 RBC LEUKOCYTES REDUCED: HCPCS

## 2021-08-06 PROCEDURE — 2720000010 HC SURG SUPPLY STERILE: Performed by: INTERNAL MEDICINE

## 2021-08-06 PROCEDURE — 97166 OT EVAL MOD COMPLEX 45 MIN: CPT

## 2021-08-06 PROCEDURE — 80053 COMPREHEN METABOLIC PANEL: CPT

## 2021-08-06 PROCEDURE — 85018 HEMOGLOBIN: CPT

## 2021-08-06 PROCEDURE — 97161 PT EVAL LOW COMPLEX 20 MIN: CPT

## 2021-08-06 PROCEDURE — 3700000001 HC ADD 15 MINUTES (ANESTHESIA): Performed by: INTERNAL MEDICINE

## 2021-08-06 PROCEDURE — 3609013200 HC EGD W/ ABLATION: Performed by: INTERNAL MEDICINE

## 2021-08-06 PROCEDURE — 2709999900 HC NON-CHARGEABLE SUPPLY: Performed by: INTERNAL MEDICINE

## 2021-08-06 PROCEDURE — 7100000010 HC PHASE II RECOVERY - FIRST 15 MIN: Performed by: INTERNAL MEDICINE

## 2021-08-06 PROCEDURE — 88342 IMHCHEM/IMCYTCHM 1ST ANTB: CPT

## 2021-08-06 PROCEDURE — 36430 TRANSFUSION BLD/BLD COMPNT: CPT

## 2021-08-06 PROCEDURE — 1200000000 HC SEMI PRIVATE

## 2021-08-06 PROCEDURE — 85014 HEMATOCRIT: CPT

## 2021-08-06 PROCEDURE — 97116 GAIT TRAINING THERAPY: CPT

## 2021-08-06 RX ORDER — SODIUM CHLORIDE 9 MG/ML
INJECTION, SOLUTION INTRAVENOUS ONCE
Status: COMPLETED | OUTPATIENT
Start: 2021-08-06 | End: 2021-08-06

## 2021-08-06 RX ORDER — LIDOCAINE HYDROCHLORIDE 20 MG/ML
INJECTION, SOLUTION INTRAVENOUS PRN
Status: DISCONTINUED | OUTPATIENT
Start: 2021-08-06 | End: 2021-08-06 | Stop reason: SDUPTHER

## 2021-08-06 RX ORDER — SODIUM CHLORIDE 9 MG/ML
INJECTION, SOLUTION INTRAVENOUS PRN
Status: DISCONTINUED | OUTPATIENT
Start: 2021-08-06 | End: 2021-08-07 | Stop reason: HOSPADM

## 2021-08-06 RX ORDER — PROPOFOL 10 MG/ML
INJECTION, EMULSION INTRAVENOUS PRN
Status: DISCONTINUED | OUTPATIENT
Start: 2021-08-06 | End: 2021-08-06 | Stop reason: SDUPTHER

## 2021-08-06 RX ADMIN — Medication 10 ML: at 06:41

## 2021-08-06 RX ADMIN — LIDOCAINE HYDROCHLORIDE 80 MG: 20 INJECTION, SOLUTION INTRAVENOUS at 13:03

## 2021-08-06 RX ADMIN — Medication 3 MG: at 01:57

## 2021-08-06 RX ADMIN — Medication 3 MG: at 20:24

## 2021-08-06 RX ADMIN — Medication 10 ML: at 19:40

## 2021-08-06 RX ADMIN — PANTOPRAZOLE SODIUM 40 MG: 40 INJECTION, POWDER, FOR SOLUTION INTRAVENOUS at 19:39

## 2021-08-06 RX ADMIN — PROPOFOL 20 MG: 10 INJECTION, EMULSION INTRAVENOUS at 13:09

## 2021-08-06 RX ADMIN — ACETAMINOPHEN 650 MG: 325 TABLET ORAL at 01:57

## 2021-08-06 RX ADMIN — PROPOFOL 40 MG: 10 INJECTION, EMULSION INTRAVENOUS at 13:05

## 2021-08-06 RX ADMIN — PROPOFOL 50 MG: 10 INJECTION, EMULSION INTRAVENOUS at 13:03

## 2021-08-06 RX ADMIN — PROPOFOL 20 MG: 10 INJECTION, EMULSION INTRAVENOUS at 13:11

## 2021-08-06 RX ADMIN — PROPOFOL 10 MG: 10 INJECTION, EMULSION INTRAVENOUS at 13:12

## 2021-08-06 RX ADMIN — PROPOFOL 30 MG: 10 INJECTION, EMULSION INTRAVENOUS at 13:08

## 2021-08-06 RX ADMIN — ACETAMINOPHEN 650 MG: 325 TABLET ORAL at 20:24

## 2021-08-06 RX ADMIN — SODIUM CHLORIDE: 9 INJECTION, SOLUTION INTRAVENOUS at 12:43

## 2021-08-06 RX ADMIN — ATORVASTATIN CALCIUM 40 MG: 40 TABLET, FILM COATED ORAL at 20:24

## 2021-08-06 RX ADMIN — PANTOPRAZOLE SODIUM 40 MG: 40 INJECTION, POWDER, FOR SOLUTION INTRAVENOUS at 06:41

## 2021-08-06 ASSESSMENT — PAIN SCALES - GENERAL
PAINLEVEL_OUTOF10: 0
PAINLEVEL_OUTOF10: 6
PAINLEVEL_OUTOF10: 0
PAINLEVEL_OUTOF10: 4

## 2021-08-06 ASSESSMENT — PULMONARY FUNCTION TESTS
PIF_VALUE: 1
PIF_VALUE: 0
PIF_VALUE: 1
PIF_VALUE: 2
PIF_VALUE: 1
PIF_VALUE: 0
PIF_VALUE: 1

## 2021-08-06 NOTE — PROGRESS NOTES
Physical Therapy    Facility/Department: [facility] [address]  Initial Assessment and treatment    NAME: Nicolas Dawn  : 1940  MRN: [id number]    Date of Service: 2021    Discharge Recommendations:    Nicolas Dawn scored a 18/24 on the AM-PAC short mobility form. Current research shows that an AM-PAC score of 18 or greater is typically associated with a discharge to the patient's home setting. Based on the patient's AM-PAC score and their current functional mobility deficits, it is recommended that the patient have 2-3 sessions per week of Physical Therapy at d/c to increase the patient's independence. At this time, this patient demonstrates the endurance and safety to discharge home with home PT and a follow up treatment frequency of 2-3x/wk. Please see assessment section for further patient specific details. PT Equipment Recommendations  Equipment Needed: No    Assessment   Body structures, Functions, Activity limitations: Decreased functional mobility ; Decreased balance;Decreased endurance  Assessment: Patient tolerated session well, transferring and ambulating in room as above with steady gait using FWW. Patient with reports of fatigue demonstrating overall decreased activity tolerance. She requires CGA for safet with ambulation and is limited in distance. Patient typically requires assistance with most ADLs at home and has good support network from  and other family members. Patient planning to d/c home with assistance from . Recommend continued skilled PT upon discharge. Will follow while in acute care setting to maximize independence with mobility. Treatment Diagnosis: impaired mobility associated with GI bleed  Prognosis: Good  Decision Making: Low Complexity  PT Education: Goals;PT Role;Functional Mobility Training;Plan of Care;General Safety;Gait Training  Patient Education: patient verbalized understanding.   REQUIRES PT FOLLOW UP: Yes  Activity Tolerance  Activity Tolerance: Patient limited by endurance; Patient limited by fatigue;Patient Tolerated treatment well       Patient Diagnosis(es): The encounter diagnosis was Upper GI bleed. has a past medical history of CHUYITA (acute kidney injury) ([facility] [address] Allergic rhinitis, Aneurysm ([facility] [address] Aortic aneurysm ([facility] [address] Cancer ([facility] [address] Cholelithiasis, Depression, History of ischemic left MCA stroke, History of ischemic left MCA stroke, Hyperlipidemia, Hypertension, Incontinence, MRSA (methicillin resistant staph aureus) culture positive, Ulcer, and Ulcerative colitis ([facility] [address] has a past surgical history that includes Abdomen surgery; Cholecystectomy, open (5/7/2012); Pressure ulcer debridement (2010); Colonoscopy; Upper gastrointestinal endoscopy; Stomach surgery; ERCP (3/13/12); bronchoscopy (12/11/2012); and ERCP (12-). Restrictions  Position Activity Restriction  Other position/activity restrictions: up as tolerated  Vision/Hearing  Vision: Impaired  Vision Exceptions: Wears glasses for reading  Hearing: Within functional limits     Subjective  General  Chart Reviewed: Yes  Patient assessed for rehabilitation services?: Yes  Additional Pertinent Hx: Patient is a 69 y/o female admitted 8/5 with fatigue and rectal bleeding. PMH significant for lung cancer, MCA stroke, HLD, HTN, CHUYITA, aneurysm, MRSA, ulcerative colitis. Response To Previous Treatment: Not applicable  Family / Caregiver Present: No  Referring Practitioner: Dk Orr MD  Referral Date : 08/05/21  Diagnosis: GI bleed  Follows Commands: Within Functional Limits  General Comment  Comments: Patient supine in bed upon arrival.  Subjective  Subjective: Patient agreeable to PT evaluation with encouragement. \"I can't do much today. \"  Pain Screening  Patient Currently in Pain: Denies  Vital Signs  Patient Currently in Pain: Denies       Orientation  Orientation  Overall Orientation Status: Within Functional Limits  Social/Functional History  Social/Functional History  Lives With: Spouse  Type of Home: Mobile home  Home Layout: One level  Home Access: Level entry  Bathroom Shower/Tub: Tub/Shower unit  Bathroom Toilet: Standard  Bathroom Equipment: Grab bars in shower, Shower chair, Hand-held shower  Home Equipment: Rolling walker, [employer]  ADL Assistance: Needs assistance (sister and daughter assist with showering,  assists with getting dressed and in/out of bed, patient usually manages toileting independently)  Homemaking Assistance:  ( performs all)  Homemaking Responsibilities: No  Ambulation Assistance: Independent (with FWW)  Transfer Assistance: Independent  Active : No  Occupation: Retired  Type of occupation: \"helped older people\"  Additional Comments: patient with one loss of balance yesterday (family lowered her to the floor) but otherwise denies history of falls  Cognition   Cognition  Overall Cognitive Status: WFL  Cognition Comment: mild aphasia, decreased short term memory    Objective          AROM RLE (degrees)  RLE AROM: WFL  AROM LLE (degrees)  LLE AROM : WFL  Strength RLE  Strength RLE: WFL  Strength LLE  Strength LLE: WFL        Bed mobility  Supine to Sit: Stand by assistance (head of bed slightly elevated, increased time to complete tasks)  Scooting: Stand by assistance (to EOB)  Comment: patient sitting up in bedside chair at end of session  Transfers  Sit to Stand: Contact guard assistance (from EOB and from bedside chair)  Stand to sit: Contact guard assistance (to bedside chair x 2 trials, verbal cues for safety)  Bed to Chair: Contact guard assistance (to take steps with FWW)  Ambulation  Ambulation?: Yes  Ambulation 1  Surface: level tile  Device: Rolling Walker  Assistance: Contact guard assistance  Quality of Gait: steady gait with no loss of balance noted, decreased jose, decreased step length/height bilaterally with shuffling steps, forward flexed posture  Distance: 4-5 steps from EOB to bedside chair, 20' in room returning to bedside chair  Comments: patient fatigued following mobility  Stairs/Curb  Stairs?: No     Balance  Sitting - Static: Good  Standing - Static: Fair (CGA to stand at sink and perform grooming)  Standing - Dynamic: Fair (CGA to ambulate with FWW)        Plan   Plan  Times per week: 2-5  Current Treatment Recommendations: Strengthening, Transfer Training, Endurance Training, Patient/Caregiver Education & Training, Balance Training, Gait Training, Functional Mobility Training, Safety Education & Training  Safety Devices  Type of devices: Left in chair, Chair alarm in place, Call light within reach, Gait belt, Nurse notified      OutComes Score                                                  AM-PAC Score  AM-PAC Inpatient Mobility Raw Score : 18 (08/06/21 0948)  AM-PAC Inpatient T-Scale Score : 43.63 (08/06/21 0948)  Mobility Inpatient CMS 0-100% Score: 46.58 (08/06/21 0948)  Mobility Inpatient CMS G-Code Modifier : CK (08/06/21 0948)          Goals  Short term goals  Time Frame for Short term goals: discharge  Short term goal 1: patient will perform bed mobility with modified independence  Short term goal 2: patient will perform transfers sit<>stand with supervision  Short term goal 3: patient will ambulate 48' with FWW and supervision  Patient Goals   Patient goals : to go home       Therapy Time   Individual Concurrent Group Co-treatment   Time In 0857         Time Out 0928         Minutes 31         Timed Code Treatment Minutes: 16 Minutes    Timed Code Treatment Minutes:  16 Minutes    Total Treatment Minutes:    16 minutes treatment + 15 minutes evaluation = 31 total treatment minutes  If patient discharges prior to next session this note will serve as a discharge summary. Please see below for the latest assessment towards goals.    [address]

## 2021-08-06 NOTE — PLAN OF CARE
Problem: Falls - Risk of:  Goal: Will remain free from falls  Description: Will remain free from falls  Outcome: Ongoing     Problem: Falls - Risk of:  Goal: Absence of physical injury  Description: Absence of physical injury  Outcome: Ongoing     Problem: Nausea/Vomiting:  Goal: Absence of nausea/vomiting  Description: Absence of nausea/vomiting  Outcome: Ongoing     Problem: Nausea/Vomiting:  Goal: Able to drink  Description: Able to drink  Outcome: Ongoing     Problem: Nausea/Vomiting:  Goal: Able to eat  Description: Able to eat  Outcome: Ongoing

## 2021-08-06 NOTE — PROGRESS NOTES
Alert and oriented x4. Resting comfortably in bed. Denies any pain at this time. Denies nausea/vomiting. No BM's during this shift. Voiding clear yellow urine. Standby assist with gait belt and walker. Tolerating diet. Fall precautions in place. Will continue to monitor.

## 2021-08-06 NOTE — CARE COORDINATION
SW Following, Pt from home w/, Pt plans to return home at DC, no needs anticipated, Pt's  will transport home. Anticipated DC is tomorrow pending medically stable.     Electronically signed by VINNIE Rdz LSW on 8/6/2021 at 3:47 PM   [phone]

## 2021-08-06 NOTE — PROGRESS NOTES
Hospitalist Progress Note      PCP: Gm Philip DO    Date of Admission: 8/5/2021    Chief Complaint: Generalized weakness    Hospital Course: Patient is 72-year-old female with history of stage IV lung cancer, CVA with mild expressive aphasia residual, hypertension hypertension came into ER with a complaint of generalized weakness and melena    Subjective: Seen and examined today. Sitting comfortably on chair. Denies any nausea, vomiting, fever, chills. Overnight patient received 1 pack RBC transfusion    Medications:  Reviewed    Infusion Medications    sodium chloride      sodium chloride      sodium chloride 150 mL/hr at 08/05/21 1752     Scheduled Medications    atorvastatin  40 mg Oral Nightly    citalopram  40 mg Oral Daily    sodium chloride flush  5-40 mL Intravenous 2 times per day    pantoprazole  40 mg Intravenous Q12H    And    sodium chloride (PF)  10 mL Intravenous Q12H     PRN Meds: sodium chloride, sodium chloride flush, sodium chloride, ondansetron **OR** ondansetron, acetaminophen **OR** acetaminophen, melatonin      Intake/Output Summary (Last 24 hours) at 8/6/2021 1255  Last data filed at 8/6/2021 0759  Gross per 24 hour   Intake 1946.13 ml   Output --   Net 1946.13 ml       Physical Exam Performed:    BP (!) 146/72   Pulse 108   Temp 98 °F (36.7 °C) (Oral)   Resp 17   Ht 5' 2\" (1.575 m)   Wt 133 lb (60.3 kg)   SpO2 94%   BMI 24.33 kg/m²     General appearance: Elderly, frail no apparent distress, appears stated age and cooperative. HEENT: Pupils equal, round, and reactive to light. Conjunctivae pale/corneas clear. Neck: Supple, with full range of motion. No jugular venous distention. Trachea midline. Respiratory:  Normal respiratory effort. Clear to auscultation, bilaterally without Rales/Wheezes/Rhonchi. Cardiovascular: Regular rate and rhythm with normal S1/S2 without murmurs, rubs or gallops.   Abdomen: Soft, non-tender, non-distended with normal bowel sounds multiple scars from. Previous laparotomy with incisional hernia  Musculoskeletal: No clubbing, cyanosis or edema bilaterally. Full range of motion without deformity. Skin: Skin color, texture, turgor normal.  No rashes or lesions. Neurologic:  Neurovascularly intact without any focal sensory/motor deficits. Cranial nerves: II-XII intact, grossly non-focal.  Psychiatric: Alert and oriented, thought content appropriate, normal insight  Capillary Refill: Brisk,< 3 seconds   Peripheral Pulses: +2 palpable, equal bilaterally       Labs:   Recent Labs     08/05/21  1355 08/05/21  1355 08/05/21  2323 08/06/21  0701 08/06/21  1027   WBC 10.4  --   --  8.6  --    HGB 8.4*   < > 6.9* 9.2* 8.9*   HCT 25.5*   < > 21.4* 27.9* 26.8*     --   --  210  --     < > = values in this interval not displayed. Recent Labs     08/05/21  1355 08/06/21  0701    139   K 4.0 4.1    109   CO2 22 17*   BUN 45* 26*   CREATININE 1.0 0.8   CALCIUM 8.9 8.8     Recent Labs     08/05/21  1355 08/06/21  0701   AST 18 28   ALT 19 18   BILITOT 0.3 0.3   ALKPHOS 231* 215*     Recent Labs     08/05/21  1355   INR 0.99     Recent Labs     08/05/21  1355   TROPONINI 0.02*       Urinalysis:      Lab Results   Component Value Date    NITRU POSITIVE 08/05/2021    WBCUA 0-2 08/05/2021    BACTERIA 2+ 08/05/2021    RBCUA None seen 08/05/2021    BLOODU Negative 08/05/2021    SPECGRAV 1.015 08/05/2021    GLUCOSEU Negative 08/05/2021    GLUCOSEU Negative 05/08/2012       Radiology:  No orders to display           Assessment/Plan:    Active Hospital Problems    Diagnosis Date Noted    GI bleed [K92.2] 08/05/2021     #Acute blood loss anemia s/p 1 pack RBC transfusion  #Suspected upper GI bleed  Continue to hold DAPT  IV Protonix 40 twice daily  Continue trend hemoglobin transfuse if hemoglobin is less than 7  Plan for EGD today.     #History of prior CVA with expressive aphasia  Hold DAPT due to GI bleed continue statin. #Hypertension  Continue to hold amlodipine    #Stage IV lung cancer follows up with [facility]      DVT Prophylaxis: SCD  Diet: Diet NPO  Code Status: Limited    PT/OT Eval Status: in progress    Dispo - inpatient.  EGD today    Marcela Pretty MD     This chart was likely completed using voice recognition technology and may contain unintended grammatical , phraseology,and/or punctuation errors

## 2021-08-06 NOTE — PROGRESS NOTES
Occupational Therapy   Occupational Therapy Initial Assessment/Tx Note  Date: 2021   Patient Name: Dea Greer  MRN: [id number]     : 1940    Date of Service: 2021     Assessment: Pt reports improved strength since admit. Was having significant difficulty getting around at home due to weakness related to GI bleed. Since receiving blood transfusion, pt now able to ambulate in room with CGA and participate in self care. Recommend continued 24 hr A at home and home OT/PT if pt/family desire. Discharge Recommendations: Dea Greer scored a 20/24 on the AM-PAC ADL Inpatient form. Current research shows that an AM-PAC score of 18 or greater is typically associated with a discharge to the patient's home setting. Based on the patient's AM-PAC score, and their current ADL deficits, it is recommended that the patient have 2-3 sessions per week of Occupational Therapy at d/c to increase the patient's independence. At this time, this patient demonstrates the endurance and safety to discharge home with 24 hr A, home OT and a follow up treatment frequency of 2-3x/wk. Please see assessment section for further patient specific details. OT Equipment Recommendations  Equipment Needed: No    HOME HEALTH CARE: LEVEL 1 STANDARD    - Initial home health evaluation to occur within 24-48 hours, in patient home   - Therapy to evaluate with goal of regaining prior level of functioning   - Therapy to evaluate if patient has [id number] needs for personal care    Assessment   Performance deficits / Impairments: Decreased functional mobility ; Decreased ADL status; Decreased endurance  Treatment Diagnosis: Decreased activity tolerance, impaired ADLs and mobility  Decision Making: Medium Complexity  REQUIRES OT FOLLOW UP: Yes  Activity Tolerance  Activity Tolerance: Patient Tolerated treatment well  Safety Devices  Safety Devices in place: Yes  Type of devices: Call light within reach; Chair alarm in place;Nurse notified; Left in chair         Treatment Diagnosis: Decreased activity tolerance, impaired ADLs and mobility      Restrictions  Position Activity Restriction  Other position/activity restrictions: up as tolerated    Subjective   General  Chart Reviewed: Yes  Patient assessed for rehabilitation services?: Yes  Additional Pertinent Hx: 68 y.o. F admitted 8/5 with GI bleed. Pt has stage IV lung CA. PMHx includes MCA CVA, CHUYITA, aneurysm, depression, incontinence, UC  Family / Caregiver Present: No  Referring Practitioner: Oscar Dow  Subjective  Subjective: Pt in bed on arrival. \"I'm not sure I'll be able to. \" Pleasantly surprised with how well she did getting up compared to at home.   General Comment  Comments: No c/o pain      Social/Functional History  Social/Functional History  Lives With: Spouse  Type of Home: Mobile home  Home Layout: One level  Home Access: Level entry  Bathroom Shower/Tub: Tub/Shower unit  Bathroom Toilet: Standard  Bathroom Equipment: Grab bars in shower, Shower chair, Hand-held shower  Home Equipment: Rolling walker, [employer]  ADL Assistance: Needs assistance (sister and daughter assist with showering,  assists with getting dressed and in/out of bed, patient usually manages toileting independently)  Homemaking Assistance:  ( performs all)  Homemaking Responsibilities: No  Ambulation Assistance: Independent (with FWW)  Transfer Assistance: Independent  Active : No  Occupation: Retired  Type of occupation: \"helped older people\"  Additional Comments: patient with one loss of balance yesterday (family lowered her to the floor) but otherwise denies history of falls     Objective        Orientation  Overall Orientation Status: Within Functional Limits     Balance  Sitting Balance: Independent  Standing Balance: Stand by assistance  Standing Balance  Time: 30 sec + 4 min  Functional Mobility  Functional - Mobility Device: Rolling Walker  Activity: Other (bed to chair, to/from sink in room for standing grooming)  Assist Level: Contact guard assistance  Toilet Transfers  Toilet - Technique: Ambulating  Equipment Used: Standard bedside commode (simulated)  Toilet Transfer: Contact guard assistance  ADL  Feeding: NPO  Grooming: Modified independent ;Setup  LE Dressing: Moderate assistance  Toileting: Unable to assess(comment)        Transfers  Sit to stand: Contact guard assistance  Stand to sit: Contact guard assistance  Vision - Basic Assessment  Patient Visual Report: No visual complaint reported. Cognition  Overall Cognitive Status: WFL  Cognition Comment: mild aphasia, decreased short term memory           Plan  If pt discharges prior to next tx, this note will serve as d/c summary. Continue per POC if pt does not d/c.     Plan  Times per week: 2-5x  Times per day: Daily  Current Treatment Recommendations: Strengthening, Balance Training, Functional Mobility Training, Endurance Training, Safety Education & Training, Patient/Caregiver Education & Training, Self-Care / ADL    AM-PAC Score  AM-PAC Inpatient Daily Activity Raw Score: 20 (08/06/21 0933)  AM-PAC Inpatient ADL T-Scale Score : 42.03 (08/06/21 0933)  ADL Inpatient CMS 0-100% Score: 38.32 (08/06/21 0933)  ADL Inpatient CMS G-Code Modifier : Dayana Denson (08/06/21 9978)    Goals  Short term goals  Time Frame for Short term goals: by D/C  Short term goal 1:  Increase standing/mobility tolerance to 8 min - Not met  Short term goal 2: Transfer to/from chairs and toilet with spvn - Not met  Short term goal 3: Jerene  pants/underwear with spvn - Not met       Therapy Time   Individual Concurrent Group Co-treatment   Time In 0857         Time Out 0928         Minutes 31          Timed Code Tx Min: 16  Total Tx TIme: [address] [facility], OT

## 2021-08-06 NOTE — PROGRESS NOTES
Spoke with Nishant Dao from blood bank.  States that type and screen ordered is good for three days, expiring on the 8th of august.

## 2021-08-06 NOTE — PROCEDURES
EGD REPORT    Patient:  Judene Leventhal                  1940    Referring Physician:  Coy Farrar    Endoscopist: Danice Simmonds     Indication:  Melena     Medications:  MAC      Pre-Anesthesia Assessment:  I have reviewed and am in agreement with patient history and medication, including previous response to sedation. Prior to the procedure, a History and Physical was performed, and patient medications and allergies were reviewed. The patient is competent. The risks and benefits of the procedure and the sedation options and risks were discussed with the patient. Risks discussed included but were not limited to infection, bleeding, perforation, death, and missed lesions. All questions were answered and informed consent was obtained. Patient identification and proposed procedure were verified by the physician and the nurse in the pre-procedure area in the procedure room. [place]: II  ASA Grade Assessment: 3     After reviewing the risks and benefits, the patient was deemed in satisfactory condition to undergo the procedure. The anesthesia plan was to use MAC anesthesia. Immediately prior to administration of medications, the patient was re-assessed for adequacy to receive sedatives and a time out was performed. Patient and healthcare providers were in agreement it was the correct patient and procedure. The heart rate, respiratory rate, oxygen saturations, blood pressure, adequacy of pulmonary ventilation, and response to care were monitored throughout the procedure. The physical status of the patient was re-assessed after the procedure. After obtaining informed consent, the endoscope was passed under direct vision. The endoscope was introduced through the mouth, and advanced to the second part of duodenum. The EGD was accomplished without difficulty. The patient tolerated the procedure well.        Duodenum: billroth II anatomy; both limbs normal without blood  Stomach: s/p gastrectomy; the anastomosis has a 2cm ulcer with 2 red spots. These were treated with gold probe at 20watts. The red spots were obliterated and there was no bleeding. The gastric mucosa is erythematous and biopsies are obtained. Retroflexed views normal.   Esophagus: GE junction at 35cms. No Evidence of Henry's or esophagitis.     Estimated blood loss none    Plan:  BID PPI x 8 weeks  Check healing in 2 weeks  No smoking  The patient knows it is their responsibility to call for biopsy results in 7 days

## 2021-08-06 NOTE — ANESTHESIA POSTPROCEDURE EVALUATION
Department of Anesthesiology  Postprocedure Note    Patient: Karlie Sampson  MRN: [id number]  YOB: 1940  Date of evaluation: 8/6/2021  Time:  1:34 PM     Procedure Summary     Date: 08/06/21 Room / Location: [facility]    Anesthesia Start: 8713 Anesthesia Stop: 1320    Procedures:       EGD BIOPSY (N/A )      EGD POLYP ABLATION/OTHER (N/A ) Diagnosis: (GI BLEED)    Surgeons: Queta Foreman MD Responsible Provider: Alirio Sanabria DO    Anesthesia Type: MAC ASA Status: 2          Anesthesia Type: No value filed. Moo Phase I: Moo Score: 10    Moo Phase II:      Last vitals: Reviewed and per EMR flowsheets.        Anesthesia Post Evaluation    Patient location during evaluation: bedside  Patient participation: complete - patient participated  Level of consciousness: awake and alert  Airway patency: patent  Nausea & Vomiting: no nausea and no vomiting  Complications: no  Respiratory status: acceptable  Hydration status: stable

## 2021-08-06 NOTE — CONSULTS
Consult Note      Sky Span  1940    Consultant: Caron Vasquez  Reason for Consult:  GI bleed  Requesting Physician:  Mayank Zeng    CHIEF COMPLAINT:  Bleeding    History Obtained From:  patient, electronic medical record    HISTORY OF PRESENT ILLNESS:                The patient is a 68 y.o. female with significant past medical history of UC and lung CA who presents with weakness. Occasional epigastric pain worse with eating x 1 week. Developed melena yesterday. No n/v. Continued to have melena overnight. She takes ASA 81mg daily. Past Medical History:        Diagnosis Date    CHUYITA (acute kidney injury) ([facility] 5/1/2018    Allergic rhinitis     Aneurysm ([facility] [address]     aorta  watching now    Aortic aneurysm [facility])     Currently has     Cancer [facility])     Lung    Cholelithiasis     Depression     History of ischemic left MCA stroke 9/30/2020    With residual expressive aphasia. Noted to be subacute ~ 8days old on MRI 9/29/2020.      History of ischemic left MCA stroke 9/30/2020    Hyperlipidemia     Hypertension     Incontinence     bladder    MRSA (methicillin resistant staph aureus) culture positive 12/28/11    Leg    Ulcer     Ulcerative colitis ([facility] [address]      Past Surgical History:        Procedure Laterality Date    ABDOMEN SURGERY      gastric resection for ulcers    BRONCHOSCOPY  12/11/2012    CHOLECYSTECTOMY, OPEN  5/7/2012    COLONOSCOPY      ERCP  3/13/12    sphincterotomy; 5 [place] cm stent placed    ERCP  12-    pancreatic stent removal, pancreotogram    PRESSURE ULCER DEBRIDEMENT  2010    gastric    STOMACH SURGERY      for ulcer    UPPER GASTROINTESTINAL ENDOSCOPY       Medications at Home:  Medications Prior to Admission: clopidogrel (PLAVIX) 75 MG tablet, Take 75 mg by mouth nightly  aspirin 81 MG EC tablet, Take 81 mg by mouth daily  atorvastatin (LIPITOR) 40 MG tablet, Take 40 mg by mouth nightly  amLODIPine (NORVASC) 10 MG tablet, Take 10 mg by mouth daily  citalopram (CELEXA) 40 MG tablet, Take 40 mg by mouth daily   pantoprazole (PROTONIX) 40 MG tablet, Take 40 mg by mouth nightly   Current Medications:    Current Facility-Administered Medications: 0.9 % sodium chloride infusion, , Intravenous, PRN  atorvastatin (LIPITOR) tablet 40 mg, 40 mg, Oral, Nightly  citalopram (CELEXA) tablet 40 mg, 40 mg, Oral, Daily  sodium chloride flush 0.9 % injection 5-40 mL, 5-40 mL, Intravenous, 2 times per day  sodium chloride flush 0.9 % injection 5-40 mL, 5-40 mL, Intravenous, PRN  0.9 % sodium chloride infusion, 25 mL, Intravenous, PRN  ondansetron (ZOFRAN-ODT) disintegrating tablet 4 mg, 4 mg, Oral, Q8H PRN **OR** ondansetron (ZOFRAN) injection 4 mg, 4 mg, Intravenous, Q6H PRN  acetaminophen (TYLENOL) tablet 650 mg, 650 mg, Oral, Q6H PRN **OR** acetaminophen (TYLENOL) suppository 650 mg, 650 mg, Rectal, Q6H PRN  0.9 % sodium chloride infusion, , Intravenous, Continuous  pantoprazole (PROTONIX) injection 40 mg, 40 mg, Intravenous, Q12H **AND** sodium chloride (PF) 0.9 % injection 10 mL, 10 mL, Intravenous, Q12H  melatonin tablet 3 mg, 3 mg, Oral, Nightly PRN  Allergies:  Patient has no known allergies. Social History:    TOBACCO:   reports that she quit smoking about 12 years ago. She has a 75.00 pack-year smoking history. She has never used smokeless tobacco.  ETOH:   reports no history of alcohol use. DRUGS:   reports no history of drug use. Family History:       Problem Relation Age of Onset    Arthritis Mother      REVIEW OF SYSTEMS:    A comprehensive 12 point review of systems is negative except as documented above.      PHYSICAL EXAM:      Vitals:    BP (!) 144/63   Pulse 87   Temp 98.1 °F (36.7 °C) (Oral)   Resp 16   Ht 5' 2\" (1.575 m)   Wt 133 lb (60.3 kg)   SpO2 94%   BMI 24.33 kg/m²     General: No acute distress  HEENT: PERRL, EOMI, oral mucosa moist and intact, no sclericterus  Neck: no thyromegaly  Lymphatic: no cervical or supraclavicular lymphadenopathy  Heart: no m/r/g; +s1/s2 rrr  Lungs: CTA bilaterally  Abdomen: soft, nt, nd +BS  Extremities: 2+pulses, no edema  Skin: no rashes or lesions  Neuro: a&o x 3; no gross deficit  DATA:    Recent Labs     08/05/21  1355 08/05/21  2323   WBC 10.4  --    HGB 8.4* 6.9*   HCT 25.5* 21.4*   MCV 86.7  --      --      Recent Labs     08/05/21  1355      K 4.0      CO2 22   BUN 45*   CREATININE 1.0     Recent Labs     08/05/21  1355   AST 18   ALT 19   BILITOT 0.3   ALKPHOS 231*     No results for input(s): LIPASE, AMYLASE in the last 72 hours. Recent Labs     08/05/21  1355   PROT 7.1   INR 0.99     No results for input(s): PTT in the last 72 hours. No results for input(s): OCCULTBLD in the last 72 hours. Previous endoscopy: EGD/colon 2018; IMPRESSION/RECOMMENDATIONS:      Melena  Anemia  UC    H/o UC - continue home meds  H/o gastric erosions may have a gastric ulcer. 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 If EGD negative will do CTA      Thank you for allowing me to participate in [address]. Alec Figueroa.  Althea Tim MD

## 2021-08-06 NOTE — ANESTHESIA PRE PROCEDURE
Department of Anesthesiology  Preprocedure Note       Name:  Starr Yung   Age:  68 y.o.  :  1940                                          MRN:  [id number]         Date:  2021      Surgeon: Son Espinoza):  Nina Barrientos MD    Procedure: Procedure(s):  EGD ESOPHAGOGASTRODUODENOSCOPY    Medications prior to admission:   Prior to Admission medications    Medication Sig Start Date End Date Taking?  Authorizing Provider   clopidogrel (PLAVIX) 75 MG tablet Take 75 mg by mouth nightly   Yes Historical Provider, MD   aspirin 81 MG EC tablet Take 81 mg by mouth daily   Yes Historical Provider, MD   atorvastatin (LIPITOR) 40 MG tablet Take 40 mg by mouth nightly   Yes Historical Provider, MD   amLODIPine (NORVASC) 10 MG tablet Take 10 mg by mouth daily   Yes Historical Provider, MD   citalopram (CELEXA) 40 MG tablet Take 40 mg by mouth daily    Yes Historical Provider, MD   pantoprazole (PROTONIX) 40 MG tablet Take 40 mg by mouth nightly     Historical Provider, MD       Current medications:    Current Facility-Administered Medications   Medication Dose Route Frequency Provider Last Rate Last Admin    0.9 % sodium chloride infusion   Intravenous PRN Sarah Curiel MD        atorvastatin (LIPITOR) tablet 40 mg  40 mg Oral Nightly Cherry Zavala MD   40 mg at 21    citalopram (CELEXA) tablet 40 mg  40 mg Oral Daily Cherry Zavala MD        sodium chloride flush 0.9 % injection 5-40 mL  5-40 mL Intravenous 2 times per day Cherry Zavala MD   10 mL at 21    sodium chloride flush 0.9 % injection 5-40 mL  5-40 mL Intravenous PRN Cherry Zavala MD        0.9 % sodium chloride infusion  25 mL Intravenous PRN Cherry Zavala MD        ondansetron (ZOFRAN-ODT) disintegrating tablet 4 mg  4 mg Oral Q8H PRN Cherry Zavala MD        Or    [facility] injection 4 mg  4 mg Intravenous Q6H PRN Cherry Zavala MD        acetaminophen (TYLENOL) tablet 650 mg  650 mg Oral Q6H PRN Hany Limon MD   650 mg at 08/06/21 0157    Or    acetaminophen (TYLENOL) suppository 650 mg  650 mg Rectal Q6H PRN Hany Limon MD        0.9 % sodium chloride infusion   Intravenous Continuous Hany Limon  mL/hr at 08/05/21 1752 New Bag at 08/05/21 1752    pantoprazole (PROTONIX) injection 40 mg  40 mg Intravenous Q12H Hany Limon MD   40 mg at 08/06/21 0641    And    sodium chloride (PF) 0.9 % injection 10 mL  10 mL Intravenous Q12H Hany Limon MD   10 mL at 08/06/21 0641    melatonin tablet 3 mg  3 mg Oral Nightly PRN Karena Lemus MD   3 mg at 08/06/21 0157       Allergies:  No Known Allergies    Problem List:    Patient Active Problem List   Diagnosis Code    Cellulitis and abscess L03.90, L02.91    UC (ulcerative colitis) ([facility] [address] K51.90    Elevated blood pressure reading R03.0    Nausea & vomiting R11.2    Pancreatic mass K86.89    S/P cholecystectomy Z90.49    Cholelithiasis K80.20    Hypoxemia requiring supplemental oxygen R09.02, Z99.81    Incisional hernia K43.2    Port-A-Cath in place Z95.828    Malignant neoplasm of overlapping sites of right lung ([facility] [address] C34.81    Left kidney mass N28.89    Wound infection after surgery, sequela [id number]    Left arm weakness R29.898    Stroke-like symptom R29.90    Weakness of both lower extremities R29.898    Tingling of left upper extremity R20.2    Acute renal failure (ARF) ([facility]) N17.9    Generalized weakness R53.1    Expressive aphasia R47.01    History of ischemic left MCA stroke Z86.73    Acute CVA (cerebrovascular accident) ([facility] [address] I63.9    Bone metastases ([facility] [address] C79.51    Weakness of left upper extremity R29.898    GI bleed K92.2       Past Medical History:        Diagnosis Date    CHUYITA (acute kidney injury) ([facility] [address] 5/1/2018    Allergic rhinitis     Aneurysm ([facility] [address]     aorta  watching now    Aortic aneurysm [facility])     Currently has     Cancer ([facility] [address]     Lung    Cholelithiasis     Depression     History of ischemic left MCA stroke 2020    With residual expressive aphasia. Noted to be subacute ~ 8days old on MRI 2020.  History of ischemic left MCA stroke 2020    Hyperlipidemia     Hypertension     Incontinence     bladder    MRSA (methicillin resistant staph aureus) culture positive 11    Leg    Ulcer     Ulcerative colitis ([address]        Past Surgical History:        Procedure Laterality Date    ABDOMEN SURGERY      gastric resection for ulcers    BRONCHOSCOPY  2012    CHOLECYSTECTOMY, OPEN  2012    COLONOSCOPY      ERCP  3/13/12    sphincterotomy; 5 Lao cm stent placed    ERCP  2012    pancreatic stent removal, pancreotogram    PRESSURE ULCER DEBRIDEMENT      gastric    STOMACH SURGERY      for ulcer    UPPER GASTROINTESTINAL ENDOSCOPY         Social History:    Social History     Tobacco Use    Smoking status: Former Smoker     Packs/day: 1.50     Years: 50.00     Pack years: 75.00     Quit date: 2008     Years since quittin.6    Smokeless tobacco: Never Used   Substance Use Topics    Alcohol use:  No                                Counseling given: Not Answered      Vital Signs (Current):   Vitals:    21 0347 21 0714 21 0759 21 1230   BP: (!) 144/63  (!) 146/72    Pulse: 87  108    Resp: 16  17    Temp: 98.1 °F (36.7 °C)  98.3 °F (36.8 °C) 98 °F (36.7 °C)   TempSrc: Oral  Oral Oral   SpO2: 94%  94%    Weight:  133 lb (60.3 kg)     Height:                                                  BP Readings from Last 3 Encounters:   21 (!) 146/72   10/30/20 137/74   10/16/20 132/75       NPO Status: Time of last liquid consumption: 0000                        Time of last solid consumption: 1700                        Date of last liquid consumption: 21                        Date of last solid food consumption: 21    BMI:   Wt Readings from Last 3 Encounters:   21 133 lb (60.3 kg)   10/28/20 142 lb 4.8 oz (64.5 kg)   10/16/20 143 lb 8.3 oz (65.1 kg)     Body mass index is 24.33 kg/m². CBC:   Lab Results   Component Value Date    WBC 8.6 08/06/2021    RBC 3.14 08/06/2021    RBC 4.20 07/05/2017    HGB 8.9 08/06/2021    HCT 26.8 08/06/2021    MCV 88.7 08/06/2021    RDW 15.3 08/06/2021     08/06/2021       CMP:   Lab Results   Component Value Date     08/06/2021    K 4.1 08/06/2021     08/06/2021    CO2 17 08/06/2021    BUN 26 08/06/2021    CREATININE 0.8 08/06/2021    GFRAA >60 08/06/2021    GFRAA 97 12/12/2012    AGRATIO 1.1 08/06/2021    LABGLOM >60 08/06/2021    GLUCOSE 98 08/06/2021    GLUCOSE 100 07/05/2017    PROT 6.0 08/06/2021    PROT 7.8 07/05/2017    CALCIUM 8.8 08/06/2021    BILITOT 0.3 08/06/2021    ALKPHOS 215 08/06/2021    AST 28 08/06/2021    ALT 18 08/06/2021       POC Tests: No results for input(s): POCGLU, POCNA, POCK, POCCL, POCBUN, POCHEMO, POCHCT in the last 72 hours.     Coags:   Lab Results   Component Value Date    PROTIME 11.2 08/05/2021    INR 0.99 08/05/2021    APTT 25.6 08/19/2017       HCG (If Applicable): No results found for: PREGTESTUR, PREGSERUM, HCG, HCGQUANT     ABGs:   Lab Results   Component Value Date    PHART 7.41 12/11/2012    PO2ART 61 12/11/2012    QSA0RCS 42 12/11/2012    IJW5FOR 26 12/11/2012    BEART 1.4 12/11/2012        Type & Screen (If Applicable):  Lab Results   Component Value Date    LABABO B 05/13/2012    LABRH Positive 05/13/2012       Drug/Infectious Status (If Applicable):  No results found for: HIV, HEPCAB    COVID-19 Screening (If Applicable):   Lab Results   Component Value Date    COVID19 Not Detected 10/16/2020    COVID19 Not Detected 06/25/2020           Anesthesia Evaluation  Patient summary reviewed and Nursing notes reviewed no history of anesthetic complications:   Airway: Mallampati: I  TM distance: <3 FB   Neck ROM: full  Mouth opening: > = 3 FB Dental:    (+) upper dentures and lower dentures      Pulmonary:Negative Pulmonary ROS and normal exam  breath sounds clear to auscultation                             Cardiovascular:    (+) hypertension: mild, hyperlipidemia      ECG reviewed  Rhythm: regular  Rate: normal  Echocardiogram reviewed                  Neuro/Psych:   (+) CVA: no interval change,             GI/Hepatic/Renal:   (+) GERD:, PUD,           Endo/Other:    (+) blood dyscrasia: anticoagulation therapy:., .                 Abdominal:             Vascular: negative vascular ROS. Other Findings:             Anesthesia Plan      MAC     ASA 2       Induction: intravenous. Anesthetic plan and risks discussed with patient. Plan discussed with CRNA.     Attending anesthesiologist reviewed and agrees with Preprocedure content            Roc Segovia DO   8/6/2021

## 2021-08-07 VITALS
TEMPERATURE: 98.1 F | HEIGHT: 62 IN | BODY MASS INDEX: 24.48 KG/M2 | DIASTOLIC BLOOD PRESSURE: 76 MMHG | WEIGHT: 133 LBS | SYSTOLIC BLOOD PRESSURE: 132 MMHG | RESPIRATION RATE: 20 BRPM | HEART RATE: 89 BPM | OXYGEN SATURATION: 92 %

## 2021-08-07 LAB
HCT VFR BLD CALC: 24 % (ref 36–48)
HCT VFR BLD CALC: 25.7 % (ref 36–48)
HEMOGLOBIN: 8 G/DL (ref 12–16)
HEMOGLOBIN: 8.5 G/DL (ref 12–16)

## 2021-08-07 PROCEDURE — 85018 HEMOGLOBIN: CPT

## 2021-08-07 PROCEDURE — 36415 COLL VENOUS BLD VENIPUNCTURE: CPT

## 2021-08-07 PROCEDURE — 96376 TX/PRO/DX INJ SAME DRUG ADON: CPT

## 2021-08-07 PROCEDURE — 6370000000 HC RX 637 (ALT 250 FOR IP): Performed by: INTERNAL MEDICINE

## 2021-08-07 PROCEDURE — C9113 INJ PANTOPRAZOLE SODIUM, VIA: HCPCS | Performed by: INTERNAL MEDICINE

## 2021-08-07 PROCEDURE — 85014 HEMATOCRIT: CPT

## 2021-08-07 PROCEDURE — 6360000002 HC RX W HCPCS: Performed by: INTERNAL MEDICINE

## 2021-08-07 PROCEDURE — 2580000003 HC RX 258: Performed by: INTERNAL MEDICINE

## 2021-08-07 RX ORDER — PANTOPRAZOLE SODIUM 40 MG/1
40 TABLET, DELAYED RELEASE ORAL 2 TIMES DAILY
Qty: 60 TABLET | Refills: 1 | Status: SHIPPED | OUTPATIENT
Start: 2021-08-07

## 2021-08-07 RX ORDER — CLOPIDOGREL BISULFATE 75 MG/1
75 TABLET ORAL NIGHTLY
Qty: 30 TABLET | Refills: 3 | Status: SHIPPED
Start: 2021-08-14

## 2021-08-07 RX ADMIN — CITALOPRAM 40 MG: 40 TABLET, FILM COATED ORAL at 09:29

## 2021-08-07 RX ADMIN — PANTOPRAZOLE SODIUM 40 MG: 40 INJECTION, POWDER, FOR SOLUTION INTRAVENOUS at 06:07

## 2021-08-07 RX ADMIN — ACETAMINOPHEN 650 MG: 325 TABLET ORAL at 11:42

## 2021-08-07 RX ADMIN — Medication 10 ML: at 09:30

## 2021-08-07 ASSESSMENT — PAIN SCALES - GENERAL
PAINLEVEL_OUTOF10: 0
PAINLEVEL_OUTOF10: 7
PAINLEVEL_OUTOF10: 0
PAINLEVEL_OUTOF10: 0

## 2021-08-07 ASSESSMENT — PAIN DESCRIPTION - PAIN TYPE: TYPE: ACUTE PAIN

## 2021-08-07 ASSESSMENT — PAIN DESCRIPTION - FREQUENCY: FREQUENCY: INTERMITTENT

## 2021-08-07 ASSESSMENT — PAIN DESCRIPTION - DESCRIPTORS: DESCRIPTORS: HEADACHE

## 2021-08-07 ASSESSMENT — PAIN DESCRIPTION - PROGRESSION: CLINICAL_PROGRESSION: GRADUALLY WORSENING

## 2021-08-07 ASSESSMENT — PAIN - FUNCTIONAL ASSESSMENT: PAIN_FUNCTIONAL_ASSESSMENT: ACTIVITIES ARE NOT PREVENTED

## 2021-08-07 ASSESSMENT — PAIN DESCRIPTION - ONSET: ONSET: ON-GOING

## 2021-08-07 ASSESSMENT — PAIN DESCRIPTION - LOCATION: LOCATION: HEAD

## 2021-08-07 NOTE — PROGRESS NOTES
GI Progress Note      Aravind Denson is a 68 y.o. female patient. 1. Upper GI bleed        Admit Date: 2021    Subjective:       No bleeding. No pain. No n/v.       ROS:  As per above    Scheduled Meds:   atorvastatin  40 mg Oral Nightly    citalopram  40 mg Oral Daily    sodium chloride flush  5-40 mL Intravenous 2 times per day    pantoprazole  40 mg Intravenous Q12H    And    sodium chloride (PF)  10 mL Intravenous Q12H       Continuous Infusions:   sodium chloride      sodium chloride      sodium chloride 150 mL/hr at 21 1752       PRN Meds:  sodium chloride, sodium chloride flush, sodium chloride, ondansetron **OR** ondansetron, acetaminophen **OR** acetaminophen, melatonin      Objective:       Patient Vitals for the past 24 hrs:   BP Temp Temp src Pulse Resp SpO2   21 0607 (!) 141/78 98.3 °F (36.8 °C) Oral 97 20 95 %   21 2323 129/68 98.2 °F (36.8 °C) Oral 85 18 98 %   21 2019 127/69 98.4 °F (36.9 °C) Oral 84 20 98 %   21 1500 (!) 141/72 98.7 °F (37.1 °C) Oral 90 18 97 %   21 1407 122/69 -- -- 85 18 98 %   21 1353 138/74 -- -- 85 18 99 %   21 1341 133/64 -- -- 85 18 99 %   21 1330 127/76 97 °F (36.1 °C) Tympanic 80 18 99 %   21 1230 -- 98 °F (36.7 °C) Oral -- -- --       Exam:  VITALS:  BP (!) 141/78   Pulse 97   Temp 98.3 °F (36.8 °C) (Oral)   Resp 20   Ht 5' 2\" (1.575 m)   Wt 133 lb (60.3 kg)   SpO2 95%   BMI 24.33 kg/m²   TEMPERATURE:  Current - Temp: 98.3 °F (36.8 °C);  Max - Temp  Av.1 °F (36.7 °C)  Min: 97 °F (36.1 °C)  Max: 98.7 °F (37.1 °C)      General appearance: alert, appears stated age, cooperative and no distress  Head: Normocephalic, without obvious abnormality, atraumatic  Neck: supple, symmetrical, trachea midline and thyroid not enlarged, symmetric, no tenderness/mass/nodules  CVS:  RRR, Nl s1s2  Lungs CTA Bilaterally, normal effort  Abdomen: soft, non-tender; bowel sounds normal; no masses,  no organomegaly  AAOx3, No asterixis or encephalopathy  Extremities: No edema. Recent Labs     08/05/21  1355 08/05/21  2323 08/06/21  0701 08/06/21  1027 08/06/21  1555 08/07/21  0017 08/07/21  0436   WBC 10.4  --  8.6  --   --   --   --    HGB 8.4*   < > 9.2*   < > 9.1* 8.5* 8.0*   HCT 25.5*   < > 27.9*   < > 27.3* 25.7* 24.0*   MCV 86.7  --  88.7  --   --   --   --      --  210  --   --   --   --     < > = values in this interval not displayed. Recent Labs     08/05/21  1355 08/06/21  0701    139   K 4.0 4.1    109   CO2 22 17*   BUN 45* 26*   CREATININE 1.0 0.8     Recent Labs     08/05/21  1355 08/06/21  0701   AST 18 28   ALT 19 18   BILITOT 0.3 0.3   ALKPHOS 231* 215*     No results for input(s): LIPASE, AMYLASE in the last 72 hours. Recent Labs     08/05/21  1355 08/06/21  0701   PROT 7.1 6.0*   INR 0.99  --      No results for input(s): PTT in the last 72 hours. No results for input(s): OCCULTBLD in the last 72 hours.     =  Assessment:       Gastric ulcer  anemia    Recommendations:       Recommend IV iron today and then oral daily  BID PPI x 8 weeks and then repeat EGD in 8 weeks  Ok to jace Prince MD  8/7/2021  8:13 AM

## 2021-08-07 NOTE — PROGRESS NOTES
Monitoring H/H Q 6hrs. Last level was 8.0 and 24.0 drawn at 0436. No active bleeding noted. Pt continue on IVF@ 150 ml/hr. Vital signs stable, afebrile. Call light in reach, bed alarm in place.

## 2021-08-07 NOTE — PLAN OF CARE
Patient discharged home with her . Discharge instructions reviewed with the both of them, and they verbalize understanding. IV removed, site unremarkable. Taken by wheelchair to car.

## 2021-08-07 NOTE — DISCHARGE SUMMARY
Hospital Medicine Discharge Summary    Patient ID: Aide Steve      Patient's PCP: Regan Ewing DO    Admit Date: 8/5/2021     Discharge Date:   8/7/2021    Admitting Physician: Uvaldo Shen MD     Discharge Physician: Uvaldo Shen MD     Discharge Diagnoses: Active Hospital Problems    Diagnosis Date Noted    GI bleed [K92.2] 08/05/2021       The patient was seen and examined on day of discharge and this discharge summary is in conjunction with any daily progress note from day of discharge. Hospital Course: Patient is 68-year-old female with history of stage IV lung cancer, CVA with mild expressive aphasia residual, hypertension hypertension came into ER with a complaint of generalized weakness and melena  S/p EGD 8/6 billroth II anatomy; both limbs normal without blood  Stomach: s/p gastrectomy; the anastomosis has a 2cm ulcer with 2 red spots. These were treated with gold probe at 20watts. The red spots were obliterated and there was no bleeding. The gastric mucosa is erythematous and biopsies are obtained. Retroflexed views normal.   GI recommended PPI twice daily for 8 weeks. Repeat EGD in 8 weeks. Okay to resume aspirin hold Plavix for 1 week. Patient seen and examined eating breakfast denies any abdominal pain, nausea, vomiting, fever, chills hemoglobin is stable no melanoma. Medically stable to be discharged to follow-up with GI and PCP. Patient verbalizes discharge instruction. Final diagnosis  #Acute blood loss anemia s/p 1 pack RBC  #Upper GI bleed s/p EGD showed bleeding which was cauterized. Will need repeat EGD in 8 weeks. #History of prior CVA with expressive aphasia  Resume aspirin. Continue to hold Plavix for 1 week  #Hypertension stable  #Stage IV lung cancer.         Physical Exam Performed:     /76   Pulse 89   Temp 98.1 °F (36.7 °C) (Oral)   Resp 20   Ht 5' 2\" (1.575 m)   Wt 133 lb (60.3 kg)   SpO2 92%   BMI 24.33 kg/m²       General appearance: No apparent distress, appears stated age and cooperative. HEENT:  Normal cephalic, atraumatic without obvious deformity. Pupils equal, round, and reactive to light. Extra ocular muscles intact. Conjunctivae/corneas clear. Neck: Supple, with full range of motion. No jugular venous distention. Trachea midline. Respiratory:  Normal respiratory effort. Clear to auscultation, bilaterally without Rales/Wheezes/Rhonchi. Cardiovascular:  Regular rate and rhythm with normal S1/S2 without murmurs, rubs or gallops. Abdomen: Soft, non-tender, non-distended with normal bowel sounds. Musculoskeletal:  No clubbing, cyanosis or edema bilaterally. Full range of motion without deformity. Skin: Skin color, texture, turgor normal.  No rashes or lesions. Neurologic:  Neurovascularly intact without any focal sensory/motor deficits. Cranial nerves: II-XII intact, grossly non-focal.  Psychiatric:  Alert and oriented, thought content appropriate, normal insight  Capillary Refill: Brisk,< 3 seconds   Peripheral Pulses: +2 palpable, equal bilaterally       Labs:  For convenience and continuity at follow-up the following most recent labs are provided:      CBC:    Lab Results   Component Value Date    WBC 8.6 08/06/2021    HGB 8.0 08/07/2021    HCT 24.0 08/07/2021     08/06/2021       Renal:    Lab Results   Component Value Date     08/06/2021    K 4.1 08/06/2021     08/06/2021    CO2 17 08/06/2021    BUN 26 08/06/2021    CREATININE 0.8 08/06/2021    CALCIUM 8.8 08/06/2021    PHOS 3.5 10/30/2020         Significant Diagnostic Studies    Radiology:   No orders to display          Consults:     IP CONSULT TO HOSPITALIST  IP CONSULT TO GI    Disposition:  home     Condition at Discharge: Stable    Discharge Instructions/Follow-up:  PCP, GI    Code Status:  Limited     Activity: activity as tolerated    Diet: cardiac diet      Discharge Medications:     Current Discharge Medication List           Details   clopidogrel (PLAVIX) 75 MG tablet Take 1 tablet by mouth nightly  Qty: 30 tablet, Refills: 3      pantoprazole (PROTONIX) 40 MG tablet Take 1 tablet by mouth 2 times daily  Qty: 60 tablet, Refills: 1              Details   aspirin 81 MG EC tablet Take 81 mg by mouth daily      atorvastatin (LIPITOR) 40 MG tablet Take 40 mg by mouth nightly      amLODIPine (NORVASC) 10 MG tablet Take 10 mg by mouth daily      citalopram (CELEXA) 40 MG tablet Take 40 mg by mouth daily              Time Spent on discharge is more than 30 minutes in the examination, evaluation, counseling and review of medications and discharge plan. This chart was likely completed using voice recognition technology and may contain unintended grammatical , phraseology,and/or punctuation errors    Signed:    Britney Peoples MD   8/7/2021      Thank you Rika Snyder DO for the opportunity to be involved in this patient's care. If you have any questions or concerns please feel free to contact me at [phone].

## 2021-08-11 NOTE — PROGRESS NOTES
Physician Progress Note      PATIENT:               Rosalinda Stanton  CSN #:                  [id number]  :                       1940  ADMIT DATE:       2021 1:07 PM  [address] [place] DATE:        2021 2:06 PM  RESPONDING  PROVIDER #:        Carlos Brown MD          QUERY TEXT:    Pt admitted with GI bleed. Pt noted to have gastrectomy. Per EGD report:   Stomach: s/p gastrectomy; the anastomosis has a 2cm ulcer with 2 red spots. These were treated with gold probe at 20watts. The red spots were obliterated   and there was no bleeding. The gastric mucosa is erythematous and biopsies are   obtained. Retroflexed views normal.   If possible, please document in   progress notes and discharge summary:      The medical record reflects the following:  Risk Factors: GI bleed, s/p gastrectomy  Clinical Indicators: Per EGD: Stomach: s/p gastrectomy; the anastomosis has a   2cm ulcer with 2 red spots. These were treated with gold probe at 20watts. The   red spots were obliterated and there was no bleeding. The gastric mucosa is   erythematous and biopsies are obtained. Retroflexed views normal.  Per D/C summary: Upper GI bleed s/p EGD showed bleeding which was cauterized. Will need repeat EGD in 8 weeks. Treatment: gold probe at 20watts. PPI twice daily for 8 weeks, Repeat EGD in 8   weeks  Options provided:  -- GI bleed due to ulcer at the anastomosis site from the previous gastrectomy  -- Other - I will add my own diagnosis  -- Disagree - Not applicable / Not valid  -- Disagree - Clinically unable to determine / Unknown  -- Refer to Clinical Documentation Reviewer    PROVIDER RESPONSE TEXT:    This patient has a GI bleed due to ulcer at the anastomosis site from the   previous gastrectomy.     Query created by: Loanne Dubin on 2021 6:41 AM      Electronically signed by:  Carlos Brown MD 2021 1:12 PM

## 2021-09-13 ENCOUNTER — ANESTHESIA EVENT (OUTPATIENT)
Dept: ENDOSCOPY | Age: 81
End: 2021-09-13
Payer: MEDICARE

## 2021-09-14 ENCOUNTER — ANESTHESIA (OUTPATIENT)
Dept: ENDOSCOPY | Age: 81
End: 2021-09-14
Payer: MEDICARE

## 2021-09-14 ENCOUNTER — HOSPITAL ENCOUNTER (OUTPATIENT)
Age: 81
Setting detail: OUTPATIENT SURGERY
Discharge: HOME | End: 2021-09-14
Attending: INTERNAL MEDICINE | Admitting: INTERNAL MEDICINE
Payer: MEDICARE

## 2021-09-14 VITALS — DIASTOLIC BLOOD PRESSURE: 79 MMHG | OXYGEN SATURATION: 99 % | SYSTOLIC BLOOD PRESSURE: 152 MMHG

## 2021-09-14 VITALS
HEART RATE: 72 BPM | DIASTOLIC BLOOD PRESSURE: 69 MMHG | HEIGHT: 62 IN | TEMPERATURE: 97.9 F | WEIGHT: 135 LBS | BODY MASS INDEX: 24.84 KG/M2 | RESPIRATION RATE: 12 BRPM | OXYGEN SATURATION: 92 % | SYSTOLIC BLOOD PRESSURE: 133 MMHG

## 2021-09-14 PROCEDURE — 3609017100 HC EGD: Performed by: INTERNAL MEDICINE

## 2021-09-14 PROCEDURE — 3700000000 HC ANESTHESIA ATTENDED CARE: Performed by: INTERNAL MEDICINE

## 2021-09-14 PROCEDURE — 2580000003 HC RX 258: Performed by: ANESTHESIOLOGY

## 2021-09-14 PROCEDURE — 6360000002 HC RX W HCPCS: Performed by: NURSE ANESTHETIST, CERTIFIED REGISTERED

## 2021-09-14 PROCEDURE — 2709999900 HC NON-CHARGEABLE SUPPLY: Performed by: INTERNAL MEDICINE

## 2021-09-14 PROCEDURE — 7100000010 HC PHASE II RECOVERY - FIRST 15 MIN: Performed by: INTERNAL MEDICINE

## 2021-09-14 PROCEDURE — 7100000011 HC PHASE II RECOVERY - ADDTL 15 MIN: Performed by: INTERNAL MEDICINE

## 2021-09-14 RX ORDER — PROPOFOL 10 MG/ML
INJECTION, EMULSION INTRAVENOUS PRN
Status: DISCONTINUED | OUTPATIENT
Start: 2021-09-14 | End: 2021-09-14 | Stop reason: SDUPTHER

## 2021-09-14 RX ORDER — LIDOCAINE HYDROCHLORIDE 20 MG/ML
INJECTION, SOLUTION INTRAVENOUS PRN
Status: DISCONTINUED | OUTPATIENT
Start: 2021-09-14 | End: 2021-09-14 | Stop reason: SDUPTHER

## 2021-09-14 RX ORDER — SODIUM CHLORIDE, SODIUM LACTATE, POTASSIUM CHLORIDE, CALCIUM CHLORIDE 600; 310; 30; 20 MG/100ML; MG/100ML; MG/100ML; MG/100ML
INJECTION, SOLUTION INTRAVENOUS CONTINUOUS
Status: DISCONTINUED | OUTPATIENT
Start: 2021-09-14 | End: 2021-09-14 | Stop reason: HOSPADM

## 2021-09-14 RX ADMIN — SODIUM CHLORIDE, POTASSIUM CHLORIDE, SODIUM LACTATE AND CALCIUM CHLORIDE: 600; 310; 30; 20 INJECTION, SOLUTION INTRAVENOUS at 10:09

## 2021-09-14 RX ADMIN — PROPOFOL 25 MG: 10 INJECTION, EMULSION INTRAVENOUS at 10:19

## 2021-09-14 RX ADMIN — LIDOCAINE HYDROCHLORIDE 20 MG: 20 INJECTION, SOLUTION INTRAVENOUS at 10:19

## 2021-09-14 RX ADMIN — LIDOCAINE HYDROCHLORIDE 50 MG: 20 INJECTION, SOLUTION INTRAVENOUS at 10:18

## 2021-09-14 RX ADMIN — PROPOFOL 75 MG: 10 INJECTION, EMULSION INTRAVENOUS at 10:18

## 2021-09-14 ASSESSMENT — PULMONARY FUNCTION TESTS
PIF_VALUE: 1

## 2021-09-14 ASSESSMENT — PAIN SCALES - GENERAL
PAINLEVEL_OUTOF10: 0
PAINLEVEL_OUTOF10: 0

## 2021-09-14 NOTE — PROCEDURES
EGD REPORT    Patient:  Alesha Suero                  1940    Referring Physician:  Joie Simmons    Endoscopist: Indra Alcantara     Indication:  Follow up ulcer     Medications:  MAC      Pre-Anesthesia Assessment:  I have reviewed and am in agreement with patient history and medication, including previous response to sedation. Prior to the procedure, a History and Physical was performed, and patient medications and allergies were reviewed. The patient is competent. The risks and benefits of the procedure and the sedation options and risks were discussed with the patient. Risks discussed included but were not limited to infection, bleeding, perforation, death, and missed lesions. All questions were answered and informed consent was obtained. Patient identification and proposed procedure were verified by the physician and the nurse in the pre-procedure area in the procedure room. Mallampatti: II  ASA Grade Assessment: 3     After reviewing the risks and benefits, the patient was deemed in satisfactory condition to undergo the procedure. The anesthesia plan was to use MAC anesthesia. Immediately prior to administration of medications, the patient was re-assessed for adequacy to receive sedatives and a time out was performed. Patient and healthcare providers were in agreement it was the correct patient and procedure. The heart rate, respiratory rate, oxygen saturations, blood pressure, adequacy of pulmonary ventilation, and response to care were monitored throughout the procedure. The physical status of the patient was re-assessed after the procedure. After obtaining informed consent, the endoscope was passed under direct vision. The endoscope was introduced through the mouth, and advanced to the second part of duodenum. The EGD was accomplished without difficulty. The patient tolerated the procedure well. Duodenum: Normal  Jejunum: normal  Stomach: healthy gastro-enteric anastomosis.   Ulcer is healed. Mild friability of the gastric mucosa but no bleeding. Retroflexion did show a small hiatal hernia. Esophagus: GE junction at 40cms. No Evidence of Henry's or esophagitis.     Estimated blood loss none    Plan:  Daily PPI  The patient knows it is their responsibility to call for biopsy results in 7 days

## 2021-09-14 NOTE — H&P
Pre-operative History and Physical    Patient: Pasha Byrne  : 1940     History Obtained From:  patient, electronic medical record    HISTORY OF PRESENT ILLNESS:    The patient is a 80 y.o. female who presents for an EGD for follow up of ulcer. Past Medical History:        Diagnosis Date    CHUYITA (acute kidney injury) ([facility] 2018    Allergic rhinitis     Aneurysm ([facility]     aorta  watching now    Aortic aneurysm [facility])     Currently has     Cancer [facility]) 2013    Lung    Cholelithiasis     Depression     Fracture     left wrist & right shoulder    History of ischemic left MCA stroke 2020    With residual expressive aphasia. Noted to be subacute ~ 8days old on MRI 2020.  History of ischemic left MCA stroke 2020    Hyperlipidemia     Hypertension     Incontinence     bladder    MRSA (methicillin resistant staph aureus) culture positive 11    Leg    Ulcer     Ulcerative colitis ([facility]      Past Surgical History:        Procedure Laterality Date    ABDOMEN SURGERY      gastric resection for ulcers    BRONCHOSCOPY  2012    CHOLECYSTECTOMY, OPEN  2012    COLONOSCOPY      ERCP  3/13/12    sphincterotomy; 5 Sinhala cm stent placed    ERCP  2012    pancreatic stent removal, pancreotogram    PRESSURE ULCER DEBRIDEMENT      gastric    STOMACH SURGERY      for ulcer    TUNNELED VENOUS PORT PLACEMENT Left     shoulder    UPPER GASTROINTESTINAL ENDOSCOPY      UPPER GASTROINTESTINAL ENDOSCOPY N/A 2021    EGD BIOPSY performed by Sruthi Jordan MD at [facility] N/A 2021    EGD POLYP ABLATION/OTHER performed by Sruthi Jordan MD at [address] [place] [address] Left     hardware placed     Medications Prior to Admission:   No current facility-administered medications on file prior to encounter.      Current Outpatient Medications on File Prior to Encounter   Medication Sig Dispense Refill    clopidogrel (PLAVIX) 75 MG tablet Take 1 tablet by mouth nightly 30 tablet 3    pantoprazole (PROTONIX) 40 MG tablet Take 1 tablet by mouth 2 times daily 60 tablet 1    aspirin 81 MG EC tablet Take 81 mg by mouth daily      atorvastatin (LIPITOR) 40 MG tablet Take 40 mg by mouth nightly      amLODIPine (NORVASC) 10 MG tablet Take 10 mg by mouth daily      citalopram (CELEXA) 40 MG tablet Take 40 mg by mouth daily        Allergies:  Patient has no known allergies. History of allergic reaction to anesthesia:  No    Social History:   TOBACCO:   reports that she quit smoking about 12 years ago. She has a 75.00 pack-year smoking history. She has never used smokeless tobacco.  ETOH:   reports no history of alcohol use. DRUGS:   reports no history of drug use. Family History:       Problem Relation Age of Onset    Arthritis Mother        PHYSICAL EXAM:      /65   Pulse 84   Temp 97.9 °F (36.6 °C) (Temporal)   Resp 14   Ht 5' 2\" (1.575 m)   Wt 135 lb (61.2 kg)   SpO2 90%   BMI 24.69 kg/m²  I        Heart:  No m/r/g +s1/s2 RRR    Lungs:  CTA bilaterally    Abdomen:  Soft, nontender, non distended; +bs    ASA Grade:  ASA 3 - Patient with moderate systemic disease with functional limitations    Mallampati Class:  Class I: Soft palate, uvula, fauces, pillars visible  __________  Class II: Soft palate, uvula, fauces visible  ____x______   Class III: Soft palate, base of uvula visible  __________  Class IV: Hard palate only visible   __________      ASSESSMENT AND PLAN:    1. Patient is a 80 y.o. female here for EGD with deep sedation  2. Procedure options, risks and benefits reviewed with patient. We specifically discussed that risks include, but are not limited to infection, bleeding, perforation, death, and missed lesions. Patient expresses understanding.

## 2021-09-14 NOTE — PROGRESS NOTES
Ambulatory Surgery/Procedure Discharge Note  Pt tolerated procedure well. Denies pain and nausea post procedure. D/c instructions reviewed with pt and family member present. Written d/c instructions provided at discharge. D/c in wheelchair to home. Spouse will be care taker and used for drive home. Vitals:    09/14/21 1033   BP: 132/68   Pulse: 72   Resp: 14   Temp: 97.9 °F (36.6 °C)   SpO2: 93%       In: 150 [I.V.:150]  Out: -     Restroom use offered before discharge. Yes    Pain assessment:  none           Patient discharged to home/self care.  Patient discharged via wheel chair by transporter to waiting family/S.O.       9/14/2021 10:45 AM

## 2021-09-14 NOTE — ANESTHESIA POSTPROCEDURE EVALUATION
Department of Anesthesiology  Postprocedure Note    Patient: Gabrielle Sanchez  MRN: [id number]  YOB: 1940  Date of evaluation: 9/14/2021  Time:  11:07 AM     Procedure Summary     Date: 09/14/21 [facility]    Anesthesia Start: 1013 Anesthesia Stop: 1033    Procedure: ESOPHAGOGASTRODUODENOSCOPY (N/A ) Diagnosis:       Gastric ulcer, unspecified chronicity, unspecified whether gastric ulcer hemorrhage or perforation present      (Gastric ulcer, unspecified chronicity, unspecified whether gastric ulcer hemorrhage or perforation present [K25.9])    Surgeons: Grady Lau MD Responsible Provider: Tamera Mckinnon MD    Anesthesia Type: MAC ASA Status: 3          Anesthesia Type: MAC    Moo Phase I:      Moo Phase II: Moo Score: 10    Last vitals: Reviewed and per EMR flowsheets.        Anesthesia Post Evaluation    Patient participation: complete - patient participated  Level of consciousness: awake  Airway patency: patent  Nausea & Vomiting: no nausea and no vomiting  Complications: no  Cardiovascular status: hemodynamically stable  Respiratory status: acceptable  Hydration status: stable

## 2021-09-14 NOTE — ANESTHESIA PRE PROCEDURE
Department of Anesthesiology  Preprocedure Note       Name:  Betsy Sparks   Age:  80 y.o.  :  1940                                          MRN:  [id number]         Date:  2021      Surgeon: Ludwin Wall):  Melanie Vega MD    Procedure: Procedure(s):  ESOPHAGOGASTRODUODENOSCOPY    Medications prior to admission:   Prior to Admission medications    Medication Sig Start Date End Date Taking?  Authorizing Provider   clopidogrel (PLAVIX) 75 MG tablet Take 1 tablet by mouth nightly 21   Natalya Motley MD   pantoprazole (PROTONIX) 40 MG tablet Take 1 tablet by mouth 2 times daily 21   Natalya Motley MD   aspirin 81 MG EC tablet Take 81 mg by mouth daily    Historical Provider, MD   atorvastatin (LIPITOR) 40 MG tablet Take 40 mg by mouth nightly    Historical Provider, MD   amLODIPine (NORVASC) 10 MG tablet Take 10 mg by mouth daily    Historical Provider, MD   citalopram (CELEXA) 40 MG tablet Take 40 mg by mouth daily     Historical Provider, MD       Current medications:    Current Facility-Administered Medications   Medication Dose Route Frequency Provider Last Rate Last Admin    lactated ringers infusion   IntraVENous Continuous Kala Gonzalez MD           Allergies:  No Known Allergies    Problem List:    Patient Active Problem List   Diagnosis Code    Cellulitis and abscess L03.90, L02.91    UC (ulcerative colitis) ([facility] K51.90    Elevated blood pressure reading R03.0    Nausea & vomiting R11.2    Pancreatic mass K86.89    S/P cholecystectomy Z90.49    Cholelithiasis K80.20    Hypoxemia requiring supplemental oxygen R09.02, Z99.81    Incisional hernia K43.2    Port-A-Cath in place Z95.828    [facility] of overlapping sites of [facility] C34.81    Left kidney mass N28.89    Wound infection after surgery, sequela [id number]    Left arm weakness R29.898    Stroke-like symptom R29.90    Weakness of both lower extremities R29.898    Tingling of left upper extremity R20.2    Acute renal failure (ARF) (HCC) N17.9    Generalized weakness R53.1    Expressive aphasia R47.01    History of ischemic left MCA stroke Z86.73    Acute CVA (cerebrovascular accident) ([facility] I63.9    Bone metastases (HCC) C79.51    Weakness of left upper extremity R29.898    GI bleed K92.2       Past Medical History:        Diagnosis Date    CHUYITA (acute kidney injury) ([facility] 2018    Allergic rhinitis     Aneurysm ([facility]     aorta  watching now    Aortic aneurysm [facility])     Currently has     Cancer ([facility]     Lung    Cholelithiasis     Depression     History of ischemic left MCA stroke 2020    With residual expressive aphasia. Noted to be subacute ~ 8days old on MRI 2020.  History of ischemic left MCA stroke 2020    Hyperlipidemia     Hypertension     Incontinence     bladder    MRSA (methicillin resistant staph aureus) culture positive 11    Leg    Ulcer     Ulcerative colitis ([facility]        Past Surgical History:        Procedure Laterality Date    ABDOMEN SURGERY      gastric resection for ulcers    BRONCHOSCOPY  2012    CHOLECYSTECTOMY, OPEN  2012    COLONOSCOPY      ERCP  3/13/12    sphincterotomy; 5 Kyrgyz cm stent placed    ERCP  2012    pancreatic stent removal, pancreotogram    PRESSURE ULCER DEBRIDEMENT      gastric    STOMACH SURGERY      for ulcer    UPPER GASTROINTESTINAL ENDOSCOPY      UPPER GASTROINTESTINAL ENDOSCOPY N/A 2021    EGD BIOPSY performed by Y[facility]rose Calvillo MD at [facility] N/A 2021    EGD POLYP ABLATION/OTHER performed by Yvrose Calvillo MD at [facility] ENDOSCOPY       Social History:    Social History     Tobacco Use    Smoking status: Former Smoker     Packs/day: 1.50     Years: 50.00     Pack years: 75.00     Quit date: 2008     Years since quittin.7    Smokeless tobacco: Never Used   Substance Use Topics    Alcohol use:  No Counseling given: Not Answered      Vital Signs (Current):   Vitals:    09/14/21 0923   BP: 130/65   Pulse: 84   Resp: 14   Temp: 97.9 °F (36.6 °C)   TempSrc: Temporal   SpO2: 90%   Weight: 135 lb (61.2 kg)   Height: 5' 2\" (1.575 m)                                              BP Readings from Last 3 Encounters:   09/14/21 130/65   08/07/21 132/76   08/06/21 (!) 114/59       NPO Status:                                                                                 BMI:   Wt Readings from Last 3 Encounters:   09/14/21 135 lb (61.2 kg)   08/06/21 133 lb (60.3 kg)   10/28/20 142 lb 4.8 oz (64.5 kg)     Body mass index is 24.69 kg/m². CBC:   Lab Results   Component Value Date    WBC 8.6 08/06/2021    RBC 3.14 08/06/2021    RBC 4.20 07/05/2017    HGB 8.0 08/07/2021    HCT 24.0 08/07/2021    MCV 88.7 08/06/2021    RDW 15.3 08/06/2021     08/06/2021       CMP:   Lab Results   Component Value Date     08/06/2021    K 4.1 08/06/2021     08/06/2021    CO2 17 08/06/2021    BUN 26 08/06/2021    CREATININE 0.8 08/06/2021    GFRAA >60 08/06/2021    GFRAA 97 12/12/2012    AGRATIO 1.1 08/06/2021    LABGLOM >60 08/06/2021    GLUCOSE 98 08/06/2021    GLUCOSE 100 07/05/2017    PROT 6.0 08/06/2021    PROT 7.8 07/05/2017    CALCIUM 8.8 08/06/2021    BILITOT 0.3 08/06/2021    ALKPHOS 215 08/06/2021    AST 28 08/06/2021    ALT 18 08/06/2021       POC Tests: No results for input(s): POCGLU, POCNA, POCK, POCCL, POCBUN, POCHEMO, POCHCT in the last 72 hours.     Coags:   Lab Results   Component Value Date    PROTIME 11.2 08/05/2021    INR 0.99 08/05/2021    APTT 25.6 08/19/2017       HCG (If Applicable): No results found for: PREGTESTUR, PREGSERUM, HCG, HCGQUANT     ABGs:   Lab Results   Component Value Date    PHART 7.41 12/11/2012    PO2ART 61 12/11/2012    NZZ0XEV 42 12/11/2012    RUD4JNW 26 12/11/2012    BEART 1.4 12/11/2012        Type & Screen (If Applicable):  Lab Results   Component Value

## 2023-06-05 NOTE — PROGRESS NOTES
Patient is alert and oriented only to herself. She has expressive aphasia and scores a 3 on the NIHSS scale. Patient has been lethargic since getting back from the MRI. She received ativan to keep her calm while in the test. She remains lethargic this afternoon. Night-time medications were held due to lethargy. Vital signs have been stable. She is not eating or drinking much this evening. No signs of pain. Use of the PAINAD scale with the patient scoring a 0 on all areas. Will continue to assess and monitor. Colchicine Pregnancy And Lactation Text: This medication is Pregnancy Category C and isn't considered safe during pregnancy. It is excreted in breast milk.

## 2023-11-05 ENCOUNTER — HOSPITAL ENCOUNTER (INPATIENT)
Age: 83
LOS: 4 days | Discharge: NURSING, REHAB OR LONG-TERM CARE | DRG: 180 | End: 2023-11-09
Attending: EMERGENCY MEDICINE | Admitting: FAMILY MEDICINE
Payer: MEDICARE

## 2023-11-05 ENCOUNTER — APPOINTMENT (OUTPATIENT)
Dept: CT IMAGING | Age: 83
DRG: 180 | End: 2023-11-05
Payer: MEDICARE

## 2023-11-05 ENCOUNTER — APPOINTMENT (OUTPATIENT)
Dept: GENERAL RADIOLOGY | Age: 83
DRG: 180 | End: 2023-11-05
Payer: MEDICARE

## 2023-11-05 DIAGNOSIS — M25.552 LEFT HIP PAIN: ICD-10-CM
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
## 2023-11-05 DIAGNOSIS — R09.02 HYPOXIA: Primary | ICD-10-CM

## 2023-11-05 PROBLEM — R79.89 ELEVATED TROPONIN: Status: ACTIVE | Noted: 2023-11-05

## 2023-11-05 LAB
ANION GAP SERPL CALCULATED.3IONS-SCNC: 10 MMOL/L (ref 3–16)
ANTI-XA UNFRAC HEPARIN: 0.45 IU/ML (ref 0.3–0.7)
ANTI-XA UNFRAC HEPARIN: >1.1 IU/ML (ref 0.3–0.7)
APTT BLD: >180 SEC (ref 22.7–35.9)
BASOPHILS # BLD: 0 K/UL (ref 0–0.2)
BASOPHILS NFR BLD: 0.2 %
BUN SERPL-MCNC: 24 MG/DL (ref 7–20)
CALCIUM SERPL-MCNC: 8.7 MG/DL (ref 8.3–10.6)
CHLORIDE SERPL-SCNC: 104 MMOL/L (ref 99–110)
CO2 SERPL-SCNC: 23 MMOL/L (ref 21–32)
CREAT SERPL-MCNC: 1 MG/DL (ref 0.6–1.2)
DEPRECATED RDW RBC AUTO: 18.7 % (ref 12.4–15.4)
EOSINOPHIL # BLD: 0.1 K/UL (ref 0–0.6)
EOSINOPHIL NFR BLD: 0.6 %
GFR SERPLBLD CREATININE-BSD FMLA CKD-EPI: 57 ML/MIN/{1.73_M2}
GLUCOSE SERPL-MCNC: 116 MG/DL (ref 70–99)
HCT VFR BLD AUTO: 33.6 % (ref 36–48)
HGB BLD-MCNC: 10.6 G/DL (ref 12–16)
INR PPP: 1.22 (ref 0.84–1.16)
LYMPHOCYTES # BLD: 0.5 K/UL (ref 1–5.1)
LYMPHOCYTES NFR BLD: 3.7 %
MCH RBC QN AUTO: 25.1 PG (ref 26–34)
MCHC RBC AUTO-ENTMCNC: 31.6 G/DL (ref 31–36)
MCV RBC AUTO: 79.5 FL (ref 80–100)
MONOCYTES # BLD: 0.5 K/UL (ref 0–1.3)
MONOCYTES NFR BLD: 3.6 %
NEUTROPHILS # BLD: 11.6 K/UL (ref 1.7–7.7)
NEUTROPHILS NFR BLD: 91.9 %
NT-PROBNP SERPL-MCNC: 423 PG/ML (ref 0–449)
PLATELET # BLD AUTO: 202 K/UL (ref 135–450)
PMV BLD AUTO: 7.3 FL (ref 5–10.5)
POTASSIUM SERPL-SCNC: 4.2 MMOL/L (ref 3.5–5.1)
PROTHROMBIN TIME: 15.4 SEC (ref 11.5–14.8)
RBC # BLD AUTO: 4.23 M/UL (ref 4–5.2)
SARS-COV-2 RDRP RESP QL NAA+PROBE: NOT DETECTED
SODIUM SERPL-SCNC: 137 MMOL/L (ref 136–145)
TROPONIN, HIGH SENSITIVITY: 206 NG/L (ref 0–14)
TROPONIN, HIGH SENSITIVITY: 207 NG/L (ref 0–14)
TROPONIN, HIGH SENSITIVITY: 230 NG/L (ref 0–14)
TROPONIN, HIGH SENSITIVITY: 250 NG/L (ref 0–14)
WBC # BLD AUTO: 12.6 K/UL (ref 4–11)

## 2023-11-05 PROCEDURE — 72170 X-RAY EXAM OF PELVIS: CPT

## 2023-11-05 PROCEDURE — 73080 X-RAY EXAM OF ELBOW: CPT

## 2023-11-05 PROCEDURE — 70450 CT HEAD/BRAIN W/O DYE: CPT

## 2023-11-05 PROCEDURE — 80048 BASIC METABOLIC PNL TOTAL CA: CPT

## 2023-11-05 PROCEDURE — 6360000004 HC RX CONTRAST MEDICATION: Performed by: STUDENT IN AN ORGANIZED HEALTH CARE EDUCATION/TRAINING PROGRAM

## 2023-11-05 PROCEDURE — 93005 ELECTROCARDIOGRAM TRACING: CPT | Performed by: STUDENT IN AN ORGANIZED HEALTH CARE EDUCATION/TRAINING PROGRAM

## 2023-11-05 PROCEDURE — 83880 ASSAY OF NATRIURETIC PEPTIDE: CPT

## 2023-11-05 PROCEDURE — 73590 X-RAY EXAM OF LOWER LEG: CPT

## 2023-11-05 PROCEDURE — 87635 SARS-COV-2 COVID-19 AMP PRB: CPT

## 2023-11-05 PROCEDURE — 6360000002 HC RX W HCPCS: Performed by: FAMILY MEDICINE

## 2023-11-05 PROCEDURE — 85025 COMPLETE CBC W/AUTO DIFF WBC: CPT

## 2023-11-05 PROCEDURE — 84484 ASSAY OF TROPONIN QUANT: CPT

## 2023-11-05 PROCEDURE — 73630 X-RAY EXAM OF FOOT: CPT

## 2023-11-05 PROCEDURE — 36415 COLL VENOUS BLD VENIPUNCTURE: CPT

## 2023-11-05 PROCEDURE — 6360000002 HC RX W HCPCS: Performed by: STUDENT IN AN ORGANIZED HEALTH CARE EDUCATION/TRAINING PROGRAM

## 2023-11-05 PROCEDURE — 71046 X-RAY EXAM CHEST 2 VIEWS: CPT

## 2023-11-05 PROCEDURE — 87040 BLOOD CULTURE FOR BACTERIA: CPT

## 2023-11-05 PROCEDURE — 2060000000 HC ICU INTERMEDIATE R&B

## 2023-11-05 PROCEDURE — 84145 PROCALCITONIN (PCT): CPT

## 2023-11-05 PROCEDURE — 73552 X-RAY EXAM OF FEMUR 2/>: CPT

## 2023-11-05 PROCEDURE — 99223 1ST HOSP IP/OBS HIGH 75: CPT | Performed by: FAMILY MEDICINE

## 2023-11-05 PROCEDURE — 99285 EMERGENCY DEPT VISIT HI MDM: CPT

## 2023-11-05 PROCEDURE — 71260 CT THORAX DX C+: CPT

## 2023-11-05 PROCEDURE — 85520 HEPARIN ASSAY: CPT

## 2023-11-05 PROCEDURE — 85610 PROTHROMBIN TIME: CPT

## 2023-11-05 PROCEDURE — 6370000000 HC RX 637 (ALT 250 FOR IP): Performed by: FAMILY MEDICINE

## 2023-11-05 PROCEDURE — 96374 THER/PROPH/DIAG INJ IV PUSH: CPT

## 2023-11-05 PROCEDURE — 85730 THROMBOPLASTIN TIME PARTIAL: CPT

## 2023-11-05 RX ORDER — ASPIRIN 81 MG/1
81 TABLET, CHEWABLE ORAL DAILY
Status: DISCONTINUED | OUTPATIENT
Start: 2023-11-06 | End: 2023-11-08

## 2023-11-05 RX ORDER — ACETAMINOPHEN 325 MG/1
650 TABLET ORAL EVERY 6 HOURS PRN
Status: DISCONTINUED | OUTPATIENT
Start: 2023-11-05 | End: 2023-11-09 | Stop reason: HOSPADM

## 2023-11-05 RX ORDER — MAGNESIUM SULFATE IN WATER 40 MG/ML
2000 INJECTION, SOLUTION INTRAVENOUS PRN
Status: DISCONTINUED | OUTPATIENT
Start: 2023-11-05 | End: 2023-11-09 | Stop reason: HOSPADM

## 2023-11-05 RX ORDER — POTASSIUM CHLORIDE 7.45 MG/ML
10 INJECTION INTRAVENOUS PRN
Status: DISCONTINUED | OUTPATIENT
Start: 2023-11-05 | End: 2023-11-09 | Stop reason: HOSPADM

## 2023-11-05 RX ORDER — HEPARIN SODIUM 1000 [USP'U]/ML
60 INJECTION, SOLUTION INTRAVENOUS; SUBCUTANEOUS ONCE
Status: COMPLETED | OUTPATIENT
Start: 2023-11-05 | End: 2023-11-05

## 2023-11-05 RX ORDER — HEPARIN SODIUM 1000 [USP'U]/ML
30 INJECTION, SOLUTION INTRAVENOUS; SUBCUTANEOUS PRN
Status: DISCONTINUED | OUTPATIENT
Start: 2023-11-05 | End: 2023-11-07

## 2023-11-05 RX ORDER — SODIUM CHLORIDE 0.9 % (FLUSH) 0.9 %
5-40 SYRINGE (ML) INJECTION EVERY 12 HOURS SCHEDULED
Status: DISCONTINUED | OUTPATIENT
Start: 2023-11-05 | End: 2023-11-09 | Stop reason: HOSPADM

## 2023-11-05 RX ORDER — HEPARIN SODIUM 1000 [USP'U]/ML
60 INJECTION, SOLUTION INTRAVENOUS; SUBCUTANEOUS PRN
Status: DISCONTINUED | OUTPATIENT
Start: 2023-11-05 | End: 2023-11-07

## 2023-11-05 RX ORDER — HEPARIN SODIUM 10000 [USP'U]/100ML
5-30 INJECTION, SOLUTION INTRAVENOUS CONTINUOUS
Status: DISCONTINUED | OUTPATIENT
Start: 2023-11-05 | End: 2023-11-07

## 2023-11-05 RX ORDER — ATORVASTATIN CALCIUM 80 MG/1
80 TABLET, FILM COATED ORAL NIGHTLY
Status: DISCONTINUED | OUTPATIENT
Start: 2023-11-05 | End: 2023-11-09 | Stop reason: HOSPADM

## 2023-11-05 RX ORDER — MORPHINE SULFATE 4 MG/ML
4 INJECTION INTRAVENOUS ONCE
Status: DISCONTINUED | OUTPATIENT
Start: 2023-11-05 | End: 2023-11-09 | Stop reason: HOSPADM

## 2023-11-05 RX ORDER — POLYETHYLENE GLYCOL 3350 17 G/17G
17 POWDER, FOR SOLUTION ORAL DAILY PRN
Status: DISCONTINUED | OUTPATIENT
Start: 2023-11-05 | End: 2023-11-09 | Stop reason: HOSPADM

## 2023-11-05 RX ORDER — SODIUM CHLORIDE 0.9 % (FLUSH) 0.9 %
5-40 SYRINGE (ML) INJECTION PRN
Status: DISCONTINUED | OUTPATIENT
Start: 2023-11-05 | End: 2023-11-09 | Stop reason: HOSPADM

## 2023-11-05 RX ORDER — HYDROMORPHONE HYDROCHLORIDE 1 MG/ML
0.5 INJECTION, SOLUTION INTRAMUSCULAR; INTRAVENOUS; SUBCUTANEOUS ONCE
Status: COMPLETED | OUTPATIENT
Start: 2023-11-05 | End: 2023-11-05

## 2023-11-05 RX ORDER — AMOXICILLIN AND CLAVULANATE POTASSIUM 875; 125 MG/1; MG/1
1 TABLET, FILM COATED ORAL EVERY 12 HOURS SCHEDULED
Status: DISCONTINUED | OUTPATIENT
Start: 2023-11-05 | End: 2023-11-06

## 2023-11-05 RX ORDER — ACETAMINOPHEN 650 MG/1
650 SUPPOSITORY RECTAL EVERY 6 HOURS PRN
Status: DISCONTINUED | OUTPATIENT
Start: 2023-11-05 | End: 2023-11-09 | Stop reason: HOSPADM

## 2023-11-05 RX ORDER — ONDANSETRON 4 MG/1
4 TABLET, ORALLY DISINTEGRATING ORAL EVERY 8 HOURS PRN
Status: DISCONTINUED | OUTPATIENT
Start: 2023-11-05 | End: 2023-11-09 | Stop reason: HOSPADM

## 2023-11-05 RX ORDER — NITROGLYCERIN 0.4 MG/1
0.4 TABLET SUBLINGUAL EVERY 5 MIN PRN
Status: DISCONTINUED | OUTPATIENT
Start: 2023-11-05 | End: 2023-11-09 | Stop reason: HOSPADM

## 2023-11-05 RX ORDER — POTASSIUM CHLORIDE 20 MEQ/1
40 TABLET, EXTENDED RELEASE ORAL PRN
Status: DISCONTINUED | OUTPATIENT
Start: 2023-11-05 | End: 2023-11-09 | Stop reason: HOSPADM

## 2023-11-05 RX ORDER — OXYCODONE HYDROCHLORIDE AND ACETAMINOPHEN 5; 325 MG/1; MG/1
1 TABLET ORAL EVERY 4 HOURS PRN
Status: DISCONTINUED | OUTPATIENT
Start: 2023-11-05 | End: 2023-11-09 | Stop reason: HOSPADM

## 2023-11-05 RX ORDER — SODIUM CHLORIDE 9 MG/ML
INJECTION, SOLUTION INTRAVENOUS PRN
Status: DISCONTINUED | OUTPATIENT
Start: 2023-11-05 | End: 2023-11-09 | Stop reason: HOSPADM

## 2023-11-05 RX ORDER — ONDANSETRON 2 MG/ML
4 INJECTION INTRAMUSCULAR; INTRAVENOUS EVERY 6 HOURS PRN
Status: DISCONTINUED | OUTPATIENT
Start: 2023-11-05 | End: 2023-11-09 | Stop reason: HOSPADM

## 2023-11-05 RX ADMIN — HEPARIN SODIUM 12 UNITS/KG/HR: 10000 INJECTION, SOLUTION INTRAVENOUS at 16:23

## 2023-11-05 RX ADMIN — IOPAMIDOL 75 ML: 755 INJECTION, SOLUTION INTRAVENOUS at 13:19

## 2023-11-05 RX ADMIN — HEPARIN SODIUM 3800 UNITS: 1000 INJECTION INTRAVENOUS; SUBCUTANEOUS at 16:18

## 2023-11-05 RX ADMIN — AMOXICILLIN AND CLAVULANATE POTASSIUM 1 TABLET: 875; 125 TABLET, FILM COATED ORAL at 22:07

## 2023-11-05 RX ADMIN — ATORVASTATIN CALCIUM 80 MG: 80 TABLET, FILM COATED ORAL at 21:37

## 2023-11-05 RX ADMIN — HYDROMORPHONE HYDROCHLORIDE 0.5 MG: 1 INJECTION, SOLUTION INTRAMUSCULAR; INTRAVENOUS; SUBCUTANEOUS at 14:24

## 2023-11-05 RX ADMIN — OXYCODONE AND ACETAMINOPHEN 1 TABLET: 5; 325 TABLET ORAL at 22:07

## 2023-11-05 SDOH — ECONOMIC STABILITY: FOOD INSECURITY: WITHIN THE PAST 12 MONTHS, THE FOOD YOU BOUGHT JUST DIDN'T LAST AND YOU DIDN'T HAVE MONEY TO GET MORE.: PATIENT DECLINED

## 2023-11-05 SDOH — ECONOMIC STABILITY: TRANSPORTATION INSECURITY
IN THE PAST 12 MONTHS, HAS THE LACK OF TRANSPORTATION KEPT YOU FROM MEDICAL APPOINTMENTS OR FROM GETTING MEDICATIONS?: NO

## 2023-11-05 SDOH — ECONOMIC STABILITY: HOUSING INSECURITY
IN THE LAST 12 MONTHS, WAS THERE A TIME WHEN YOU DID NOT HAVE A STEADY PLACE TO SLEEP OR SLEPT IN A SHELTER (INCLUDING NOW)?: NO

## 2023-11-05 SDOH — HEALTH STABILITY: PHYSICAL HEALTH: ON AVERAGE, HOW MANY DAYS PER WEEK DO YOU ENGAGE IN MODERATE TO STRENUOUS EXERCISE (LIKE A BRISK WALK)?: 0 DAYS

## 2023-11-05 SDOH — ECONOMIC STABILITY: FOOD INSECURITY: WITHIN THE PAST 12 MONTHS, YOU WORRIED THAT YOUR FOOD WOULD RUN OUT BEFORE YOU GOT MONEY TO BUY MORE.: PATIENT DECLINED

## 2023-11-05 SDOH — ECONOMIC STABILITY: INCOME INSECURITY: IN THE LAST 12 MONTHS, WAS THERE A TIME WHEN YOU WERE NOT ABLE TO PAY THE MORTGAGE OR RENT ON TIME?: NO

## 2023-11-05 SDOH — ECONOMIC STABILITY: HOUSING INSECURITY: IN THE LAST 12 MONTHS, HOW MANY PLACES HAVE YOU LIVED?: 1

## 2023-11-05 SDOH — HEALTH STABILITY: PHYSICAL HEALTH: ON AVERAGE, HOW MANY MINUTES DO YOU ENGAGE IN EXERCISE AT THIS LEVEL?: 0 MIN

## 2023-11-05 SDOH — ECONOMIC STABILITY: TRANSPORTATION INSECURITY
IN THE PAST 12 MONTHS, HAS LACK OF TRANSPORTATION KEPT YOU FROM MEETINGS, WORK, OR FROM GETTING THINGS NEEDED FOR DAILY LIVING?: NO

## 2023-11-05 ASSESSMENT — SOCIAL DETERMINANTS OF HEALTH (SDOH)
HOW OFTEN DO YOU ATTEND CHURCH OR RELIGIOUS SERVICES?: PATIENT DECLINED
HOW OFTEN DO YOU ATTENT MEETINGS OF THE CLUB OR ORGANIZATION YOU BELONG TO?: NEVER
HOW HARD IS IT FOR YOU TO PAY FOR THE VERY BASICS LIKE FOOD, HOUSING, MEDICAL CARE, AND HEATING?: PATIENT DECLINED
WITHIN THE LAST YEAR, HAVE YOU BEEN HUMILIATED OR EMOTIONALLY ABUSED IN OTHER WAYS BY YOUR PARTNER OR EX-PARTNER?: NO
WITHIN THE LAST YEAR, HAVE TO BEEN RAPED OR FORCED TO HAVE ANY KIND OF SEXUAL ACTIVITY BY YOUR PARTNER OR EX-PARTNER?: NO
IN A TYPICAL WEEK, HOW MANY TIMES DO YOU TALK ON THE PHONE WITH FAMILY, FRIENDS, OR NEIGHBORS?: PATIENT DECLINED
HOW OFTEN DO YOU GET TOGETHER WITH FRIENDS OR RELATIVES?: PATIENT DECLINED
WITHIN THE LAST YEAR, HAVE YOU BEEN AFRAID OF YOUR PARTNER OR EX-PARTNER?: NO
DO YOU BELONG TO ANY CLUBS OR ORGANIZATIONS SUCH AS CHURCH GROUPS UNIONS, FRATERNAL OR ATHLETIC GROUPS, OR SCHOOL GROUPS?: NO
WITHIN THE LAST YEAR, HAVE YOU BEEN KICKED, HIT, SLAPPED, OR OTHERWISE PHYSICALLY HURT BY YOUR PARTNER OR EX-PARTNER?: NO

## 2023-11-05 ASSESSMENT — PAIN DESCRIPTION - LOCATION: LOCATION: LEG;HIP

## 2023-11-05 ASSESSMENT — PATIENT HEALTH QUESTIONNAIRE - PHQ9
SUM OF ALL RESPONSES TO PHQ9 QUESTIONS 1 & 2: 0
2. FEELING DOWN, DEPRESSED OR HOPELESS: NOT AT ALL
1. LITTLE INTEREST OR PLEASURE IN DOING THINGS: NOT AT ALL

## 2023-11-05 ASSESSMENT — PAIN - FUNCTIONAL ASSESSMENT: PAIN_FUNCTIONAL_ASSESSMENT: NONE - DENIES PAIN

## 2023-11-05 ASSESSMENT — PAIN DESCRIPTION - ORIENTATION: ORIENTATION: LEFT

## 2023-11-05 ASSESSMENT — PAIN DESCRIPTION - PAIN TYPE: TYPE: ACUTE PAIN

## 2023-11-05 ASSESSMENT — PAIN SCALES - GENERAL
PAINLEVEL_OUTOF10: 7
PAINLEVEL_OUTOF10: 0
PAINLEVEL_OUTOF10: 10

## 2023-11-05 ASSESSMENT — LIFESTYLE VARIABLES
HOW MANY STANDARD DRINKS CONTAINING ALCOHOL DO YOU HAVE ON A TYPICAL DAY: PATIENT DOES NOT DRINK
HOW OFTEN DO YOU HAVE A DRINK CONTAINING ALCOHOL: NEVER

## 2023-11-05 ASSESSMENT — PAIN DESCRIPTION - FREQUENCY: FREQUENCY: INTERMITTENT

## 2023-11-05 NOTE — ED PROVIDER NOTES
ED Attending Attestation Note     Date of evaluation: 11/5/2023    This patient was seen by the advance practice provider. I have seen and examined the patient, agree with the workup, evaluation, management and diagnosis. The care plan has been discussed. I have reviewed the ECG and concur with the JACQUI's interpretation. My assessment reveals an elderly female who presents after a fall. States that she is somewhat unclear why she fell. Does not remember any preceding symptoms. Reports pain on her left side. Moving her arm and leg without difficulty. Incidentally was noted to have hypoxia while lying in bed.   Patient does have known lung cancer but does not have a home oxygen requirement     Aneita Ormond, MD  11/05/23 1143

## 2023-11-05 NOTE — ED NOTES
ED TO INPATIENT SBAR HANDOFF    Patient Name: New Napier   :  1940  82 y.o. MRN:  [id number]  Preferred Name  [facility]  ED Room #:  Q98/C33-17  Family/Caregiver Present yes   Restraints no   Sitter no   Sepsis Risk Score Sepsis Risk Score: 2.43    Situation  Code Status: Prior No additional code details. Allergies: Patient has no known allergies. Weight: Patient Vitals for the past 96 hrs (Last 3 readings):   Weight   23 0908 63.4 kg (139 lb 12.8 oz)     Arrived from: home  Chief Complaint:   Chief Complaint   Patient presents with    Fall     Fall getting out of bed this AM and landed on left side, c/o left hip pain and skin tear to left elbow, 25mcg fentanyl by squad     Hospital Problem/Diagnosis:  Active Problems:    * No active hospital problems. *  Resolved Problems:    * No resolved hospital problems. *    Imaging:   CT CHEST PULMONARY EMBOLISM W CONTRAST   Final Result   Diffuse thoracic and upper abdominal aortic ectasia and calcification, similar   to the prior exam.      No CT evidence of pulmonary embolism. Diffuse centrilobular emphysema. Lobular 3 x 2 cm soft tissue mass in the central left upper lobe. It is   worrisome for possible primary lung malignancy. XR CHEST (2 VW)   Final Result      New nodular density in the left upper lobe. Further evaluation with CT imaging   may be recommended. No other acute findings in the chest.            CT Head W/O Contrast   Final Result      Cerebral atrophy. Evidence of diffuse chronic small vessel white matter disease bilaterally. No acute intracranial findings. Right maxillary and left sphenoid sinus disease. XR FEMUR LEFT (MIN 2 VIEWS)   Final Result   Negative study. XR TIBIA FIBULA LEFT (2 VIEWS)   Final Result      Negative study. XR FOOT LEFT (MIN 3 VIEWS)   Final Result      No acute findings. XR ELBOW LEFT (MIN 3 VIEWS)   Final Result      Negative study.       XR SpO2: 94% 92% 93%    Weight: 63.4 kg (139 lb 12.8 oz)      Height:    1.575 m (5' 2\")     FiO2 (%): N/A  O2 Flow Rate: O2 Device: Nasal cannula O2 Flow Rate (L/min): 2 L/min  Cardiac Rhythm:  NSR  Pain Assessment: 8 [] Verbal [] Lilibeth Grosser Scale  Pain Scale: Pain Assessment  Pain Level: 7  Last documented pain score (0-10 scale) Pain Level: 7  Last documented pain medication administered: Hydromorphone  Mental Status: oriented  Orientation Level:    NIH Score:    C-SSRS: Risk of Suicide: No Risk  Bedside swallow:    [place] Coma Scale (GCS): [place] Coma Scale  Eye Opening: Spontaneous  Best Verbal Response: Oriented  Best Motor Response: Obeys commands  [place] Coma Scale Score: 15  Active LDA's:   Peripheral IV 11/05/23 Left;Proximal;Anterior Forearm (Active)     PO Status: Regular  Pertinent or High Risk Medications/Drips: no   If Yes, please provide details: N/A  Pending Blood Product Administration: no       You may also review the ED PT Care Timeline found under the Summary Nursing Index tab. Recommendation    Pending orders: Admit Orders  Plan for Discharge (if known):    Additional Comments: N/A   If any further questions, please call Sending RN at [phone]    Electronically signed by: Electronically signed by Marquez Navarrete RN on 11/5/2023 at 3:15 PM      Jennifer Fitzgerald  11/05/23 4578

## 2023-11-05 NOTE — ED NOTES
Lab was called to add on labs from [facility] for Anti-Xa that was sent earlier today. Lab stated that the specimen was too old to run. A new specimen was taken and may be effected by the one time dose of Heparin.            Patricia Moise RN  11/05/23 5014

## 2023-11-05 NOTE — ED PROVIDER NOTES
[facility]          PHYSICIAN ASSISTANT NOTE       Date of evaluation: 11/5/2023    Chief Complaint     Fall (Fall getting out of bed this AM and landed on left side, c/o left hip pain and skin tear to left elbow, 25mcg fentanyl by squad)      History of Present Illness     Camille Lewis is a 82 y.o. female who presents with left hip pain. Patient states that she fell and is now experiencing pain in her left leg. Is unsure if she hit her head. States that she is having \"some\" pain in her left leg. Denies any numbness or tingling in the affected extremity. Patient unable to contribute to history other than this due to diagnosis of dementia. Review of Systems     Review of Systems see above for pertinent ROS. Past Medical, Surgical, Family, and Social History     She has a past medical history of CHUYITA (acute kidney injury) ([address]), Allergic rhinitis, Aneurysm ([address]), Aortic aneurysm ([address]), Cancer ([address]), Cholelithiasis, Depression, Fracture, History of ischemic left MCA stroke, History of ischemic left MCA stroke, Hyperlipidemia, Hypertension, Incontinence, MRSA (methicillin resistant staph aureus) culture positive, Ulcer, and Ulcerative colitis ([address] She has a past surgical history that includes Abdomen surgery; Cholecystectomy, open (5/7/2012); Pressure ulcer debridement (2010); Colonoscopy; Upper gastrointestinal endoscopy; Stomach surgery; ERCP (3/13/12); bronchoscopy (12/11/2012); ERCP (12-); Upper gastrointestinal endoscopy (N/A, 8/6/2021); Upper gastrointestinal endoscopy (N/A, 8/6/2021); Tunneled venous port placement (Left); Wrist surgery (Left); and Upper gastrointestinal endoscopy (N/A, 9/14/2021). Her family history includes Arthritis in her mother. She reports that she quit smoking about 14 years ago. She has a 75.00 pack-year smoking history.  She has never used smokeless tobacco. She reports that she does not drink alcohol and does not history of expressive aphasia, ulcerative colitis, depression, aortic aneurysm, HTN, HLD, and lung cancer who presents with left leg pain after a fall out of bed. On exam, patient is hemodynamically stable no signs respiratory distress. Diffusely TTP over LLE with most pain at mid femur. Pelvic binder in place by EMS. 2+ DP pulses. Sensation intact in bilateral lower extremities. See above for additional history and physical exam findings. .    Per EMS, patient fell when trying to get out of bed. Her  was there and able to get her back into the bed. He did give her Tylenol. When she continued to have pain, he called EMS around 8 AM.  Patient is currently on Plavix. Unknown if patient hit her head. Patient received 25 fentanyl in route. Patient received Dilaudid for the pain. On reevaluation, the patient is resting comfortably. .    X-rays completed of patient's whole left lower extremity, including her hip and pelvis which showed no acute osseous abnormality. Patient was able to stand at bedside with minimal pain after. Her pain is likely musculoskeletal in etiology. CT head was completed which showed no acute intracranial abnormality. While patient was waiting for her scans to come back, patient became hypoxemic with O2 in the low 80s. As she was placed on 5 L O2 NC. She fluctuated in and out of needing her oxygen. As this is a new oxygen requirement, I did complete a thorough work-up for this with EKG showing no ischemic changes. Chest x-ray did show:  New nodular density in the left upper lobe. Further evaluation with CT imaging  may be recommended. Troponin elevated at 230, repeat 207. BNP is mildly elevated at 423. COVID-negative. Patient has a mild leukocytosis of 12.6. No electrolyte abnormalities.   Given his new oxygen requirement,  CTPA completed which showed: No CT evidence of pulmonary embolism, Diffuse centrilobular emphysema and Lobular 3 x 2 cm soft tissue mass in the

## 2023-11-06 ENCOUNTER — APPOINTMENT (OUTPATIENT)
Dept: VASCULAR LAB | Age: 83
DRG: 180 | End: 2023-11-06
Payer: MEDICARE

## 2023-11-06 PROBLEM — R91.8 MASS OF UPPER LOBE OF LEFT LUNG: Status: ACTIVE | Noted: 2023-11-06

## 2023-11-06 PROBLEM — Z85.118 HISTORY OF LUNG CANCER: Status: ACTIVE | Noted: 2023-11-06

## 2023-11-06 LAB
ALBUMIN SERPL-MCNC: 3.6 G/DL (ref 3.4–5)
ALBUMIN/GLOB SERPL: 1 {RATIO} (ref 1.1–2.2)
ALP SERPL-CCNC: 285 U/L (ref 40–129)
ALT SERPL-CCNC: 38 U/L (ref 10–40)
ANION GAP SERPL CALCULATED.3IONS-SCNC: 10 MMOL/L (ref 3–16)
ANTI-XA UNFRAC HEPARIN: 0.24 IU/ML (ref 0.3–0.7)
ANTI-XA UNFRAC HEPARIN: 0.25 IU/ML (ref 0.3–0.7)
ANTI-XA UNFRAC HEPARIN: 0.25 IU/ML (ref 0.3–0.7)
ANTI-XA UNFRAC HEPARIN: 0.32 IU/ML (ref 0.3–0.7)
AST SERPL-CCNC: 49 U/L (ref 15–37)
BILIRUB SERPL-MCNC: 0.6 MG/DL (ref 0–1)
BUN SERPL-MCNC: 24 MG/DL (ref 7–20)
CALCIUM SERPL-MCNC: 8.7 MG/DL (ref 8.3–10.6)
CHLORIDE SERPL-SCNC: 102 MMOL/L (ref 99–110)
CO2 SERPL-SCNC: 26 MMOL/L (ref 21–32)
CREAT SERPL-MCNC: 1.2 MG/DL (ref 0.6–1.2)
DEPRECATED RDW RBC AUTO: 18.8 % (ref 12.4–15.4)
EKG ATRIAL RATE: 76 BPM
EKG DIAGNOSIS: NORMAL
EKG P AXIS: 58 DEGREES
EKG P-R INTERVAL: 130 MS
EKG Q-T INTERVAL: 386 MS
EKG QRS DURATION: 74 MS
EKG QTC CALCULATION (BAZETT): 434 MS
EKG R AXIS: 18 DEGREES
EKG T AXIS: 55 DEGREES
EKG VENTRICULAR RATE: 76 BPM
GFR SERPLBLD CREATININE-BSD FMLA CKD-EPI: 45 ML/MIN/{1.73_M2}
GLUCOSE SERPL-MCNC: 116 MG/DL (ref 70–99)
HCT VFR BLD AUTO: 32.6 % (ref 36–48)
HGB BLD-MCNC: 10.2 G/DL (ref 12–16)
MCH RBC QN AUTO: 25.6 PG (ref 26–34)
MCHC RBC AUTO-ENTMCNC: 31.2 G/DL (ref 31–36)
MCV RBC AUTO: 82.1 FL (ref 80–100)
PLATELET # BLD AUTO: 180 K/UL (ref 135–450)
PMV BLD AUTO: 7.8 FL (ref 5–10.5)
POTASSIUM SERPL-SCNC: 4.3 MMOL/L (ref 3.5–5.1)
PROCALCITONIN SERPL IA-MCNC: 0.31 NG/ML (ref 0–0.15)
PROT SERPL-MCNC: 7.1 G/DL (ref 6.4–8.2)
RBC # BLD AUTO: 3.98 M/UL (ref 4–5.2)
SODIUM SERPL-SCNC: 138 MMOL/L (ref 136–145)
WBC # BLD AUTO: 8.6 K/UL (ref 4–11)

## 2023-11-06 PROCEDURE — 99223 1ST HOSP IP/OBS HIGH 75: CPT | Performed by: INTERNAL MEDICINE

## 2023-11-06 PROCEDURE — 93306 TTE W/DOPPLER COMPLETE: CPT

## 2023-11-06 PROCEDURE — 2060000000 HC ICU INTERMEDIATE R&B

## 2023-11-06 PROCEDURE — 2580000003 HC RX 258: Performed by: FAMILY MEDICINE

## 2023-11-06 PROCEDURE — 85027 COMPLETE CBC AUTOMATED: CPT

## 2023-11-06 PROCEDURE — 93970 EXTREMITY STUDY: CPT

## 2023-11-06 PROCEDURE — 6370000000 HC RX 637 (ALT 250 FOR IP): Performed by: FAMILY MEDICINE

## 2023-11-06 PROCEDURE — 94761 N-INVAS EAR/PLS OXIMETRY MLT: CPT

## 2023-11-06 PROCEDURE — 6360000002 HC RX W HCPCS: Performed by: INTERNAL MEDICINE

## 2023-11-06 PROCEDURE — 2700000000 HC OXYGEN THERAPY PER DAY

## 2023-11-06 PROCEDURE — 80053 COMPREHEN METABOLIC PANEL: CPT

## 2023-11-06 PROCEDURE — 2580000003 HC RX 258: Performed by: INTERNAL MEDICINE

## 2023-11-06 PROCEDURE — 36415 COLL VENOUS BLD VENIPUNCTURE: CPT

## 2023-11-06 PROCEDURE — 85520 HEPARIN ASSAY: CPT

## 2023-11-06 RX ORDER — ALBUTEROL SULFATE 2.5 MG/3ML
2.5 SOLUTION RESPIRATORY (INHALATION) EVERY 6 HOURS PRN
Status: DISCONTINUED | OUTPATIENT
Start: 2023-11-06 | End: 2023-11-09 | Stop reason: HOSPADM

## 2023-11-06 RX ORDER — THIAMINE HYDROCHLORIDE 100 MG/ML
100 INJECTION, SOLUTION INTRAMUSCULAR; INTRAVENOUS DAILY
Status: DISCONTINUED | OUTPATIENT
Start: 2023-11-06 | End: 2023-11-08

## 2023-11-06 RX ORDER — IPRATROPIUM BROMIDE AND ALBUTEROL SULFATE 2.5; .5 MG/3ML; MG/3ML
1 SOLUTION RESPIRATORY (INHALATION) EVERY 4 HOURS PRN
Status: DISCONTINUED | OUTPATIENT
Start: 2023-11-06 | End: 2023-11-09 | Stop reason: HOSPADM

## 2023-11-06 RX ORDER — LANOLIN ALCOHOL/MO/W.PET/CERES
3 CREAM (GRAM) TOPICAL NIGHTLY
Status: DISCONTINUED | OUTPATIENT
Start: 2023-11-06 | End: 2023-11-09 | Stop reason: HOSPADM

## 2023-11-06 RX ORDER — IPRATROPIUM BROMIDE AND ALBUTEROL SULFATE 2.5; .5 MG/3ML; MG/3ML
1 SOLUTION RESPIRATORY (INHALATION) 3 TIMES DAILY
Status: DISCONTINUED | OUTPATIENT
Start: 2023-11-06 | End: 2023-11-06

## 2023-11-06 RX ADMIN — OXYCODONE AND ACETAMINOPHEN 1 TABLET: 5; 325 TABLET ORAL at 09:55

## 2023-11-06 RX ADMIN — THIAMINE HYDROCHLORIDE 100 MG: 100 INJECTION, SOLUTION INTRAMUSCULAR; INTRAVENOUS at 18:09

## 2023-11-06 RX ADMIN — AMOXICILLIN AND CLAVULANATE POTASSIUM 1 TABLET: 875; 125 TABLET, FILM COATED ORAL at 08:57

## 2023-11-06 RX ADMIN — SODIUM CHLORIDE, PRESERVATIVE FREE 10 ML: 5 INJECTION INTRAVENOUS at 18:09

## 2023-11-06 RX ADMIN — ATORVASTATIN CALCIUM 80 MG: 80 TABLET, FILM COATED ORAL at 20:02

## 2023-11-06 RX ADMIN — SODIUM CHLORIDE, PRESERVATIVE FREE 10 ML: 5 INJECTION INTRAVENOUS at 20:03

## 2023-11-06 RX ADMIN — ASPIRIN 81 MG: 81 TABLET, CHEWABLE ORAL at 08:57

## 2023-11-06 RX ADMIN — SODIUM CHLORIDE 25 ML: 9 INJECTION, SOLUTION INTRAVENOUS at 20:59

## 2023-11-06 RX ADMIN — CEFTRIAXONE 1000 MG: 1 INJECTION, POWDER, FOR SOLUTION INTRAMUSCULAR; INTRAVENOUS at 20:59

## 2023-11-06 RX ADMIN — OXYCODONE AND ACETAMINOPHEN 1 TABLET: 5; 325 TABLET ORAL at 20:02

## 2023-11-06 RX ADMIN — SODIUM CHLORIDE, PRESERVATIVE FREE 10 ML: 5 INJECTION INTRAVENOUS at 08:57

## 2023-11-06 ASSESSMENT — PAIN DESCRIPTION - ONSET: ONSET: GRADUAL

## 2023-11-06 ASSESSMENT — PAIN SCALES - GENERAL
PAINLEVEL_OUTOF10: 0
PAINLEVEL_OUTOF10: 6
PAINLEVEL_OUTOF10: 7
PAINLEVEL_OUTOF10: 0
PAINLEVEL_OUTOF10: 3
PAINLEVEL_OUTOF10: 0
PAINLEVEL_OUTOF10: 1

## 2023-11-06 ASSESSMENT — PAIN DESCRIPTION - DESCRIPTORS
DESCRIPTORS: SHARP
DESCRIPTORS: DISCOMFORT

## 2023-11-06 ASSESSMENT — PAIN DESCRIPTION - LOCATION
LOCATION: HIP
LOCATION: HIP;LEG

## 2023-11-06 ASSESSMENT — PAIN DESCRIPTION - PAIN TYPE: TYPE: ACUTE PAIN

## 2023-11-06 ASSESSMENT — PAIN DESCRIPTION - ORIENTATION
ORIENTATION: LEFT
ORIENTATION: LEFT

## 2023-11-06 ASSESSMENT — PAIN - FUNCTIONAL ASSESSMENT
PAIN_FUNCTIONAL_ASSESSMENT: PREVENTS OR INTERFERES SOME ACTIVE ACTIVITIES AND ADLS
PAIN_FUNCTIONAL_ASSESSMENT: PREVENTS OR INTERFERES SOME ACTIVE ACTIVITIES AND ADLS

## 2023-11-06 ASSESSMENT — PAIN DESCRIPTION - FREQUENCY: FREQUENCY: INTERMITTENT

## 2023-11-06 NOTE — PROGRESS NOTES
Consulted for L Elbow traumatic wound and R Heel - DTI. Pt has skin tear on left elbow d/t a fall. Cleaned with NS and applied new dressing. Assessed R Heel - pt does not have a DTI. R Heel skin blanches and appears pt had a blister that ruptured and dried. Wound care orders for left elbow placed. Wound Care to sign off. Please re-consult for changes or deterioration.      L Elbow:     R Heel:

## 2023-11-06 NOTE — PLAN OF CARE
Problem: ABCDS Injury Assessment  Goal: Absence of physical injury  11/6/2023 0045 by Chay Wolfe RN  Outcome: Progressing  11/5/2023 2033 by Chay Wolfe RN  Outcome: Progressing   Note: Patient has no new injury    Problem: Safety - Adult  Goal: Free from fall injury  11/6/2023 0045 by Chay Wolfe RN  Outcome: Progressing  11/5/2023 2033 by Chay Wolfe RN  Outcome: Progressing   Note: Patient free from falls    Problem: Discharge Planning  Goal: Discharge to home or other facility with appropriate resources  11/6/2023 0045 by Chay Wolfe RN  Outcome: Progressing  11/5/2023 2033 by Chay Wolfe RN  Outcome: Progressing   Note: Patient to return home with spouse    Problem: Pain  Goal: Verbalizes/displays adequate comfort level or baseline comfort level  11/6/2023 0045 by Chay Wolfe RN  Outcome: Progressing  11/5/2023 2033 by Chay Wolfe RN  Outcome: Progressing   Note: Patient pain controlled    Problem: Skin/Tissue Integrity  Goal: Absence of new skin breakdown  Description: 1. Monitor for areas of redness and/or skin breakdown  2. Assess vascular access sites hourly  3. Every 4-6 hours minimum:  Change oxygen saturation probe site  4. Every 4-6 hours:  If on nasal continuous positive airway pressure, respiratory therapy assess nares and determine need for appliance change or resting period.   11/6/2023 0045 by Chay Wolfe RN  Outcome: Progressing  11/5/2023 2033 by Chay Wolfe RN  Outcome: Progressing   Note: No new skin breakdown

## 2023-11-06 NOTE — PROGRESS NOTES
Progress Note  Admit Date: 11/5/2023           CC: F/U for fall    82 y.o. female with Hx of metastatic Non small Cell lung cancer (stage IVB, was in hospice for 2 years,  COPD, AAA repair, left partial nephrectomy(2018), BCC, Parkinson's disease  HTN, HLD, CVA (residual right hemiplegia 2020) who presented from Home with a fall. Her metastatic Non small Cell lung cancer (stage IVB) was Dx: 12/2012, surgically resected in 01/2013, EGFR wild type, ALK mutation testing negative, Tx: carboplatin and Alimta, completed in 09/2013, Maintenance Alimta started in 10/2013, tolerated poorly, discontinued, Stable disease in 2020, Nivolumab, initiated 5/6/2018 ),, Radiation therapy for T11 sclerotic lesion in 2019,      She lives at Home, uses a walker. Her  got up in the night and she tried to get up to go to him but did not use her walker, legs were too weak and she fell, reports severe L leg pain. She was on the floor for about 10 minutes as she explained that she felt weak in her legs and was unable to get herself back up until she was helped by her . She did not experience any loss of consciousness, urinary incontinence or fecal incontinence. She denies chest pain or pressure, syncope, LOC, dizziness, focal weakness, numbness or tingling. In ED patient's vitals were grossly within normal limits she was saturating at 94% on 2 L of O2. Patient's labs were significant for  leukocytosis with 12.6, elevation of troponin 230; repeat 207. UA showed features of infection. EKG: nil acute ST-T wave changes. CT head was unremarkable for any trauma, CT PE was significant for lobular 3 and to 2 cm soft tissue mass in the central left upper lobe worrisome for possible primary lung malignancy. No PE. Interval History: No chest pain  No dyspnea. No fever or chills      Review of Systems - Negative except  as in HPI.      Scheduled Medications:    sodium chloride flush  5-40 mL

## 2023-11-06 NOTE — H&P
Hospital Medicine History & Physical      Date of Admission: 11/5/2023    Date of Service:  Pt seen/examined on 11/5/2023     [x]Admitted to Inpatient with expected LOS greater than two midnights due to medical therapy. []Placed in Observation status. Chief Admission Complaint:  fall    Presenting Admission History:      82 y.o. female who presented to [facility] with fall. PMHx significant for Lung cancer in remission, HTN, HLD. Present s/p fall. States hse uses a walker, her  got up in the night and she tried to get up to go to him but did not use her walker  , legs were too weak and she fell. After reports sever L leg pain. Deneis chest pain or pressure, syncope, LOC, dizziness, unilaterla weakness, numbness or tingling    Reports epigastric abd pain for last several days. In ER  New O2 requirement of 5L, reports SOB today    CT head shows Cerebral atrophy./Evidence of diffuse chronic small vessel white matter disease bilaterally./No acute intracranial findings./Right maxillary and left sphenoid sinus disease. CT chest PE shows Diffuse thoracic and upper abdominal aortic ectasia and calcification, similar to the prior exam./No CT evidence of pulmonary embolism./Diffuse centrilobular emphysema./Lobular 3 x 2 cm soft tissue mass in the central left upper lobe. It is worrisome for possible primary lung malignancy.        WBC 12.6     Troponin 230 repeat 207    Assessment/Plan:      Current Principal Problem:  Elevated troponin    Elevated troponin/abd pain/new O2 requirement  - Will initiate heparin gtt  - cardiology consult  - aspirin  - statin    Leg pain  - pain control  - dvt US    Lung mass  - pulm consult  - onc consult    Leukocytosis  - unknown cause  - Pro calcitonin  - Blood cultures  - UA  - Augmentin (sinus disease on CT)      Discussed management of the case in detail w/ the Emergency Department Provider:     CXR: I have reviewed the CXR with the following interpretation: New

## 2023-11-06 NOTE — ED NOTES
Patient wsa changed into a clean brief, gown, new dressing was applied on the wound on her left elbow, and pure wick in place. Sh was left resting comfortably with family at the bedside. She denies any pain.       Patricia Moise RN  11/05/23 1925

## 2023-11-06 NOTE — PROGRESS NOTES
4 Eyes Skin Assessment     NAME:  Nohemi Verdin  YOB: 1940  MEDICAL RECORD NUMBER:  [id number]    The patient is being assessed for  Admission    I agree that at least one RN has performed a thorough Head to Toe Skin Assessment on the patient. ALL assessment sites listed below have been assessed. Areas assessed by both nurses:    Head, Face, Ears, Shoulders, Back, Chest, Arms, Elbows, Hands, Sacrum. Buttock, Coccyx, Ischium, Legs. Feet and Heels, Under Medical Devices , and Other          Does the Patient have a Wound? Yes wound(s) were present on assessment. LDA wound assessment was Initiated and completed by RN    Wound/Skin tear to Left Elbow and right heel, Red Buttocks.       Margarito Prevention initiated by RN: Yes  Wound Care Orders initiated by RN: Yes    Pressure Injury (Stage 3,4, Unstageable, DTI, NWPT, and Complex wounds) if present, place Wound referral order by RN under : No    New Ostomies, if present place, Ostomy referral order under : No     Nurse 1 eSignature: Electronically signed by Osmany Charles RN on 11/5/23 at 8:38 PM EST    **SHARE this note so that the co-signing nurse can place an eSignature**    Nurse 2 eSignature: Electronically signed by Jerome Meza RN on 11/6/23 at 12:51 AM EST

## 2023-11-06 NOTE — PLAN OF CARE
Problem: ABCDS Injury Assessment  Goal: Absence of physical injury  Outcome: Progressing   Note: Free from physical injury    Problem: Safety - Adult  Goal: Free from fall injury  Outcome: Progressing   Note: Patient had fall at home prior to admission    Problem: Discharge Planning  Goal: Discharge to home or other facility with appropriate resources  Outcome: Progressing   Note: Patient to discharge home v. SNF     Problem: Pain  Goal: Verbalizes/displays adequate comfort level or baseline comfort level  Outcome: Progressing   Note: Patients pain maintained and controlled    Problem: Skin/Tissue Integrity  Goal: Absence of new skin breakdown  Description: 1. Monitor for areas of redness and/or skin breakdown  2. Assess vascular access sites hourly  3. Every 4-6 hours minimum:  Change oxygen saturation probe site  4. Every 4-6 hours:  If on nasal continuous positive airway pressure, respiratory therapy assess nares and determine need for appliance change or resting period.   Outcome: Progressing   Note: Patient has Left Elbow skin tear, Right heel blister, Red buttocks

## 2023-11-06 NOTE — PROGRESS NOTES
Physician Progress Note      PATIENT:               Rambo Singletary  CSN #:                  [id number]  :                       1940  ADMIT DATE:       2023 9:02 AM  DISCH DATE:  RESPONDING  PROVIDER #:        Melanie Dyson MD          QUERY TEXT:    Patient admitted with Brain mass. WOCN notes on 23, patient has \"R Heel   DTI\" If possible, please document in progress notes and discharge summary the   location, present on admission status and stage of the pressure ulcer: The medical record reflects the following:  Risk Factors: CVA w/ residual hemiplegia  Clinical Indicators: per WOCN notes on  \" R Heel - DTI. R Heel skin   blanches and appears pt had a blister that ruptured and  dried. \"  Treatment: CBC, CMP, WOCN consult, per WOCN \"maintain heels off of bed at all   times, Clean with NS and apply dressing\"    Stage 1:  Non-blanchable erythema of intact skin  Stage 2:  Abrasion, Blister, Partial-thickness skin loss, with exposed dermis  Stage 3:  Full-thickness skin loss with damage or necrosis of subcutaneous   tissue  Stage 4:  Full-thickness skin & soft tissue loss through to underlying muscle,   tendon or bone  Unstageable: Obscured full-thickness skin & tissue loss  Options provided:  -- Deep tissue injury pressure ulcer of right heel present on admission  -- Other - I will add my own diagnosis  -- Disagree - Not applicable / Not valid  -- Disagree - Clinically unable to determine / Unknown  -- Refer to Clinical Documentation Reviewer    PROVIDER RESPONSE TEXT:    This patient has a deep tissue injury pressure ulcer of the right heel which   was present on admission.     Query created by: Kathy Joseph on 2023 4:27 PM      Electronically signed by:  Melanie Dyson MD 2023 5:20 PM

## 2023-11-06 NOTE — CONSULTS
Cardiology Consultation/History and Physical                                                                  Pt Name: Iris Mascorro  Age: 82 y.o. Sex: female  : 1940  [facility] [facility]    Referring Physician: Fauzia Vigil MD  Primary cardiologist: elevated troponin      Reason for Consult:     Reason for Consultation/Chief Complaint: elevated troponin    HPI:      Iris Mascorro is a 82 y.o. female with presented to ED s/p fall with hip pain. Cardiology consulted due to elevated troponin. Denies chest pain, sob, orthopnea, or pnd. CTA chest in ED negative for PE    Diffuse thoracic and upper abdominal aortic ectasia and calcification, similar to the prior exam.  Diffuse centrilobular emphysema. Lobular 3 x 2 cm soft tissue mass in the central left upper lobe. It is  worrisome for possible primary lung malignancy. Histories     Past Medical History:   has a past medical history of CHUYITA (acute kidney injury) ([address]), Allergic rhinitis, Aneurysm ([address]), Aortic aneurysm ([address]), Cancer ([address]), Cerebral artery occlusion with cerebral infarction ([address]), Cholelithiasis, Depression, Fracture, History of ischemic left MCA stroke, History of ischemic left MCA stroke, Hyperlipidemia, Hypertension, Incontinence, MRSA (methicillin resistant staph aureus) culture positive, Ulcer, and Ulcerative colitis ([address]). Surgical History:   has a past surgical history that includes Abdomen surgery; Cholecystectomy, open (2012); Pressure ulcer debridement (); Colonoscopy; Upper gastrointestinal endoscopy; Stomach surgery; ERCP (3/13/12); bronchoscopy (2012); ERCP (2012); Upper gastrointestinal endoscopy (N/A, 2021); Upper gastrointestinal endoscopy (N/A, 2021); Tunneled venous port placement (Left); Wrist surgery (Left); and Upper gastrointestinal endoscopy (N/A, 2021). Social History:   reports that she quit smoking about 14 years ago.  Her smoking use included EKG NSR, poor r wave progression    Assessment / Plan:   Elevated troponin  Lung mass on ct  H/o metastatic nonsmall cell lung ca  S/p fall  H/o stroke    Denies chest pain  Troponin elevation of unclear significance  Clinical history does not suggest acs event  Obtain echocardiogram  Continue asa/statin  Optimize GDMT as tolerated    Some underlying dementia per documentation but remembers falling   The note was completed using EMR and Dragon dictation system. Every effort was made to ensure accuracy; however, inadvertent computerized transcription errors may be present. I would like to thank you for providing me the opportunity to participate in the care of your patient. If you have any questions, please do not hesitate to contact me. Pete Haddad MD, [facility] [place] [address]  Ph: [phone]  Fax: [phone]     Time Based Itemization  A total of 80 minutes was spent on today's patient encounter.   If applicable, non-patient-facing activities:  (X )Preparing to see the patient and reviewing records  (X ) Individual interpretation of results  (X) Discussion or coordination of care with other health care professionals  (X) Ordering of unique tests, medications, or procedures  (X) Documentation within the EHR

## 2023-11-06 NOTE — PROGRESS NOTES
Physical Therapy/occupational Therapy      Orders received and chart reviewed. Pt currently on bedrest.  Will need updated activity orders prior to working with pt for PT/OT. MD informed and reports pt awaiting further work-up.   Will hold and eval as appropriate once pt off bedrest.    Kimo Walls, PT [id number]  Nasima Frank, OTR/L, [id number]

## 2023-11-06 NOTE — CONSULTS
basal septum. Overall left ventricular   systolic function appears normal with an estimated ejection fraction of   55-60%. Diastolic filling parameters suggests normal diastolic function. No   evidence of significant valvular stenosis or regurgitation present. A bubble   study was performed and fails to show evidence of shunting. Assessment/Plan   Mechanical Fall  Pt pw fall no LOC, no prodromal symptoms, hx of CVA, pt states legs gave out   -PTOT  -Orthostats   -Telemetry  -Management as per Primary Medical Team     Possible new Lung Mass   metastatic Non small Cell lung cancer ( stage IVB, Dx: 12/2012, surgically resected in 01/2013,EGFR wild type, ALK mutation testing negative, Tx: carboplatin and Alimta, completed in 09/2013, Maintenance Alimta started in 10/2013,  Nivolumab, initiated 5/6/2018. Upon review of her last CT scan from 2021 there may have been mass present in the left upper lobe which is currently presenting as it has grown in size however patient is currently not symptomatic for it. Patient also stated that she would not preferably undergo treatment for the cancer however she would like to discuss with her  at this time.  -We will continue to monitor for any signs and symptoms of malignancy  -Treatment as per oncology if patient agrees to treatment  -Will consider Follow-up CT to monitor 3-6 months if opt for no treatment     Sara Calderon MD,   Internal Medicine, PGY-2    Patient reviewed, findings as discussed with Dr. Helga Whaley. Agree with assessment and plan. Left upper lobe mass is consistent with appearance of neoplasm. In retrospect, a 6 mm nodule is seen in the area on CT scan January 2021 given her history, she is currently is at risk for recurrence of bronchogenic carcinoma. She is asymptomatic from this. She is considering she may not want to proceed with treatment.   If that is indeed the case, then I would not recommend pursuit of a biopsy, because it would be a moot point; no treatment would be taken whether or not malignant cells are found. We will follow her progress with you, and take action accordingly.

## 2023-11-07 LAB
ALBUMIN SERPL-MCNC: 3.8 G/DL (ref 3.4–5)
ALBUMIN/GLOB SERPL: 1.2 {RATIO} (ref 1.1–2.2)
ALP SERPL-CCNC: 351 U/L (ref 40–129)
ALT SERPL-CCNC: 57 U/L (ref 10–40)
ANION GAP SERPL CALCULATED.3IONS-SCNC: 12 MMOL/L (ref 3–16)
ANTI-XA UNFRAC HEPARIN: 0.22 IU/ML (ref 0.3–0.7)
AST SERPL-CCNC: 69 U/L (ref 15–37)
BACTERIA URNS QL MICRO: ABNORMAL /HPF
BILIRUB SERPL-MCNC: 0.7 MG/DL (ref 0–1)
BILIRUB UR QL STRIP.AUTO: NEGATIVE
BUN SERPL-MCNC: 18 MG/DL (ref 7–20)
CALCIUM SERPL-MCNC: 8.9 MG/DL (ref 8.3–10.6)
CHLORIDE SERPL-SCNC: 104 MMOL/L (ref 99–110)
CLARITY UR: CLEAR
CO2 SERPL-SCNC: 23 MMOL/L (ref 21–32)
COLOR UR: YELLOW
CREAT SERPL-MCNC: 1.1 MG/DL (ref 0.6–1.2)
DEPRECATED RDW RBC AUTO: 18.7 % (ref 12.4–15.4)
EPI CELLS #/AREA URNS HPF: ABNORMAL /HPF (ref 0–5)
GFR SERPLBLD CREATININE-BSD FMLA CKD-EPI: 50 ML/MIN/{1.73_M2}
GLUCOSE SERPL-MCNC: 107 MG/DL (ref 70–99)
GLUCOSE UR STRIP.AUTO-MCNC: NEGATIVE MG/DL
HCT VFR BLD AUTO: 33.8 % (ref 36–48)
HGB BLD-MCNC: 10.6 G/DL (ref 12–16)
HGB UR QL STRIP.AUTO: NEGATIVE
KETONES UR STRIP.AUTO-MCNC: ABNORMAL MG/DL
LEUKOCYTE ESTERASE UR QL STRIP.AUTO: ABNORMAL
MAGNESIUM SERPL-MCNC: 2 MG/DL (ref 1.8–2.4)
MCH RBC QN AUTO: 25.5 PG (ref 26–34)
MCHC RBC AUTO-ENTMCNC: 31.5 G/DL (ref 31–36)
MCV RBC AUTO: 81 FL (ref 80–100)
NITRITE UR QL STRIP.AUTO: NEGATIVE
PH UR STRIP.AUTO: 6 [PH] (ref 5–8)
PLATELET # BLD AUTO: 177 K/UL (ref 135–450)
PMV BLD AUTO: 7.8 FL (ref 5–10.5)
POTASSIUM SERPL-SCNC: 4.2 MMOL/L (ref 3.5–5.1)
PROT SERPL-MCNC: 7 G/DL (ref 6.4–8.2)
PROT UR STRIP.AUTO-MCNC: ABNORMAL MG/DL
RBC # BLD AUTO: 4.17 M/UL (ref 4–5.2)
RBC #/AREA URNS HPF: ABNORMAL /HPF (ref 0–4)
SODIUM SERPL-SCNC: 139 MMOL/L (ref 136–145)
SP GR UR STRIP.AUTO: 1.02 (ref 1–1.03)
TROPONIN, HIGH SENSITIVITY: 148 NG/L (ref 0–14)
UA COMPLETE W REFLEX CULTURE PNL UR: YES
UA DIPSTICK W REFLEX MICRO PNL UR: YES
URN SPEC COLLECT METH UR: ABNORMAL
UROBILINOGEN UR STRIP-ACNC: 0.2 E.U./DL
WBC # BLD AUTO: 8.4 K/UL (ref 4–11)
WBC #/AREA URNS HPF: >100 /HPF (ref 0–5)

## 2023-11-07 PROCEDURE — 85520 HEPARIN ASSAY: CPT

## 2023-11-07 PROCEDURE — 80053 COMPREHEN METABOLIC PANEL: CPT

## 2023-11-07 PROCEDURE — 36415 COLL VENOUS BLD VENIPUNCTURE: CPT

## 2023-11-07 PROCEDURE — 87086 URINE CULTURE/COLONY COUNT: CPT

## 2023-11-07 PROCEDURE — 6360000002 HC RX W HCPCS: Performed by: FAMILY MEDICINE

## 2023-11-07 PROCEDURE — 83735 ASSAY OF MAGNESIUM: CPT

## 2023-11-07 PROCEDURE — 6370000000 HC RX 637 (ALT 250 FOR IP): Performed by: INTERNAL MEDICINE

## 2023-11-07 PROCEDURE — 2060000000 HC ICU INTERMEDIATE R&B

## 2023-11-07 PROCEDURE — 97167 OT EVAL HIGH COMPLEX 60 MIN: CPT

## 2023-11-07 PROCEDURE — 6360000002 HC RX W HCPCS: Performed by: INTERNAL MEDICINE

## 2023-11-07 PROCEDURE — 81001 URINALYSIS AUTO W/SCOPE: CPT

## 2023-11-07 PROCEDURE — 2700000000 HC OXYGEN THERAPY PER DAY

## 2023-11-07 PROCEDURE — 97530 THERAPEUTIC ACTIVITIES: CPT

## 2023-11-07 PROCEDURE — 94761 N-INVAS EAR/PLS OXIMETRY MLT: CPT

## 2023-11-07 PROCEDURE — 84484 ASSAY OF TROPONIN QUANT: CPT

## 2023-11-07 PROCEDURE — 2580000003 HC RX 258: Performed by: FAMILY MEDICINE

## 2023-11-07 PROCEDURE — 97116 GAIT TRAINING THERAPY: CPT

## 2023-11-07 PROCEDURE — 97163 PT EVAL HIGH COMPLEX 45 MIN: CPT

## 2023-11-07 PROCEDURE — 85027 COMPLETE CBC AUTOMATED: CPT

## 2023-11-07 PROCEDURE — 97535 SELF CARE MNGMENT TRAINING: CPT

## 2023-11-07 PROCEDURE — 99232 SBSQ HOSP IP/OBS MODERATE 35: CPT | Performed by: INTERNAL MEDICINE

## 2023-11-07 PROCEDURE — 2580000003 HC RX 258: Performed by: INTERNAL MEDICINE

## 2023-11-07 PROCEDURE — 6370000000 HC RX 637 (ALT 250 FOR IP): Performed by: FAMILY MEDICINE

## 2023-11-07 RX ORDER — METOPROLOL SUCCINATE 25 MG/1
12.5 TABLET, EXTENDED RELEASE ORAL DAILY
Status: DISCONTINUED | OUTPATIENT
Start: 2023-11-07 | End: 2023-11-09 | Stop reason: HOSPADM

## 2023-11-07 RX ADMIN — ATORVASTATIN CALCIUM 80 MG: 80 TABLET, FILM COATED ORAL at 20:09

## 2023-11-07 RX ADMIN — CEFTRIAXONE 1000 MG: 1 INJECTION, POWDER, FOR SOLUTION INTRAMUSCULAR; INTRAVENOUS at 20:09

## 2023-11-07 RX ADMIN — HEPARIN SODIUM 12 UNITS/KG/HR: 10000 INJECTION, SOLUTION INTRAVENOUS at 01:43

## 2023-11-07 RX ADMIN — Medication 3 MG: at 20:09

## 2023-11-07 RX ADMIN — METOPROLOL SUCCINATE 12.5 MG: 25 TABLET, EXTENDED RELEASE ORAL at 10:45

## 2023-11-07 RX ADMIN — ASPIRIN 81 MG: 81 TABLET, CHEWABLE ORAL at 10:44

## 2023-11-07 RX ADMIN — SODIUM CHLORIDE, PRESERVATIVE FREE 10 ML: 5 INJECTION INTRAVENOUS at 20:10

## 2023-11-07 RX ADMIN — SODIUM CHLORIDE, PRESERVATIVE FREE 10 ML: 5 INJECTION INTRAVENOUS at 10:45

## 2023-11-07 RX ADMIN — OXYCODONE AND ACETAMINOPHEN 1 TABLET: 5; 325 TABLET ORAL at 13:01

## 2023-11-07 RX ADMIN — THIAMINE HYDROCHLORIDE 100 MG: 100 INJECTION, SOLUTION INTRAMUSCULAR; INTRAVENOUS at 10:44

## 2023-11-07 ASSESSMENT — PAIN DESCRIPTION - PAIN TYPE: TYPE: ACUTE PAIN

## 2023-11-07 ASSESSMENT — PAIN SCALES - GENERAL
PAINLEVEL_OUTOF10: 6
PAINLEVEL_OUTOF10: 0

## 2023-11-07 ASSESSMENT — PAIN DESCRIPTION - DESCRIPTORS: DESCRIPTORS: DISCOMFORT

## 2023-11-07 ASSESSMENT — PAIN DESCRIPTION - ONSET: ONSET: GRADUAL

## 2023-11-07 ASSESSMENT — PAIN DESCRIPTION - LOCATION: LOCATION: ARM;LEG;HIP

## 2023-11-07 ASSESSMENT — PAIN DESCRIPTION - ORIENTATION: ORIENTATION: LEFT;RIGHT

## 2023-11-07 ASSESSMENT — PAIN DESCRIPTION - FREQUENCY: FREQUENCY: INTERMITTENT

## 2023-11-07 ASSESSMENT — PAIN - FUNCTIONAL ASSESSMENT: PAIN_FUNCTIONAL_ASSESSMENT: PREVENTS OR INTERFERES SOME ACTIVE ACTIVITIES AND ADLS

## 2023-11-07 NOTE — PROGRESS NOTES
Physical Therapy  Facility/Department: [place] [address] [facility]  Physical Therapy Initial Assessment and Treatment    Name: Amilcar Rodriguez  : 1940  MRN: [id number]  Date of Service: 2023    Discharge Recommendations:  Amilcar Rodriguez scored a 9 /24 on the AM-PAC short mobility form. Current research shows that an AM-PAC score of 17 or less is typically not associated with a discharge to the patient's home setting. Based on the patient's AM-PAC score and their current functional mobility deficits, it is recommended that the patient have 3-5 sessions per week of Physical Therapy at d/c to increase the patient's independence. Please see assessment section for further patient specific details. If patient discharges prior to next session this note will serve as a discharge summary. Please see below for the latest assessment towards goals. Patient would benefit from continued therapy after discharge   PT Equipment Recommendations  Equipment Needed: No  Other: Defer      Patient Diagnosis(es): The primary encounter diagnosis was Hypoxia. Diagnoses of Fall from bed, initial encounter and Left hip pain were also pertinent to this visit. Past Medical History:  has a past medical history of CHUYITA (acute kidney injury) ([address]), Allergic rhinitis, Aneurysm ([address]), Aortic aneurysm ([address]), Cancer ([address]), Cerebral artery occlusion with cerebral infarction ([address]), Cholelithiasis, Depression, Fracture, History of ischemic left MCA stroke, History of ischemic left MCA stroke, Hyperlipidemia, Hypertension, Incontinence, MRSA (methicillin resistant staph aureus) culture positive, Ulcer, and Ulcerative colitis ([address]). Past Surgical History:  has a past surgical history that includes Abdomen surgery; Cholecystectomy, open (2012); Pressure ulcer debridement (); Colonoscopy; Upper gastrointestinal endoscopy; Stomach surgery; ERCP (3/13/12); bronchoscopy (2012); ERCP (2012);  Upper gastrointestinal endoscopy

## 2023-11-07 NOTE — PROGRESS NOTES
Occupational Therapy  Facility/Department: [place] [address] [facility]  Occupational Therapy Initial Assessment/tx    Name: Amilcar Rodriguez  : 1940  MRN: [id number]  Date of Service: 2023    Discharge Recommendations:  Amilcar Rodriguez scored a 13/24 on the AM-PAC ADL Inpatient form. Current research shows that an AM-PAC score of 17 or less is typically not associated with a discharge to the patient's home setting. Based on the patient's AM-PAC score and their current ADL deficits, it is recommended that the patient have 3-5 sessions per week of Occupational Therapy at d/c to increase the patient's independence. Please see assessment section for further patient specific details. If patient discharges prior to next session this note will serve as a discharge summary. Please see below for the latest assessment towards goals. OT Equipment Recommendations  Other: defer to next level of care       Patient Diagnosis(es): The primary encounter diagnosis was Hypoxia. Diagnoses of Fall from bed, initial encounter and Left hip pain were also pertinent to this visit. Past Medical History:  has a past medical history of CHUYITA (acute kidney injury) ([address]), Allergic rhinitis, Aneurysm ([address]), Aortic aneurysm ([address]), Cancer ([address]), Cerebral artery occlusion with cerebral infarction ([address]), Cholelithiasis, Depression, Fracture, History of ischemic left MCA stroke, History of ischemic left MCA stroke, Hyperlipidemia, Hypertension, Incontinence, MRSA (methicillin resistant staph aureus) culture positive, Ulcer, and Ulcerative colitis ([address]). Past Surgical History:  has a past surgical history that includes Abdomen surgery; Cholecystectomy, open (2012); Pressure ulcer debridement (); Colonoscopy; Upper gastrointestinal endoscopy; Stomach surgery; ERCP (3/13/12); bronchoscopy (2012); ERCP (2012); Upper gastrointestinal endoscopy (N/A, 2021); Upper gastrointestinal endoscopy (N/A, 2021);  Tunneled all  Sequencing: Requires cues for some  Orientation  Overall Orientation Status: Impaired  Orientation Level: Oriented to person                  Education Given To: Patient; Family  Education Provided: Role of Therapy;Plan of Care  Education Method: Demonstration;Verbal  Barriers to Learning: Cognition  Education Outcome: Continued education needed                        G-Code     OutComes Score                                                  AM-PAC Score        AM-PAC Inpatient Daily Activity Raw Score: 13 (11/07/23 1456)  AM-PAC Inpatient ADL T-Scale Score : 32.03 (11/07/23 1456)  ADL Inpatient CMS 0-100% Score: 63.03 (11/07/23 1456)  ADL Inpatient CMS G-Code Modifier : CL (11/07/23 1456)           Goals  Short Term Goals  Time Frame for Short Term Goals: discharge  Short Term Goal 1: pt to transfer to 3 in 1 commode with mod A of 2  Short Term Goal 2: pt to manage toileting with max A  Short Term Goal 3: pt to do seated grooming/hygiene tasks with mod A  Short Term Goal 4: pt to tolerate 5-10 reps B UE AROM exerc to increase activity tolerance  Patient Goals   Patient goals : not stated       Therapy Time   Individual Concurrent Group Co-treatment   Time In 1345         Time Out 1440         Minutes 55          Timed Code Treatment Minutes:   40    Total Treatment Minutes:     [address], OTR/L [facility]

## 2023-11-07 NOTE — PROGRESS NOTES
Occupational Therapy/Physical Therapy-hold  OT/PT orders received, chart reviewed. OT came to room and nurse reported pt is more confused, not oriented. He reports he is going to message physician about cognitive change and wants to hold OT/PT evals this morning. Will follow up later, as schedule allows.   Jada Oden, OTR/L [facility]  [address] [place] [address]

## 2023-11-07 NOTE — PROGRESS NOTES
Pt alert and oriented to person, disoriented to place, time, situation. Pt able to follow commands. No neural deficits noted. Per report from nightshift RN pt has been Alert and oriented x4. MD notified.  Electronically signed by Cony Gunter RN on 11/7/2023 at 10:59 AM

## 2023-11-07 NOTE — PROGRESS NOTES
Pulmonary Progress Note    Patient Name: Inna Meyers   Patient : 1940   Date: 2023   Admit Date: 2023     CC: Fall      Interval History: Patient seen at bedside this AM.  She denies experiencing any cough shortness of breath however she does complain of experiencing some chest discomfort that was lasting only a few seconds. Otherwise pt denies any Fever, chills, nausea or vomiting. In an attempt to wean down O2 she desat to 86% and resumed her O2 to 3L. HPI:  82 y o F with PMHx of metastatic Non small Cell lung cancer ( stage IVB, was in hospice for 2 years, Dx: 2012, surgically resected in 2013,EGFR wild type, ALK mutation testing negative, Tx: carboplatin and Alimta, completed in 2013, Maintenance Alimta started in 10/2013, tolerated poorly, discontinued, Stable disease in , Nivolumab, initiated 2018 ), COPD, AAA repair, left partial nephrectomy(), BCC, Radiation therapy for T11 sclerotic lesion in , Parkinson's disease  HTN, HLD, CVA (residual right hemiplegia ) who presented for a fall. History was obtained from the patient who is from home and lives with her . She states that she had fallen after she was trying to get up from her bed and she denies experiencing any prodromal symptoms like lightheadedness or palpitations. She also stated that she did not experience any loss of consciousness, urinary incontinence or fecal incontinence. She had been on the floor for about 10 minutes as she explained that she felt weak in her legs and was unable to get herself back up until she was helped by her . She did not hit her head only hit the back of her body in specific both of her legs more left than the right. She denies experiencing any fever, chills, cough, weight loss, night sweats, loss of appetite or any hemoptysis. She also states that she has quit smoking 13 years ago and has 100-pack-year.   She also denies any alcohol or any

## 2023-11-07 NOTE — CARE COORDINATION
Case Management Assessment  Initial Evaluation    Date/Time of Evaluation: 11/7/2023 4:40 PM  Assessment Completed by: Kadie Becker    If patient is discharged prior to next notation, then this note serves as note for discharge by case management. Patient Name: Jazzy Bolden                   YOB: 1940  Diagnosis: Hypoxia [R09.02]  Elevated troponin [R79.89]  Left hip pain [M25.552]  Fall from bed, initial encounter [W06. XXXA]                   Date / Time: 11/5/2023  9:02 AM    Patient Admission Status: Inpatient   Readmission Risk (Low < 19, Mod (19-27), High > 27): Readmission Risk Score: 17.5    Current PCP: Ellie Meyers, DO  PCP verified by CM? No    Chart Reviewed: Yes      History Provided by: Patient, Medical Record, Spouse  Patient Orientation: Alert and Oriented    Patient Cognition: Alert    Hospitalization in the last 30 days (Readmission):  No    If yes, Readmission Assessment in CM Navigator will be completed. Advance Directives:      Code Status: Full Code   Patient's Primary Decision Maker is: Patient Declined (Legal Next of Kin Remains as Decision Maker)      Discharge Planning:    Patient lives with: Spouse/Significant Other Type of Home: House  Primary Care Giver: Self  Patient Support Systems include: Spouse/Significant Other, Family Members   Current Financial resources: Medicare  Current community resources: None  Current services prior to admission: Durable Medical Equipment            Current DME: Shower Chair, Walker            Type of Home Care services:  None    ADLS  Prior functional level: Independent in ADLs/IADLs  Current functional level: Assistance with the following:, Bathing, Dressing, Toileting, Cooking, Housework, Shopping, Mobility    PT AM-PAC: 9 /24  OT AM-PAC: 13 /24    Family can provide assistance at DC: Yes  Would you like Case Management to discuss the discharge plan with any other family members/significant others, and if so, who?  Yes provided to: Patient, Patient Representative   Patient Representative Name: Nelly Chicas     The Patient and/or Patient Representative Agree with the Discharge Plan?  Yes    Willis Ochoa  Case Management Department  Ph: [phone]

## 2023-11-08 ENCOUNTER — APPOINTMENT (OUTPATIENT)
Dept: MRI IMAGING | Age: 83
DRG: 180 | End: 2023-11-08
Payer: MEDICARE

## 2023-11-08 LAB
ALBUMIN SERPL-MCNC: 3.2 G/DL (ref 3.4–5)
ALBUMIN/GLOB SERPL: 1 {RATIO} (ref 1.1–2.2)
ALP SERPL-CCNC: 308 U/L (ref 40–129)
ALT SERPL-CCNC: 48 U/L (ref 10–40)
ANION GAP SERPL CALCULATED.3IONS-SCNC: 8 MMOL/L (ref 3–16)
AST SERPL-CCNC: 44 U/L (ref 15–37)
BACTERIA UR CULT: NORMAL
BACTERIA UR CULT: NORMAL
BILIRUB SERPL-MCNC: 0.4 MG/DL (ref 0–1)
BUN SERPL-MCNC: 23 MG/DL (ref 7–20)
CALCIUM SERPL-MCNC: 8.7 MG/DL (ref 8.3–10.6)
CHLORIDE SERPL-SCNC: 103 MMOL/L (ref 99–110)
CO2 SERPL-SCNC: 25 MMOL/L (ref 21–32)
CREAT SERPL-MCNC: 1.2 MG/DL (ref 0.6–1.2)
DEPRECATED RDW RBC AUTO: 18.7 % (ref 12.4–15.4)
GFR SERPLBLD CREATININE-BSD FMLA CKD-EPI: 45 ML/MIN/{1.73_M2}
GLUCOSE SERPL-MCNC: 112 MG/DL (ref 70–99)
HCT VFR BLD AUTO: 30.5 % (ref 36–48)
HGB BLD-MCNC: 9.8 G/DL (ref 12–16)
MCH RBC QN AUTO: 25.7 PG (ref 26–34)
MCHC RBC AUTO-ENTMCNC: 32.2 G/DL (ref 31–36)
MCV RBC AUTO: 79.9 FL (ref 80–100)
PLATELET # BLD AUTO: 159 K/UL (ref 135–450)
PMV BLD AUTO: 7.8 FL (ref 5–10.5)
POTASSIUM SERPL-SCNC: 4.1 MMOL/L (ref 3.5–5.1)
PROT SERPL-MCNC: 6.5 G/DL (ref 6.4–8.2)
RBC # BLD AUTO: 3.82 M/UL (ref 4–5.2)
SODIUM SERPL-SCNC: 136 MMOL/L (ref 136–145)
WBC # BLD AUTO: 7.7 K/UL (ref 4–11)

## 2023-11-08 PROCEDURE — 85027 COMPLETE CBC AUTOMATED: CPT

## 2023-11-08 PROCEDURE — 36415 COLL VENOUS BLD VENIPUNCTURE: CPT

## 2023-11-08 PROCEDURE — 97110 THERAPEUTIC EXERCISES: CPT

## 2023-11-08 PROCEDURE — 94761 N-INVAS EAR/PLS OXIMETRY MLT: CPT

## 2023-11-08 PROCEDURE — 2700000000 HC OXYGEN THERAPY PER DAY

## 2023-11-08 PROCEDURE — 6360000004 HC RX CONTRAST MEDICATION: Performed by: INTERNAL MEDICINE

## 2023-11-08 PROCEDURE — A9579 GAD-BASE MR CONTRAST NOS,1ML: HCPCS | Performed by: INTERNAL MEDICINE

## 2023-11-08 PROCEDURE — 97535 SELF CARE MNGMENT TRAINING: CPT

## 2023-11-08 PROCEDURE — 97530 THERAPEUTIC ACTIVITIES: CPT

## 2023-11-08 PROCEDURE — 99232 SBSQ HOSP IP/OBS MODERATE 35: CPT | Performed by: INTERNAL MEDICINE

## 2023-11-08 PROCEDURE — 70553 MRI BRAIN STEM W/O & W/DYE: CPT

## 2023-11-08 PROCEDURE — 2580000003 HC RX 258: Performed by: FAMILY MEDICINE

## 2023-11-08 PROCEDURE — 80053 COMPREHEN METABOLIC PANEL: CPT

## 2023-11-08 PROCEDURE — 6370000000 HC RX 637 (ALT 250 FOR IP): Performed by: INTERNAL MEDICINE

## 2023-11-08 PROCEDURE — 2580000003 HC RX 258: Performed by: INTERNAL MEDICINE

## 2023-11-08 PROCEDURE — 2060000000 HC ICU INTERMEDIATE R&B

## 2023-11-08 PROCEDURE — 6360000002 HC RX W HCPCS: Performed by: NURSE PRACTITIONER

## 2023-11-08 PROCEDURE — 6360000002 HC RX W HCPCS: Performed by: INTERNAL MEDICINE

## 2023-11-08 PROCEDURE — 6370000000 HC RX 637 (ALT 250 FOR IP): Performed by: FAMILY MEDICINE

## 2023-11-08 RX ORDER — GAUZE BANDAGE 2" X 2"
100 BANDAGE TOPICAL DAILY
Status: DISCONTINUED | OUTPATIENT
Start: 2023-11-09 | End: 2023-11-09 | Stop reason: HOSPADM

## 2023-11-08 RX ORDER — AMLODIPINE BESYLATE 5 MG/1
5 TABLET ORAL DAILY
Status: DISCONTINUED | OUTPATIENT
Start: 2023-11-08 | End: 2023-11-09 | Stop reason: HOSPADM

## 2023-11-08 RX ORDER — LORAZEPAM 2 MG/ML
0.5 INJECTION INTRAMUSCULAR ONCE
Status: COMPLETED | OUTPATIENT
Start: 2023-11-08 | End: 2023-11-08

## 2023-11-08 RX ORDER — CITALOPRAM 40 MG/1
40 TABLET ORAL DAILY
Status: DISCONTINUED | OUTPATIENT
Start: 2023-11-08 | End: 2023-11-09 | Stop reason: HOSPADM

## 2023-11-08 RX ORDER — CLOPIDOGREL BISULFATE 75 MG/1
75 TABLET ORAL NIGHTLY
Status: DISCONTINUED | OUTPATIENT
Start: 2023-11-08 | End: 2023-11-09 | Stop reason: HOSPADM

## 2023-11-08 RX ADMIN — THIAMINE HYDROCHLORIDE 100 MG: 100 INJECTION, SOLUTION INTRAMUSCULAR; INTRAVENOUS at 10:48

## 2023-11-08 RX ADMIN — SODIUM CHLORIDE, PRESERVATIVE FREE 10 ML: 5 INJECTION INTRAVENOUS at 10:48

## 2023-11-08 RX ADMIN — LORAZEPAM 0.5 MG: 2 INJECTION INTRAMUSCULAR; INTRAVENOUS at 20:28

## 2023-11-08 RX ADMIN — CEFTRIAXONE 1000 MG: 1 INJECTION, POWDER, FOR SOLUTION INTRAMUSCULAR; INTRAVENOUS at 22:01

## 2023-11-08 RX ADMIN — AMLODIPINE BESYLATE 5 MG: 5 TABLET ORAL at 13:49

## 2023-11-08 RX ADMIN — SODIUM CHLORIDE, PRESERVATIVE FREE 10 ML: 5 INJECTION INTRAVENOUS at 22:02

## 2023-11-08 RX ADMIN — ASPIRIN 81 MG: 81 TABLET, CHEWABLE ORAL at 10:48

## 2023-11-08 RX ADMIN — METOPROLOL SUCCINATE 12.5 MG: 25 TABLET, EXTENDED RELEASE ORAL at 10:47

## 2023-11-08 RX ADMIN — GADOTERIDOL 13 ML: 279.3 INJECTION, SOLUTION INTRAVENOUS at 21:08

## 2023-11-08 RX ADMIN — CLOPIDOGREL BISULFATE 75 MG: 75 TABLET ORAL at 22:02

## 2023-11-08 RX ADMIN — CITALOPRAM 40 MG: 40 TABLET, FILM COATED ORAL at 13:49

## 2023-11-08 RX ADMIN — ATORVASTATIN CALCIUM 80 MG: 80 TABLET, FILM COATED ORAL at 22:02

## 2023-11-08 RX ADMIN — CARBIDOPA AND LEVODOPA 1 TABLET: 10; 100 TABLET ORAL at 13:49

## 2023-11-08 RX ADMIN — Medication 3 MG: at 22:02

## 2023-11-08 RX ADMIN — CARBIDOPA AND LEVODOPA 1 TABLET: 10; 100 TABLET ORAL at 22:02

## 2023-11-08 ASSESSMENT — ENCOUNTER SYMPTOMS
SHORTNESS OF BREATH: 0
CHEST TIGHTNESS: 0
WHEEZING: 0
SORE THROAT: 0
COUGH: 0

## 2023-11-08 NOTE — PROGRESS NOTES
with use of rail. Moderate cueing. Scooting: Max assist to EOB with moderate cueing. Transfers  Sit to stand: Dependent (Mod assist x 2) from bed to walker. Cues for hand placement/pushing up from bed. Stand to sit: Dependent (Mod assist x 2) into chair. Cues for hand placement/reaching back for chair. Bed > chair: Dependent (Mod assist x 2) with walker    Ambulation  Assistance Level: Dependent (Mod assist x 2)  Assistive device: Wheeled walker  Distance: 2 ft (bed to chair)  Quality of gait: Unsteady; decreased step length/height; effortful    Exercises  Reclined in chair:  10-12 reps B ankle pumps, hip abd/add (mod assist on L), SAQ (mod assist on L)  Other: Rest breaks between exercises. Balance  Sat EOB with Min assist initially, progressing to CGA  Static stance with walker Mod assist  Taking steps with walker Mod assist x 2    Patient Education  Role of PT. Importance of mobility. Expressed understanding. Hand placement with transfers when using walker. Needs continued education. Safety Devices  Pt left with needs in reach. In chair (reclined) with chair alarm on. RN updated.  present.      AM-PAC score  AM-PAC Inpatient Mobility Raw Score : 7  AM-PAC Inpatient T-Scale Score : 26.42  Mobility Inpatient CMS 0-100% Score: 92.36  Mobility Inpatient CMS G-Code Modifier : CM    Goals: (as determined and assessed by primary PT)  Time Frame for Short Term Goals: by discharge  Short Term Goal 1: Pt will perform bed mobility with min assist   Short Term Goal 2: Pt will transfer sit to and from stand with min assist   Short Term Goal 3: Pt will ambulate 15 feet with wheeled walker and min assist          Plan:  Plan: 2-5x/week  Current Treatment Recommendations: Strengthening, Balance training, Functional mobility training, Transfer training, Gait training, Safety education & training, Therapeutic activities, Patient/Caregiver education & training    Therapy Time    Individual  Concurrent Group  Co-treatment    Time In  1150            Time Out  1220            Minutes  30              Timed Code Treatment Minutes: 30  Total Treatment Minutes: 30    Will continue per plan of care. If patient is discharged prior to next treatment, this note will serve as the discharge summary.     Lizette Reid #[id number]

## 2023-11-08 NOTE — PLAN OF CARE
Problem: Pain  Goal: Verbalizes/displays adequate comfort level or baseline comfort level  Outcome: Progressing  Flowsheets  Taken 11/7/2023 1939 by Marimar Chopra RN  Verbalizes/displays adequate comfort level or baseline comfort level:   Encourage patient to monitor pain and request assistance   Assess pain using appropriate pain scale   Administer analgesics based on type and severity of pain and evaluate response   Implement non-pharmacological measures as appropriate and evaluate response  Taken 11/7/2023 0900 by Isak Irving RN  Verbalizes/displays adequate comfort level or baseline comfort level: Encourage patient to monitor pain and request assistance  Note: Pt complained to R hip and leg as well as L arm. Pt administered PRN Percocet. Upon reassessment pt stated pain 0/10. Problem: Skin/Tissue Integrity  Goal: Absence of new skin breakdown  Description: 1. Monitor for areas of redness and/or skin breakdown  2. Assess vascular access sites hourly  3. Every 4-6 hours minimum:  Change oxygen saturation probe site  4. Every 4-6 hours:  If on nasal continuous positive airway pressure, respiratory therapy assess nares and determine need for appliance change or resting period. Outcome: Progressing  Note: Margarito skin assessment completed this shift pt has blanchable to redness to buttocks. Pt has been incontinent at times. External catheter in place. Perineal care provided after each incontinent episode. Pt assisted to turn and reposition every 2 hours and as needed.

## 2023-11-08 NOTE — CARE COORDINATION
CM received call from Batsheva at [facility]- they can accept pt for SNF admission, precert started this morning- remains pending at this time. CM informed pt and  at bedside, aware and agreeable with DCP to Batsheva at [facility].     Thank you  Cat Barbara Quiroz RN, BSN, [facility]   [phone]

## 2023-11-08 NOTE — PROGRESS NOTES
Occupational Therapy  Facility/Department: [place] [address] [facility]  Occupational Therapy Treatment Session     Name: Michelle Stewart  : 1940  MRN: [id number]  Date of Service: 2023    Discharge Recommendations: Michelle Stewart scored a 13/24 on the AM-PAC ADL Inpatient form. Current research shows that an AM-PAC score of 17 or less is typically not associated with a discharge to the patient's home setting. Based on the patient's AM-PAC score and their current ADL deficits, it is recommended that the patient have 3-5 sessions per week of Occupational Therapy at d/c to increase the patient's independence. Please see assessment section for further patient specific details. If patient discharges prior to next session this note will serve as a discharge summary. Please see below for the latest assessment towards goals. Patient Diagnosis(es): The primary encounter diagnosis was Hypoxia. Diagnoses of Fall from bed, initial encounter and Left hip pain were also pertinent to this visit. Past Medical History:  has a past medical history of CHUYITA (acute kidney injury) ([address]), Allergic rhinitis, Aneurysm ([address]), Aortic aneurysm ([address]), Cancer ([address]), Cerebral artery occlusion with cerebral infarction ([address]), Cholelithiasis, Depression, Fracture, History of ischemic left MCA stroke, History of ischemic left MCA stroke, Hyperlipidemia, Hypertension, Incontinence, MRSA (methicillin resistant staph aureus) culture positive, Ulcer, and Ulcerative colitis ([address]). Past Surgical History:  has a past surgical history that includes Abdomen surgery; Cholecystectomy, open (2012); Pressure ulcer debridement (); Colonoscopy; Upper gastrointestinal endoscopy; Stomach surgery; ERCP (3/13/12); bronchoscopy (2012); ERCP (2012); Upper gastrointestinal endoscopy (N/A, 2021); Upper gastrointestinal endoscopy (N/A, 2021); Tunneled venous port placement (Left);  Wrist surgery (Left); and Upper

## 2023-11-08 NOTE — PROGRESS NOTES
Pulmonary Progress Note    Patient Name: Reinier Alva   Patient : 1940   Date: 2023   Admit Date: 2023     CC:Fall      Interval History: Patient seen at bedside this AM.  She denies experiencing any cough shortness of breath however she does complain of experiencing some chest discomfort that was lasting only a few seconds. Otherwise pt denies any Fever, chills, nausea or vomiting. Pt today is down to 1L of O2. HPI:   82 y o F with PMHx of metastatic Non small Cell lung cancer ( stage IVB, was in hospice for 2 years, Dx: 2012, surgically resected in 2013,EGFR wild type, ALK mutation testing negative, Tx: carboplatin and Alimta, completed in 2013, Maintenance Alimta started in 10/2013, tolerated poorly, discontinued, Stable disease in , Nivolumab, initiated 2018 ), COPD, AAA repair, left partial nephrectomy(), BCC, Radiation therapy for T11 sclerotic lesion in , Parkinson's disease  HTN, HLD, CVA (residual right hemiplegia ) who presented for a fall. History was obtained from the patient who is from home and lives with her . She states that she had fallen after she was trying to get up from her bed and she denies experiencing any prodromal symptoms like lightheadedness or palpitations. She also stated that she did not experience any loss of consciousness, urinary incontinence or fecal incontinence. She had been on the floor for about 10 minutes as she explained that she felt weak in her legs and was unable to get herself back up until she was helped by her . She did not hit her head only hit the back of her body in specific both of her legs more left than the right. She denies experiencing any fever, chills, cough, weight loss, night sweats, loss of appetite or any hemoptysis. She also states that she has quit smoking 13 years ago and has 100-pack-year. She also denies any alcohol or any illicit drug use.      In ED patient's the cancer.   -fu with pulmonology in outpatient setting   -pt can be discharged to home on 1L of O2 if needed    Ubaldo Sanchez MD,   Internal Medicine, PGY-2    Patient examined, findings as discussed with Dr. Hammad Pond. Agree with assessment and plan. Given that she does not wish to pursue any treatment of a recurrence of lung cancer, there is no reason to pursue diagnostic studies to prove that the lung mass is indeed cancer. She will be getting an MRI brain to look for any pathology that may need to be addressed, given her difficulty with ambulation. Pulmonary service will sign off.   Please call if there are additional questions or concerns

## 2023-11-08 NOTE — PROGRESS NOTES
Notification of IV to PO Conversion     IV To Po Conversion:   Will convert thiamine IV to PO based on [facility] IV to PO policy (see below). Please call with questions.   Carlos Enrique Trejo, PharmD, [facility]  [id number]  11/08/23 11:41 AM      Criteria for conversion from IV to PO therapy per [facility] IV to PO Protocol:   Patients should meet all of the following inclusion criteria and none of the exclusion criteria    Criteria to initiate medication route switch (Inclusion Criteria)  IV therapy for > 24 hours (antibiotics only)  Tolerating diet more advanced than clear liquids  Tolerating oral (PO) medications  Does not require vasopressor therapy for blood pressure support  No seizures for 72hrs (antiepileptic medications only)      Criteria indicating that the patient is NOT a candidate for IV to PO conversion (Exclusion Criteria)  Infections requiring IV therapy (ie: meningitis, endocarditis, osteomyelitis, pancreatitis)  Nausea and/or vomiting or severe diarrhea within past 24 hours  Has gastrectomy, ileus, gastric outlet or bowel obstruction, or malabsorption syndromes  Has significant, painful oral ulceration  TPN with an NPO order  Active GI bleed  Unable to swallow  Nothing by Mouth (NPO) status  Febrile in the last 24 hours- (antibiotics only)  Clinical deteriorating or unstable - (antibiotics only)  Pediatric patients and patients who are not euthyroid (not on oral levothyroxine/not stabilized on oral levothyroxine) - (Levothyroxine only)  Patients being treated for myxedema coma or during the organ donation process - (Levothyroxine only)    Other notes-   Patients may be given suppositories when available for the product being ordered if no contraindications exist (ie: rectal surgery or infection)   Oral solutions/suspensions may be considered for patients with a functioning enteral tube not on continuous suction (if medication is available in this formulation)

## 2023-11-09 VITALS
BODY MASS INDEX: 25.56 KG/M2 | RESPIRATION RATE: 16 BRPM | WEIGHT: 138.89 LBS | DIASTOLIC BLOOD PRESSURE: 58 MMHG | HEART RATE: 68 BPM | SYSTOLIC BLOOD PRESSURE: 99 MMHG | TEMPERATURE: 98.4 F | HEIGHT: 62 IN | OXYGEN SATURATION: 96 %

## 2023-11-09 LAB
ALBUMIN SERPL-MCNC: 3.2 G/DL (ref 3.4–5)
ALBUMIN/GLOB SERPL: 1 {RATIO} (ref 1.1–2.2)
ALP SERPL-CCNC: 270 U/L (ref 40–129)
ALT SERPL-CCNC: <5 U/L (ref 10–40)
ANION GAP SERPL CALCULATED.3IONS-SCNC: 7 MMOL/L (ref 3–16)
AST SERPL-CCNC: 27 U/L (ref 15–37)
BACTERIA BLD CULT ORG #2: NORMAL
BACTERIA BLD CULT: NORMAL
BILIRUB SERPL-MCNC: 0.3 MG/DL (ref 0–1)
BUN SERPL-MCNC: 17 MG/DL (ref 7–20)
CALCIUM SERPL-MCNC: 8.6 MG/DL (ref 8.3–10.6)
CHLORIDE SERPL-SCNC: 104 MMOL/L (ref 99–110)
CO2 SERPL-SCNC: 27 MMOL/L (ref 21–32)
CREAT SERPL-MCNC: 0.9 MG/DL (ref 0.6–1.2)
DEPRECATED RDW RBC AUTO: 18.4 % (ref 12.4–15.4)
GFR SERPLBLD CREATININE-BSD FMLA CKD-EPI: >60 ML/MIN/{1.73_M2}
GLUCOSE SERPL-MCNC: 98 MG/DL (ref 70–99)
HCT VFR BLD AUTO: 29.3 % (ref 36–48)
HGB BLD-MCNC: 9.5 G/DL (ref 12–16)
MCH RBC QN AUTO: 25.6 PG (ref 26–34)
MCHC RBC AUTO-ENTMCNC: 32.5 G/DL (ref 31–36)
MCV RBC AUTO: 78.8 FL (ref 80–100)
PLATELET # BLD AUTO: 174 K/UL (ref 135–450)
PMV BLD AUTO: 8 FL (ref 5–10.5)
POTASSIUM SERPL-SCNC: 3.9 MMOL/L (ref 3.5–5.1)
PROT SERPL-MCNC: 6.3 G/DL (ref 6.4–8.2)
RBC # BLD AUTO: 3.71 M/UL (ref 4–5.2)
SODIUM SERPL-SCNC: 138 MMOL/L (ref 136–145)
WBC # BLD AUTO: 7.3 K/UL (ref 4–11)

## 2023-11-09 PROCEDURE — 36415 COLL VENOUS BLD VENIPUNCTURE: CPT

## 2023-11-09 PROCEDURE — 2580000003 HC RX 258: Performed by: FAMILY MEDICINE

## 2023-11-09 PROCEDURE — 2700000000 HC OXYGEN THERAPY PER DAY

## 2023-11-09 PROCEDURE — 85027 COMPLETE CBC AUTOMATED: CPT

## 2023-11-09 PROCEDURE — 80053 COMPREHEN METABOLIC PANEL: CPT

## 2023-11-09 PROCEDURE — 6370000000 HC RX 637 (ALT 250 FOR IP): Performed by: INTERNAL MEDICINE

## 2023-11-09 PROCEDURE — 94761 N-INVAS EAR/PLS OXIMETRY MLT: CPT

## 2023-11-09 RX ORDER — METOPROLOL SUCCINATE 25 MG/1
12.5 TABLET, EXTENDED RELEASE ORAL DAILY
Qty: 30 TABLET | Refills: 3 | Status: SHIPPED | OUTPATIENT
Start: 2023-11-10

## 2023-11-09 RX ADMIN — SODIUM CHLORIDE, PRESERVATIVE FREE 10 ML: 5 INJECTION INTRAVENOUS at 10:12

## 2023-11-09 RX ADMIN — CITALOPRAM 40 MG: 40 TABLET, FILM COATED ORAL at 10:12

## 2023-11-09 RX ADMIN — CARBIDOPA AND LEVODOPA 1 TABLET: 10; 100 TABLET ORAL at 13:06

## 2023-11-09 RX ADMIN — Medication 100 MG: at 10:12

## 2023-11-09 RX ADMIN — AMLODIPINE BESYLATE 5 MG: 5 TABLET ORAL at 10:12

## 2023-11-09 RX ADMIN — CARBIDOPA AND LEVODOPA 1 TABLET: 10; 100 TABLET ORAL at 10:11

## 2023-11-09 RX ADMIN — METOPROLOL SUCCINATE 12.5 MG: 25 TABLET, EXTENDED RELEASE ORAL at 10:12

## 2023-11-09 NOTE — PLAN OF CARE
Problem: Pain  Goal: Verbalizes/displays adequate comfort level or baseline comfort level  11/9/2023 0129 by Mary Soares RN  Outcome: Progressing  Flowsheets (Taken 11/9/2023 0129)  Verbalizes/displays adequate comfort level or baseline comfort level:   Encourage patient to monitor pain and request assistance   Assess pain using appropriate pain scale   Administer analgesics based on type and severity of pain and evaluate response   Implement non-pharmacological measures as appropriate and evaluate response   Consider cultural and social influences on pain and pain management   Notify Licensed Independent Practitioner if interventions unsuccessful or patient reports new pain  Note: Pt has not complained of pain this shift. Will continue to assess using appropriate pain scale. Problem: Skin/Tissue Integrity  Goal: Absence of new skin breakdown  Description: 1. Monitor for areas of redness and/or skin breakdown  2. Assess vascular access sites hourly  3. Every 4-6 hours minimum:  Change oxygen saturation probe site  4. Every 4-6 hours:  If on nasal continuous positive airway pressure, respiratory therapy assess nares and determine need for appliance change or resting period. 11/9/2023 0129 by Mary Soares RN  Outcome: Progressing  Note: Pt turned every 2 hours.  No new areas of skin breakdown noted     Problem: Chronic Conditions and Co-morbidities  Goal: Patient's chronic conditions and co-morbidity symptoms are monitored and maintained or improved  Outcome: Progressing  Flowsheets (Taken 11/9/2023 0129)  Care Plan - Patient's Chronic Conditions and Co-Morbidity Symptoms are Monitored and Maintained or Improved: Monitor and assess patient's chronic conditions and comorbid symptoms for stability, deterioration, or improvement

## 2023-11-09 NOTE — PROGRESS NOTES
MRI screening completed. MRI tech notified.  Electronically signed by Trisha Oropeza RN on 11/8/2023 at 7:54 PM

## 2023-11-09 NOTE — PLAN OF CARE
Problem: Pain  Goal: Verbalizes/displays adequate comfort level or baseline comfort level  Outcome: Progressing  Flowsheets (Taken 11/8/2023 1954)  Verbalizes/displays adequate comfort level or baseline comfort level: Encourage patient to monitor pain and request assistance  Note: Pt has not had any complaints of pain this shift. Problem: Skin/Tissue Integrity  Goal: Absence of new skin breakdown  Description: 1. Monitor for areas of redness and/or skin breakdown  2. Assess vascular access sites hourly  3. Every 4-6 hours minimum:  Change oxygen saturation probe site  4. Every 4-6 hours:  If on nasal continuous positive airway pressure, respiratory therapy assess nares and determine need for appliance change or resting period. Outcome: Progressing  Note: Margarito skin assessment completed this shift, no areas of new skin breakdown noted. Pt has external urinary catheter in place, pt has had occasional urine incontinence. Perineal care provided after each incontinent episode. Pt assisted to turn and reposition after each incontinent episode.

## 2023-11-09 NOTE — PROGRESS NOTES
Pt discharged to [facility] via transportation. Iv and tele removed.   Avs provided to transportation staff and report called to [facility]  Electronically signed by Anuja Lopez RN on 11/9/2023 at 6:45 PM

## 2023-11-09 NOTE — CARE COORDINATION
Case Management Assessment            Discharge Note                    Date / Time of Note: 11/9/2023 12:09 PM                  Discharge Note Completed by: Elliott Kauffman    Patient Name: Eliza Nj   YOB: 1940  Diagnosis: Hypoxia [R09.02]  Elevated troponin [R79.89]  Left hip pain [M25.552]  Fall from bed, initial encounter [W06. XXXA]   Date / Time: 11/5/2023  9:02 AM    Current PCP: Mary Stephens DO  Clinic patient: No    Hospitalization in the last 30 days: No       Advance Directives:  Code Status: Full Code  [place] DNR form completed and on chart: Not Indicated    Financial:  Payor: [address] / Plan: Santi Hernandez PPO / Product Type: Medicare /      Pharmacy:    [facility] [id number] - [address]  [address] [place] [address]  Phone: [phone] Fax: [phone]      Assistance purchasing medications?: Potential Assistance Purchasing Medications: No  Assistance provided by Case Management: None at this time    Does patient want to participate in local refill/ meds to beds program?: No    Meds To Beds General Rules:  1. Can ONLY be done Monday- Friday between 8:30am-5pm  2. Prescription(s) must be in pharmacy by 3pm to be filled same day  3. Copy of patient's insurance/ prescription drug card and patient face sheet must be sent along with the prescription(s)  4. Cost of Rx cannot be added to hospital bill. If financial assistance is needed, please contact unit  or ;  or  CANNOT provide pharmacy voucher for patients co-pays  5.  Patients can then  the prescription on their way out of the hospital at discharge, or pharmacy can deliver to the bedside if staff is available. (payment due at time of pick-up or delivery - cash, check, or card accepted)     Able to afford home medications/ co-pay costs: Yes    ADLS:  Current PT AM-PAC Score: 7 /24  Current OT

## 2023-11-09 NOTE — PLAN OF CARE
Problem: Pain  Goal: Verbalizes/displays adequate comfort level or baseline comfort level  Outcome: Adequate for Discharge  Flowsheets (Taken 11/9/2023 8681)  Verbalizes/displays adequate comfort level or baseline comfort level: Encourage patient to monitor pain and request assistance  Encourage pt to notify rn of any pain

## 2023-11-09 NOTE — ACP (ADVANCE CARE PLANNING)
Advance Care Planning   Healthcare Decision Maker:    Primary Decision Maker: Deysi Caul - [phone]    Secondary Decision Maker: Odalys Leavitt - [phone]      Today we documented Decision Maker(s) consistent with Legal Next of Kin hierarchy. Patient's spouse informed this  that patient has completed ACP documents, with the exception of signing of witnesses. Decision makers listed above reflect patient's chosen decision makers (also NOK).  met with patient's  at bedside while patient was sleeping. Spouse stated that patient has completed [address] and Living Will documents, but still needs to sign in the presence of witnesses. Unfortunately, pt's spouse does not have documents with him for this  and another staff person to witness, and patient is discharging this afternoon to [address] [place] [address], so spouse stated that he will take documents to [facility] and ask for assistance there. Writer listed pt's decision makers as reported by patient's spouse and encouraged him to present finalized ACP documents for scanning into pt's charge if pt requires further treatment at a [facility] facility.      Latty [facility] [address]  Phone: [phone]  Pager: [phone]

## 2023-11-10 NOTE — PROGRESS NOTES
Physician Progress Note      PATIENT:               Nomi Rasmussen  CSN #:                  [id number]  :                       1940  ADMIT DATE:       2023 9:02 AM  DISCH DATE:        2023 6:50 PM  RESPONDING  PROVIDER #:        Tri Sotelo MD          QUERY TEXT:    Patient admitted with fall, and hypoxia (per ED 23) Noted documentation   of type II MI in MD progress note 23. In order to support the diagnosis   of type II MI, please refer to 4th universal definition of MI below and   include additional clinical indicators in your documentation. Or please   document if the diagnosis of type II MI has been ruled out after study. ? The medical record reflects the following:  Risk Factors: hypoxia  Clinical Indicators: per H/P \"New O2 requirement of 5L, reports SOB today\"; Troponin levels- @ 11:11= 230 ng/dL,   @12:08=430 ng/dL,  @ 16:39 =206 ng/dl,  @ 21:74=458yn/dl; MD notes    \" Possible NSTEMI (less likely) or sec to  CKD\"  Treatment: CBC, CMP, Cardiac panel, chest CT, ECHO, 12-lead EKG,   Cardiology/Pulmonology consult, Meds- Norvasc, Aspirin,  Lipitor, Plavix, metoprolol, Oxygen protocol, Telemetry monitoring, V/S and   I/O monitoring per unit protocol    Fourth Universal Definition of Myocardial Infarction:  Clearly separates MI   from myocardial injury. Patients with elevated blood troponin levels but   without clinical evidence of ischemia are said to have had a myocardial   injury. ? To have a myocardial infarction requires both an elevated troponin   blood test along with at least one of the following:  - Symptoms of acute myocardial ischemia (Types 1 - 5 MI)  - Clinical evidence of ischemia, as evidenced in an electrocardiogram (EKG)   showing new ischemic changes (Type 1, Type 2, Type 3, or Type 4a MI)  - Development of pathological Q waves (Types 1 - 5 MI)  - Imaging evidence of new loss of viable myocardium or new regional wall   motion abnormality in a pattern consistent with an ischemic etiology (Types 1   - 5 MI)  Options provided:  -- MI type II ruled out after study and non-ischemic myocardial injury due to   hypoxia confirmed  -- MI type II due to hypoxia present as evidenced by, Please document   indicators of myocardial infarction. -- MI type II ruled out after study and demand ischemia due to hypoxia   confirmed  -- MI type II ruled out after study and non-ischemic myocardial injury due to   hypoxia confirmed  -- Other - I will add my own diagnosis  -- Disagree - Not applicable / Not valid  -- Disagree - Clinically unable to determine / Unknown  -- Refer to Clinical Documentation Reviewer    PROVIDER RESPONSE TEXT:    Type II MI was ruled out after study and demand ischemia confirmed. Query created by: Robinson Quezada on 11/9/2023 4:21 PM      QUERY TEXT:    Pt admitted with \"Elevated troponin/abd pain/new O2 requirement\" (H/P 11/5) If   possible, please document in the progress notes and discharge summary if you   are evaluating and/or treating any of the following: The medical record reflects the following:  Risk Factors:  COPD, history of non-small cell cancer stage IVB  Clinical Indicators: per H/P \"New O2 requirement of 5L, reports SOB today\";   V/S flowsheet (from 11/5-11/9)- O2 therapy of 2L via  nasal canula, to 7L via High flow nasal canula (29%-49% FiO2) with O2 sat. between %; Chest CT 11/5 \"Diffuse centrilobular  emphysema. Lobular 3 x 2 cm soft tissue mass in the central left upper lobe. It is worrisome for possible primary lung malignancy\".   Treatment: CBC, CMP, Chest CT/CXR, Pulmonology consult, meds-DUONEB, Oxygen   therapy protocol, V/S and I/O monitoring  per unit protocol  Options provided:  -- Acute respiratory failure with hypoxia  -- Acute respiratory failure with hypercapnia  -- Acute respiratory failure with hypoxia and hypercapnia  -- Other - I will add my own diagnosis  -- Disagree - Not

## 2023-12-05 PROBLEM — R79.89 ELEVATED TROPONIN: Status: RESOLVED | Noted: 2023-11-05 | Resolved: 2023-12-05

## (undated) DEVICE — CANNULA SAMP CO2 AD GRN 7FT CO2 AND 7FT O2 TBNG UNIV CONN

## (undated) DEVICE — FORCEPS BX L240CM JAW DIA2.8MM L CAP W/ NDL MIC MESH TOOTH

## (undated) DEVICE — BIPOLAR ELECTROHEMOSTASIS CATHETER: Brand: INJECTION GOLD PROBE